# Patient Record
Sex: FEMALE | Race: WHITE | NOT HISPANIC OR LATINO | Employment: OTHER | URBAN - METROPOLITAN AREA
[De-identification: names, ages, dates, MRNs, and addresses within clinical notes are randomized per-mention and may not be internally consistent; named-entity substitution may affect disease eponyms.]

---

## 2017-01-24 ENCOUNTER — OFFICE VISIT (OUTPATIENT)
Dept: NEUROSURGERY | Facility: CLINIC | Age: 37
End: 2017-01-24
Payer: MEDICARE

## 2017-01-24 VITALS
DIASTOLIC BLOOD PRESSURE: 78 MMHG | WEIGHT: 293 LBS | SYSTOLIC BLOOD PRESSURE: 138 MMHG | HEART RATE: 82 BPM | HEIGHT: 57 IN | BODY MASS INDEX: 63.21 KG/M2 | TEMPERATURE: 99 F

## 2017-01-24 DIAGNOSIS — G93.5 ARNOLD-CHIARI MALFORMATION, TYPE I: ICD-10-CM

## 2017-01-24 DIAGNOSIS — E66.01 MORBID OBESITY, UNSPECIFIED OBESITY TYPE: ICD-10-CM

## 2017-01-24 DIAGNOSIS — M79.601 RIGHT ARM PAIN: ICD-10-CM

## 2017-01-24 DIAGNOSIS — R42 DIZZINESS: ICD-10-CM

## 2017-01-24 DIAGNOSIS — G93.2 PSEUDOTUMOR CEREBRI: ICD-10-CM

## 2017-01-24 DIAGNOSIS — Z98.2 S/P VENTRICULOPERITONEAL SHUNT: Primary | ICD-10-CM

## 2017-01-24 PROCEDURE — 99214 OFFICE O/P EST MOD 30 MIN: CPT | Mod: S$GLB,,, | Performed by: NURSE PRACTITIONER

## 2017-01-24 PROCEDURE — 62252 CSF SHUNT REPROGRAM: CPT | Mod: S$GLB,,, | Performed by: NURSE PRACTITIONER

## 2017-01-24 RX ORDER — HYDROCODONE BITARTRATE AND ACETAMINOPHEN 10; 325 MG/1; MG/1
1 TABLET ORAL EVERY 4 HOURS PRN
Status: ON HOLD | COMMUNITY
End: 2017-03-08 | Stop reason: HOSPADM

## 2017-01-24 RX ORDER — NAPROXEN 375 MG/1
375 TABLET ORAL 2 TIMES DAILY
COMMUNITY
End: 2017-02-27 | Stop reason: CLARIF

## 2017-01-24 RX ORDER — FLUTICASONE PROPIONATE 50 MCG
1 SPRAY, SUSPENSION (ML) NASAL DAILY
COMMUNITY
End: 2017-11-09

## 2017-01-24 RX ORDER — BUTALBITAL, ASPIRIN, CAFFEINE AND CODEINE PHOSPHATE 50; 325; 40; 30 MG/1; MG/1; MG/1; MG/1
1 CAPSULE ORAL EVERY 4 HOURS PRN
COMMUNITY
End: 2018-09-12

## 2017-01-24 RX ORDER — CYCLOBENZAPRINE HCL 10 MG
10 TABLET ORAL 3 TIMES DAILY PRN
COMMUNITY
End: 2017-12-11 | Stop reason: SDUPTHER

## 2017-01-24 NOTE — MR AVS SNAPSHOT
[place] - Neurosurgery  [address]  [facility] [address]  Phone: [phone]  Fax: [phone]                  Shanna Douglass   2017 1:30 PM   Office Visit    Description:  Female : 1980   Provider:  Michelle Finnegan NP   Department:  [place] - Neurosurgery           Reason for Visit     Cervical Spine Pain (C-spine)     Shunt Problem                To Do List           Goals (5 Years of Data)     None      [facility]     [facility] On Call Nurse Care Line -  Assistance  Registered nurses in the [facility] provide clinical advisement, health education, appointment booking, and other advisory services.  Call for this free service at [phone].             Medications           Message regarding Medications     Verify the changes and/or additions to your medication regime listed below are the same as discussed with your clinician today.  If any of these changes or additions are incorrect, please notify your healthcare provider.             Verify that the below list of medications is an accurate representation of the medications you are currently taking.  If none reported, the list may be blank. If incorrect, please contact your healthcare provider. Carry this list with you in case of emergency.           Current Medications     amoxicillin (AMOXIL) 875 MG tablet     celecoxib (CELEBREX) 200 MG capsule Take 400 mg by mouth 2 (two) times daily.     cetirizine (ZYRTEC) 10 MG tablet Take 10 mg by mouth once daily.    codeine-butalbital-ASA-caffeine (BUTALBITAL COMPOUND-CODEINE) 18--40 mg Cap Take 1 capsule by mouth every 4 (four) hours as needed.    cyclobenzaprine (FLEXERIL) 10 MG tablet Take 10 mg by mouth 3 (three) times daily as needed for Muscle spasms.    fluticasone (FLONASE) 50 mcg/actuation nasal spray 1 spray by Each Nare route once daily.    furosemide (LASIX) 80 MG tablet Take 80 mg by mouth once daily.     gabapentin (NEURONTIN) 300 MG  "capsule Take 300 mg by mouth once daily.     hydrocodone-acetaminophen 10-325mg (NORCO)  mg Tab Take by mouth.    levothyroxine (SYNTHROID) 137 MCG Tab tablet Take 137 mcg by mouth before breakfast.     losartan (COZAAR) 50 MG tablet Take 50 mg by mouth once daily.     medroxyPROGESTERone (PROVERA) 10 MG tablet Take 10 mg by mouth once daily.     naproxen (NAPROSYN) 375 MG tablet Take 375 mg by mouth 2 (two) times daily.    oxycodone-acetaminophen (PERCOCET) 7.5-325 mg per tablet Take 1 tablet by mouth every 4 (four) hours as needed for Pain.    pantoprazole (PROTONIX) 40 MG tablet Take 40 mg by mouth once daily.     paroxetine (PAXIL) 20 MG tablet Take 20 mg by mouth once daily.     polyethylene glycol (GLYCOLAX) 17 gram PwPk Take 17 g by mouth once daily.    potassium chloride (MICRO-K) 10 MEQ CpSR Take 10 mEq by mouth once daily.    sulfamethoxazole-trimethoprim 800-160mg (BACTRIM DS) 800-160 mg Tab     topiramate (TOPAMAX) 100 MG tablet Take 100 mg by mouth 2 (two) times daily.            Clinical Reference Information           Vital Signs - Last Recorded  Most recent update: 1/24/2017  1:19 PM by Afia Apple LPN    BP Pulse Temp Ht Wt BMI    138/78 82 98.5 °F (36.9 °C) (Oral) 4' 9" (1.448 m) 134 kg (295 lb 6.7 oz) 63.93 kg/m2      Blood Pressure          Most Recent Value    BP  138/78      Allergies as of 1/24/2017     Diamox [Acetazolamide]      Immunizations Administered on Date of Encounter - 1/24/2017     None      [facility] Sign-Up     Activating your [facility] account is as easy as 1-2-3!     1) Visit [email or web address], select Sign Up Now, enter this activation code and your date of birth, then select Next.  [email or web address]  Expires: 3/10/2017  1:04 PM      2) Create a username and password to use when you visit [facility] in the future and select a security question in case you lose your password and select Next.    3) Enter your e-mail address and click Sign Up!    Additional Information  If " you have questions, please e-mail [email or web address] or call [phone] to talk to our [facility] staff. Remember, [facility] is NOT to be used for urgent needs. For medical emergencies, dial 911.

## 2017-01-24 NOTE — PROGRESS NOTES
Neurosurgery Outpatient Follow Up    Patient ID: Shanna Douglass is a 36 y.o. female.    Chief Complaint   Patient presents with    Cervical Spine Pain (C-spine)     5 month history of worsening, moderate-severe neck pain nothing pain, numbness and weakness in her entire RUE to hand. Reports intermittent bladder retention. Pain is increased by walking and slightly alleviated by sitting. She is doing PT with minimal improvement. She has not had injections.     Shunt Problem     Patient states she started having frequent headaches and intermittent dizziness that started about a week ago after having an MRI       Review of Systems   Constitutional: Positive for activity change. Negative for appetite change, chills, fever and unexpected weight change.   HENT: Negative for tinnitus, trouble swallowing and voice change.    Respiratory: Negative for apnea, cough, chest tightness and shortness of breath.    Cardiovascular: Negative for chest pain and palpitations.   Gastrointestinal: Negative for constipation, diarrhea, nausea and vomiting.   Genitourinary: Positive for vaginal pain. Negative for difficulty urinating, dysuria, frequency and urgency.   Musculoskeletal: Positive for arthralgias, gait problem, neck pain and neck stiffness. Negative for back pain.   Skin: Negative for wound.   Neurological: Positive for dizziness, tremors, weakness, numbness and headaches. Negative for seizures, facial asymmetry, speech difficulty and light-headedness.   Psychiatric/Behavioral: Negative for confusion and decreased concentration.       Past Medical History   Diagnosis Date    Asthma     Bronchitis     Chiari malformation type I     Chronic headache     Hyperlipidemia     Hypertension     Murmur, heart     NPH (normal pressure hydrocephalus)     Obesity     Pseudotumor cerebri     Thyroid disease      Social History     Social History    Marital status: Single     Spouse name: N/A    Number of children: N/A     Years of education: N/A     Occupational History    Not on file.     Social History Main Topics    Smoking status: Never Smoker    Smokeless tobacco: Never Used    Alcohol use No    Drug use: No    Sexual activity: Not on file     Other Topics Concern    Not on file     Social History Narrative     Family History   Problem Relation Age of Onset    Diabetes Mother     Hypertension Father     Heart disease Father     Heart disease Maternal Grandfather      Review of patient's allergies indicates:   Allergen Reactions    Diamox [acetazolamide] Hives       Current Outpatient Prescriptions:     amoxicillin (AMOXIL) 875 MG tablet, , Disp: , Rfl:     celecoxib (CELEBREX) 200 MG capsule, Take 400 mg by mouth 2 (two) times daily. , Disp: , Rfl:     cetirizine (ZYRTEC) 10 MG tablet, Take 10 mg by mouth once daily., Disp: , Rfl:     codeine-butalbital-ASA-caffeine (BUTALBITAL COMPOUND-CODEINE) 74--40 mg Cap, Take 1 capsule by mouth every 4 (four) hours as needed., Disp: , Rfl:     cyclobenzaprine (FLEXERIL) 10 MG tablet, Take 10 mg by mouth 3 (three) times daily as needed for Muscle spasms., Disp: , Rfl:     fluticasone (FLONASE) 50 mcg/actuation nasal spray, 1 spray by Each Nare route once daily., Disp: , Rfl:     furosemide (LASIX) 80 MG tablet, Take 80 mg by mouth once daily. , Disp: , Rfl:     gabapentin (NEURONTIN) 300 MG capsule, Take 300 mg by mouth once daily. , Disp: , Rfl:     hydrocodone-acetaminophen 10-325mg (NORCO)  mg Tab, Take by mouth., Disp: , Rfl:     levothyroxine (SYNTHROID) 137 MCG Tab tablet, Take 137 mcg by mouth before breakfast. , Disp: , Rfl:     losartan (COZAAR) 50 MG tablet, Take 50 mg by mouth once daily. , Disp: , Rfl:     medroxyPROGESTERone (PROVERA) 10 MG tablet, Take 10 mg by mouth once daily. , Disp: , Rfl:     naproxen (NAPROSYN) 375 MG tablet, Take 375 mg by mouth 2 (two) times daily., Disp: , Rfl:     oxycodone-acetaminophen (PERCOCET) 7.5-325 mg per  "tablet, Take 1 tablet by mouth every 4 (four) hours as needed for Pain., Disp: 45 tablet, Rfl: 0    pantoprazole (PROTONIX) 40 MG tablet, Take 40 mg by mouth once daily. , Disp: , Rfl:     paroxetine (PAXIL) 20 MG tablet, Take 20 mg by mouth once daily. , Disp: , Rfl:     polyethylene glycol (GLYCOLAX) 17 gram PwPk, Take 17 g by mouth once daily., Disp: 20 packet, Rfl: 1    potassium chloride (MICRO-K) 10 MEQ CpSR, Take 10 mEq by mouth once daily., Disp: , Rfl:     sulfamethoxazole-trimethoprim 800-160mg (BACTRIM DS) 800-160 mg Tab, , Disp: , Rfl:     topiramate (TOPAMAX) 100 MG tablet, Take 100 mg by mouth 2 (two) times daily. , Disp: , Rfl:   Blood pressure 138/78, pulse 82, temperature 98.5 °F (36.9 °C), temperature source Oral, height 4' 9" (1.448 m), weight 134 kg (295 lb 6.7 oz).      Neurologic Exam     Mental Status   Oriented to person, place, and time.   Follows 3 step commands.   Attention: normal. Concentration: normal.   Speech: speech is normal   Level of consciousness: alert  Knowledge: consistent with education.   Able to name object. Able to read. Able to repeat. Able to write. Normal comprehension.     Cranial Nerves     CN II   Visual acuity: normal  Right visual field deficit: none  Left visual field deficit: none     CN III, IV, VI   Pupils are equal, round, and reactive to light.  Extraocular motions are normal.   Right pupil: Size: 3 mm. Shape: regular. Reactivity: brisk. Consensual response: intact. Accommodation: intact.   Left pupil: Size: 3 mm. Shape: regular. Reactivity: brisk. Consensual response: intact. Accommodation: intact.   CN III: no CN III palsy  Nystagmus: none   Diplopia: none  Ophthalmoparesis: none  Conjugate gaze: present    CN V   Right facial sensation deficit: none  Left facial sensation deficit: none    CN VII   Right facial weakness: none  Left facial weakness: none    CN VIII   Hearing: intact    CN IX, X   CN IX normal.   CN X normal.     CN XI   Right " sternocleidomastoid strength: normal  Left sternocleidomastoid strength: normal  Right trapezius strength: normal  Left trapezius strength: normal    CN XII   Fasciculations: absent  Tongue deviation: none    Motor Exam   Muscle bulk: normal  Overall muscle tone: normal  Right arm pronator drift: absent  Left arm pronator drift: absent    Strength   Strength 5/5 except as noted.        Mild weakness left hand intrinsics     Sensory Exam   Light touch normal.   Vibration normal.   Pinprick normal.        Except for hypoesthesia left forearm, hand, digits 1-5     Gait, Coordination, and Reflexes     Coordination   Romberg: negative  Finger to nose coordination: normal  Heel to shin coordination: normal  Tandem walking coordination: abnormal    Tremor   Resting tremor: absent  Intention tremor: absent  Action tremor: left arm    Reflexes   Right brachioradialis: 2+  Left brachioradialis: 2+  Right biceps: 2+  Left biceps: 2+  Right triceps: 2+  Left triceps: 2+  Right Cardenas: present  Left Cardenas: absent  Right ankle clonus: absent  Left ankle clonus: absent       Mild ataxia       Physical Exam   Constitutional: She is oriented to person, place, and time. She appears well-developed and well-nourished.   obese   HENT:   Head: Normocephalic and atraumatic.   Eyes: EOM are normal. Pupils are equal, round, and reactive to light.   Neck: Neck supple. Decreased range of motion present.   Cardiovascular: Normal rate and intact distal pulses.    Pulmonary/Chest: Effort normal. No respiratory distress.   Abdominal: Soft. She exhibits no distension.   Musculoskeletal: She exhibits no edema or deformity.   Neurological: She is oriented to person, place, and time. She has an abnormal Tandem Gait Test. She has a normal Finger-Nose-Finger Test, a normal Heel to Ortiz Test and a normal Romberg Test.   Reflex Scores:       Tricep reflexes are 2+ on the right side and 2+ on the left side.       Bicep reflexes are 2+ on the right side  and 2+ on the left side.       Brachioradialis reflexes are 2+ on the right side and 2+ on the left side.  Skin: Skin is warm and dry.   Psychiatric: She has a normal mood and affect. Her speech is normal and behavior is normal. Judgment and thought content normal.   Developmentally delayed   Nursing note and vitals reviewed.  right frontal shunt valve palpable, compresses without refilling  PFD incision well healed    Provider dictation:  The patient is a 36 year-old morbidly obese  female with pseudotumor cerebri and chiari malformation who is s/p posterior fossa decompression in June 2016. She also has undergone  shunt in the past by another neurosurgeon. She is following up today due to complaints of neck and right arm pain, headache and dizziness. The arm and neck pain has been ongoing for ~5 months. She has reportedly been evaluated by an orthopedic surgeon and neurology. She underwent right shoulder MRI on Friday and needs her shunt valve evaluated due to setting change following MRI. The headache and dizziness started after the MRI. She has also recently undergone EMG/NCS of the bilateral upper extremities but this report is not available.     Most reccent CT head was in 9/2016 at [facility]. Shunt valve was set at 150 prior to MRI and was at 170 post-imaging.     Using the [employer] , I have reset the valve to 150 cm H20. However, I am not certain that the shunt is even functioning due to the fact that the valve compresses without refilling.     Given the above, I would to obtain a new CT head to evaluate the ventricular size. We will also obtain shunt series x-rays to evaluate for any discontinuity of the tubing. Due to her neck and arm pain, weakness, and ataxia, I would like to obtain a cervical MRI. We will see her back in this clinic to determine further plan of care after the above images are completed.     Visit Diagnosis:  S/P ventriculoperitoneal shunt  -     CT Head Without Contrast;  Future; Expected date: 1/24/17  -     MRI Cervical Spine Without Contrast; Future; Expected date: 1/24/17  -     X-Ray Shunt Series; Future; Expected date: 1/24/17  -     X-Ray Skull Ltd Less Than 4 Views; Future; Expected date: 1/24/17    Morbid obesity, unspecified obesity type  -     CT Head Without Contrast; Future; Expected date: 1/24/17  -     MRI Cervical Spine Without Contrast; Future; Expected date: 1/24/17  -     X-Ray Shunt Series; Future; Expected date: 1/24/17  -     X-Ray Skull Ltd Less Than 4 Views; Future; Expected date: 1/24/17    Pseudotumor cerebri  -     CT Head Without Contrast; Future; Expected date: 1/24/17  -     MRI Cervical Spine Without Contrast; Future; Expected date: 1/24/17  -     X-Ray Shunt Series; Future; Expected date: 1/24/17  -     X-Ray Skull Ltd Less Than 4 Views; Future; Expected date: 1/24/17    Arnold-Chiari malformation, type I  -     CT Head Without Contrast; Future; Expected date: 1/24/17  -     MRI Cervical Spine Without Contrast; Future; Expected date: 1/24/17  -     X-Ray Shunt Series; Future; Expected date: 1/24/17  -     X-Ray Skull Ltd Less Than 4 Views; Future; Expected date: 1/24/17    Right arm pain  -     CT Head Without Contrast; Future; Expected date: 1/24/17  -     MRI Cervical Spine Without Contrast; Future; Expected date: 1/24/17  -     X-Ray Shunt Series; Future; Expected date: 1/24/17  -     X-Ray Skull Ltd Less Than 4 Views; Future; Expected date: 1/24/17    Dizziness  -     CT Head Without Contrast; Future; Expected date: 1/24/17  -     MRI Cervical Spine Without Contrast; Future; Expected date: 1/24/17  -     X-Ray Shunt Series; Future; Expected date: 1/24/17  -     X-Ray Skull Ltd Less Than 4 Views; Future; Expected date: 1/24/17

## 2017-02-09 ENCOUNTER — OFFICE VISIT (OUTPATIENT)
Dept: NEUROSURGERY | Facility: CLINIC | Age: 37
End: 2017-02-09
Payer: MEDICARE

## 2017-02-09 VITALS
DIASTOLIC BLOOD PRESSURE: 76 MMHG | SYSTOLIC BLOOD PRESSURE: 127 MMHG | HEIGHT: 57 IN | HEART RATE: 90 BPM | BODY MASS INDEX: 61.5 KG/M2 | WEIGHT: 285.06 LBS | TEMPERATURE: 98 F

## 2017-02-09 DIAGNOSIS — M50.30 DEGENERATION OF CERVICAL DISC WITHOUT MYELOPATHY: ICD-10-CM

## 2017-02-09 DIAGNOSIS — R51.9 CHRONIC NONINTRACTABLE HEADACHE, UNSPECIFIED HEADACHE TYPE: ICD-10-CM

## 2017-02-09 DIAGNOSIS — Z98.2 S/P VENTRICULOPERITONEAL SHUNT: Primary | ICD-10-CM

## 2017-02-09 DIAGNOSIS — G89.29 CHRONIC NONINTRACTABLE HEADACHE, UNSPECIFIED HEADACHE TYPE: ICD-10-CM

## 2017-02-09 DIAGNOSIS — E66.01 MORBID OBESITY, UNSPECIFIED OBESITY TYPE: ICD-10-CM

## 2017-02-09 DIAGNOSIS — G93.2 PSEUDOTUMOR CEREBRI: ICD-10-CM

## 2017-02-09 PROCEDURE — 99214 OFFICE O/P EST MOD 30 MIN: CPT | Mod: S$GLB,,, | Performed by: NURSE PRACTITIONER

## 2017-02-09 NOTE — MR AVS SNAPSHOT
[facility] - Neurosurgery  [address]  [facility] [address]  Phone: [phone]  Fax: [phone]                  Shanna Douglass   2017 3:00 PM   Office Visit    Description:  Female : 1980   Provider:  Michelle Finnegan NP   Department:  [facility] - Neurosurgery           Reason for Visit     Follow-up                To Do List           Goals (5 Years of Data)     None      [facility]     [facility] On Call Nurse Care Line -  Assistance  Registered nurses in the [facility] provide clinical advisement, health education, appointment booking, and other advisory services.  Call for this free service at [phone].             Medications           Message regarding Medications     Verify the changes and/or additions to your medication regime listed below are the same as discussed with your clinician today.  If any of these changes or additions are incorrect, please notify your healthcare provider.             Verify that the below list of medications is an accurate representation of the medications you are currently taking.  If none reported, the list may be blank. If incorrect, please contact your healthcare provider. Carry this list with you in case of emergency.           Current Medications     amoxicillin (AMOXIL) 875 MG tablet     celecoxib (CELEBREX) 200 MG capsule Take 400 mg by mouth 2 (two) times daily.     cetirizine (ZYRTEC) 10 MG tablet Take 10 mg by mouth once daily.    codeine-butalbital-ASA-caffeine (BUTALBITAL COMPOUND-CODEINE) 53--40 mg Cap Take 1 capsule by mouth every 4 (four) hours as needed.    cyclobenzaprine (FLEXERIL) 10 MG tablet Take 10 mg by mouth 3 (three) times daily as needed for Muscle spasms.    fluticasone (FLONASE) 50 mcg/actuation nasal spray 1 spray by Each Nare route once daily.    furosemide (LASIX) 80 MG tablet Take 80 mg by mouth once daily.     gabapentin (NEURONTIN) 300 MG capsule Take 300 mg by mouth once daily.      "hydrocodone-acetaminophen 10-325mg (NORCO)  mg Tab Take by mouth.    levothyroxine (SYNTHROID) 137 MCG Tab tablet Take 137 mcg by mouth before breakfast.     losartan (COZAAR) 50 MG tablet Take 50 mg by mouth once daily.     medroxyPROGESTERone (PROVERA) 10 MG tablet Take 10 mg by mouth once daily.     naproxen (NAPROSYN) 375 MG tablet Take 375 mg by mouth 2 (two) times daily.    oxycodone-acetaminophen (PERCOCET) 7.5-325 mg per tablet Take 1 tablet by mouth every 4 (four) hours as needed for Pain.    pantoprazole (PROTONIX) 40 MG tablet Take 40 mg by mouth once daily.     paroxetine (PAXIL) 20 MG tablet Take 20 mg by mouth once daily.     polyethylene glycol (GLYCOLAX) 17 gram PwPk Take 17 g by mouth once daily.    potassium chloride (MICRO-K) 10 MEQ CpSR Take 10 mEq by mouth once daily.    sulfamethoxazole-trimethoprim 800-160mg (BACTRIM DS) 800-160 mg Tab     topiramate (TOPAMAX) 100 MG tablet Take 100 mg by mouth 2 (two) times daily.            Clinical Reference Information           Your Vitals Were     BP Pulse Temp Height Weight BMI    127/76 90 98.2 °F (36.8 °C) (Oral) 4' 9" (1.448 m) 129.3 kg (285 lb 0.9 oz) 61.69 kg/m2      Blood Pressure          Most Recent Value    BP  127/76      Allergies as of 2/9/2017     Diamox [Acetazolamide]      Immunizations Administered on Date of Encounter - 2/9/2017     None      Language Assistance Services     ATTENTION: Language assistance services are available, free of charge. Please call [phone].      ATENCIÓN: Si habla lyric, tiene a garcia disposición servicios gratuitos de asistencia lingüística. Llame al [phone].     [facility] Ý: N?u b?n nói Ti?ng Vi?t, có các d?ch v? h? tr? ngôn ng? mi?n phí dành cho b?n. G?i s? [phone].         [facility] complies with applicable Federal civil rights laws and does not discriminate on the basis of race, color, national origin, age, disability, or sex.        "

## 2017-02-09 NOTE — PROGRESS NOTES
Neurosurgery Outpatient Follow Up    Patient ID: Shanna Douglass is a 36 y.o. female.    Chief Complaint   Patient presents with    Follow-up     States headaches have increased in severity since last visit.        Review of Systems   Constitutional: Positive for activity change. Negative for appetite change, chills, fever and unexpected weight change.   HENT: Negative for tinnitus, trouble swallowing and voice change.    Respiratory: Negative for apnea, cough, chest tightness and shortness of breath.    Cardiovascular: Negative for chest pain and palpitations.   Gastrointestinal: Negative for constipation, diarrhea, nausea and vomiting.   Genitourinary: Negative for difficulty urinating, dysuria, frequency and urgency.   Musculoskeletal: Positive for arthralgias, gait problem, neck pain and neck stiffness. Negative for back pain.   Skin: Negative for wound.   Neurological: Positive for dizziness, tremors, weakness, numbness and headaches. Negative for seizures, facial asymmetry, speech difficulty and light-headedness.   Psychiatric/Behavioral: Negative for confusion and decreased concentration.       Past Medical History   Diagnosis Date    Asthma     Bronchitis     Chiari malformation type I     Chronic headache     Hyperlipidemia     Hypertension     Murmur, heart     NPH (normal pressure hydrocephalus)     Obesity     Pseudotumor cerebri     Thyroid disease      Social History     Social History    Marital status: Single     Spouse name: N/A    Number of children: N/A    Years of education: N/A     Occupational History    Not on file.     Social History Main Topics    Smoking status: Never Smoker    Smokeless tobacco: Never Used    Alcohol use No    Drug use: No    Sexual activity: Not on file     Other Topics Concern    Not on file     Social History Narrative     Family History   Problem Relation Age of Onset    Diabetes Mother     Hypertension Father     Heart disease Father      Heart disease Maternal Grandfather      Review of patient's allergies indicates:   Allergen Reactions    Diamox [acetazolamide] Hives       Current Outpatient Prescriptions:     amoxicillin (AMOXIL) 875 MG tablet, , Disp: , Rfl:     celecoxib (CELEBREX) 200 MG capsule, Take 400 mg by mouth 2 (two) times daily. , Disp: , Rfl:     cetirizine (ZYRTEC) 10 MG tablet, Take 10 mg by mouth once daily., Disp: , Rfl:     codeine-butalbital-ASA-caffeine (BUTALBITAL COMPOUND-CODEINE) 11--40 mg Cap, Take 1 capsule by mouth every 4 (four) hours as needed., Disp: , Rfl:     cyclobenzaprine (FLEXERIL) 10 MG tablet, Take 10 mg by mouth 3 (three) times daily as needed for Muscle spasms., Disp: , Rfl:     fluticasone (FLONASE) 50 mcg/actuation nasal spray, 1 spray by Each Nare route once daily., Disp: , Rfl:     furosemide (LASIX) 80 MG tablet, Take 80 mg by mouth once daily. , Disp: , Rfl:     gabapentin (NEURONTIN) 300 MG capsule, Take 300 mg by mouth once daily. , Disp: , Rfl:     hydrocodone-acetaminophen 10-325mg (NORCO)  mg Tab, Take by mouth., Disp: , Rfl:     levothyroxine (SYNTHROID) 137 MCG Tab tablet, Take 137 mcg by mouth before breakfast. , Disp: , Rfl:     losartan (COZAAR) 50 MG tablet, Take 50 mg by mouth once daily. , Disp: , Rfl:     medroxyPROGESTERone (PROVERA) 10 MG tablet, Take 10 mg by mouth once daily. , Disp: , Rfl:     naproxen (NAPROSYN) 375 MG tablet, Take 375 mg by mouth 2 (two) times daily., Disp: , Rfl:     oxycodone-acetaminophen (PERCOCET) 7.5-325 mg per tablet, Take 1 tablet by mouth every 4 (four) hours as needed for Pain., Disp: 45 tablet, Rfl: 0    pantoprazole (PROTONIX) 40 MG tablet, Take 40 mg by mouth once daily. , Disp: , Rfl:     paroxetine (PAXIL) 20 MG tablet, Take 20 mg by mouth once daily. , Disp: , Rfl:     polyethylene glycol (GLYCOLAX) 17 gram PwPk, Take 17 g by mouth once daily., Disp: 20 packet, Rfl: 1    potassium chloride (MICRO-K) 10 MEQ CpSR, Take  "10 mEq by mouth once daily., Disp: , Rfl:     sulfamethoxazole-trimethoprim 800-160mg (BACTRIM DS) 800-160 mg Tab, , Disp: , Rfl:     topiramate (TOPAMAX) 100 MG tablet, Take 100 mg by mouth 2 (two) times daily. , Disp: , Rfl:   Blood pressure 127/76, pulse 90, temperature 98.2 °F (36.8 °C), temperature source Oral, height 4' 9" (1.448 m), weight 129.3 kg (285 lb 0.9 oz).      Neurologic Exam     Mental Status   Oriented to person, place, and time.   Follows 3 step commands.   Attention: normal. Concentration: normal.   Speech: speech is normal   Level of consciousness: alert  Knowledge: consistent with education.   Able to name object. Able to read. Able to repeat. Able to write. Normal comprehension.     Cranial Nerves     CN II   Visual acuity: normal  Right visual field deficit: none  Left visual field deficit: none     CN III, IV, VI   Pupils are equal, round, and reactive to light.  Extraocular motions are normal.   Right pupil: Size: 3 mm. Shape: regular. Reactivity: brisk. Consensual response: intact. Accommodation: intact.   Left pupil: Size: 3 mm. Shape: regular. Reactivity: brisk. Consensual response: intact. Accommodation: intact.   CN III: no CN III palsy  Nystagmus: none   Diplopia: none  Ophthalmoparesis: none  Conjugate gaze: present    CN V   Right facial sensation deficit: none  Left facial sensation deficit: none    CN VII   Right facial weakness: none  Left facial weakness: none    CN VIII   Hearing: intact    CN IX, X   CN IX normal.   CN X normal.     CN XI   Right sternocleidomastoid strength: normal  Left sternocleidomastoid strength: normal  Right trapezius strength: normal  Left trapezius strength: normal    CN XII   Fasciculations: absent  Tongue deviation: none    Motor Exam   Muscle bulk: normal  Overall muscle tone: normal  Right arm pronator drift: absent  Left arm pronator drift: absent    Strength   Strength 5/5 except as noted.        Mild weakness left hand intrinsics     Sensory " Exam   Light touch normal.   Vibration normal.   Pinprick normal.        Except for hypoesthesia left forearm, hand, digits 1-5     Gait, Coordination, and Reflexes     Coordination   Romberg: negative  Finger to nose coordination: normal  Heel to shin coordination: normal  Tandem walking coordination: abnormal    Tremor   Resting tremor: absent  Intention tremor: absent  Action tremor: left arm    Reflexes   Right brachioradialis: 2+  Left brachioradialis: 2+  Right biceps: 2+  Left biceps: 2+  Right triceps: 2+  Left triceps: 2+  Right Cardenas: present  Left Cardenas: absent  Right ankle clonus: absent  Left ankle clonus: absent       Mild ataxia       Physical Exam   Constitutional: She is oriented to person, place, and time. She appears well-developed and well-nourished.   obese   HENT:   Head: Normocephalic and atraumatic.   Eyes: EOM are normal. Pupils are equal, round, and reactive to light.   Neck: Neck supple. Decreased range of motion present.   Cardiovascular: Normal rate and intact distal pulses.    Pulmonary/Chest: Effort normal. No respiratory distress.   Abdominal: Soft. She exhibits no distension.   Musculoskeletal: She exhibits no edema or deformity.   Neurological: She is oriented to person, place, and time. She has an abnormal Tandem Gait Test. She has a normal Finger-Nose-Finger Test, a normal Heel to Ortiz Test and a normal Romberg Test.   Reflex Scores:       Tricep reflexes are 2+ on the right side and 2+ on the left side.       Bicep reflexes are 2+ on the right side and 2+ on the left side.       Brachioradialis reflexes are 2+ on the right side and 2+ on the left side.  Skin: Skin is warm and dry.   Psychiatric: She has a normal mood and affect. Her speech is normal and behavior is normal. Judgment and thought content normal.   Developmentally delayed   Nursing note and vitals reviewed.  right frontal shunt valve palpable, compresses without refilling  PFD incision well healed    Provider  "dictation:  The patient is a 36 year-old morbidly obese  female with pseudotumor cerebri and chiari malformation who is s/p posterior fossa decompression in June 2016. She also has undergone  shunt in the past by another neurosurgeon.     She is following up today with a new CT head and cervical MRI.     Per her last visit: "She is following up due to complaints of neck and right arm pain, headache and dizziness. The arm and neck pain has been ongoing for ~5 months. She has reportedly been evaluated by an orthopedic surgeon and neurology. She underwent right shoulder MRI. The headache and dizziness started after the MRI. She has also recently undergone EMG/NCS of the bilateral upper extremities."     EMG report reviewed and reveals recurrent, mild bilateral carpal tunnel syndrome effecting both motor and sensory fibers.     CT head from 2/9/2017 at [facility] reviewed and reveals sulcal effacement with slit ventricles, consistent with severe pseudotumor cerebri.     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 There are degenerative findings that are most pronounced at C4/5 where there is a right paracentral disc protrusion that distorts the right hemicord and effaces the thecal sac.     Given the above, the patient's symptoms could be related to persistent increased ICP despite  shunt and posterior fossa decompression. She may be a candidate for cranial expansion procedure and we would like to purse admission in the near future for ICP bolt monitor insertion to better evaluate her ICP trend over 24-48 hours. If consistently high, we will proceed with cranial expansion. Shunt revision would not provide any benefit, given there is very little CSF available to drain.    The degree of cervical stenosis at C4/5 is likely contributing to her right arm/shoulder pain and some of her neck and head pain. She may require decompression in the " future.     We would like to address the pseudotumor cerebri first. IF she remains symptomatic, then we will address the cervical stenosis.     I have reviewed the patient's history, physical exam and neuroimaging with Dr. Velasco and he is in agreement with this plan. The patient would like to proceed with surgery as soon as possible.    Visit Diagnosis:  S/P ventriculoperitoneal shunt    Chronic nonintractable headache, unspecified headache type    Morbid obesity, unspecified obesity type    Pseudotumor cerebri    Degeneration of cervical disc without myelopathy

## 2017-02-14 DIAGNOSIS — G93.2 PSEUDOTUMOR CEREBRI: Primary | ICD-10-CM

## 2017-03-01 ENCOUNTER — TELEPHONE (OUTPATIENT)
Dept: NEUROSURGERY | Facility: CLINIC | Age: 37
End: 2017-03-01

## 2017-03-01 NOTE — TELEPHONE ENCOUNTER
Received a call from Mouna in Pre-access at [facility], that pt had an abnormal urine that needs to be treated prior to surgery. While discussing this we found that the order for urine culture was not released by Pre-access. Informed Mouna we will not be able to treat this until a urine culture is completed. Mouna states she will contact the pt to come in for another UA and Culture.

## 2017-03-06 PROBLEM — Z01.818 PRE-OP EVALUATION: Status: ACTIVE | Noted: 2017-03-06

## 2017-03-13 ENCOUNTER — CLINICAL SUPPORT (OUTPATIENT)
Dept: NEUROSURGERY | Facility: CLINIC | Age: 37
End: 2017-03-13
Payer: MEDICARE

## 2017-03-13 NOTE — PROGRESS NOTES
Pt is 7 days s/p right frontal ICP bolt monitor with Dr. Velasco. No s/s of infection. Staples removed with no issue. Pt reports post-operative pain level = pre-operative state. Pt is requesting medication refill today. Pt re-educated on narcotics policy. Per JR, NP, no refills to be given. Pt aware that no f/u is needed for this matter. No further questions.

## 2017-03-13 NOTE — MR AVS SNAPSHOT
[facility]  [address]  [place] [address]  Phone: [phone]  Fax: [phone]                  Shanna Douglass   3/13/2017 1:30 PM   Clinical Support    Description:  Female : 1980   Provider:  [facility]   Department:  [facility]                To Do List           Future Appointments        Provider Department Dept Phone    2017 10:30 AM Stanley Velasco MD [facility] Neurosurgery [phone]      Goals (5 Years of Data)     None      [facility]     [facility] On Call Nurse Care Line -  Assistance  Registered nurses in the [facility] On Call Center provide clinical advisement, health education, appointment booking, and other advisory services.  Call for this free service at [phone].             Medications           Message regarding Medications     Verify the changes and/or additions to your medication regime listed below are the same as discussed with your clinician today.  If any of these changes or additions are incorrect, please notify your healthcare provider.             Verify that the below list of medications is an accurate representation of the medications you are currently taking.  If none reported, the list may be blank. If incorrect, please contact your healthcare provider. Carry this list with you in case of emergency.           Current Medications     aspirin (ECOTRIN) 81 MG EC tablet Take 81 mg by mouth once daily.    cetirizine (ZYRTEC) 10 MG tablet Take 10 mg by mouth once daily.    codeine-butalbital-ASA-caffeine (BUTALBITAL COMPOUND-CODEINE) 46--40 mg Cap Take 1 capsule by mouth every 4 (four) hours as needed.    cyclobenzaprine (FLEXERIL) 10 MG tablet Take 10 mg by mouth 3 (three) times daily as needed for Muscle spasms.    fluticasone (FLONASE) 50 mcg/actuation nasal spray 1 spray by Each Nare route once daily.    furosemide (LASIX) 80 MG tablet Take 80 mg by mouth once daily.      hydrocodone-acetaminophen 5-325mg (NORCO) 5-325 mg per tablet Take 1 tablet by mouth every 6 (six) hours as needed for Pain.    levothyroxine (SYNTHROID) 137 MCG Tab tablet Take 125 mcg by mouth before breakfast.     losartan (COZAAR) 50 MG tablet Take 50 mg by mouth once daily.     pantoprazole (PROTONIX) 40 MG tablet Take 40 mg by mouth once daily.     paroxetine (PAXIL) 20 MG tablet Take 20 mg by mouth once daily.     potassium chloride (MICRO-K) 10 MEQ CpSR Take 10 mEq by mouth once daily.    topiramate (TOPAMAX) 100 MG tablet Take 100 mg by mouth 2 (two) times daily.            Clinical Reference Information           Your Vitals Were     Last Period                   01/27/2017           Allergies as of 3/13/2017     Diamox [Acetazolamide]    Dye      Immunizations Administered on Date of Encounter - 3/13/2017     None      Language Assistance Services     ATTENTION: Language assistance services are available, free of charge. Please call [phone].      ATENCIÓN: Si marianola lyric, tiene a garcia disposición servicios gratuitos de asistencia lingüística. Llame al [phone].     GURMEET Ý: N?u b?n nói Ti?ng Vi?t, có các d?ch v? h? tr? ngôn ng? mi?n phí dành cho b?n. G?i s? [phone].         [facility] complies with applicable Federal civil rights laws and does not discriminate on the basis of race, color, national origin, age, disability, or sex.

## 2017-03-16 ENCOUNTER — PATIENT MESSAGE (OUTPATIENT)
Dept: ORTHOPEDIC SURGERY | Facility: CLINIC | Age: 37
End: 2017-03-16

## 2017-04-04 ENCOUNTER — OFFICE VISIT (OUTPATIENT)
Dept: CARDIOLOGY | Facility: CLINIC | Age: 37
End: 2017-04-04
Payer: MEDICARE

## 2017-04-04 VITALS
DIASTOLIC BLOOD PRESSURE: 60 MMHG | BODY MASS INDEX: 58.91 KG/M2 | HEART RATE: 114 BPM | HEIGHT: 57 IN | WEIGHT: 273.06 LBS | SYSTOLIC BLOOD PRESSURE: 114 MMHG

## 2017-04-04 DIAGNOSIS — Z98.2 S/P VENTRICULOPERITONEAL SHUNT: Primary | ICD-10-CM

## 2017-04-04 DIAGNOSIS — I10 ESSENTIAL HYPERTENSION: ICD-10-CM

## 2017-04-04 PROCEDURE — 99999 PR PBB SHADOW E&M-EST. PATIENT-LVL II: CPT | Mod: PBBFAC,,, | Performed by: INTERNAL MEDICINE

## 2017-04-04 PROCEDURE — 99204 OFFICE O/P NEW MOD 45 MIN: CPT | Mod: S$PBB,,, | Performed by: INTERNAL MEDICINE

## 2017-04-04 PROCEDURE — 99212 OFFICE O/P EST SF 10 MIN: CPT | Mod: PBBFAC,PO | Performed by: INTERNAL MEDICINE

## 2017-04-04 RX ORDER — CIPROFLOXACIN 500 MG/1
500 TABLET ORAL
COMMUNITY
End: 2017-10-03

## 2017-04-04 RX ORDER — LEVOTHYROXINE SODIUM 125 UG/1
125 TABLET ORAL DAILY
COMMUNITY
End: 2018-05-03

## 2017-04-04 RX ORDER — ONDANSETRON 4 MG/1
8 TABLET, ORALLY DISINTEGRATING ORAL EVERY 4 HOURS PRN
COMMUNITY
End: 2017-12-11

## 2017-04-04 RX ORDER — BENZONATATE 100 MG/1
100 CAPSULE ORAL 3 TIMES DAILY PRN
COMMUNITY
End: 2017-11-09

## 2017-04-04 RX ORDER — ASPIRIN 81 MG
100 TABLET, DELAYED RELEASE (ENTERIC COATED) ORAL
COMMUNITY
End: 2018-09-12

## 2017-04-04 RX ORDER — GABAPENTIN 800 MG/1
800 TABLET ORAL 3 TIMES DAILY
COMMUNITY
End: 2017-09-19

## 2017-04-04 NOTE — PROGRESS NOTES
Subjective:   Patient ID:  Shanna Douglass is a 36 y.o. female who presents for follow-up to establish care. Pt with hx of murmur.  Pt with  shunt last month.Patient denies CP, angina or anginal equivalent.    Hypertension   This is a chronic problem. The current episode started more than 1 year ago. The problem has been gradually improving since onset. The problem is controlled. Pertinent negatives include no chest pain, palpitations or shortness of breath. Past treatments include angiotensin blockers and diuretics. The current treatment provides moderate improvement. Compliance problems include exercise.        Review of Systems   Constitution: Negative. Negative for weight gain.   HENT: Negative.    Eyes: Negative.    Cardiovascular: Negative.  Negative for chest pain, leg swelling and palpitations.   Respiratory: Negative.  Negative for shortness of breath.    Endocrine: Negative.    Hematologic/Lymphatic: Negative.    Skin: Negative.    Musculoskeletal: Negative for muscle weakness.   Gastrointestinal: Negative.    Genitourinary: Negative.    Neurological: Negative.  Negative for dizziness.   Psychiatric/Behavioral: Negative.    Allergic/Immunologic: Negative.      Family History   Problem Relation Age of Onset    Diabetes Mother     Hypertension Father     Heart disease Father     Heart disease Maternal Grandfather      Past Medical History:   Diagnosis Date    Asthma     Bronchitis     Chiari malformation type I     Chronic headache     Hyperlipidemia     Hypertension     Murmur, heart     NPH (normal pressure hydrocephalus)     Obesity     MANUEL on CPAP     Pseudotumor cerebri     Thyroid disease     Hadley syndrome      Current Outpatient Prescriptions on File Prior to Visit   Medication Sig Dispense Refill    aspirin (ECOTRIN) 81 MG EC tablet Take 81 mg by mouth once daily.      cetirizine (ZYRTEC) 10 MG tablet Take 10 mg by mouth once daily.      codeine-butalbital-ASA-caffeine  (BUTALBITAL COMPOUND-CODEINE) 23--40 mg Cap Take 1 capsule by mouth every 4 (four) hours as needed.      cyclobenzaprine (FLEXERIL) 10 MG tablet Take 10 mg by mouth 3 (three) times daily as needed for Muscle spasms.      fluticasone (FLONASE) 50 mcg/actuation nasal spray 1 spray by Each Nare route once daily.      furosemide (LASIX) 80 MG tablet Take 80 mg by mouth once daily.       hydrocodone-acetaminophen 5-325mg (NORCO) 5-325 mg per tablet Take 1 tablet by mouth every 6 (six) hours as needed for Pain. 30 tablet 0    losartan (COZAAR) 50 MG tablet Take 50 mg by mouth once daily.       pantoprazole (PROTONIX) 40 MG tablet Take 40 mg by mouth once daily.       paroxetine (PAXIL) 20 MG tablet Take 20 mg by mouth once daily.       potassium chloride (MICRO-K) 10 MEQ CpSR Take 10 mEq by mouth once daily.      topiramate (TOPAMAX) 100 MG tablet Take 100 mg by mouth 2 (two) times daily.       [DISCONTINUED] levothyroxine (SYNTHROID) 137 MCG Tab tablet Take 125 mcg by mouth before breakfast.        No current facility-administered medications on file prior to visit.      Review of patient's allergies indicates:   Allergen Reactions    Diamox [acetazolamide] Hives    Dye Swelling and Rash       Objective:     Physical Exam   Constitutional: She is oriented to person, place, and time. She appears well-developed and well-nourished.   HENT:   Head: Normocephalic and atraumatic.   Eyes: Conjunctivae and EOM are normal. Pupils are equal, round, and reactive to light.   Neck: Normal range of motion. Neck supple.   Cardiovascular: Normal rate, regular rhythm, normal heart sounds and intact distal pulses.    Pulmonary/Chest: Effort normal and breath sounds normal.   Abdominal: Soft. Bowel sounds are normal.   Musculoskeletal: Normal range of motion.   Neurological: She is alert and oriented to person, place, and time.   Skin: Skin is warm and dry.   Psychiatric: She has a normal mood and affect.   Nursing note  and vitals reviewed.      Assessment:     1. S/P ventriculoperitoneal shunt    2. Essential hypertension    3. BMI 50.0-59.9, adult    4. Sleep Apnea    Plan:     S/P ventriculoperitoneal shunt    Essential hypertension    BMI 50.0-59.9, adult    echo  Continue losartan, diuretic- htn  Weight loss

## 2017-04-04 NOTE — MR AVS SNAPSHOT
[facility]  [address]  [place] [address]  Phone: [phone]  Fax: [phone]                  Shanna Douglass   2017 1:20 PM   Office Visit    Description:  Female : 1980   Provider:  Kit Bucio MD   Department:  [facility]           Reason for Visit     Consult           Diagnoses this Visit        Comments    S/P ventriculoperitoneal shunt    -  Primary     Essential hypertension         BMI 50.0-59.9, adult                To Do List           Future Appointments        Provider Department Dept Phone    2017 10:30 AM Stanley Velasco MD [facility] Neurosurgery [phone]      Goals (5 Years of Data)     None      Follow-Up and Disposition     Return in about 4 months (around 2017).      [facility] On Call     [facility] On Call Nurse Care Line -  Assistance  Unless otherwise directed by your provider, please contact [facility], our nurse care line that is available for  assistance.     Registered nurses in the [facility] On Call Center provide: appointment scheduling, clinical advisement, health education, and other advisory services.  Call: [phone] (toll free)               Medications           Message regarding Medications     Verify the changes and/or additions to your medication regime listed below are the same as discussed with your clinician today.  If any of these changes or additions are incorrect, please notify your healthcare provider.             Verify that the below list of medications is an accurate representation of the medications you are currently taking.  If none reported, the list may be blank. If incorrect, please contact your healthcare provider. Carry this list with you in case of emergency.           Current Medications     aspirin (ECOTRIN) 81 MG EC tablet Take 81 mg by mouth once daily.    benzonatate (TESSALON) 100 MG capsule Take 100 mg by mouth 3 (three) times daily as needed for Cough.     "butalbital-aspirin-caffeine (BUTALBITAL COMPOUND) -40 mg Tab Take by mouth.    cetirizine (ZYRTEC) 10 MG tablet Take 10 mg by mouth once daily.    ciprofloxacin HCl (CIPRO) 500 MG tablet Take 500 mg by mouth every 12 (twelve) hours.    codeine-butalbital-ASA-caffeine (BUTALBITAL COMPOUND-CODEINE) 62--40 mg Cap Take 1 capsule by mouth every 4 (four) hours as needed.    cyclobenzaprine (FLEXERIL) 10 MG tablet Take 10 mg by mouth 3 (three) times daily as needed for Muscle spasms.    docusate sodium (STOOL SOFTENER) 100 mg capsule Take 100 mg by mouth as needed for Constipation.    fluticasone (FLONASE) 50 mcg/actuation nasal spray 1 spray by Each Nare route once daily.    furosemide (LASIX) 80 MG tablet Take 80 mg by mouth once daily.     gabapentin (NEURONTIN) 800 MG tablet Take 800 mg by mouth 3 (three) times daily.    hydrocodone-acetaminophen 5-325mg (NORCO) 5-325 mg per tablet Take 1 tablet by mouth every 6 (six) hours as needed for Pain.    levothyroxine (SYNTHROID) 125 MCG tablet Take 125 mcg by mouth once daily.    losartan (COZAAR) 50 MG tablet Take 50 mg by mouth once daily.     ondansetron (ZOFRAN-ODT) 4 MG TbDL Take 8 mg by mouth every 4 (four) hours as needed.    pantoprazole (PROTONIX) 40 MG tablet Take 40 mg by mouth once daily.     paroxetine (PAXIL) 20 MG tablet Take 20 mg by mouth once daily.     potassium chloride (MICRO-K) 10 MEQ CpSR Take 10 mEq by mouth once daily.    topiramate (TOPAMAX) 100 MG tablet Take 100 mg by mouth 2 (two) times daily.            Clinical Reference Information           Your Vitals Were     BP Pulse Height Weight BMI    114/60 (BP Location: Left arm, Patient Position: Sitting, BP Method: Automatic) 114 4' 9" (1.448 m) 123.9 kg (273 lb 0.6 oz) 59.09 kg/m2      Blood Pressure          Most Recent Value    BP  114/60      Allergies as of 4/4/2017     Diamox [Acetazolamide]    Dye      Immunizations Administered on Date of Encounter - 4/4/2017     None      Orders " Placed During Today's Visit      Normal Orders This Visit    2D echo with color flow doppler       Language Assistance Services     ATTENTION: Language assistance services are available, free of charge. Please call [phone].      ATENCIÓN: Si habla lyric, tiene a garcia disposición servicios gratuitos de asistencia lingüística. Llame al [phone].     CHÚ Ý: N?u b?n nói Ti?ng Vi?t, có các d?ch v? h? tr? ngôn ng? mi?n phí dành cho b?n. G?i s? [phone].         [facility] complies with applicable Federal civil rights laws and does not discriminate on the basis of race, color, national origin, age, disability, or sex.

## 2017-04-06 ENCOUNTER — OFFICE VISIT (OUTPATIENT)
Dept: NEUROSURGERY | Facility: CLINIC | Age: 37
End: 2017-04-06
Payer: MEDICARE

## 2017-04-06 VITALS
DIASTOLIC BLOOD PRESSURE: 85 MMHG | HEART RATE: 100 BPM | SYSTOLIC BLOOD PRESSURE: 129 MMHG | WEIGHT: 274.25 LBS | BODY MASS INDEX: 59.17 KG/M2 | HEIGHT: 57 IN

## 2017-04-06 DIAGNOSIS — G89.29 CHRONIC BILATERAL LOW BACK PAIN WITHOUT SCIATICA: ICD-10-CM

## 2017-04-06 DIAGNOSIS — M50.20 HERNIATED CERVICAL INTERVERTEBRAL DISC: ICD-10-CM

## 2017-04-06 DIAGNOSIS — G93.5 ARNOLD-CHIARI MALFORMATION, TYPE I: ICD-10-CM

## 2017-04-06 DIAGNOSIS — Z98.2 S/P VENTRICULOPERITONEAL SHUNT: Primary | ICD-10-CM

## 2017-04-06 DIAGNOSIS — E66.01 MORBID OBESITY, UNSPECIFIED OBESITY TYPE: ICD-10-CM

## 2017-04-06 DIAGNOSIS — M54.2 NECK PAIN: ICD-10-CM

## 2017-04-06 DIAGNOSIS — M54.50 CHRONIC BILATERAL LOW BACK PAIN WITHOUT SCIATICA: ICD-10-CM

## 2017-04-06 DIAGNOSIS — G93.2 PSEUDOTUMOR CEREBRI SYNDROME: ICD-10-CM

## 2017-04-06 DIAGNOSIS — M48.02 CERVICAL STENOSIS OF SPINAL CANAL: ICD-10-CM

## 2017-04-06 PROCEDURE — 99214 OFFICE O/P EST MOD 30 MIN: CPT | Mod: S$GLB,,, | Performed by: NEUROLOGICAL SURGERY

## 2017-04-06 NOTE — PROGRESS NOTES
Neurosurgery Outpatient Follow Up    Patient ID: Shanna Douglass is a 36 y.o. female.    Chief Complaint   Patient presents with    Follow-up     Neck pain follow up. States pain has increased since last visit.        Review of Systems   Constitutional: Positive for activity change. Negative for appetite change, chills, fever and unexpected weight change.   HENT: Negative for tinnitus, trouble swallowing and voice change.    Respiratory: Negative for apnea, cough, chest tightness and shortness of breath.    Cardiovascular: Negative for chest pain and palpitations.   Gastrointestinal: Negative for constipation, diarrhea, nausea and vomiting.   Genitourinary: Positive for vaginal pain. Negative for difficulty urinating, dysuria, frequency and urgency.   Musculoskeletal: Positive for arthralgias, gait problem, neck pain and neck stiffness. Negative for back pain.   Skin: Negative for wound.   Neurological: Positive for dizziness, tremors, weakness, numbness and headaches. Negative for seizures, facial asymmetry, speech difficulty and light-headedness.   Psychiatric/Behavioral: Negative for confusion and decreased concentration.       Past Medical History:   Diagnosis Date    Asthma     Bronchitis     Chiari malformation type I     Chronic headache     Hyperlipidemia     Hypertension     Murmur, heart     NPH (normal pressure hydrocephalus)     Obesity     MANUEL on CPAP     Pseudotumor cerebri     Thyroid disease     Hadley syndrome      Social History     Social History    Marital status: Single     Spouse name: N/A    Number of children: N/A    Years of education: N/A     Occupational History    Not on file.     Social History Main Topics    Smoking status: Never Smoker    Smokeless tobacco: Never Used    Alcohol use No    Drug use: No    Sexual activity: Not on file     Other Topics Concern    Not on file     Social History Narrative     Family History   Problem Relation Age of Onset     Diabetes Mother     Hypertension Father     Heart disease Father     Heart disease Maternal Grandfather      Review of patient's allergies indicates:   Allergen Reactions    Diamox [acetazolamide] Hives       Current Outpatient Prescriptions:     aspirin (ECOTRIN) 81 MG EC tablet, Take 81 mg by mouth once daily., Disp: , Rfl:     benzonatate (TESSALON) 100 MG capsule, Take 100 mg by mouth 3 (three) times daily as needed for Cough., Disp: , Rfl:     butalbital-aspirin-caffeine (BUTALBITAL COMPOUND) -40 mg Tab, Take by mouth., Disp: , Rfl:     cetirizine (ZYRTEC) 10 MG tablet, Take 10 mg by mouth once daily., Disp: , Rfl:     ciprofloxacin HCl (CIPRO) 500 MG tablet, Take 500 mg by mouth every 12 (twelve) hours., Disp: , Rfl:     codeine-butalbital-ASA-caffeine (BUTALBITAL COMPOUND-CODEINE) 01--40 mg Cap, Take 1 capsule by mouth every 4 (four) hours as needed., Disp: , Rfl:     cyclobenzaprine (FLEXERIL) 10 MG tablet, Take 10 mg by mouth 3 (three) times daily as needed for Muscle spasms., Disp: , Rfl:     docusate sodium (STOOL SOFTENER) 100 mg capsule, Take 100 mg by mouth as needed for Constipation., Disp: , Rfl:     fluticasone (FLONASE) 50 mcg/actuation nasal spray, 1 spray by Each Nare route once daily., Disp: , Rfl:     furosemide (LASIX) 80 MG tablet, Take 80 mg by mouth once daily. , Disp: , Rfl:     gabapentin (NEURONTIN) 800 MG tablet, Take 800 mg by mouth 3 (three) times daily., Disp: , Rfl:     hydrocodone-acetaminophen 5-325mg (NORCO) 5-325 mg per tablet, Take 1 tablet by mouth every 6 (six) hours as needed for Pain., Disp: 30 tablet, Rfl: 0    levothyroxine (SYNTHROID) 125 MCG tablet, Take 125 mcg by mouth once daily., Disp: , Rfl:     losartan (COZAAR) 50 MG tablet, Take 50 mg by mouth once daily. , Disp: , Rfl:     ondansetron (ZOFRAN-ODT) 4 MG TbDL, Take 8 mg by mouth every 4 (four) hours as needed., Disp: , Rfl:     pantoprazole (PROTONIX) 40 MG tablet, Take 40 mg by  "mouth once daily. , Disp: , Rfl:     paroxetine (PAXIL) 20 MG tablet, Take 20 mg by mouth once daily. , Disp: , Rfl:     potassium chloride (MICRO-K) 10 MEQ CpSR, Take 10 mEq by mouth once daily., Disp: , Rfl:     topiramate (TOPAMAX) 100 MG tablet, Take 100 mg by mouth 2 (two) times daily. , Disp: , Rfl:   Blood pressure 129/85, pulse 100, height 4' 9" (1.448 m), weight 124.4 kg (274 lb 4 oz).      Neurologic Exam     Mental Status   Oriented to person, place, and time.   Follows 3 step commands.   Attention: normal. Concentration: normal.   Speech: speech is normal   Level of consciousness: alert  Knowledge: consistent with education.   Able to name object. Able to read. Able to repeat. Able to write. Normal comprehension.     Cranial Nerves     CN II   Visual acuity: normal  Right visual field deficit: none  Left visual field deficit: none     CN III, IV, VI   Pupils are equal, round, and reactive to light.  Extraocular motions are normal.   Right pupil: Size: 3 mm. Shape: regular. Reactivity: brisk. Consensual response: intact. Accommodation: intact.   Left pupil: Size: 3 mm. Shape: regular. Reactivity: brisk. Consensual response: intact. Accommodation: intact.   CN III: no CN III palsy  Nystagmus: none   Diplopia: none  Ophthalmoparesis: none  Conjugate gaze: present    CN V   Right facial sensation deficit: none  Left facial sensation deficit: none    CN VII   Right facial weakness: none  Left facial weakness: none    CN VIII   Hearing: intact    CN IX, X   CN IX normal.   CN X normal.     CN XI   Right sternocleidomastoid strength: normal  Left sternocleidomastoid strength: normal  Right trapezius strength: normal  Left trapezius strength: normal    CN XII   Fasciculations: absent  Tongue deviation: none    Motor Exam   Muscle bulk: normal  Overall muscle tone: normal  Right arm pronator drift: absent  Left arm pronator drift: absent    Strength   Strength 5/5 except as noted.        Mild weakness left " hand intrinsics     Sensory Exam   Light touch normal.   Vibration normal.   Pinprick normal.        Except for hypoesthesia left forearm, hand, digits 1-5     Gait, Coordination, and Reflexes     Coordination   Romberg: negative  Finger to nose coordination: normal  Heel to shin coordination: normal  Tandem walking coordination: abnormal    Tremor   Resting tremor: absent  Intention tremor: absent  Action tremor: left arm    Reflexes   Right brachioradialis: 2+  Left brachioradialis: 2+  Right biceps: 2+  Left biceps: 2+  Right triceps: 2+  Left triceps: 2+  Right Cardenas: present  Left Cardenas: absent  Right ankle clonus: absent  Left ankle clonus: absent       Mild ataxia       Physical Exam   Constitutional: She is oriented to person, place, and time. She appears well-developed and well-nourished.   obese   HENT:   Head: Normocephalic and atraumatic.   Eyes: EOM are normal. Pupils are equal, round, and reactive to light.   Neck: Neck supple. Decreased range of motion present.   Cardiovascular: Normal rate and intact distal pulses.    Pulmonary/Chest: Effort normal. No respiratory distress.   Abdominal: Soft. She exhibits no distension.   Musculoskeletal: She exhibits no edema or deformity.   Neurological: She is oriented to person, place, and time. She has an abnormal Tandem Gait Test. She has a normal Finger-Nose-Finger Test, a normal Heel to Ortiz Test and a normal Romberg Test.   Reflex Scores:       Tricep reflexes are 2+ on the right side and 2+ on the left side.       Bicep reflexes are 2+ on the right side and 2+ on the left side.       Brachioradialis reflexes are 2+ on the right side and 2+ on the left side.  Skin: Skin is warm and dry.   Psychiatric: She has a normal mood and affect. Her speech is normal and behavior is normal. Judgment and thought content normal.   Developmentally delayed   Nursing note and vitals reviewed.  right frontal shunt valve palpable, compresses without refilling  PFD incision  well healed    Provider dictation:  The patient is a 36 year-old  female with extreme morbid obesity with pseudotumor cerebri and chiari malformation who is s/p posterior fossa decompression in June 2016.She is following up today due to complaints of neck and right arm pain and numbness as well as low back pain and gait difficulty. The arm and neck pain has been ongoing for over 1 year and she reports numbness in the entire right arm. Her neck and back pain are worse with movement and with walking.    She has a recent imaging of the cervical spine.    The Codman shunt shunt is set at 150.     The cervical MRI shows congenital narrowing of the cervical spine with moderate stenosis throughout and a C4-5 right paracentral disc herniation compressing the ventral hemicord.    Given the above she is a candidate for a C4-5 ADCF but she is a high risk surgical candidate due to her extreme BMI and spherical body habitus. I would like her to see a bariatric surgeon before considering spinal interventions. We will refer her to Dr. Bowden.    Visit Diagnosis:  S/P ventriculoperitoneal shunt    Pseudotumor cerebri syndrome    Morbid obesity, unspecified obesity type    BMI 50.0-59.9, adult    Arnold-Chiari malformation, type I    Neck pain    Cervical stenosis of spinal canal    Herniated cervical intervertebral disc    Chronic bilateral low back pain without sciatica

## 2017-04-06 NOTE — MR AVS SNAPSHOT
[facility] Neurosurgery  [address] [facility] [address] [place] [address]  Phone: [phone]  Fax: [phone]                  Shanna Douglass   2017 10:30 AM   Office Visit    Description:  Female : 1980   Provider:  Stanley Velasco MD   Department:  [facility] - Neurosurgery           Reason for Visit     Follow-up           Diagnoses this Visit        Comments    S/P ventriculoperitoneal shunt    -  Primary     Pseudotumor cerebri syndrome         Morbid obesity, unspecified obesity type         BMI 50.0-59.9, adult         Arnold-Chiari malformation, type I         Neck pain         Cervical stenosis of spinal canal         Herniated cervical intervertebral disc         Chronic bilateral low back pain without sciatica                To Do List           Future Appointments        Provider Department Dept Phone    4/10/2017 1:45 PM NEELAM BOLIVAR SCHEDULE [facility] [phone]    2017 1:00 PM Kit Bucio MD [facility] [phone]      Goals (5 Years of Data)     None      [facility]     [facility] Care [facility] -  Assistance  Unless otherwise directed by your provider, please contact [facility], our nurse care line that is available for  assistance.     Registered nurses in the [facility] provide: appointment scheduling, clinical advisement, health education, and other advisory services.  Call: [phone] (toll free)               Medications           Message regarding Medications     Verify the changes and/or additions to your medication regime listed below are the same as discussed with your clinician today.  If any of these changes or additions are incorrect, please notify your healthcare provider.             Verify that the below list of medications is an accurate representation of the medications you are currently taking.  If none reported, the list may be blank. If incorrect, please contact your healthcare provider.  "Carry this list with you in case of emergency.           Current Medications     aspirin (ECOTRIN) 81 MG EC tablet Take 81 mg by mouth once daily.    benzonatate (TESSALON) 100 MG capsule Take 100 mg by mouth 3 (three) times daily as needed for Cough.    butalbital-aspirin-caffeine (BUTALBITAL COMPOUND) -40 mg Tab Take by mouth.    cetirizine (ZYRTEC) 10 MG tablet Take 10 mg by mouth once daily.    ciprofloxacin HCl (CIPRO) 500 MG tablet Take 500 mg by mouth every 12 (twelve) hours.    codeine-butalbital-ASA-caffeine (BUTALBITAL COMPOUND-CODEINE) 21--40 mg Cap Take 1 capsule by mouth every 4 (four) hours as needed.    cyclobenzaprine (FLEXERIL) 10 MG tablet Take 10 mg by mouth 3 (three) times daily as needed for Muscle spasms.    docusate sodium (STOOL SOFTENER) 100 mg capsule Take 100 mg by mouth as needed for Constipation.    fluticasone (FLONASE) 50 mcg/actuation nasal spray 1 spray by Each Nare route once daily.    furosemide (LASIX) 80 MG tablet Take 80 mg by mouth once daily.     gabapentin (NEURONTIN) 800 MG tablet Take 800 mg by mouth 3 (three) times daily.    hydrocodone-acetaminophen 5-325mg (NORCO) 5-325 mg per tablet Take 1 tablet by mouth every 6 (six) hours as needed for Pain.    levothyroxine (SYNTHROID) 125 MCG tablet Take 125 mcg by mouth once daily.    losartan (COZAAR) 50 MG tablet Take 50 mg by mouth once daily.     ondansetron (ZOFRAN-ODT) 4 MG TbDL Take 8 mg by mouth every 4 (four) hours as needed.    pantoprazole (PROTONIX) 40 MG tablet Take 40 mg by mouth once daily.     paroxetine (PAXIL) 20 MG tablet Take 20 mg by mouth once daily.     potassium chloride (MICRO-K) 10 MEQ CpSR Take 10 mEq by mouth once daily.    topiramate (TOPAMAX) 100 MG tablet Take 100 mg by mouth 2 (two) times daily.            Clinical Reference Information           Your Vitals Were     BP Pulse Height Weight BMI    129/85 100 4' 9" (1.448 m) 124.4 kg (274 lb 4 oz) 59.35 kg/m2      Blood Pressure          " Most Recent Value    BP  129/85      Allergies as of 4/6/2017     Diamox [Acetazolamide]    Dye      Immunizations Administered on Date of Encounter - 4/6/2017     None      Language Assistance Services     ATTENTION: Language assistance services are available, free of charge. Please call [phone].      ATENCIÓN: Si habla lyric, tiene a garcia disposición servicios gratuitos de asistencia lingüística. Llame al [phone].     CHÚ Ý: N?u b?n nói Ti?ng Vi?t, có các d?ch v? h? tr? ngôn ng? mi?n phí dành cho b?n. G?i s? [phone].         [facility] complies with applicable Federal civil rights laws and does not discriminate on the basis of race, color, national origin, age, disability, or sex.

## 2017-04-18 ENCOUNTER — APPOINTMENT (OUTPATIENT)
Dept: CARDIOLOGY | Facility: CLINIC | Age: 37
End: 2017-04-18
Payer: MEDICARE

## 2017-04-18 LAB
DIASTOLIC DYSFUNCTION: NO
ESTIMATED PA SYSTOLIC PRESSURE: 30.04
RETIRED EF AND QEF - SEE NOTES: 60 (ref 55–65)

## 2017-04-18 PROCEDURE — 93306 TTE W/DOPPLER COMPLETE: CPT | Mod: PBBFAC,PO | Performed by: INTERNAL MEDICINE

## 2017-05-09 ENCOUNTER — TELEPHONE (OUTPATIENT)
Dept: BARIATRICS | Facility: CLINIC | Age: 37
End: 2017-05-09

## 2017-05-11 ENCOUNTER — OFFICE VISIT (OUTPATIENT)
Dept: NEUROSURGERY | Facility: CLINIC | Age: 37
End: 2017-05-11
Payer: MEDICARE

## 2017-05-11 VITALS
HEART RATE: 88 BPM | SYSTOLIC BLOOD PRESSURE: 144 MMHG | DIASTOLIC BLOOD PRESSURE: 77 MMHG | BODY MASS INDEX: 57.51 KG/M2 | HEIGHT: 57 IN | WEIGHT: 266.56 LBS

## 2017-05-11 DIAGNOSIS — G93.2 PSEUDOTUMOR CEREBRI SYNDROME: ICD-10-CM

## 2017-05-11 DIAGNOSIS — Z98.2 S/P VENTRICULOPERITONEAL SHUNT: Primary | ICD-10-CM

## 2017-05-11 DIAGNOSIS — E66.01 MORBID OBESITY, UNSPECIFIED OBESITY TYPE: ICD-10-CM

## 2017-05-11 DIAGNOSIS — G93.5 ARNOLD-CHIARI MALFORMATION, TYPE I: ICD-10-CM

## 2017-05-11 PROCEDURE — 99213 OFFICE O/P EST LOW 20 MIN: CPT | Mod: S$GLB,,, | Performed by: PHYSICIAN ASSISTANT

## 2017-05-11 NOTE — PROGRESS NOTES
Neurosurgery History & Physical    Patient ID: Shanna Douglass is a 36 y.o. female.    Chief Complaint   Patient presents with    Shunt Problem       Review of Systems   Constitutional: Negative for activity change, appetite change, chills, fever and unexpected weight change.   HENT: Negative for tinnitus, trouble swallowing and voice change.    Respiratory: Negative for apnea, cough, chest tightness and shortness of breath.    Cardiovascular: Negative for chest pain and palpitations.   Gastrointestinal: Negative for constipation, diarrhea, nausea and vomiting.   Genitourinary: Positive for enuresis. Negative for difficulty urinating, dysuria, frequency and urgency.   Musculoskeletal: Positive for back pain and neck pain. Negative for gait problem and neck stiffness.   Skin: Negative for wound.   Neurological: Positive for headaches. Negative for dizziness, tremors, seizures, facial asymmetry, speech difficulty, weakness, light-headedness and numbness.   Psychiatric/Behavioral: Negative for confusion and decreased concentration.       Past Medical History:   Diagnosis Date    Asthma     Bronchitis     Chiari malformation type I     Chronic headache     Hyperlipidemia     Hypertension     Murmur, heart     NPH (normal pressure hydrocephalus)     Obesity     MANUEL on CPAP     Pseudotumor cerebri     Thyroid disease     Hadley syndrome      Social History     Social History    Marital status: Single     Spouse name: N/A    Number of children: N/A    Years of education: N/A     Occupational History    Not on file.     Social History Main Topics    Smoking status: Never Smoker    Smokeless tobacco: Never Used    Alcohol use No    Drug use: No    Sexual activity: Not on file     Other Topics Concern    Not on file     Social History Narrative     Family History   Problem Relation Age of Onset    Diabetes Mother     Hypertension Father     Heart disease Father     Heart disease Maternal  Grandfather      Review of patient's allergies indicates:   Allergen Reactions    Diamox [acetazolamide] Hives    Dye Swelling and Rash       Current Outpatient Prescriptions:     aspirin (ECOTRIN) 81 MG EC tablet, Take 81 mg by mouth once daily., Disp: , Rfl:     benzonatate (TESSALON) 100 MG capsule, Take 100 mg by mouth 3 (three) times daily as needed for Cough., Disp: , Rfl:     butalbital-aspirin-caffeine (BUTALBITAL COMPOUND) -40 mg Tab, Take by mouth., Disp: , Rfl:     cetirizine (ZYRTEC) 10 MG tablet, Take 10 mg by mouth once daily., Disp: , Rfl:     ciprofloxacin HCl (CIPRO) 500 MG tablet, Take 500 mg by mouth every 12 (twelve) hours., Disp: , Rfl:     codeine-butalbital-ASA-caffeine (BUTALBITAL COMPOUND-CODEINE) 96--40 mg Cap, Take 1 capsule by mouth every 4 (four) hours as needed., Disp: , Rfl:     cyclobenzaprine (FLEXERIL) 10 MG tablet, Take 10 mg by mouth 3 (three) times daily as needed for Muscle spasms., Disp: , Rfl:     docusate sodium (STOOL SOFTENER) 100 mg capsule, Take 100 mg by mouth as needed for Constipation., Disp: , Rfl:     fluticasone (FLONASE) 50 mcg/actuation nasal spray, 1 spray by Each Nare route once daily., Disp: , Rfl:     furosemide (LASIX) 80 MG tablet, Take 80 mg by mouth once daily. , Disp: , Rfl:     gabapentin (NEURONTIN) 800 MG tablet, Take 800 mg by mouth 3 (three) times daily., Disp: , Rfl:     hydrocodone-acetaminophen 5-325mg (NORCO) 5-325 mg per tablet, Take 1 tablet by mouth every 6 (six) hours as needed for Pain., Disp: 30 tablet, Rfl: 0    levothyroxine (SYNTHROID) 125 MCG tablet, Take 125 mcg by mouth once daily., Disp: , Rfl:     losartan (COZAAR) 50 MG tablet, Take 50 mg by mouth once daily. , Disp: , Rfl:     ondansetron (ZOFRAN-ODT) 4 MG TbDL, Take 8 mg by mouth every 4 (four) hours as needed., Disp: , Rfl:     pantoprazole (PROTONIX) 40 MG tablet, Take 40 mg by mouth once daily. , Disp: , Rfl:     paroxetine (PAXIL) 20 MG tablet,  "Take 20 mg by mouth once daily. , Disp: , Rfl:     potassium chloride (MICRO-K) 10 MEQ CpSR, Take 10 mEq by mouth once daily., Disp: , Rfl:     topiramate (TOPAMAX) 100 MG tablet, Take 100 mg by mouth 2 (two) times daily. , Disp: , Rfl:     Vitals:    05/11/17 1251   BP: (!) 144/77   Pulse: 88   Weight: 120.9 kg (266 lb 8.6 oz)   Height: 4' 9" (1.448 m)       Physical Exam   Constitutional: She is oriented to person, place, and time. She appears well-developed and well-nourished.   HENT:   Head: Normocephalic and atraumatic.   Eyes: Pupils are equal, round, and reactive to light.   Neck: Normal range of motion. Neck supple.   Cardiovascular: Normal rate.    Pulmonary/Chest: Effort normal.   Musculoskeletal: Normal range of motion. She exhibits no edema.   Neurological: She is alert and oriented to person, place, and time. She has a normal Finger-Nose-Finger Test, a normal Heel to Shin Test, a normal Romberg Test and a normal Tandem Gait Test. Gait normal.   Reflex Scores:       Tricep reflexes are 2+ on the right side and 2+ on the left side.       Bicep reflexes are 2+ on the right side and 2+ on the left side.       Brachioradialis reflexes are 2+ on the right side and 2+ on the left side.       Patellar reflexes are 2+ on the right side and 2+ on the left side.       Achilles reflexes are 2+ on the right side and 2+ on the left side.  Skin: Skin is warm, dry and intact.   Psychiatric: She has a normal mood and affect. Her speech is normal and behavior is normal. Judgment and thought content normal.   Nursing note and vitals reviewed.      Neurologic Exam     Mental Status   Oriented to person, place, and time.   Oriented to person.   Oriented to place.   Oriented to time.   Follows 3 step commands.   Attention: normal. Concentration: normal.   Speech: speech is normal   Level of consciousness: alert  Knowledge: consistent with education.   Able to name object. Able to read. Able to repeat. Able to write. Normal " comprehension.     Cranial Nerves     CN II   Visual acuity: normal  Right visual field deficit: none  Left visual field deficit: none     CN III, IV, VI   Pupils are equal, round, and reactive to light.  Right pupil: Size: 3 mm. Shape: regular. Reactivity: brisk. Consensual response: intact.   Left pupil: Size: 3 mm. Shape: regular. Reactivity: brisk. Consensual response: intact.   CN III: no CN III palsy  CN VI: no CN VI palsy  Nystagmus: none   Diplopia: none  Ophthalmoparesis: none  Conjugate gaze: present    CN V   Right facial sensation deficit: none  Left facial sensation deficit: none    CN VII   Right facial weakness: none  Left facial weakness: none    CN VIII   Hearing: intact    CN IX, X   CN IX normal.   CN X normal.     CN XI   Right sternocleidomastoid strength: normal  Left sternocleidomastoid strength: normal  Right trapezius strength: normal  Left trapezius strength: normal    CN XII   Fasciculations: absent  Tongue deviation: none    Motor Exam   Muscle bulk: normal  Overall muscle tone: normal  Right arm pronator drift: absent  Left arm pronator drift: absent    Strength   Right neck flexion: 5/5  Left neck flexion: 5/5  Right neck extension: 5/5  Left neck extension: 5/5  Right deltoid: 5/5  Left deltoid: 5/5  Right biceps: 5/5  Left biceps: 5/5  Right triceps: 5/5  Left triceps: 5/5  Right wrist flexion: 5/5  Left wrist flexion: 5/5  Right wrist extension: 5/5  Left wrist extension: 5/5  Right interossei: 5/5  Left interossei: 5/5  Right abdominals: 5/5  Left abdominals: 5/5  Right iliopsoas: 5/5  Left iliopsoas: 5/5  Right quadriceps: 5/5  Left quadriceps: 5/5  Right hamstrin/5  Left hamstrin/5  Right glutei: 5/5  Left glutei: 5/5  Right anterior tibial: 5/5  Left anterior tibial: 5/5  Right posterior tibial: 5/5  Left posterior tibial: 5/5  Right peroneal: 5/5  Left peroneal: 5/5  Right gastroc: 5/5  Left gastroc: 5/5    Sensory Exam   Right arm light touch: normal  Left arm light  touch: normal  Right leg light touch: normal  Left leg light touch: normal  Right arm vibration: normal  Left arm vibration: normal  Right arm pinprick: normal  Left arm pinprick: normal    Gait, Coordination, and Reflexes     Gait  Gait: normal    Coordination   Romberg: negative  Finger to nose coordination: normal  Heel to shin coordination: normal  Tandem walking coordination: normal    Tremor   Resting tremor: absent  Intention tremor: absent  Action tremor: absent    Reflexes   Right brachioradialis: 2+  Left brachioradialis: 2+  Right biceps: 2+  Left biceps: 2+  Right triceps: 2+  Left triceps: 2+  Right patellar: 2+  Left patellar: 2+  Right achilles: 2+  Left achilles: 2+  Right Cardenas: absent  Left Cardenas: absent  Right ankle clonus: absent  Left ankle clonus: absent      Provider dictation:  36 year-old morbidly obese  female with pseudotumor cerebri and chiari malformation who is s/p posterior fossa decompression in June 2016 presents today for episodes of urinary incontinence.  These episodes have occurred 3 times in the last 3 weeks.  During each event, she did not feel the urge to go to the restroom and was incontinent.  She continues to have headaches and neck pain.     The Codman shunt shunt is set at 150.      I have reviewed a cervical MRI which reveals congenital narrowing of the cervical spine with moderate stenosis throughout and a C4-5 right paracentral disc herniation mildly compressing the ventral hemicord.  There is no cord signal change seen.    I do not belive her 3 episodes of cord compression are due to cervical spine pathology as there is no focal cord compression with myelomalacia change.  She has no myelopathic findings on exam.  These episodes can not be explained from her  Shunt.  She may have had some stress incontinence and should follow up with her pcp, gynecologist if it persists.  No further neurosurgical treatment needed at this time.    Her case has been  discussed with Dr. Velasco and he agrees with the recommendations.     Visit Diagnosis:  S/P ventriculoperitoneal shunt    Pseudotumor cerebri syndrome    Morbid obesity, unspecified obesity type    Arnold-Chiari malformation, type I        Total time spent counseling greater than fifty percent of total visit time.  Counseling included discussion regarding imaging findings, diagnosis possibilities, treatment options, risks and benefits.   The patient had many questions regarding the options and long-term effects.

## 2017-05-11 NOTE — MR AVS SNAPSHOT
[facility]  [address] [facility] [address]  [facility] [address]  Phone: [phone]  Fax: [phone]                  Shanna Douglass   2017 12:30 PM   Office Visit    Description:  Female : 1980   Provider:  Michelle Walsh PA-C   Department:  [facility]           Reason for Visit     Shunt Problem                To Do List           Future Appointments        Provider Department Dept Phone    2017 1:00 PM Kit Bucio MD [facility] [phone]      Goals (5 Years of Data)     None      [facility]     [facility] On Call Nurse Care Line -  Assistance  Unless otherwise directed by your provider, please contact [facility], our nurse care line that is available for  assistance.     Registered nurses in the [facility] provide: appointment scheduling, clinical advisement, health education, and other advisory services.  Call: [phone] (toll free)               Medications           Message regarding Medications     Verify the changes and/or additions to your medication regime listed below are the same as discussed with your clinician today.  If any of these changes or additions are incorrect, please notify your healthcare provider.             Verify that the below list of medications is an accurate representation of the medications you are currently taking.  If none reported, the list may be blank. If incorrect, please contact your healthcare provider. Carry this list with you in case of emergency.           Current Medications     aspirin (ECOTRIN) 81 MG EC tablet Take 81 mg by mouth once daily.    benzonatate (TESSALON) 100 MG capsule Take 100 mg by mouth 3 (three) times daily as needed for Cough.    butalbital-aspirin-caffeine (BUTALBITAL COMPOUND) -40 mg Tab Take by mouth.    cetirizine (ZYRTEC) 10 MG tablet Take 10 mg by mouth once daily.    ciprofloxacin HCl (CIPRO) 500 MG tablet Take 500 mg by mouth every 12  "(twelve) hours.    codeine-butalbital-ASA-caffeine (BUTALBITAL COMPOUND-CODEINE) 71--40 mg Cap Take 1 capsule by mouth every 4 (four) hours as needed.    cyclobenzaprine (FLEXERIL) 10 MG tablet Take 10 mg by mouth 3 (three) times daily as needed for Muscle spasms.    docusate sodium (STOOL SOFTENER) 100 mg capsule Take 100 mg by mouth as needed for Constipation.    fluticasone (FLONASE) 50 mcg/actuation nasal spray 1 spray by Each Nare route once daily.    furosemide (LASIX) 80 MG tablet Take 80 mg by mouth once daily.     gabapentin (NEURONTIN) 800 MG tablet Take 800 mg by mouth 3 (three) times daily.    hydrocodone-acetaminophen 5-325mg (NORCO) 5-325 mg per tablet Take 1 tablet by mouth every 6 (six) hours as needed for Pain.    levothyroxine (SYNTHROID) 125 MCG tablet Take 125 mcg by mouth once daily.    losartan (COZAAR) 50 MG tablet Take 50 mg by mouth once daily.     ondansetron (ZOFRAN-ODT) 4 MG TbDL Take 8 mg by mouth every 4 (four) hours as needed.    pantoprazole (PROTONIX) 40 MG tablet Take 40 mg by mouth once daily.     paroxetine (PAXIL) 20 MG tablet Take 20 mg by mouth once daily.     potassium chloride (MICRO-K) 10 MEQ CpSR Take 10 mEq by mouth once daily.    topiramate (TOPAMAX) 100 MG tablet Take 100 mg by mouth 2 (two) times daily.            Clinical Reference Information           Your Vitals Were     BP Pulse Height Weight BMI    144/77 88 4' 9" (1.448 m) 120.9 kg (266 lb 8.6 oz) 57.68 kg/m2      Blood Pressure          Most Recent Value    BP  (!)  144/77      Allergies as of 5/11/2017     Diamox [Acetazolamide]    Dye      Immunizations Administered on Date of Encounter - 5/11/2017     None      Language Assistance Services     ATTENTION: Language assistance services are available, free of charge. Please call [phone].      ATENCIÓN: Si habla lyric, tiene a garcia disposición servicios gratuitos de asistencia lingüística. Llame al [phone].     [facility] Ý: N?u b?n nói Ti?ng Vi?t, có " các d?ch v? h? tr? ngôn ng? mi?n phí dành cho b?n. G?i s? [phone].         [facility] complies with applicable Federal civil rights laws and does not discriminate on the basis of race, color, national origin, age, disability, or sex.

## 2017-05-17 ENCOUNTER — TELEPHONE (OUTPATIENT)
Dept: NEUROSURGERY | Facility: CLINIC | Age: 37
End: 2017-05-17

## 2017-05-17 DIAGNOSIS — M48.02 CERVICAL STENOSIS OF SPINAL CANAL: Primary | ICD-10-CM

## 2017-05-17 NOTE — TELEPHONE ENCOUNTER
Returned Pts call regarding left shoulder to elbow pain. Informed pt per providers last note, we will not proceed with surgery until pt has weight loss surgery. Informed pt i will relay information regarding pain to [facility]. Pt verbalized understanding.     Discussed above with Michelle Wlash, who advised referral to pain management.

## 2017-07-06 NOTE — TELEPHONE ENCOUNTER
Pt called requesting pain management referral be sent to Dr. Pak. Advised pt I will sed Dr. Pak the referral and call her back with an appt.

## 2017-07-06 NOTE — ADDENDUM NOTE
Encounter addended by: Jessa Gonzalez LPN on: 7/6/2017 10:59 AM<BR>    Actions taken: Sign clinical note

## 2017-07-06 NOTE — TELEPHONE ENCOUNTER
Called pt to give pain management appt. Unable to leave voicemail. Appt letter printed and mailed to pt.

## 2017-08-17 ENCOUNTER — OFFICE VISIT (OUTPATIENT)
Dept: PAIN MEDICINE | Facility: CLINIC | Age: 37
End: 2017-08-17
Payer: MEDICARE

## 2017-08-17 VITALS
WEIGHT: 256.63 LBS | HEIGHT: 57 IN | SYSTOLIC BLOOD PRESSURE: 128 MMHG | BODY MASS INDEX: 55.37 KG/M2 | HEART RATE: 76 BPM | TEMPERATURE: 98 F | RESPIRATION RATE: 20 BRPM | DIASTOLIC BLOOD PRESSURE: 72 MMHG

## 2017-08-17 DIAGNOSIS — M48.02 CERVICAL STENOSIS OF SPINAL CANAL: Primary | ICD-10-CM

## 2017-08-17 DIAGNOSIS — E66.01 MORBID OBESITY WITH BMI OF 50.0-59.9, ADULT: ICD-10-CM

## 2017-08-17 DIAGNOSIS — G93.2 PSEUDOTUMOR CEREBRI: ICD-10-CM

## 2017-08-17 DIAGNOSIS — M54.12 CERVICAL RADICULOPATHY: ICD-10-CM

## 2017-08-17 PROCEDURE — 99205 OFFICE O/P NEW HI 60 MIN: CPT | Mod: S$PBB,,, | Performed by: ANESTHESIOLOGY

## 2017-08-17 PROCEDURE — 99214 OFFICE O/P EST MOD 30 MIN: CPT | Mod: PBBFAC,PO | Performed by: ANESTHESIOLOGY

## 2017-08-17 PROCEDURE — 3078F DIAST BP <80 MM HG: CPT | Mod: ,,, | Performed by: ANESTHESIOLOGY

## 2017-08-17 PROCEDURE — 3074F SYST BP LT 130 MM HG: CPT | Mod: ,,, | Performed by: ANESTHESIOLOGY

## 2017-08-17 PROCEDURE — 99999 PR PBB SHADOW E&M-EST. PATIENT-LVL IV: CPT | Mod: PBBFAC,,, | Performed by: ANESTHESIOLOGY

## 2017-08-17 RX ORDER — HYDROCODONE BITARTRATE AND ACETAMINOPHEN 5; 325 MG/1; MG/1
1 TABLET ORAL EVERY 12 HOURS PRN
Qty: 60 TABLET | Refills: 0 | Status: SHIPPED | OUTPATIENT
Start: 2017-08-17 | End: 2017-09-16

## 2017-08-17 NOTE — LETTER
August 17, 2017      Stanley Velasco MD  [address]  [address]  [facility] [address]           [facility]  [address] [facility] [place] [address]  Phone: [phone]          Patient: Shanna Douglass   MR Number: [id number]   YOB: 1980   Date of Visit: 8/17/2017       Dear Dr. Stanley Velasco:    Thank you for referring Shanna Douglass to me for evaluation. Attached you will find relevant portions of my assessment and plan of care.    If you have questions, please do not hesitate to call me. I look forward to following Shanna Douglass along with you.    Sincerely,    Santana Pak MD    Enclosure  CC:  No Recipients    If you would like to receive this communication electronically, please contact [email or web address] or [phone] to request more information on [employer] access.    For providers and/or their staff who would like to refer a patient to [facility], please contact us through our one-stop-shop provider referral line, [facility] , at [phone].    If you feel you have received this communication in error or would no longer like to receive these types of communications, please e-mail [email or web address]

## 2017-08-17 NOTE — PROGRESS NOTES
This note was completed with dictation software and grammatical errors may exist.    CC:Neck pain    HPI: The patient is a 37-year-old woman with a history of Chiari malformation, Hadley syndrome, pseudotumor cerebri with shunt, morbid obesity who presents in referral from Dr. Velasco for neck pain. The patient has a long history of neck pain, headaches, states that this began in about 1997.  She had been diagnosed with Arnold-Chiari malformation type I and underwent posterior fossa decompression in June, 2016 by Dr. Velasco.She also has a history of pseudotumor cerebri, headaches for which she underwent  shunting but continued to have headaches, underwent inpatient ICP bolt placement to monitor in 3/2017 and was found to have continued low ICPs so no further surgery was done.  She continues to have headaches but explains that she began having right arm pain about one year ago in addition to her neck pain, MRI has shown some congenital canal narrowing in addition to disc bulging with moderate canal stenosis especially at C4/5 with a paracentral disc bulge out to the right side.  She has not undergone any interventions for her neck pain.  She describes her pain as aching, burning, tingling and numb throughout the right arm.  It is worse with bending or flexing her neck, improved with heat and medications.    Pain intervention history: She was seeing Dr. Gerson Renteria, pain management and until recently had been taking hydrocodone 5/325 once a day in addition to Flexeril 3 times a day and gabapentin 600 mg 3 times a day.  She apparently tested positive for Valium and so was dismissed from his practice.    ROS: She reports weight loss, headaches, easy bruising and back pain.  Balance of review of systems is negative.    Past Medical History:   Diagnosis Date    Asthma     Bronchitis     Chiari malformation type I     Chronic headache     Hyperlipidemia     Hypertension     Murmur, heart     NPH (normal pressure  "hydrocephalus)     Obesity     MANUEL on CPAP     Pseudotumor cerebri     Thyroid disease     Hadley syndrome        Past Surgical History:   Procedure Laterality Date    CARPAL TUNNEL RELEASE      CERVICAL SPINE SURGERY      CHOLECYSTECTOMY      EAR TUBE REMOVAL Bilateral     EYE SURGERY      strabismus    MYRINGOTOMY W/ TUBES      TONSILLECTOMY      VENTRICULOPERITONEAL SHUNT         Social History     Social History    Marital status: Single     Spouse name: N/A    Number of children: N/A    Years of education: N/A     Social History Main Topics    Smoking status: Never Smoker    Smokeless tobacco: Never Used    Alcohol use No    Drug use: No    Sexual activity: Not Asked     Other Topics Concern    None     Social History Narrative    None         Medications/Allergies: See med card    Vitals:    08/17/17 0809   BP: 128/72   Pulse: 76   Resp: 20   Temp: 97.8 °F (36.6 °C)   TempSrc: Oral   Weight: 116.4 kg (256 lb 9.9 oz)   Height: 4' 9" (1.448 m)   PainSc:   6   PainLoc: Arm     Body mass index is 55.53 kg/m².      Physical exam:  Gen: A and O x3, pleasant, obese  Skin: No rashes or obvious lesions  HEENT: PERRLA, no obvious deformities on ears or in canals.Trachea midline.  CVS: Regular rate and rhythm, normal palpable pulses.  Resp: Clear to auscultation bilaterally, no wheezes or rales.  Abdomen: Soft, NT/ND.  Musculoskeletal:  Wide-based gait.    Neuro:  Upper extremities: 5/5 strength bilaterally   Reflexes: Brachioradialis 2+, Bicep 2+, Tricep 2+.   Sensory: Intact and symmetrical to light touch and pinprick in C2-T1 dermatomes bilaterally.    Cervical Spine:  Cervical spine: Range of motion is mildly reduced with forward flexion with no increased pain, moderately reduced with extension with increased pain and neck in the midline, lateral rotation mildly reduced with increased pain especially with right oblique extension.  Spurling's maneuver is lateral neck pain.  Myofascial exam: " Tenderness to palpation across cervical paraspinous region bilaterally.  She has tenderness over the right lateral epicondyle and posterior forearm musculature.      Imaging:  MRI from 2/9/17 cervical spine demonstrates congenitally narrowed canal with disc bulging most prominently at C4/5 to the right side causing anterior cord compression but no signal changes.  There is narrowing of the canal at C3/4 to a lesser degree.    Assessment:   The patient is a 37-year-old woman with a history of Chiari malformation, pseudotumor cerebri with shunt, morbid obesity who presents in referral from Dr. Velasco for neck pain.  1. Cervical stenosis of spinal canal     2. Cervical radiculopathy     3. Pseudotumor cerebri     4. Morbid obesity with BMI of 50.0-59.9, adult         Plan:  1.  The patient is somewhat complicated in that she has cervical canal stenosis at fairly young age of 37 but also has morbid obesity which makes for a poor surgical candidate for right now.  She states that she is working on weight loss by exercising, walking and working around the house and has cut back on meals as well.  She reports previously weighing 335 pounds she is now down to 256 pounds today.  I explained that I could help manage her symptoms for now, likely will need surgery at some point.  2.  In terms of medication, she has been taking hydrocodone but feels that she would benefit from more frequent dosing and at this point I think that's reasonable.  I have given her prescription for hydrocodone 5/325 to take twice daily.  I have asked her to stop taking the Flexeril 2-3 times a day and only take it at night because I think this is probably only causing more sedation.  Likewise, she reports having some shakiness with taking gabapentin at 600 mg so I will drop her down to 300 mg 3 times a day and continue this.  3.  She may be a candidate for an epidural steroid injection, however she states that due to her history she cannot have  steroids.  I'm not sure if she is referring to her pseudotumor cerebri because steroids actually were a treatment for this at one time.  I'm not sure if there are any other contraindications at this point, I will check with Dr. Velasco.  Otherwise we will set her up for a follow-up in 1 month or sooner as needed.  I've had her sign an opioid agreement and during the next visit we will have her submit a urine drug screen.    Thank you for referring this interesting patient, and I look forward to continuing to collaborate in her care.

## 2017-08-24 ENCOUNTER — TELEPHONE (OUTPATIENT)
Dept: NEUROSURGERY | Facility: CLINIC | Age: 37
End: 2017-08-24

## 2017-08-24 NOTE — TELEPHONE ENCOUNTER
Spoke with patient and she said she has had a headache for four days. I let her know she needs to see the neurologist and she indicated understanding.

## 2017-08-24 NOTE — TELEPHONE ENCOUNTER
----- Message from Rani Duffy sent at 8/24/2017 11:10 AM CDT -----  Patient would like to make appointment for 9/9 if possible only want to see Rod. [phone]

## 2017-09-11 ENCOUNTER — TELEPHONE (OUTPATIENT)
Dept: NEUROSURGERY | Facility: CLINIC | Age: 37
End: 2017-09-11

## 2017-09-11 ENCOUNTER — TELEPHONE (OUTPATIENT)
Dept: PAIN MEDICINE | Facility: CLINIC | Age: 37
End: 2017-09-11

## 2017-09-11 NOTE — TELEPHONE ENCOUNTER
Spoke with the patient regarding her pain medication. Patient is asking to increase her pain medication to 3 per day due to increased headaches. She was advised not to increase her pain mediation unless instructed by Dr. Pak. She was advised that increasing pain medication can cause rebound headaches. Please advise.

## 2017-09-11 NOTE — TELEPHONE ENCOUNTER
Called pt to discuss below message. Left voicemail with return number.       ----- Message from Winsome Ma sent at 9/11/2017 12:40 PM CDT -----  Contact: Mrs. Shanna Otero called and stated that she would like to see Dr Velasco instead of Michelle Walsh. She asked that you call her back at [phone].

## 2017-09-11 NOTE — TELEPHONE ENCOUNTER
No, I do not recommend increasing the frequency of the pain medication. She needs to see neurology for her headache management. Please have her follow up with us in the next few weeks.

## 2017-09-11 NOTE — TELEPHONE ENCOUNTER
----- Message from Naima Astorga sent at 9/11/2017 12:49 PM CDT -----  Contact: pt  Pt states that she needs to speak to the nurse regarding need to ask question about pain medicine...[phone] (home)

## 2017-09-12 ENCOUNTER — TELEPHONE (OUTPATIENT)
Dept: PAIN MEDICINE | Facility: CLINIC | Age: 37
End: 2017-09-12

## 2017-09-12 NOTE — TELEPHONE ENCOUNTER
Spoke with the patient with the recommendations. Stated that she has an appointment already scheduled.

## 2017-09-12 NOTE — TELEPHONE ENCOUNTER
----- Message from Brissa Stevenson sent at 9/12/2017  3:39 PM CDT -----  Patient is returning office call. Please call back with details at [phone].

## 2017-09-19 ENCOUNTER — OFFICE VISIT (OUTPATIENT)
Dept: PAIN MEDICINE | Facility: CLINIC | Age: 37
End: 2017-09-19
Payer: MEDICARE

## 2017-09-19 ENCOUNTER — TELEPHONE (OUTPATIENT)
Dept: PAIN MEDICINE | Facility: CLINIC | Age: 37
End: 2017-09-19

## 2017-09-19 VITALS
RESPIRATION RATE: 20 BRPM | HEART RATE: 78 BPM | DIASTOLIC BLOOD PRESSURE: 70 MMHG | TEMPERATURE: 98 F | WEIGHT: 247.38 LBS | BODY MASS INDEX: 53.53 KG/M2 | SYSTOLIC BLOOD PRESSURE: 130 MMHG

## 2017-09-19 DIAGNOSIS — M50.30 DDD (DEGENERATIVE DISC DISEASE), CERVICAL: Primary | ICD-10-CM

## 2017-09-19 DIAGNOSIS — R51.9 CHRONIC NONINTRACTABLE HEADACHE, UNSPECIFIED HEADACHE TYPE: ICD-10-CM

## 2017-09-19 DIAGNOSIS — E66.01 MORBID OBESITY, UNSPECIFIED OBESITY TYPE: ICD-10-CM

## 2017-09-19 DIAGNOSIS — G89.29 CHRONIC NONINTRACTABLE HEADACHE, UNSPECIFIED HEADACHE TYPE: ICD-10-CM

## 2017-09-19 DIAGNOSIS — M48.02 CERVICAL STENOSIS OF SPINAL CANAL: ICD-10-CM

## 2017-09-19 PROCEDURE — 99214 OFFICE O/P EST MOD 30 MIN: CPT | Mod: PBBFAC,PO | Performed by: ANESTHESIOLOGY

## 2017-09-19 PROCEDURE — 3075F SYST BP GE 130 - 139MM HG: CPT | Mod: ,,, | Performed by: ANESTHESIOLOGY

## 2017-09-19 PROCEDURE — 3078F DIAST BP <80 MM HG: CPT | Mod: ,,, | Performed by: ANESTHESIOLOGY

## 2017-09-19 PROCEDURE — 80307 DRUG TEST PRSMV CHEM ANLYZR: CPT

## 2017-09-19 PROCEDURE — 99999 PR PBB SHADOW E&M-EST. PATIENT-LVL IV: CPT | Mod: PBBFAC,,, | Performed by: ANESTHESIOLOGY

## 2017-09-19 PROCEDURE — 99213 OFFICE O/P EST LOW 20 MIN: CPT | Mod: S$PBB,,, | Performed by: ANESTHESIOLOGY

## 2017-09-19 RX ORDER — HYDROCODONE BITARTRATE AND ACETAMINOPHEN 5; 325 MG/1; MG/1
1 TABLET ORAL 2 TIMES DAILY PRN
Qty: 60 TABLET | Refills: 0 | Status: SHIPPED | OUTPATIENT
Start: 2017-09-19 | End: 2017-11-01 | Stop reason: SDUPTHER

## 2017-09-19 NOTE — PROGRESS NOTES
This note was completed with dictation software and grammatical errors may exist.    CC:Neck pain, headaches    HPI: The patient is a 37-year-old woman with a history of Chiari malformation, Hadley syndrome, pseudotumor cerebri with shunt, morbid obesity who presents in referral from Dr. Velasco for neck pain. She returns in follow-up today complaining of more frequent headaches, states that it starts in the frontal region and radiates back to the occipital region and causes neck pain as well.  Not sure if this is actually vice versa.  She continues to have neck pain radiating into her right greater than left arm.  She also reports having some issues with dropping things lately.  She was helping her father wash lawnmowers and she was dropping the towels.  She also reports that her arms have been shaking, hands shaking constantly.  I had noticed this during the last visit as well, she cannot tell how long this has been going on.  We had considered that she had recently increased gabapentin to 600 mg 3 times a day and so we had her decrease down to 300mg 3 times a day but this has not improved her shakes, her pain has not worsened.    Pain intervention history: She was seeing Dr. Gerson Renteria, pain management and until recently had been taking hydrocodone 5/325 once a day in addition to Flexeril 3 times a day and gabapentin 600 mg 3 times a day.  She apparently tested positive for Valium and so was dismissed from his practice.    ROS: She reports weight loss, headaches, easy bruising and back pain.  Balance of review of systems is negative.    Past Medical History:   Diagnosis Date    Asthma     Bronchitis     Chiari malformation type I     Chronic headache     Hyperlipidemia     Hypertension     Murmur, heart     NPH (normal pressure hydrocephalus)     Obesity     MANUEL on CPAP     Pseudotumor cerebri     Thyroid disease     Hadley syndrome        Past Surgical History:   Procedure Laterality Date     CARPAL TUNNEL RELEASE      CERVICAL SPINE SURGERY      CHOLECYSTECTOMY      EAR TUBE REMOVAL Bilateral     EYE SURGERY      strabismus    MYRINGOTOMY W/ TUBES      TONSILLECTOMY      VENTRICULOPERITONEAL SHUNT         Social History     Social History    Marital status: Single     Spouse name: N/A    Number of children: N/A    Years of education: N/A     Social History Main Topics    Smoking status: Never Smoker    Smokeless tobacco: Never Used    Alcohol use No    Drug use: No    Sexual activity: Not Asked     Other Topics Concern    None     Social History Narrative    None         Medications/Allergies: See med card    Vitals:    09/19/17 1005   BP: 130/70   Pulse: 78   Resp: 20   Temp: 97.6 °F (36.4 °C)   TempSrc: Oral   Weight: 112.2 kg (247 lb 5.7 oz)   PainSc:   4   PainLoc: Back     Body mass index is 53.53 kg/m².      Physical exam:  Gen: A and O x3, pleasant, obese  Skin: No rashes or obvious lesions  HEENT: PERRLA, no obvious deformities on ears or in canals.Trachea midline.  CVS: Regular rate and rhythm, normal palpable pulses.  Resp: Clear to auscultation bilaterally, no wheezes or rales.  Abdomen: Soft, NT/ND.  Musculoskeletal:  Wide-based gait.    Neuro:  Upper extremities: 5/5 strength bilaterally   Reflexes: Brachioradialis 2+, Bicep 2+, Tricep 2+.   Sensory: Intact and symmetrical to light touch and pinprick in C2-T1 dermatomes bilaterally.  Noticeable tremor bilateral hands when held out anteriorly.    Cervical Spine:  Cervical spine: Range of motion is mildly reduced with forward flexion with no increased pain, moderately reduced with extension with increased pain and neck in the midline, lateral rotation mildly reduced with increased pain especially with right oblique extension.  Spurling's maneuver is lateral neck pain.  Myofascial exam: Tenderness to palpation across cervical paraspinous region bilaterally.  She has tenderness over the right lateral epicondyle and posterior  forearm musculature.      Imaging:  MRI from 2/9/17 cervical spine demonstrates congenitally narrowed canal with disc bulging most prominently at C4/5 to the right side causing anterior cord compression but no signal changes.  There is narrowing of the canal at C3/4 to a lesser degree.    Assessment:   The patient is a 37-year-old woman with a history of Chiari malformation, pseudotumor cerebri with shunt, morbid obesity who presents in referral from Dr. Velasco for neck pain.  1. DDD (degenerative disc disease), cervical  Pain Clinic Drug Screen   2. Chronic nonintractable headache, unspecified headache type     3. Cervical stenosis of spinal canal     4. Morbid obesity, unspecified obesity type         Plan:  1.  I'm going to have her discontinue the gabapentin since she is already taking Topamax 100 mg twice a day and we want to find out if this could possibly be causing some of her tremors.  If her pain worsens after stopping the gabapentin we can consider going back on it.  2.  For her neck pain, arm pain and headaches, she does have cervical stenosis and we discussed the role of epidural steroid injections and she would like to proceed.  I have discussed this with her neurosurgeon, Dr. Velasco and he has stated that there are no contraindications for her receiving steroids.  Hopefully this will help with the neck pain, arm pain and even the headaches perhaps.  In terms of her headaches, she has been taking Fioricet without any relief, hydrocodone 5 mg up to twice a day with out much relief.  She does have an appointment with her neurologist, Dr. Rincon in October or November.  I'll have her follow-up in several weeks after the injection or sooner as needed.

## 2017-09-19 NOTE — TELEPHONE ENCOUNTER
Left patient voicemail stating we received her cardio clearance for procedure and to call back to schedule.

## 2017-09-20 ENCOUNTER — TELEPHONE (OUTPATIENT)
Dept: PAIN MEDICINE | Facility: CLINIC | Age: 37
End: 2017-09-20

## 2017-09-20 DIAGNOSIS — M54.12 CERVICAL RADICULOPATHY: Primary | ICD-10-CM

## 2017-09-20 RX ORDER — SODIUM CHLORIDE, SODIUM LACTATE, POTASSIUM CHLORIDE, CALCIUM CHLORIDE 600; 310; 30; 20 MG/100ML; MG/100ML; MG/100ML; MG/100ML
INJECTION, SOLUTION INTRAVENOUS CONTINUOUS
Status: CANCELLED | OUTPATIENT
Start: 2017-10-04

## 2017-09-20 RX ORDER — MIDAZOLAM HYDROCHLORIDE 5 MG/ML
4 INJECTION INTRAMUSCULAR; INTRAVENOUS ONCE
Status: CANCELLED | OUTPATIENT
Start: 2017-10-04

## 2017-09-20 NOTE — TELEPHONE ENCOUNTER
----- Message from Ginny Valadez sent at 9/19/2017  4:41 PM CDT -----  Contact: call  [phone]    Pt    Stated   She  Is  Returning the  Call // please call

## 2017-09-21 ENCOUNTER — OFFICE VISIT (OUTPATIENT)
Dept: SPINE | Facility: CLINIC | Age: 37
End: 2017-09-21
Payer: MEDICARE

## 2017-09-21 VITALS
SYSTOLIC BLOOD PRESSURE: 112 MMHG | DIASTOLIC BLOOD PRESSURE: 62 MMHG | HEART RATE: 103 BPM | BODY MASS INDEX: 53.53 KG/M2 | HEIGHT: 57 IN | WEIGHT: 248.13 LBS

## 2017-09-21 DIAGNOSIS — R51.9 CHRONIC NONINTRACTABLE HEADACHE, UNSPECIFIED HEADACHE TYPE: ICD-10-CM

## 2017-09-21 DIAGNOSIS — E66.01 MORBID OBESITY, UNSPECIFIED OBESITY TYPE: ICD-10-CM

## 2017-09-21 DIAGNOSIS — G89.29 CHRONIC NONINTRACTABLE HEADACHE, UNSPECIFIED HEADACHE TYPE: ICD-10-CM

## 2017-09-21 DIAGNOSIS — M48.02 CERVICAL STENOSIS OF SPINAL CANAL: ICD-10-CM

## 2017-09-21 DIAGNOSIS — M54.2 NECK PAIN: ICD-10-CM

## 2017-09-21 DIAGNOSIS — G93.5 ARNOLD-CHIARI MALFORMATION, TYPE I: Primary | ICD-10-CM

## 2017-09-21 DIAGNOSIS — M50.20 HERNIATED CERVICAL INTERVERTEBRAL DISC: ICD-10-CM

## 2017-09-21 DIAGNOSIS — G93.2 PSEUDOTUMOR CEREBRI SYNDROME: ICD-10-CM

## 2017-09-21 PROCEDURE — 99999 PR PBB SHADOW E&M-EST. PATIENT-LVL IV: CPT | Mod: PBBFAC,,, | Performed by: PHYSICIAN ASSISTANT

## 2017-09-21 PROCEDURE — 3078F DIAST BP <80 MM HG: CPT | Mod: ,,, | Performed by: PHYSICIAN ASSISTANT

## 2017-09-21 PROCEDURE — 99213 OFFICE O/P EST LOW 20 MIN: CPT | Mod: S$PBB,,, | Performed by: PHYSICIAN ASSISTANT

## 2017-09-21 PROCEDURE — 99214 OFFICE O/P EST MOD 30 MIN: CPT | Mod: PBBFAC,PN | Performed by: PHYSICIAN ASSISTANT

## 2017-09-21 PROCEDURE — 3074F SYST BP LT 130 MM HG: CPT | Mod: ,,, | Performed by: PHYSICIAN ASSISTANT

## 2017-09-21 RX ORDER — NAPROXEN 500 MG/1
500 TABLET ORAL 2 TIMES DAILY
COMMUNITY
End: 2018-09-12

## 2017-09-21 RX ORDER — MONTELUKAST SODIUM 10 MG/1
10 TABLET ORAL DAILY
COMMUNITY
End: 2018-09-12

## 2017-09-21 RX ORDER — PROMETHAZINE HYDROCHLORIDE 25 MG/1
25 TABLET ORAL EVERY 4 HOURS PRN
COMMUNITY
End: 2017-12-11 | Stop reason: SDUPTHER

## 2017-09-21 NOTE — PROGRESS NOTES
Neurosurgery History & Physical    Patient ID: Shanna Douglass is a 37 y.o. female.    Chief Complaint   Patient presents with    Headache    Neck Pain       Review of Systems   Constitutional: Negative for activity change, appetite change, chills, fever and unexpected weight change.   HENT: Negative for tinnitus, trouble swallowing and voice change.    Respiratory: Negative for apnea, cough, chest tightness and shortness of breath.    Cardiovascular: Negative for chest pain and palpitations.   Gastrointestinal: Negative for constipation, diarrhea, nausea and vomiting.   Genitourinary: Negative for difficulty urinating, dysuria, frequency and urgency.   Musculoskeletal: Positive for back pain and neck pain. Negative for gait problem and neck stiffness.   Skin: Negative for wound.   Neurological: Positive for headaches. Negative for dizziness, tremors, seizures, facial asymmetry, speech difficulty, weakness, light-headedness and numbness.   Psychiatric/Behavioral: Negative for confusion and decreased concentration.       Past Medical History:   Diagnosis Date    Asthma     Bronchitis     Chiari malformation type I     Chronic headache     Hyperlipidemia     Hypertension     Murmur, heart     NPH (normal pressure hydrocephalus)     Obesity     MANUEL on CPAP     Pseudotumor cerebri     Thyroid disease     Hadley syndrome      Social History     Social History    Marital status: Single     Spouse name: N/A    Number of children: N/A    Years of education: N/A     Occupational History    Not on file.     Social History Main Topics    Smoking status: Never Smoker    Smokeless tobacco: Never Used    Alcohol use No    Drug use: No    Sexual activity: Not on file     Other Topics Concern    Not on file     Social History Narrative    No narrative on file     Family History   Problem Relation Age of Onset    Diabetes Mother     Hypertension Father     Heart disease Father     Heart disease  Maternal Grandfather      Review of patient's allergies indicates:   Allergen Reactions    Diamox [acetazolamide] Hives    Dye Swelling and Rash       Current Outpatient Prescriptions:     aspirin (ECOTRIN) 81 MG EC tablet, Take 81 mg by mouth once daily., Disp: , Rfl:     benzonatate (TESSALON) 100 MG capsule, Take 100 mg by mouth 3 (three) times daily as needed for Cough., Disp: , Rfl:     butalbital-aspirin-caffeine (BUTALBITAL COMPOUND) -40 mg Tab, Take by mouth., Disp: , Rfl:     cetirizine (ZYRTEC) 10 MG tablet, Take 10 mg by mouth once daily., Disp: , Rfl:     ciprofloxacin HCl (CIPRO) 500 MG tablet, Take 500 mg by mouth every 12 (twelve) hours., Disp: , Rfl:     codeine-butalbital-ASA-caffeine (BUTALBITAL COMPOUND-CODEINE) 56--40 mg Cap, Take 1 capsule by mouth every 4 (four) hours as needed., Disp: , Rfl:     cyclobenzaprine (FLEXERIL) 10 MG tablet, Take 10 mg by mouth 3 (three) times daily as needed for Muscle spasms., Disp: , Rfl:     docusate sodium (STOOL SOFTENER) 100 mg capsule, Take 100 mg by mouth as needed for Constipation., Disp: , Rfl:     fluticasone (FLONASE) 50 mcg/actuation nasal spray, 1 spray by Each Nare route once daily., Disp: , Rfl:     furosemide (LASIX) 80 MG tablet, Take 80 mg by mouth once daily. , Disp: , Rfl:     hydrocodone-acetaminophen 5-325mg (NORCO) 5-325 mg per tablet, Take 1 tablet by mouth 2 (two) times daily as needed for Pain., Disp: 60 tablet, Rfl: 0    levothyroxine (SYNTHROID) 125 MCG tablet, Take 125 mcg by mouth once daily., Disp: , Rfl:     losartan (COZAAR) 50 MG tablet, Take 50 mg by mouth once daily. , Disp: , Rfl:     montelukast (SINGULAIR) 10 mg tablet, Take 10 mg by mouth once daily., Disp: , Rfl:     naproxen (NAPROSYN) 500 MG tablet, Take 500 mg by mouth 2 (two) times daily., Disp: , Rfl:     ondansetron (ZOFRAN-ODT) 4 MG TbDL, Take 8 mg by mouth every 4 (four) hours as needed., Disp: , Rfl:     pantoprazole (PROTONIX) 40 MG  "tablet, Take 40 mg by mouth once daily. , Disp: , Rfl:     paroxetine (PAXIL) 20 MG tablet, Take 20 mg by mouth once daily. , Disp: , Rfl:     potassium chloride (MICRO-K) 10 MEQ CpSR, Take 10 mEq by mouth once daily., Disp: , Rfl:     promethazine (PHENERGAN) 25 MG tablet, Take 25 mg by mouth every 4 (four) hours as needed for Nausea., Disp: , Rfl:     topiramate (TOPAMAX) 100 MG tablet, Take 100 mg by mouth 2 (two) times daily. , Disp: , Rfl:     Vitals:    09/21/17 1020   BP: 112/62   BP Location: Left arm   Patient Position: Sitting   BP Method: Large (Automatic)   Pulse: 103   Weight: 112.5 kg (248 lb 2 oz)   Height: 4' 9" (1.448 m)       Physical Exam   Constitutional: She is oriented to person, place, and time. She appears well-developed and well-nourished.   HENT:   Head: Normocephalic and atraumatic.   Eyes: Pupils are equal, round, and reactive to light.   Neck: Normal range of motion. Neck supple.   Cardiovascular: Normal rate.    Pulmonary/Chest: Effort normal.   Musculoskeletal: Normal range of motion. She exhibits no edema.   Neurological: She is alert and oriented to person, place, and time. She has a normal Finger-Nose-Finger Test, a normal Heel to Shin Test, a normal Romberg Test and a normal Tandem Gait Test. Gait normal.   Reflex Scores:       Tricep reflexes are 2+ on the right side and 2+ on the left side.       Bicep reflexes are 2+ on the right side and 2+ on the left side.       Brachioradialis reflexes are 2+ on the right side and 2+ on the left side.       Patellar reflexes are 2+ on the right side and 2+ on the left side.       Achilles reflexes are 2+ on the right side and 2+ on the left side.  Skin: Skin is warm, dry and intact.   Psychiatric: She has a normal mood and affect. Her speech is normal and behavior is normal. Judgment and thought content normal.   Nursing note and vitals reviewed.      Neurologic Exam     Mental Status   Oriented to person, place, and time.   Oriented to " person.   Oriented to place.   Oriented to time.   Follows 3 step commands.   Attention: normal. Concentration: normal.   Speech: speech is normal   Level of consciousness: alert  Knowledge: consistent with education.   Able to name object. Able to read. Able to repeat. Able to write. Normal comprehension.     Cranial Nerves     CN II   Visual acuity: normal  Right visual field deficit: none  Left visual field deficit: none     CN III, IV, VI   Pupils are equal, round, and reactive to light.  Right pupil: Size: 3 mm. Shape: regular. Reactivity: brisk. Consensual response: intact.   Left pupil: Size: 3 mm. Shape: regular. Reactivity: brisk. Consensual response: intact.   CN III: no CN III palsy  CN VI: no CN VI palsy  Nystagmus: none   Diplopia: none  Ophthalmoparesis: none  Conjugate gaze: present    CN V   Right facial sensation deficit: none  Left facial sensation deficit: none    CN VII   Right facial weakness: none  Left facial weakness: none    CN VIII   Hearing: intact    CN IX, X   CN IX normal.   CN X normal.     CN XI   Right sternocleidomastoid strength: normal  Left sternocleidomastoid strength: normal  Right trapezius strength: normal  Left trapezius strength: normal    CN XII   Fasciculations: absent  Tongue deviation: none    Motor Exam   Muscle bulk: normal  Overall muscle tone: normal  Right arm pronator drift: absent  Left arm pronator drift: absent    Strength   Right neck flexion: 5/5  Left neck flexion: 5/5  Right neck extension: 5/5  Left neck extension: 5/5  Right deltoid: 5/5  Left deltoid: 5/5  Right biceps: 5/5  Left biceps: 5/5  Right triceps: 5/5  Left triceps: 5/5  Right wrist flexion: 5/5  Left wrist flexion: 5/5  Right wrist extension: 5/5  Left wrist extension: 5/5  Right interossei: 5/5  Left interossei: 5/5  Right abdominals: 5/5  Left abdominals: 5/5  Right iliopsoas: 5/5  Left iliopsoas: 5/5  Right quadriceps: 5/5  Left quadriceps: 5/5  Right hamstrin/5  Left hamstring:  5/5  Right glutei: 5/5  Left glutei: 5/5  Right anterior tibial: 5/5  Left anterior tibial: 5/5  Right posterior tibial: 5/5  Left posterior tibial: 5/5  Right peroneal: 5/5  Left peroneal: 5/5  Right gastroc: 5/5  Left gastroc: 5/5    Sensory Exam   Right arm light touch: normal  Left arm light touch: normal  Right leg light touch: normal  Left leg light touch: normal  Right arm vibration: normal  Left arm vibration: normal  Right arm pinprick: normal  Left arm pinprick: normal    Gait, Coordination, and Reflexes     Gait  Gait: normal    Coordination   Romberg: negative  Finger to nose coordination: normal  Heel to shin coordination: normal  Tandem walking coordination: normal    Tremor   Resting tremor: absent  Intention tremor: absent  Action tremor: absent    Reflexes   Right brachioradialis: 2+  Left brachioradialis: 2+  Right biceps: 2+  Left biceps: 2+  Right triceps: 2+  Left triceps: 2+  Right patellar: 2+  Left patellar: 2+  Right achilles: 2+  Left achilles: 2+  Right Cardenas: absent  Left Cardenas: absent  Right ankle clonus: absent  Left ankle clonus: absent      Provider dictation:  36 year-old morbidly obese  female presents for follow up of headaches and neck pain.  She is known to have chiari malformation and pseudotumor cerebri.  She is s/p June 2016 posterior fossa decompression.  She continued to have headaches and on 3/9/17 an ICP bolt monitor was placed.  Her ICP did not elevate and remained below 10mmHg despite positioning and degree of headache; therefore, she was not a candidated for cranial vault expansion.  She is known to have congenital cervical stenosis and cervical spondylosis greatest at C4/5 with right disc hernia.  She has had chronic headaches, neck pain and right arm pain that has increased over the last 1 month.  Headaches affect the entire head and do not change with position.  She is scheduled for TABITHA with Dr. Pak 10/4/17.  She does see Dr. Rincon (neurologist)  for headaches.     The Codman shunt is set at 150.      I have reviewed a cervical MRI which reveals congenital narrowing of the cervical spine with moderate stenosis throughout and a C4-5 right paracentral disc herniation mildly compressing the ventral hemicord.  There is no cord signal change seen.    Her headaches, neck pain and right arm pain are due to cervical pathology and likely not related to her prior shunt or chiari/ pseudotumor because there was no pressure change with ICP bolt monitoring.  She should proceed with TABITHA as offered by Dr. Pak and continue to follow with her neurologist for headaches. If no improvement, Dr. Velasco has discussed cervical spine surgery with her in the past and we could re-consider surgery.  Dr. Velasco has also recommended she lose weight prior to considering any surgery, which she has been working on.  Follow up if no improvement with injections.     Visit Diagnosis:  Arnold-Chiari malformation, type I    Chronic nonintractable headache, unspecified headache type    Pseudotumor cerebri syndrome    Cervical stenosis of spinal canal    Herniated cervical intervertebral disc    Morbid obesity, unspecified obesity type    Neck pain        Total time spent counseling greater than fifty percent of total visit time.  Counseling included discussion regarding imaging findings, diagnosis possibilities, treatment options, risks and benefits.   The patient had many questions regarding the options and long-term effects.

## 2017-09-25 LAB
6MAM UR QL: NOT DETECTED
7AMINOCLONAZEPAM UR QL: NOT DETECTED
A-OH ALPRAZ UR QL: NOT DETECTED
ALPRAZ UR QL: NOT DETECTED
AMPHET UR QL SCN: NOT DETECTED
ANNOTATION COMMENT IMP: NORMAL
ANNOTATION COMMENT IMP: NORMAL
BARBITURATES UR QL: PRESENT
BUPRENORPHINE UR QL: NOT DETECTED
BZE UR QL: NOT DETECTED
CARBOXYTHC UR QL: NOT DETECTED
CARISOPRODOL UR QL: NOT DETECTED
CLONAZEPAM UR QL: NOT DETECTED
CODEINE UR QL: NOT DETECTED
CREAT UR-MCNC: 108.6 MG/DL (ref 20–400)
DIAZEPAM UR QL: NOT DETECTED
ETHYL GLUCURONIDE UR QL: NOT DETECTED
FENTANYL UR QL: NOT DETECTED
HYDROCODONE UR QL: PRESENT
HYDROMORPHONE UR QL: NOT DETECTED
LORAZEPAM UR QL: NOT DETECTED
MDA UR QL: NOT DETECTED
MDEA UR QL: NOT DETECTED
MDMA UR QL: NOT DETECTED
ME-PHENIDATE UR QL: NOT DETECTED
MEPERIDINE UR QL: NOT DETECTED
METHADONE UR QL: NOT DETECTED
METHAMPHET UR QL: NOT DETECTED
MIDAZOLAM UR QL SCN: NOT DETECTED
MORPHINE UR QL: NOT DETECTED
NORBUPRENORPHINE UR QL CFM: NOT DETECTED
NORDIAZEPAM UR QL: NOT DETECTED
NORFENTANYL UR QL: NOT DETECTED
NORHYDROCODONE UR QL CFM: PRESENT
NOROXYCODONE UR QL CFM: NOT DETECTED
NOROXYMORPHONE: NOT DETECTED
OXAZEPAM UR QL: NOT DETECTED
OXYCODONE UR QL: NOT DETECTED
OXYMORPHONE UR QL: NOT DETECTED
PATHOLOGY STUDY: NORMAL
PCP UR QL: NOT DETECTED
PHENTERMINE UR QL: NOT DETECTED
PROPOXYPH UR QL: NOT DETECTED
SERVICE CMNT-IMP: NORMAL
TAPENTADOL UR QL SCN: NOT DETECTED
TAPENTADOL-O-SULF: NOT DETECTED
TEMAZEPAM UR QL: NOT DETECTED
TRAMADOL UR QL: NOT DETECTED
ZOLPIDEM UR QL: NOT DETECTED

## 2017-10-03 ENCOUNTER — TELEPHONE (OUTPATIENT)
Dept: PAIN MEDICINE | Facility: CLINIC | Age: 37
End: 2017-10-03

## 2017-10-03 DIAGNOSIS — M50.30 DDD (DEGENERATIVE DISC DISEASE), CERVICAL: Primary | ICD-10-CM

## 2017-10-03 NOTE — TELEPHONE ENCOUNTER
----- Message from Tequila Cheng RN sent at 10/3/2017 12:24 PM CDT -----  Pt. Scheduled for TABITHA 10/4. Reports she took Naproxyn 500 mg yesterday(10/3). Is it still ok to proceed with procedure?

## 2017-10-04 ENCOUNTER — SURGERY (OUTPATIENT)
Age: 37
End: 2017-10-04

## 2017-10-04 ENCOUNTER — HOSPITAL ENCOUNTER (OUTPATIENT)
Facility: HOSPITAL | Age: 37
Discharge: HOME | End: 2017-10-04
Attending: ANESTHESIOLOGY | Admitting: ANESTHESIOLOGY
Payer: MEDICARE

## 2017-10-04 ENCOUNTER — HOSPITAL ENCOUNTER (OUTPATIENT)
Dept: RADIOLOGY | Facility: HOSPITAL | Age: 37
Discharge: HOME | End: 2017-10-04
Attending: ANESTHESIOLOGY
Payer: MEDICARE

## 2017-10-04 VITALS
WEIGHT: 240 LBS | TEMPERATURE: 98 F | SYSTOLIC BLOOD PRESSURE: 128 MMHG | DIASTOLIC BLOOD PRESSURE: 68 MMHG | RESPIRATION RATE: 18 BRPM | BODY MASS INDEX: 51.78 KG/M2 | HEIGHT: 57 IN | OXYGEN SATURATION: 98 % | HEART RATE: 95 BPM

## 2017-10-04 DIAGNOSIS — M50.30 DDD (DEGENERATIVE DISC DISEASE), CERVICAL: ICD-10-CM

## 2017-10-04 DIAGNOSIS — M54.12 CERVICAL RADICULOPATHY: Primary | ICD-10-CM

## 2017-10-04 LAB
B-HCG UR QL: NEGATIVE
CTP QC/QA: YES

## 2017-10-04 PROCEDURE — 99152 MOD SED SAME PHYS/QHP 5/>YRS: CPT | Mod: ,,, | Performed by: ANESTHESIOLOGY

## 2017-10-04 PROCEDURE — 25000003 PHARM REV CODE 250: Mod: PO | Performed by: ANESTHESIOLOGY

## 2017-10-04 PROCEDURE — 76000 FLUOROSCOPY <1 HR PHYS/QHP: CPT | Mod: TC,PO

## 2017-10-04 PROCEDURE — 81025 URINE PREGNANCY TEST: CPT | Mod: PO | Performed by: ANESTHESIOLOGY

## 2017-10-04 PROCEDURE — A9579 GAD-BASE MR CONTRAST NOS,1ML: HCPCS | Mod: PO | Performed by: ANESTHESIOLOGY

## 2017-10-04 PROCEDURE — 63600175 PHARM REV CODE 636 W HCPCS: Mod: PO | Performed by: ANESTHESIOLOGY

## 2017-10-04 PROCEDURE — 62321 NJX INTERLAMINAR CRV/THRC: CPT | Mod: ,,, | Performed by: ANESTHESIOLOGY

## 2017-10-04 PROCEDURE — 25500020 PHARM REV CODE 255: Mod: PO | Performed by: ANESTHESIOLOGY

## 2017-10-04 PROCEDURE — 62321 NJX INTERLAMINAR CRV/THRC: CPT | Mod: PO | Performed by: ANESTHESIOLOGY

## 2017-10-04 RX ORDER — SODIUM CHLORIDE, SODIUM LACTATE, POTASSIUM CHLORIDE, CALCIUM CHLORIDE 600; 310; 30; 20 MG/100ML; MG/100ML; MG/100ML; MG/100ML
INJECTION, SOLUTION INTRAVENOUS CONTINUOUS
Status: DISCONTINUED | OUTPATIENT
Start: 2017-10-04 | End: 2017-10-04 | Stop reason: HOSPADM

## 2017-10-04 RX ORDER — MIDAZOLAM HYDROCHLORIDE 2 MG/2ML
INJECTION, SOLUTION INTRAMUSCULAR; INTRAVENOUS
Status: DISCONTINUED | OUTPATIENT
Start: 2017-10-04 | End: 2017-10-04 | Stop reason: HOSPADM

## 2017-10-04 RX ORDER — DIPHENHYDRAMINE HCL 25 MG
25 CAPSULE ORAL EVERY 6 HOURS PRN
Status: DISCONTINUED | OUTPATIENT
Start: 2017-10-04 | End: 2017-10-04 | Stop reason: HOSPADM

## 2017-10-04 RX ORDER — LIDOCAINE HYDROCHLORIDE 10 MG/ML
INJECTION, SOLUTION EPIDURAL; INFILTRATION; INTRACAUDAL; PERINEURAL
Status: DISCONTINUED | OUTPATIENT
Start: 2017-10-04 | End: 2017-10-04 | Stop reason: HOSPADM

## 2017-10-04 RX ORDER — MIDAZOLAM HYDROCHLORIDE 5 MG/ML
4 INJECTION INTRAMUSCULAR; INTRAVENOUS ONCE
Status: DISCONTINUED | OUTPATIENT
Start: 2017-10-04 | End: 2017-10-04 | Stop reason: HOSPADM

## 2017-10-04 RX ORDER — METHYLPREDNISOLONE ACETATE 80 MG/ML
INJECTION, SUSPENSION INTRA-ARTICULAR; INTRALESIONAL; INTRAMUSCULAR; SOFT TISSUE
Status: DISCONTINUED | OUTPATIENT
Start: 2017-10-04 | End: 2017-10-04 | Stop reason: HOSPADM

## 2017-10-04 RX ADMIN — MIDAZOLAM HYDROCHLORIDE 1 MG: 1 INJECTION, SOLUTION INTRAMUSCULAR; INTRAVENOUS at 03:10

## 2017-10-04 RX ADMIN — GADODIAMIDE 3 ML: 287 INJECTION INTRAVENOUS at 03:10

## 2017-10-04 RX ADMIN — LIDOCAINE HYDROCHLORIDE 10 ML: 10 INJECTION, SOLUTION EPIDURAL; INFILTRATION; INTRACAUDAL; PERINEURAL at 03:10

## 2017-10-04 RX ADMIN — METHYLPREDNISOLONE ACETATE 80 MG: 80 INJECTION, SUSPENSION INTRA-ARTICULAR; INTRALESIONAL; INTRAMUSCULAR; SOFT TISSUE at 03:10

## 2017-10-04 RX ADMIN — SODIUM CHLORIDE, SODIUM LACTATE, POTASSIUM CHLORIDE, AND CALCIUM CHLORIDE: 600; 310; 30; 20 INJECTION, SOLUTION INTRAVENOUS at 02:10

## 2017-10-04 RX ADMIN — DIPHENHYDRAMINE HYDROCHLORIDE 25 MG: 50 CAPSULE ORAL at 04:10

## 2017-10-04 NOTE — DISCHARGE INSTRUCTIONS
Recovery After Procedural Sedation (Adult)  You have been given medicine by vein to make you sleep during your surgery. This may have included both a pain medicine and sleeping medicine. Most of the effects have worn off. But you may still have some drowsiness for the next 6 to 8 hours.  Home care  Follow these guidelines when you get home:  · For the next 8 hours, you should be watched by a responsible adult. This person should make sure your condition is not getting worse.  · Don't drink any alcohol for the next 24 hours.  · Don't drive, operate dangerous machinery, or make important business or personal decisions during the next 24 hours.  Note: Your healthcare provider may tell you not to take any medicine by mouth for pain or sleep in the next 4 hours. These medicines may react with the medicines you were given in the hospital. This could cause a much stronger response than usual.  Follow-up care  Follow up with your healthcare provider if you are not alert and back to your usual level of activity within 12 hours.  When to seek medical advice  Call your healthcare provider right away if any of these occur:  · Drowsiness gets worse  · Weakness or dizziness gets worse  · Repeated vomiting  · You can't be awakened   Date Last Reviewed: 10/18/2016  © 2862-9649 [employer]. [address] [place] [address], [place] [address]. All rights reserved. This information is not intended as a substitute for professional medical care. Always follow your healthcare professional's instructions.    Home care instructions  Apply ice pack to the injection site for 20 minutes periods for the first 24 hrs for soreness/discomfort at injection site DO NOT USE HEAT FOR 24 HOURS  Keep site clean and dry for 24 hours, remove bandaid when desired  Do not drive until tomorrow  Take care when walking after a cervical injection  Avoid strenuous activities for 2 days  Make take 2 weeks to feel the full effects   Resume home medication  as prescribed today  Resume Aspirin, Plavix, or Coumadin the day after the procedure unless otherwise instructed.    SEE IMMEDIATE MEDICAL HELP FOR:  Severe increase in your usual pain or appearance of new pain  Prolonged or increasing weakness or numbness in the legs or arms  Drainage, redness, active bleeding, or increased swelling at the injection site  Temperature over 100.0 degrees F.  Headache that increases when your head is upright and decreases when you lie flat    CALL 911 OR GO DIRECTLY TO EMERGENCY DEPARTMENT FOR:  Shortness of breath, chest pain, or problems breathing

## 2017-10-04 NOTE — OP NOTE
PROCEDURE DATE: 10/4/2017    Procedure: C7-T1 cervical interlaminar epidural steroid injection under utilizing fluoroscopy.    Diagnosis: Cervical Radiculopathy    POSTOP DIAGNOSIS: SAME    Physician: Santana Pak MD    Medications injected:  Methylprednisone 80mg followed by a slow injection of 4 mL sterile, preservative-free normal saline.    Local anesthetic used: Lidocaine 1%, 4 ml.    Sedation Medications: 2mg versed    Complications:  none    Estimated blood loss: none    Technique:  A time-out was taken to identify patient and procedure prior to starting the procedure.  With the patient laying in a prone position with the neck in a mid-flexed forward position, the area was prepped and draped in the usual sterile fashion using ChloraPrep and a fenestrated drape.  The area was determined under AP fluoroscopic guidance.  Local anesthetic was given using a 25-gauge 1.5 inch needle by raising a wheal and then infiltrating ventrally.  A 3.5 inch 20-gauge Touhy needle was introduced under fluoroscopic guidance to meet the lamina of C7.  The needle was then hinged under the lamina then advanced using loss of resistance technique.  Once the tip of the needle was in the desired position, the contrast dye Omnipaque was injected to determine placement and no uptake.  The steroid was then injected slowly followed by a slow injection of 4 mL of the sterile preservative-free normal saline.  The patient tolerated the procedure well.    The patient was monitored after the procedure and was given post-procedure and discharge instructions to follow at home. The patient was discharged in a stable condition.

## 2017-10-04 NOTE — DISCHARGE SUMMARY
[facility]  Discharge Note  Short Stay    Admit Date: 10/4/2017    Discharge Date: 10/4/2017    Attending Physician: Santana Pak MD     Discharge Provider: Santana Pak    Diagnoses:  Active Hospital Problems    Diagnosis  POA    *Cervical radiculopathy [M54.12]  Yes      Resolved Hospital Problems    Diagnosis Date Resolved POA   No resolved problems to display.       Discharged Condition: good    Final Diagnoses: Cervical radiculopathy [M54.12]    Disposition: Home or Self Care    Hospital Course: no complications, uneventful    Outcome of Hospitalization, Treatment, Procedure, or Surgery:  Patient was admitted for outpatient procedure. The patient underwent procedure without complications and are discharged home    Follow up/Patient Instructions:  Follow up as scheduled/Patient has received instructions and follow up date    Medications:  Continue previous medications      Discharge Procedure Orders  Diet general     Activity as tolerated     Call MD for:  temperature >100.4     Call MD for:  severe uncontrolled pain     Call MD for:  redness, tenderness, or signs of infection (pain, swelling, redness, odor or green/yellow discharge around incision site)     Call MD for:  severe persistent headache     No dressing needed           Discharge Procedure Orders (must include Diet, Follow-up, Activity):    Discharge Procedure Orders (must include Diet, Follow-up, Activity)  Diet general     Activity as tolerated     Call MD for:  temperature >100.4     Call MD for:  severe uncontrolled pain     Call MD for:  redness, tenderness, or signs of infection (pain, swelling, redness, odor or green/yellow discharge around incision site)     Call MD for:  severe persistent headache     No dressing needed

## 2017-10-23 ENCOUNTER — TELEPHONE (OUTPATIENT)
Dept: PAIN MEDICINE | Facility: CLINIC | Age: 37
End: 2017-10-23

## 2017-11-01 ENCOUNTER — OFFICE VISIT (OUTPATIENT)
Dept: PAIN MEDICINE | Facility: CLINIC | Age: 37
End: 2017-11-01
Payer: MEDICARE

## 2017-11-01 VITALS
SYSTOLIC BLOOD PRESSURE: 118 MMHG | BODY MASS INDEX: 50.66 KG/M2 | DIASTOLIC BLOOD PRESSURE: 70 MMHG | HEIGHT: 57 IN | HEART RATE: 80 BPM | WEIGHT: 234.81 LBS

## 2017-11-01 DIAGNOSIS — M50.30 DDD (DEGENERATIVE DISC DISEASE), CERVICAL: ICD-10-CM

## 2017-11-01 DIAGNOSIS — M54.12 CERVICAL RADICULOPATHY: Primary | ICD-10-CM

## 2017-11-01 PROCEDURE — 99215 OFFICE O/P EST HI 40 MIN: CPT | Mod: PBBFAC,PO | Performed by: PHYSICIAN ASSISTANT

## 2017-11-01 PROCEDURE — 99214 OFFICE O/P EST MOD 30 MIN: CPT | Mod: S$PBB,,, | Performed by: PHYSICIAN ASSISTANT

## 2017-11-01 PROCEDURE — 99999 PR PBB SHADOW E&M-EST. PATIENT-LVL V: CPT | Mod: PBBFAC,,, | Performed by: PHYSICIAN ASSISTANT

## 2017-11-01 RX ORDER — METOPROLOL SUCCINATE 25 MG/1
1 TABLET, EXTENDED RELEASE ORAL
COMMUNITY
Start: 2017-10-12 | End: 2018-09-12

## 2017-11-01 RX ORDER — HYDROCODONE BITARTRATE AND ACETAMINOPHEN 5; 325 MG/1; MG/1
1 TABLET ORAL 2 TIMES DAILY PRN
Qty: 60 TABLET | Refills: 0 | Status: SHIPPED | OUTPATIENT
Start: 2017-11-01 | End: 2017-12-11 | Stop reason: SDUPTHER

## 2017-11-01 RX ORDER — SODIUM CHLORIDE, SODIUM LACTATE, POTASSIUM CHLORIDE, CALCIUM CHLORIDE 600; 310; 30; 20 MG/100ML; MG/100ML; MG/100ML; MG/100ML
INJECTION, SOLUTION INTRAVENOUS CONTINUOUS
Status: CANCELLED | OUTPATIENT
Start: 2017-11-14

## 2017-11-01 RX ORDER — DIPHENOXYLATE HYDROCHLORIDE AND ATROPINE SULFATE 2.5; .025 MG/1; MG/1
TABLET ORAL
COMMUNITY
Start: 2017-10-31 | End: 2018-09-12

## 2017-11-05 NOTE — PROGRESS NOTES
This note was completed with dictation software and grammatical errors may exist.    CC:Neck pain, headaches    HPI: The patient is a 37-year-old woman with a history of Chiari malformation, Hadley syndrome, pseudotumor cerebri with shunt, morbid obesity who presents in referral from Dr. Velasco for neck pain. She is status post C7-T1 cervical interlaminar epidural steroid injection on 10/4/17 with 100% relief lasting 2 weeks.  Patient is new to me.  She complains of bilateral neck pain radiating throughout the arms and also causing headaches.  She reports intermittent weakness in her arms and constant numbness in her hands.  She feels that her tremors improved some since discontinuing gabapentin.  She also complains of low back pain radiating throughout her legs.  She believes she had a lumbar spine MRI but is not sure where.    Pain intervention history: She was seeing Dr. Gerson Renteria, pain management and until recently had been taking hydrocodone 5/325 once a day in addition to Flexeril 3 times a day and gabapentin 600 mg 3 times a day.  She apparently tested positive for Valium and so was dismissed from his practice. She is status post C7-T1 cervical interlaminar epidural steroid injection on 10/4/17 with 100% relief lasting 2 weeks.      ROS: She reports weight loss, headaches, easy bruising and back pain.  Balance of review of systems is negative.    Past Medical History:   Diagnosis Date    Asthma     Bronchitis     Chiari malformation type I     Chronic headache     Hyperlipidemia     Hypertension     Murmur, heart     NPH (normal pressure hydrocephalus)     Obesity     MANUEL on CPAP     Pseudotumor cerebri     Thyroid disease     Hadley syndrome        Past Surgical History:   Procedure Laterality Date    CARPAL TUNNEL RELEASE      CERVICAL SPINE SURGERY      CHOLECYSTECTOMY      EAR TUBE REMOVAL Bilateral     EYE SURGERY      strabismus    MYRINGOTOMY W/ TUBES      TONSILLECTOMY    "   VENTRICULOPERITONEAL SHUNT         Social History     Social History    Marital status: Single     Spouse name: N/A    Number of children: N/A    Years of education: N/A     Social History Main Topics    Smoking status: Never Smoker    Smokeless tobacco: Never Used    Alcohol use No    Drug use: No    Sexual activity: Not Asked     Other Topics Concern    None     Social History Narrative    None         Medications/Allergies: See med card    Vitals:    11/01/17 1316   BP: 118/70   Pulse: 80   Weight: 106.5 kg (234 lb 12.6 oz)   Height: 4' 9" (1.448 m)   PainSc:   7   PainLoc: Back     Body mass index is 50.81 kg/m².      Physical exam:  Gen: A and O x3, pleasant, obese  Skin: No rashes or obvious lesions  HEENT: PERRLA, no obvious deformities on ears or in canals.Trachea midline.  CVS: Regular rate and rhythm, normal palpable pulses.  Resp: Clear to auscultation bilaterally, no wheezes or rales.  Abdomen: Soft, NT/ND.  Musculoskeletal:  Wide-based gait.    Neuro:  Upper extremities: 5/5 strength bilaterally   Lower extremities: 5/5 strength bilaterally  Reflexes: Brachioradialis 2+, Bicep 2+, Tricep 2+. Patellar 2+, Achilles 2+ bilaterally.  Sensory: Intact and symmetrical to light touch and pinprick in C2-T1 dermatomes bilaterally.  Intact and symmetrical to light touch and pinprick in L2-S1 dermatomes bilaterally.    Cervical Spine:  Cervical spine: Range of motion is mildly reduced with forward flexion with no increased pain, moderately reduced with extension with increased pain and neck in the midline, lateral rotation mildly reduced with increased pain especially with right oblique extension.  Spurling's maneuver is lateral neck pain.  Myofascial exam: Tenderness to palpation across cervical paraspinous region bilaterally.    Lumbar spine:  Lumbar spine: ROM is mildly limited with flexion and extension without major increased low back pain.  Derrick's test causes no increased pain on either side. "    Supine straight leg raise is negative bilaterally.    Internal and external rotation of the hip causes no increased pain on either side.  Myofascial exam: No tenderness to palpation across lumbar paraspinous muscles.        Imaging:  MRI from 2/9/17 cervical spine demonstrates congenitally narrowed canal with disc bulging most prominently at C4/5 to the right side causing anterior cord compression but no signal changes.  There is narrowing of the canal at C3/4 to a lesser degree.    Assessment:   The patient is a 37-year-old woman with a history of Chiari malformation, pseudotumor cerebri with shunt, morbid obesity who presents in referral from Dr. Velasco for neck pain.  1. Cervical radiculopathy  Vital signs    Activity as tolerated    Place 18-22 gauage peripheral IV     Verify informed consent    Notify physician     Notify physician     Notify physician (specify)    Diet NPO    Case Request Operating Room: INJECTION-STEROID-EPIDURAL-CERVICAL    Place in Outpatient    lactated ringers infusion   2. DDD (degenerative disc disease), cervical         Plan:  1.  The patient did well following the cervical TABITHA with significant relief but not long-lasting.  I'll set her up to repeat this.  It can be done third time if necessary.  2.  She reports having a lumbar spine MRI possibly a year ago but is not sure where.  If we are unable to locate these records and she continues to have low back pain, we may need to obtain a new lumbar spine MRI.  3.  Dr. Pak provided a prescription for hydrocodone-acetaminophen 5/325 mg #60.  I have reviewed the [place] Board of [employer] website and there are no abberancies.    4.  Follow-up in 4 weeks post procedure sooner as needed.    Greater than 50% of this 30 minute visit was spent on counseling the patient.

## 2017-11-10 DIAGNOSIS — M50.30 DDD (DEGENERATIVE DISC DISEASE), CERVICAL: Primary | ICD-10-CM

## 2017-11-14 ENCOUNTER — HOSPITAL ENCOUNTER (OUTPATIENT)
Dept: RADIOLOGY | Facility: HOSPITAL | Age: 37
Discharge: HOME | End: 2017-11-14
Attending: ANESTHESIOLOGY | Admitting: ANESTHESIOLOGY
Payer: MEDICARE

## 2017-11-14 ENCOUNTER — SURGERY (OUTPATIENT)
Age: 37
End: 2017-11-14

## 2017-11-14 ENCOUNTER — HOSPITAL ENCOUNTER (OUTPATIENT)
Facility: HOSPITAL | Age: 37
Discharge: HOME | End: 2017-11-14
Attending: ANESTHESIOLOGY | Admitting: ANESTHESIOLOGY
Payer: MEDICARE

## 2017-11-14 DIAGNOSIS — M50.30 DDD (DEGENERATIVE DISC DISEASE), CERVICAL: ICD-10-CM

## 2017-11-14 DIAGNOSIS — M54.12 CERVICAL RADICULOPATHY: Primary | ICD-10-CM

## 2017-11-14 LAB
B-HCG UR QL: NEGATIVE
CTP QC/QA: YES

## 2017-11-14 PROCEDURE — 25000003 PHARM REV CODE 250: Mod: PO | Performed by: ANESTHESIOLOGY

## 2017-11-14 PROCEDURE — 62321 NJX INTERLAMINAR CRV/THRC: CPT | Mod: PO | Performed by: ANESTHESIOLOGY

## 2017-11-14 PROCEDURE — 25500020 PHARM REV CODE 255: Mod: PO | Performed by: ANESTHESIOLOGY

## 2017-11-14 PROCEDURE — A9579 GAD-BASE MR CONTRAST NOS,1ML: HCPCS | Mod: PO | Performed by: ANESTHESIOLOGY

## 2017-11-14 PROCEDURE — 81025 URINE PREGNANCY TEST: CPT | Mod: PO | Performed by: ANESTHESIOLOGY

## 2017-11-14 PROCEDURE — 62321 NJX INTERLAMINAR CRV/THRC: CPT | Mod: ,,, | Performed by: ANESTHESIOLOGY

## 2017-11-14 PROCEDURE — 76000 FLUOROSCOPY <1 HR PHYS/QHP: CPT | Mod: TC,PO

## 2017-11-14 PROCEDURE — A4216 STERILE WATER/SALINE, 10 ML: HCPCS | Mod: PO | Performed by: ANESTHESIOLOGY

## 2017-11-14 PROCEDURE — 99152 MOD SED SAME PHYS/QHP 5/>YRS: CPT | Mod: ,,, | Performed by: ANESTHESIOLOGY

## 2017-11-14 PROCEDURE — 63600175 PHARM REV CODE 636 W HCPCS: Mod: PO | Performed by: ANESTHESIOLOGY

## 2017-11-14 RX ORDER — MIDAZOLAM HYDROCHLORIDE 2 MG/2ML
INJECTION, SOLUTION INTRAMUSCULAR; INTRAVENOUS
Status: DISCONTINUED | OUTPATIENT
Start: 2017-11-14 | End: 2017-11-14 | Stop reason: HOSPADM

## 2017-11-14 RX ORDER — SODIUM CHLORIDE, SODIUM LACTATE, POTASSIUM CHLORIDE, CALCIUM CHLORIDE 600; 310; 30; 20 MG/100ML; MG/100ML; MG/100ML; MG/100ML
INJECTION, SOLUTION INTRAVENOUS CONTINUOUS
Status: DISCONTINUED | OUTPATIENT
Start: 2017-11-14 | End: 2017-11-14 | Stop reason: HOSPADM

## 2017-11-14 RX ORDER — LIDOCAINE HYDROCHLORIDE 10 MG/ML
1 INJECTION INFILTRATION; PERINEURAL ONCE
Status: COMPLETED | OUTPATIENT
Start: 2017-11-14 | End: 2017-11-14

## 2017-11-14 RX ORDER — METHYLPREDNISOLONE ACETATE 80 MG/ML
INJECTION, SUSPENSION INTRA-ARTICULAR; INTRALESIONAL; INTRAMUSCULAR; SOFT TISSUE
Status: DISCONTINUED | OUTPATIENT
Start: 2017-11-14 | End: 2017-11-14 | Stop reason: HOSPADM

## 2017-11-14 RX ORDER — SODIUM CHLORIDE 9 MG/ML
INJECTION, SOLUTION INTRAMUSCULAR; INTRAVENOUS; SUBCUTANEOUS
Status: DISCONTINUED | OUTPATIENT
Start: 2017-11-14 | End: 2017-11-14 | Stop reason: HOSPADM

## 2017-11-14 RX ADMIN — SODIUM CHLORIDE, SODIUM LACTATE, POTASSIUM CHLORIDE, AND CALCIUM CHLORIDE: .6; .31; .03; .02 INJECTION, SOLUTION INTRAVENOUS at 01:11

## 2017-11-14 RX ADMIN — GADODIAMIDE 1 ML: 287 INJECTION INTRAVENOUS at 01:11

## 2017-11-14 RX ADMIN — METHYLPREDNISOLONE ACETATE 80 MG: 80 INJECTION, SUSPENSION INTRA-ARTICULAR; INTRALESIONAL; INTRAMUSCULAR; SOFT TISSUE at 01:11

## 2017-11-14 RX ADMIN — LIDOCAINE HYDROCHLORIDE 1 ML: 10 INJECTION, SOLUTION EPIDURAL; INFILTRATION; INTRACAUDAL; PERINEURAL at 01:11

## 2017-11-14 RX ADMIN — SODIUM CHLORIDE 4 ML: 9 INJECTION INTRAMUSCULAR; INTRAVENOUS; SUBCUTANEOUS at 01:11

## 2017-11-14 RX ADMIN — MIDAZOLAM HYDROCHLORIDE 2 MG: 1 INJECTION, SOLUTION INTRAMUSCULAR; INTRAVENOUS at 01:11

## 2017-11-14 NOTE — OP NOTE
PROCEDURE DATE: 11/14/2017    Procedure: C7-T1 cervical interlaminar epidural steroid injection under utilizing fluoroscopy.    Diagnosis: Cervical Radiculopathy    POSTOP DIAGNOSIS: SAME    Physician: Santana Pak MD    Medications injected:  Methylprednisone 80mg followed by a slow injection of 4 mL sterile, preservative-free normal saline.    Local anesthetic used: Lidocaine 1%, 4 ml.    Sedation Medications: 2mg versed    Complications:  none    Estimated blood loss: none    Technique:  A time-out was taken to identify patient and procedure prior to starting the procedure.  With the patient laying in a prone position with the neck in a mid-flexed forward position, the area was prepped and draped in the usual sterile fashion using ChloraPrep and a fenestrated drape.  The area was determined under AP fluoroscopic guidance.  Local anesthetic was given using a 25-gauge 1.5 inch needle by raising a wheal and then infiltrating ventrally.  A 3.5 inch 20-gauge Touhy needle was introduced under fluoroscopic guidance to meet the lamina of C7.  The needle was then hinged under the lamina then advanced using loss of resistance technique.  Once the tip of the needle was in the desired position, the contrast dye Omnipaque was injected to determine placement and no uptake.  The steroid was then injected slowly followed by a slow injection of 4 mL of the sterile preservative-free normal saline.  The patient tolerated the procedure well.    The patient was monitored after the procedure and was given post-procedure and discharge instructions to follow at home. The patient was discharged in a stable condition.

## 2017-11-14 NOTE — DISCHARGE SUMMARY
[facility]  Discharge Note  Short Stay    Admit Date: 11/14/2017    Discharge Date: 11/14/2017    Attending Physician: Santana Pak MD     Discharge Provider: Santana Pak    Diagnoses:  Active Hospital Problems    Diagnosis  POA    *Cervical radiculopathy [M54.12]  Yes      Resolved Hospital Problems    Diagnosis Date Resolved POA   No resolved problems to display.       Discharged Condition: good    Final Diagnoses: Cervical radiculopathy [M54.12]    Disposition: Home or Self Care    Hospital Course: no complications, uneventful    Outcome of Hospitalization, Treatment, Procedure, or Surgery:  Patient was admitted for outpatient procedure. The patient underwent procedure without complications and are discharged home    Follow up/Patient Instructions:  Follow up as scheduled/Patient has received instructions and follow up date    Medications:  Continue previous medications      Discharge Procedure Orders  Diet general     Activity as tolerated     Call MD for:  temperature >100.4     Call MD for:  severe uncontrolled pain     Call MD for:  redness, tenderness, or signs of infection (pain, swelling, redness, odor or green/yellow discharge around incision site)     Call MD for:  severe persistent headache     No dressing needed           Discharge Procedure Orders (must include Diet, Follow-up, Activity):    Discharge Procedure Orders (must include Diet, Follow-up, Activity)  Diet general     Activity as tolerated     Call MD for:  temperature >100.4     Call MD for:  severe uncontrolled pain     Call MD for:  redness, tenderness, or signs of infection (pain, swelling, redness, odor or green/yellow discharge around incision site)     Call MD for:  severe persistent headache     No dressing needed

## 2017-11-14 NOTE — DISCHARGE INSTRUCTIONS
Home care instructions  Apply ice pack to the injection site for 20 minutes periods for the first 24 hrs for soreness/discomfort at injection site DO NOT USE HEAT FOR 24 HOURS  Keep site clean and dry for 24 hours, remove bandaid when desired  Do not drive until tomorrow  Take care when walking after a lumbar injection  Avoid strenuous activities for 2 days  Make take 2 weeks to feel the full effects   Resume home medication as prescribed today  Resume Aspirin, Plavix, or Coumadin the day after the procedure unless otherwise instructed.    SEE IMMEDIATE MEDICAL HELP FOR:  Severe increase in your usual pain or appearance of new pain  Prolonged or increasing weakness or numbness in the legs or arms  Drainage, redness, active bleeding, or increased swelling at the injection site  Temperature over 100.0 degrees F.  Headache that increases when your head is upright and decreases when you lie flat    CALL 911 OR GO DIRECTLY TO EMERGENCY DEPARTMENT FOR:  Shortness of breath, chest pain, or problems breathing

## 2017-11-15 VITALS
TEMPERATURE: 98 F | SYSTOLIC BLOOD PRESSURE: 121 MMHG | DIASTOLIC BLOOD PRESSURE: 61 MMHG | RESPIRATION RATE: 18 BRPM | HEART RATE: 85 BPM | WEIGHT: 234 LBS | HEIGHT: 57 IN | OXYGEN SATURATION: 100 % | BODY MASS INDEX: 50.48 KG/M2

## 2017-12-11 ENCOUNTER — TELEPHONE (OUTPATIENT)
Dept: NEUROSURGERY | Facility: CLINIC | Age: 37
End: 2017-12-11

## 2017-12-11 ENCOUNTER — HOSPITAL ENCOUNTER (OUTPATIENT)
Dept: RADIOLOGY | Facility: HOSPITAL | Age: 37
Discharge: HOME | End: 2017-12-11
Attending: PHYSICIAN ASSISTANT
Payer: MEDICARE

## 2017-12-11 ENCOUNTER — OFFICE VISIT (OUTPATIENT)
Dept: PAIN MEDICINE | Facility: CLINIC | Age: 37
End: 2017-12-11
Payer: MEDICARE

## 2017-12-11 VITALS
HEART RATE: 76 BPM | BODY MASS INDEX: 51.76 KG/M2 | RESPIRATION RATE: 18 BRPM | TEMPERATURE: 98 F | WEIGHT: 239.19 LBS | SYSTOLIC BLOOD PRESSURE: 120 MMHG | DIASTOLIC BLOOD PRESSURE: 60 MMHG

## 2017-12-11 DIAGNOSIS — M48.02 CERVICAL STENOSIS OF SPINAL CANAL: ICD-10-CM

## 2017-12-11 DIAGNOSIS — M54.12 CERVICAL RADICULOPATHY: ICD-10-CM

## 2017-12-11 DIAGNOSIS — M51.36 DDD (DEGENERATIVE DISC DISEASE), LUMBAR: ICD-10-CM

## 2017-12-11 DIAGNOSIS — R29.898 MUSCULAR DECONDITIONING: ICD-10-CM

## 2017-12-11 DIAGNOSIS — M79.18 MYOFASCIAL MUSCLE PAIN: Primary | ICD-10-CM

## 2017-12-11 DIAGNOSIS — E66.01 MORBID OBESITY: ICD-10-CM

## 2017-12-11 DIAGNOSIS — M50.30 DDD (DEGENERATIVE DISC DISEASE), CERVICAL: ICD-10-CM

## 2017-12-11 PROCEDURE — 99999 PR PBB SHADOW E&M-EST. PATIENT-LVL III: CPT | Mod: PBBFAC,,, | Performed by: ANESTHESIOLOGY

## 2017-12-11 PROCEDURE — 99213 OFFICE O/P EST LOW 20 MIN: CPT | Mod: PBBFAC,25,PO | Performed by: ANESTHESIOLOGY

## 2017-12-11 PROCEDURE — 74000 XR SHUNT SERIES: CPT | Mod: 26,,, | Performed by: RADIOLOGY

## 2017-12-11 PROCEDURE — 74000 XR SHUNT SERIES: CPT | Mod: TC,PO

## 2017-12-11 PROCEDURE — 70250 X-RAY EXAM OF SKULL: CPT | Mod: 26,,, | Performed by: RADIOLOGY

## 2017-12-11 PROCEDURE — 71010 XR SHUNT SERIES: CPT | Mod: 26,,, | Performed by: RADIOLOGY

## 2017-12-11 PROCEDURE — 99213 OFFICE O/P EST LOW 20 MIN: CPT | Mod: S$PBB,,, | Performed by: ANESTHESIOLOGY

## 2017-12-11 PROCEDURE — 72020 X-RAY EXAM OF SPINE 1 VIEW: CPT | Mod: 26,,, | Performed by: RADIOLOGY

## 2017-12-11 RX ORDER — HYDROCODONE BITARTRATE AND ACETAMINOPHEN 5; 325 MG/1; MG/1
1 TABLET ORAL 2 TIMES DAILY PRN
Qty: 60 TABLET | Refills: 0 | Status: SHIPPED | OUTPATIENT
Start: 2017-12-11 | End: 2018-02-07 | Stop reason: SDUPTHER

## 2017-12-11 RX ORDER — PROMETHAZINE HYDROCHLORIDE 25 MG/1
25 TABLET ORAL EVERY 4 HOURS PRN
Qty: 60 TABLET | Refills: 0 | Status: SHIPPED | OUTPATIENT
Start: 2017-12-11 | End: 2018-09-12

## 2017-12-11 RX ORDER — CYCLOBENZAPRINE HCL 10 MG
10 TABLET ORAL NIGHTLY
Qty: 30 TABLET | Refills: 0 | Status: SHIPPED | OUTPATIENT
Start: 2017-12-11 | End: 2018-02-07 | Stop reason: SDUPTHER

## 2017-12-11 RX ORDER — HYDROCODONE BITARTRATE AND ACETAMINOPHEN 5; 325 MG/1; MG/1
1 TABLET ORAL 2 TIMES DAILY PRN
Qty: 60 TABLET | Refills: 0 | Status: SHIPPED | OUTPATIENT
Start: 2017-01-10 | End: 2017-02-09

## 2017-12-11 NOTE — TELEPHONE ENCOUNTER
Pt called to schedule appt with Dr. Velasco. MRI, xray, and appt with Dr. Velasco dates, times, and locations confirmed. Pt verbalized understanding.

## 2017-12-11 NOTE — TELEPHONE ENCOUNTER
----- Message from Nicolette Garcia PA-C sent at 12/5/2017  3:38 PM CST -----  Contact: self  Surgery was discussed last office visit, please make an appointment with Dr. Velasco at his next available. She does not have a CINE sequence MRI for her Chiari. She will need that prior to a possible posterior fossa decompression surgery if needed. I ordered both an Xray shunt series and a CINE MRI. Her MRI and appointment will need to be the same day so we can reset the shunt. The Xray should be done prior to MRI.     ----- Message -----  From: Thu Mclean  Sent: 12/5/2017   8:51 AM  To: Nicolette Garcia PA-C    Patient called reporting having worsening headaches and pain beneath her shunt. She was last seen by her neurologist in 10/2017. Office note in media. Patient would like to make a follow up with Dr. Velasco. Please advise how patient should be scheduled or not. Patient call back [phone].

## 2017-12-11 NOTE — PROGRESS NOTES
This note was completed with dictation software and grammatical errors may exist.    CC:Neck pain, headaches    HPI: The patient is a 37-year-old woman with a history of Chiari malformation, Hadley syndrome, pseudotumor cerebri with shunt, morbid obesity who presents in referral from Dr. Velasco for neck pain. She is status post a second cervical TABITHA on 11/14/17 with almost complete relief again but only lasting 2-3 weeks.  She reported complete relief of her bilateral radicular arm symptoms, tingling and numbness in her hands, posterior and parietal headaches.  However, now the symptoms have returned.  She complains of left greater than right pain into the third and fourth fingers, states that she has been dropping things.  She reports headaches in the parietal region, sensitivity to touch over the parietal region as well.  She has been having some increased pain especially on the right side at the site of her previous shunt.  She has an appointment to see Dr. Velasco to discuss possible shunt revision.    Her other complaint today is bilateral low back pain, radiates into the right lateral thigh at times.  It is worse with standing and walking, sometimes worse with sitting too long.  She has been doing some stretching exercises, otherwise has not done anything specifically for her back.  She denies any weakness in the legs but does report pain and cramping in the thighs at times.    Pain intervention history: She was seeing Dr. Gerson Renteria, pain management and until recently had been taking hydrocodone 5/325 once a day in addition to Flexeril 3 times a day and gabapentin 600 mg 3 times a day.  She apparently tested positive for Valium and so was dismissed from his practice. She is status post C7-T1 cervical interlaminar epidural steroid injection on 10/4/17 with 100% relief lasting 2 weeks.      ROS: She reports weight loss, headaches, easy bruising and back pain.  Balance of review of systems is negative.    Past  Medical History:   Diagnosis Date    Asthma     Bronchitis     Chiari malformation type I     Chronic headache     Hyperlipidemia     Hypertension     Murmur, heart     NPH (normal pressure hydrocephalus)     Obesity     MANUEL on CPAP     Pseudotumor cerebri     Thyroid disease     Hadley syndrome        Past Surgical History:   Procedure Laterality Date    CARPAL TUNNEL RELEASE      CERVICAL SPINE SURGERY      CHOLECYSTECTOMY      EAR TUBE REMOVAL Bilateral     EYE SURGERY      strabismus    MYRINGOTOMY W/ TUBES      TONSILLECTOMY      VENTRICULOPERITONEAL SHUNT         Social History     Social History    Marital status: Single     Spouse name: N/A    Number of children: N/A    Years of education: N/A     Social History Main Topics    Smoking status: Never Smoker    Smokeless tobacco: Never Used    Alcohol use No    Drug use: No    Sexual activity: Not on file     Other Topics Concern    Not on file     Social History Narrative    No narrative on file         Medications/Allergies: See med card    Vitals:    12/11/17 0913   BP: 120/60   Pulse: 76   Resp: 18   Temp: 97.6 °F (36.4 °C)   TempSrc: Oral   Weight: 108.5 kg (239 lb 3.2 oz)   PainSc:   8   PainLoc: Neck     Body mass index is 51.76 kg/m².      Physical exam:  Gen: A and O x3, pleasant, obese  Skin: No rashes or obvious lesions  HEENT: PERRLA, no obvious deformities on ears or in canals.Trachea midline.  CVS: Regular rate and rhythm, normal palpable pulses.  Resp: Clear to auscultation bilaterally, no wheezes or rales.  Abdomen: Soft, NT/ND.  Musculoskeletal:  Wide-based gait.    Neuro:  Upper extremities: 5/5 strength bilaterally   Lower extremities: 5/5 strength bilaterally  Reflexes: Brachioradialis 2+, Bicep 2+, Tricep 2+. Patellar 2+, Achilles 2+ bilaterally.  Sensory: Intact and symmetrical to light touch and pinprick in C2-T1 dermatomes bilaterally.  Intact and symmetrical to light touch and pinprick in L2-S1  dermatomes bilaterally.    Cervical Spine:  Cervical spine: Range of motion is mildly reduced with forward flexion with no increased pain, moderately reduced with extension with increased pain and neck in the midline, lateral rotation mildly reduced with increased pain especially with right oblique extension.  Spurling's maneuver is lateral neck pain.  Myofascial exam: Tenderness to palpation across cervical paraspinous region bilaterally.    Lumbar spine:  Lumbar spine: Range of motion is moderately reduced with extension with increased pain in the bilateral low back, right greater than left, mildly reduced with flexion with no increased pain.    Derrick's test causes no increased pain on either side.    Supine straight leg raise is negative bilaterally.    Internal and external rotation of the hip causes no increased pain on either side.  Myofascial exam: No tenderness to palpation across lumbar paraspinous muscles.        Imaging:  MRI from 2/9/17 cervical spine demonstrates congenitally narrowed canal with disc bulging most prominently at C4/5 to the right side causing anterior cord compression but no signal changes.  There is narrowing of the canal at C3/4 to a lesser degree.    Assessment:   The patient is a 37-year-old woman with a history of Chiari malformation, pseudotumor cerebri with shunt, morbid obesity who presents in referral from Dr. Velasco for neck pain.  1. Myofascial muscle pain  Ambulatory Referral to Physical/Occupational Therapy   2. DDD (degenerative disc disease), lumbar  Ambulatory Referral to Physical/Occupational Therapy   3. Muscular deconditioning  Ambulatory Referral to Physical/Occupational Therapy   4. Cervical stenosis of spinal canal     5. Morbid obesity     6. Cervical radiculopathy         Plan:  1.  I explained that for her neck pain, radicular symptoms and headaches, the epidural steroid injections seem to provide very good relief which would in turn seem to provide diagnostic  information as well.  Unfortunately they are not providing lasting relief so I would not repeat these now.  She will continue to use gabapentin, I have provided a prescription for hydrocodone 5/325 to use twice a day, she also requested Flexeril at night.  We discussed that if she is developing further weakness, surgery may need to be an option for her.  2.  She has been having a great deal of nausea, occurring after eating and her primary care physician thinks that this may be secondary to her reflux.  She was apparently given a new antireflux medication but she has not started it.  She has also had Zofran in the past and states that this does not help.  I gave her a prescription for Phenergan to use as needed but told her to start the reflux medication as soon as possible.  3.  For her back pain, I think she would benefit from physical therapy, I've sent orders to [facility] to work on core strengthening, stretching exercises.  I think if she lost any weight it would go a long way to improving some of her back pain as well.  4.  She'll follow-up in 2 months or sooner as needed.

## 2018-01-04 ENCOUNTER — TELEPHONE (OUTPATIENT)
Dept: NEUROSURGERY | Facility: CLINIC | Age: 38
End: 2018-01-04

## 2018-01-04 ENCOUNTER — HOSPITAL ENCOUNTER (OUTPATIENT)
Dept: RADIOLOGY | Facility: HOSPITAL | Age: 38
Discharge: HOME | End: 2018-01-04
Attending: PHYSICIAN ASSISTANT
Payer: MEDICARE

## 2018-01-04 ENCOUNTER — HOSPITAL ENCOUNTER (OUTPATIENT)
Dept: RADIOLOGY | Facility: HOSPITAL | Age: 38
Discharge: HOME | End: 2018-01-04
Attending: NEUROLOGICAL SURGERY
Payer: MEDICARE

## 2018-01-04 ENCOUNTER — OFFICE VISIT (OUTPATIENT)
Dept: NEUROSURGERY | Facility: CLINIC | Age: 38
End: 2018-01-04
Payer: MEDICARE

## 2018-01-04 VITALS
HEIGHT: 57 IN | WEIGHT: 231.69 LBS | DIASTOLIC BLOOD PRESSURE: 72 MMHG | SYSTOLIC BLOOD PRESSURE: 134 MMHG | HEART RATE: 81 BPM | BODY MASS INDEX: 49.98 KG/M2

## 2018-01-04 DIAGNOSIS — R10.31 RIGHT LOWER QUADRANT ABDOMINAL PAIN: ICD-10-CM

## 2018-01-04 DIAGNOSIS — R10.31 RIGHT LOWER QUADRANT ABDOMINAL PAIN: Primary | ICD-10-CM

## 2018-01-04 DIAGNOSIS — R51.9 HEADACHE DISORDER: Primary | ICD-10-CM

## 2018-01-04 DIAGNOSIS — M50.30 DDD (DEGENERATIVE DISC DISEASE), CERVICAL: ICD-10-CM

## 2018-01-04 DIAGNOSIS — Z98.2 S/P VENTRICULOPERITONEAL SHUNT: ICD-10-CM

## 2018-01-04 DIAGNOSIS — R51.9 WORSENING HEADACHES: ICD-10-CM

## 2018-01-04 DIAGNOSIS — G93.5 ARNOLD-CHIARI MALFORMATION, TYPE I: ICD-10-CM

## 2018-01-04 DIAGNOSIS — G93.2 PSEUDOTUMOR CEREBRI: ICD-10-CM

## 2018-01-04 PROCEDURE — 74178 CT ABD&PLV WO CNTR FLWD CNTR: CPT | Mod: 26,,, | Performed by: RADIOLOGY

## 2018-01-04 PROCEDURE — 70551 MRI BRAIN STEM W/O DYE: CPT | Mod: TC,PO

## 2018-01-04 PROCEDURE — 99214 OFFICE O/P EST MOD 30 MIN: CPT | Mod: S$GLB,,, | Performed by: NEUROLOGICAL SURGERY

## 2018-01-04 PROCEDURE — 99999 PR PBB SHADOW E&M-EST. PATIENT-LVL IV: CPT | Mod: PBBFAC,,, | Performed by: NEUROLOGICAL SURGERY

## 2018-01-04 PROCEDURE — 99214 OFFICE O/P EST MOD 30 MIN: CPT | Mod: PBBFAC,25,PN | Performed by: NEUROLOGICAL SURGERY

## 2018-01-04 PROCEDURE — 25500020 PHARM REV CODE 255: Mod: PO | Performed by: NEUROLOGICAL SURGERY

## 2018-01-04 PROCEDURE — 70551 MRI BRAIN STEM W/O DYE: CPT | Mod: 26,,, | Performed by: RADIOLOGY

## 2018-01-04 PROCEDURE — 74178 CT ABD&PLV WO CNTR FLWD CNTR: CPT | Mod: TC,PO

## 2018-01-04 RX ADMIN — IOHEXOL 100 ML: 350 INJECTION, SOLUTION INTRAVENOUS at 02:01

## 2018-01-04 RX ADMIN — IOHEXOL 30 ML: 350 INJECTION, SOLUTION INTRAVENOUS at 02:01

## 2018-01-04 NOTE — TELEPHONE ENCOUNTER
Please make appt for pt when Dr. Gonzalez comes back from maternity leave. Referral placed with Dr. Gonzalez for headaches. Thank you!

## 2018-01-04 NOTE — PROGRESS NOTES
Neurosurgery History & Physical    Patient ID: Shanna Douglass is a 37 y.o. female.    Chief Complaint   Patient presents with    Follow-up     Pt reports with worsening of headaches and pain beneath shunt. Pt also reports nausea.       Review of Systems   Constitutional: Negative for activity change, appetite change, chills, fever and unexpected weight change.   HENT: Negative for tinnitus, trouble swallowing and voice change.    Respiratory: Negative for apnea, cough, chest tightness and shortness of breath.    Cardiovascular: Negative for chest pain and palpitations.   Gastrointestinal: Negative for constipation, diarrhea, nausea and vomiting.   Genitourinary: Positive for enuresis. Negative for difficulty urinating, dysuria, frequency and urgency.   Musculoskeletal: Positive for back pain and neck pain. Negative for gait problem and neck stiffness.   Skin: Negative for wound.   Neurological: Positive for headaches. Negative for dizziness, tremors, seizures, facial asymmetry, speech difficulty, weakness, light-headedness and numbness.   Psychiatric/Behavioral: Negative for confusion and decreased concentration.       Past Medical History:   Diagnosis Date    Asthma     Bronchitis     Chiari malformation type I     Chronic headache     Hyperlipidemia     Hypertension     Murmur, heart     NPH (normal pressure hydrocephalus)     Obesity     MANUEL on CPAP     Pseudotumor cerebri     Thyroid disease     Hadley syndrome      Social History     Social History    Marital status: Single     Spouse name: N/A    Number of children: N/A    Years of education: N/A     Occupational History    Not on file.     Social History Main Topics    Smoking status: Never Smoker    Smokeless tobacco: Never Used    Alcohol use No    Drug use: No    Sexual activity: Not on file     Other Topics Concern    Not on file     Social History Narrative    No narrative on file     Family History   Problem Relation Age of  Onset    Diabetes Mother     Hypertension Father     Heart disease Father     Heart disease Maternal Grandfather      Review of patient's allergies indicates:   Allergen Reactions    Diamox [acetazolamide] Hives    Dye Swelling and Rash       Current Outpatient Prescriptions:     aspirin (ECOTRIN) 81 MG EC tablet, Take 81 mg by mouth once daily., Disp: , Rfl:     butalbital-aspirin-caffeine (BUTALBITAL COMPOUND) -40 mg Tab, Take by mouth., Disp: , Rfl:     cetirizine (ZYRTEC) 10 MG tablet, Take 10 mg by mouth once daily., Disp: , Rfl:     codeine-butalbital-ASA-caffeine (BUTALBITAL COMPOUND-CODEINE) 93--40 mg Cap, Take 1 capsule by mouth every 4 (four) hours as needed., Disp: , Rfl:     cyclobenzaprine (FLEXERIL) 10 MG tablet, Take 1 tablet (10 mg total) by mouth every evening., Disp: 30 tablet, Rfl: 0    diphenoxylate-atropine 2.5-0.025 mg (LOMOTIL) 2.5-0.025 mg per tablet, , Disp: , Rfl:     docusate sodium (STOOL SOFTENER) 100 mg capsule, Take 100 mg by mouth as needed for Constipation., Disp: , Rfl:     furosemide (LASIX) 80 MG tablet, Take 80 mg by mouth once daily. , Disp: , Rfl:     hydrocodone-acetaminophen 5-325mg (NORCO) 5-325 mg per tablet, Take 1 tablet by mouth 2 (two) times daily as needed for Pain., Disp: 60 tablet, Rfl: 0    levothyroxine (SYNTHROID) 125 MCG tablet, Take 125 mcg by mouth once daily., Disp: , Rfl:     metoprolol succinate (TOPROL-XL) 25 MG 24 hr tablet, 1 tablet., Disp: , Rfl:     montelukast (SINGULAIR) 10 mg tablet, Take 10 mg by mouth once daily., Disp: , Rfl:     naproxen (NAPROSYN) 500 MG tablet, Take 500 mg by mouth 2 (two) times daily., Disp: , Rfl:     omega-3s-dha-epa-fish oil (OMEGA-3 FISH OIL) 300-1,000 mg Cap, Take 1 capsule by mouth., Disp: , Rfl:     pantoprazole (PROTONIX) 40 MG tablet, Take 40 mg by mouth once daily. , Disp: , Rfl:     paroxetine (PAXIL) 20 MG tablet, Take 20 mg by mouth once daily. , Disp: , Rfl:     potassium  "chloride (MICRO-K) 10 MEQ CpSR, Take 10 mEq by mouth once daily., Disp: , Rfl:     promethazine (PHENERGAN) 25 MG tablet, Take 1 tablet (25 mg total) by mouth every 4 (four) hours as needed for Nausea., Disp: 60 tablet, Rfl: 0    topiramate (TOPAMAX) 100 MG tablet, Take 100 mg by mouth 2 (two) times daily. , Disp: , Rfl:     Vitals:    01/04/18 1210   BP: 134/72   Pulse: 81   Weight: 105.1 kg (231 lb 11.3 oz)   Height: 4' 9" (1.448 m)       Physical Exam   Constitutional: She is oriented to person, place, and time. She appears well-developed and well-nourished.   HENT:   Head: Normocephalic and atraumatic.   Eyes: Pupils are equal, round, and reactive to light.   Neck: Normal range of motion. Neck supple.   Cardiovascular: Normal rate.    Pulmonary/Chest: Effort normal.   Musculoskeletal: Normal range of motion. She exhibits no edema.   Neurological: She is alert and oriented to person, place, and time. She has a normal Finger-Nose-Finger Test, a normal Heel to Shin Test, a normal Romberg Test and a normal Tandem Gait Test. Gait normal.   Reflex Scores:       Tricep reflexes are 2+ on the right side and 2+ on the left side.       Bicep reflexes are 2+ on the right side and 2+ on the left side.       Brachioradialis reflexes are 2+ on the right side and 2+ on the left side.       Patellar reflexes are 2+ on the right side and 2+ on the left side.       Achilles reflexes are 2+ on the right side and 2+ on the left side.  Skin: Skin is warm, dry and intact.   Psychiatric: She has a normal mood and affect. Her speech is normal and behavior is normal. Judgment and thought content normal.   Nursing note and vitals reviewed.      Neurologic Exam     Mental Status   Oriented to person, place, and time.   Oriented to person.   Oriented to place.   Oriented to time.   Follows 3 step commands.   Attention: normal. Concentration: normal.   Speech: speech is normal   Level of consciousness: alert  Knowledge: consistent with " education.   Able to name object. Able to read. Able to repeat. Able to write. Normal comprehension.     Cranial Nerves     CN II   Visual acuity: normal  Right visual field deficit: none  Left visual field deficit: none     CN III, IV, VI   Pupils are equal, round, and reactive to light.  Right pupil: Size: 3 mm. Shape: regular. Reactivity: brisk. Consensual response: intact.   Left pupil: Size: 3 mm. Shape: regular. Reactivity: brisk. Consensual response: intact.   CN III: no CN III palsy  CN VI: no CN VI palsy  Nystagmus: none   Diplopia: none  Ophthalmoparesis: none  Conjugate gaze: present    CN V   Right facial sensation deficit: none  Left facial sensation deficit: none    CN VII   Right facial weakness: none  Left facial weakness: none    CN VIII   Hearing: intact    CN IX, X   CN IX normal.   CN X normal.     CN XI   Right sternocleidomastoid strength: normal  Left sternocleidomastoid strength: normal  Right trapezius strength: normal  Left trapezius strength: normal    CN XII   Fasciculations: absent  Tongue deviation: none    Motor Exam   Muscle bulk: normal  Overall muscle tone: normal  Right arm pronator drift: absent  Left arm pronator drift: absent    Strength   Right neck flexion: 5/5  Left neck flexion: 5/5  Right neck extension: 5/5  Left neck extension: 5/5  Right deltoid: 5/5  Left deltoid: 5/5  Right biceps: 5/5  Left biceps: 5/5  Right triceps: 5/5  Left triceps: 5/5  Right wrist flexion: 5/5  Left wrist flexion: 5/5  Right wrist extension: 5/5  Left wrist extension: 5/5  Right interossei: 5/5  Left interossei: 5/5  Right abdominals: 5/5  Left abdominals: 5/5  Right iliopsoas: 5/5  Left iliopsoas: 5/5  Right quadriceps: 5/5  Left quadriceps: 5/5  Right hamstrin/5  Left hamstrin/5  Right glutei: 5/5  Left glutei: 5/5  Right anterior tibial: 5/5  Left anterior tibial: 5/5  Right posterior tibial: 5/5  Left posterior tibial: 5/5  Right peroneal: 5/5  Left peroneal: 5/5  Right gastroc:  "5/5  Left gastroc: 5/5    Sensory Exam   Right arm light touch: normal  Left arm light touch: normal  Right leg light touch: normal  Left leg light touch: normal  Right arm vibration: normal  Left arm vibration: normal  Right arm pinprick: normal  Left arm pinprick: normal    Gait, Coordination, and Reflexes     Gait  Gait: normal    Coordination   Romberg: negative  Finger to nose coordination: normal  Heel to shin coordination: normal  Tandem walking coordination: normal    Tremor   Resting tremor: absent  Intention tremor: absent  Action tremor: absent    Reflexes   Right brachioradialis: 2+  Left brachioradialis: 2+  Right biceps: 2+  Left biceps: 2+  Right triceps: 2+  Left triceps: 2+  Right patellar: 2+  Left patellar: 2+  Right achilles: 2+  Left achilles: 2+  Right Cardenas: absent  Left Cardenas: absent  Right ankle clonus: absent  Left ankle clonus: absent      Provider dictation:  The patient is 37 year-old morbidly obese  female with pseudotumor cerebri and Chiari Type 1 malformation s/p posterior fossa decompression in June 2016 following up for persistent headache and new right lower abdominal pain.  She has a labile mood and is upset because her neurologist in [place] allegedly told her "she has only 6 months to live."  She scored 23 on the PHQ-9 indicating severe depression.     The Codman shunt shunt is set at 150 and her ventricles are completely decompressed.     I have reviewed a brain MRI which reveals persistent crowding at the foramen magnum. Her psoter fossa decompression was performed in the seated position because of her body habitus and we do cannot obtain a wider decompression in her case.    Her exam is grossly intact and she has managed to lose some weight. She will require an Abdominal and Pelvic CT to assess her focal tenderness and sharp pain the right lower quadrant. I have explained this is not related to the peritoneal end of the  shunt.     She will see Dr. Gonzalez or Dr" Ashtyn for her headaches and I have advised her to seek psychiatric help for depression.     Visit Diagnosis:  Headache disorder    Pseudotumor cerebri    S/P ventriculoperitoneal shunt    BMI 50.0-59.9, adult    Arnold-Chiari malformation, type I    Right lower quadrant abdominal pain        Total time spent counseling greater than fifty percent of total visit time.  Counseling included discussion regarding imaging findings, diagnosis possibilities, treatment options, risks and benefits.   The patient had many questions regarding the options and long-term effects.

## 2018-01-05 ENCOUNTER — TELEPHONE (OUTPATIENT)
Dept: NEUROSURGERY | Facility: CLINIC | Age: 38
End: 2018-01-05

## 2018-01-05 DIAGNOSIS — K86.9 PANCREATIC LESION: Primary | ICD-10-CM

## 2018-01-05 NOTE — TELEPHONE ENCOUNTER
----- Message from Stanley Velasco MD sent at 1/4/2018  4:10 PM CST -----  Regarding: referral to GI  Please arrange referral to GI for pancreatic lesion on CT scan

## 2018-01-05 NOTE — TELEPHONE ENCOUNTER
Called pt regarding GI referral. Informed pt I sent a message to Dr. Roberson's staff and they will be calling her to make an appointment. Pt verbalized understanding.

## 2018-01-05 NOTE — TELEPHONE ENCOUNTER
Please schedule an appt for the pt with Dr. Roberson. Pt is being referred for pancreatic lesion. Referral in system. Thank you.

## 2018-01-09 ENCOUNTER — TELEPHONE (OUTPATIENT)
Dept: NEUROSURGERY | Facility: CLINIC | Age: 38
End: 2018-01-09

## 2018-01-09 DIAGNOSIS — K86.9 PANCREATIC LESION: Primary | ICD-10-CM

## 2018-01-09 NOTE — TELEPHONE ENCOUNTER
----- Message from Thu Mclean sent at 1/9/2018 10:05 AM CST -----  Contact: self  Patient request to have GI referral sent to Dr. Stef Boyle in [place]. Clinic phone number is [phone].

## 2018-01-09 NOTE — TELEPHONE ENCOUNTER
Called pt to notify of referral entered for Dr. Boyle in [place] with GI as requested. Pt to call to schedule an appt. Instructed pt to call clinic for any further concerns.

## 2018-02-07 ENCOUNTER — OFFICE VISIT (OUTPATIENT)
Dept: PAIN MEDICINE | Facility: CLINIC | Age: 38
End: 2018-02-07
Payer: MEDICARE

## 2018-02-07 VITALS
DIASTOLIC BLOOD PRESSURE: 67 MMHG | HEART RATE: 78 BPM | HEIGHT: 57 IN | SYSTOLIC BLOOD PRESSURE: 121 MMHG | BODY MASS INDEX: 51.27 KG/M2 | WEIGHT: 237.63 LBS

## 2018-02-07 DIAGNOSIS — M54.16 LUMBAR RADICULOPATHY: ICD-10-CM

## 2018-02-07 DIAGNOSIS — M50.30 DDD (DEGENERATIVE DISC DISEASE), CERVICAL: ICD-10-CM

## 2018-02-07 DIAGNOSIS — R29.898 MUSCULAR DECONDITIONING: ICD-10-CM

## 2018-02-07 DIAGNOSIS — M54.12 CERVICAL RADICULOPATHY: ICD-10-CM

## 2018-02-07 DIAGNOSIS — M48.02 CERVICAL STENOSIS OF SPINAL CANAL: ICD-10-CM

## 2018-02-07 DIAGNOSIS — M51.36 DDD (DEGENERATIVE DISC DISEASE), LUMBAR: Primary | ICD-10-CM

## 2018-02-07 PROCEDURE — 99214 OFFICE O/P EST MOD 30 MIN: CPT | Mod: S$GLB,,, | Performed by: PHYSICIAN ASSISTANT

## 2018-02-07 PROCEDURE — 99999 PR PBB SHADOW E&M-EST. PATIENT-LVL V: CPT | Mod: PBBFAC,,, | Performed by: PHYSICIAN ASSISTANT

## 2018-02-07 PROCEDURE — 3008F BODY MASS INDEX DOCD: CPT | Mod: S$GLB,,, | Performed by: PHYSICIAN ASSISTANT

## 2018-02-07 RX ORDER — HYDROCODONE BITARTRATE AND ACETAMINOPHEN 5; 325 MG/1; MG/1
1 TABLET ORAL 2 TIMES DAILY PRN
Qty: 60 TABLET | Refills: 0 | Status: SHIPPED | OUTPATIENT
Start: 2018-02-07 | End: 2018-03-09

## 2018-02-07 RX ORDER — OMEPRAZOLE 20 MG/1
CAPSULE, DELAYED RELEASE ORAL
COMMUNITY
Start: 2018-01-31 | End: 2018-09-12

## 2018-02-07 RX ORDER — CYCLOBENZAPRINE HCL 10 MG
10 TABLET ORAL NIGHTLY
Qty: 30 TABLET | Refills: 2 | Status: SHIPPED | OUTPATIENT
Start: 2018-02-07 | End: 2018-09-12

## 2018-02-07 RX ORDER — HYDROCODONE BITARTRATE AND ACETAMINOPHEN 5; 325 MG/1; MG/1
1 TABLET ORAL 2 TIMES DAILY PRN
Qty: 60 TABLET | Refills: 0 | Status: SHIPPED | OUTPATIENT
Start: 2018-03-09 | End: 2018-04-09 | Stop reason: SDUPTHER

## 2018-02-09 NOTE — PROGRESS NOTES
This note was completed with dictation software and grammatical errors may exist.    CC: Back pain, Neck pain, headaches    HPI: The patient is a 37-year-old woman with a history of Chiari malformation, Hadley syndrome, pseudotumor cerebri with shunt, morbid obesity who presents in referral from Dr. Velasco for neck pain. She returns in follow-up today with neck pain and back pain.  Currently her low back is bothering her more than her neck.  She complains of bilateral low back pain radiating to the posterior and lateral thighs and bilateral shins.  Pain is worse with getting up from a seated position as well as standing and walking.  She continues to have neck pain radiating throughout her left arm.  She has been going to physical therapy and aquatic therapy at [facility] once a week with some relief.  She complains of numbness in her feet and her left arm.  She denies weakness, bladder or bowel incontinence.    Pain intervention history: She was seeing Dr. Gerson Renteria, pain management and until recently had been taking hydrocodone 5/325 once a day in addition to Flexeril 3 times a day and gabapentin 600 mg 3 times a day.  She apparently tested positive for Valium and so was dismissed from his practice. She is status post C7-T1 cervical interlaminar epidural steroid injection on 10/4/17 with 100% relief lasting 2 weeks.  She is status post a second cervical TABITHA on 11/14/17 with almost complete relief again but only lasting 2-3 weeks.    ROS: She reports weight loss, headaches, easy bruising and back pain.  Balance of review of systems is negative.    Past Medical History:   Diagnosis Date    Asthma     Bronchitis     Chiari malformation type I     Chronic headache     Hyperlipidemia     Hypertension     Murmur, heart     NPH (normal pressure hydrocephalus)     Obesity     MANUEL on CPAP     Pseudotumor cerebri     Thyroid disease     Hadley syndrome        Past Surgical History:   Procedure  "Laterality Date    CARPAL TUNNEL RELEASE      CERVICAL SPINE SURGERY      CHOLECYSTECTOMY      EAR TUBE REMOVAL Bilateral     EYE SURGERY      strabismus    MYRINGOTOMY W/ TUBES      TONSILLECTOMY      VENTRICULOPERITONEAL SHUNT         Social History     Social History    Marital status: Single     Spouse name: N/A    Number of children: N/A    Years of education: N/A     Social History Main Topics    Smoking status: Never Smoker    Smokeless tobacco: Never Used    Alcohol use No    Drug use: No    Sexual activity: Not Asked     Other Topics Concern    None     Social History Narrative    None         Medications/Allergies: See med card    Vitals:    02/07/18 1323   BP: 121/67   Pulse: 78   Weight: 107.8 kg (237 lb 10.5 oz)   Height: 4' 9" (1.448 m)   PainSc:   9   PainLoc: Neck     Body mass index is 51.43 kg/m².      Physical exam:  Gen: A and O x3, pleasant, obese  Skin: No rashes or obvious lesions  HEENT: PERRLA, no obvious deformities on ears or in canals.Trachea midline.  CVS: Regular rate and rhythm, normal palpable pulses.  Resp: Clear to auscultation bilaterally, no wheezes or rales.  Abdomen: Soft, NT/ND.  Musculoskeletal:  Wide-based gait.    Neuro:  Upper extremities: 5/5 strength bilaterally   Lower extremities: 5/5 strength bilaterally  Reflexes: Brachioradialis 2+, Bicep 2+, Tricep 2+. Patellar 2+, Achilles 2+ bilaterally.  Sensory: Intact and symmetrical to light touch and pinprick in C2-T1 dermatomes bilaterally.  Intact and symmetrical to light touch and pinprick in L2-S1 dermatomes bilaterally.    Cervical Spine:  Cervical spine: Range of motion is mildly reduced with flexion with no increased pain, moderately reduced with extension with increased neck pain, lateral rotation mildly reduced with increased pain, worse on the left.  Spurling's maneuver is lateral neck pain.  Myofascial exam: Tenderness to palpation across cervical paraspinous region bilaterally.    Lumbar " spine:  Lumbar spine: Range of motion is moderately reduced with extension with increased pain in the bilateral low back, mildly reduced with flexion with no increased pain.    Derrick's test causes no increased pain on either side.    Supine straight leg raise is negative bilaterally.    Internal and external rotation of the hip causes no increased pain on either side.  Myofascial exam: No tenderness to palpation across lumbar paraspinous muscles.      Imaging:  MRI from 2/9/17 cervical spine demonstrates congenitally narrowed canal with disc bulging most prominently at C4/5 to the right side causing anterior cord compression but no signal changes.  There is narrowing of the canal at C3/4 to a lesser degree.      Assessment:   The patient is a 37-year-old woman with a history of Chiari malformation, pseudotumor cerebri with shunt, morbid obesity who presents in referral from Dr. Velasco for neck pain.  1. DDD (degenerative disc disease), lumbar  MRI Lumbar Spine Without Contrast   2. Lumbar radiculopathy     3. Cervical stenosis of spinal canal     4. Cervical radiculopathy     5. DDD (degenerative disc disease), cervical     6. Muscular deconditioning         Plan:  1.  For her low back and leg pain, I will obtain a lumbar spine MRI for further evaluation.  She would likely benefit from a lumbar TABITHA.  We will call her with results and recommendations.  2.  Dr. Pak provided prescriptions for hydrocodone-acetaminophen 5/325 mg up to 2 times a day as needed for pain.  I have reviewed the [place] Board of Pharmacy website and there are no abberancies.      Greater than 50% of this 25 minute visit was spent on counseling patient.

## 2018-02-12 ENCOUNTER — TELEPHONE (OUTPATIENT)
Dept: PAIN MEDICINE | Facility: CLINIC | Age: 38
End: 2018-02-12

## 2018-02-12 NOTE — TELEPHONE ENCOUNTER
----- Message from Ginny Valadez sent at 2/10/2018  8:05 AM CST -----    Please call  Pt [phone]  To reschedule  MRI // please call

## 2018-02-26 ENCOUNTER — TELEPHONE (OUTPATIENT)
Dept: PAIN MEDICINE | Facility: CLINIC | Age: 38
End: 2018-02-26

## 2018-02-26 NOTE — TELEPHONE ENCOUNTER
----- Message from Sami Houston sent at 2/26/2018 10:04 AM CST -----  Contact: self   Patient want to speak with a nurse regarding some test that were done on her stomach. Please call back at [phone] (home)

## 2018-02-26 NOTE — TELEPHONE ENCOUNTER
Patient stated she wanted to hold off on PT right now until she gets her stomach issues taken care of.

## 2018-03-20 ENCOUNTER — TELEPHONE (OUTPATIENT)
Dept: PAIN MEDICINE | Facility: CLINIC | Age: 38
End: 2018-03-20

## 2018-03-20 NOTE — TELEPHONE ENCOUNTER
----- Message from Brissa Stevenson sent at 3/20/2018  8:21 AM CDT -----  Patient is calling concerning needing another prescription of hydrocodone-acetaminophen 5-325mg (NORCO) 5-325 mg per tablet but her appointment is not until 5/8/18. Please call back to advise at [phone] or [phone]      [address] [address]  Phone: [phone] Fax: [phone]

## 2018-03-21 ENCOUNTER — TELEPHONE (OUTPATIENT)
Dept: PAIN MEDICINE | Facility: CLINIC | Age: 38
End: 2018-03-21

## 2018-03-21 RX ORDER — HYDROCODONE BITARTRATE AND ACETAMINOPHEN 5; 325 MG/1; MG/1
1 TABLET ORAL 2 TIMES DAILY PRN
Qty: 60 TABLET | Refills: 0 | Status: CANCELLED | OUTPATIENT
Start: 2018-03-21 | End: 2018-04-20

## 2018-03-21 NOTE — TELEPHONE ENCOUNTER
----- Message from Lucia Butcher sent at 3/21/2018  8:59 AM CDT -----  Contact: self [phone] cell/home [phone]  Patient called asking for a written for rx norco. Asking to  the rx Monday 03/26/2018. Thanks!

## 2018-03-23 ENCOUNTER — TELEPHONE (OUTPATIENT)
Dept: PAIN MEDICINE | Facility: CLINIC | Age: 38
End: 2018-03-23

## 2018-03-23 NOTE — TELEPHONE ENCOUNTER
----- Message from Pardeep Lozano sent at 3/22/2018  9:53 AM CDT -----  Contact: pt  Pt is calling on regards to speaking with doctor. Pt can be reached at [phone] (qiyk)

## 2018-03-23 NOTE — TELEPHONE ENCOUNTER
----- Message from Sami Houston sent at 3/23/2018 11:09 AM CDT -----  Contact: self   Patient want to speak with a nurse regarding rx for norco, please call back at [phone] (home) Father Thierry will answer the phone, patient on her way to therapy

## 2018-03-26 ENCOUNTER — HOSPITAL ENCOUNTER (OUTPATIENT)
Dept: RADIOLOGY | Facility: HOSPITAL | Age: 38
Discharge: HOME | End: 2018-03-26
Attending: PHYSICIAN ASSISTANT
Payer: MEDICARE

## 2018-03-26 ENCOUNTER — OFFICE VISIT (OUTPATIENT)
Dept: NEUROLOGY | Facility: CLINIC | Age: 38
End: 2018-03-26
Payer: MEDICARE

## 2018-03-26 ENCOUNTER — OFFICE VISIT (OUTPATIENT)
Dept: NEUROSURGERY | Facility: CLINIC | Age: 38
End: 2018-03-26
Payer: MEDICARE

## 2018-03-26 ENCOUNTER — TELEPHONE (OUTPATIENT)
Dept: PAIN MEDICINE | Facility: CLINIC | Age: 38
End: 2018-03-26

## 2018-03-26 VITALS
WEIGHT: 226.19 LBS | DIASTOLIC BLOOD PRESSURE: 68 MMHG | HEART RATE: 81 BPM | HEIGHT: 57 IN | SYSTOLIC BLOOD PRESSURE: 111 MMHG | BODY MASS INDEX: 48.8 KG/M2

## 2018-03-26 VITALS
RESPIRATION RATE: 18 BRPM | HEART RATE: 99 BPM | HEIGHT: 57 IN | BODY MASS INDEX: 48.76 KG/M2 | SYSTOLIC BLOOD PRESSURE: 114 MMHG | WEIGHT: 226 LBS | DIASTOLIC BLOOD PRESSURE: 73 MMHG

## 2018-03-26 DIAGNOSIS — Z98.2 S/P VENTRICULOPERITONEAL SHUNT: ICD-10-CM

## 2018-03-26 DIAGNOSIS — E66.01 MORBID OBESITY: ICD-10-CM

## 2018-03-26 DIAGNOSIS — G43.719 INTRACTABLE CHRONIC MIGRAINE WITHOUT AURA AND WITHOUT STATUS MIGRAINOSUS: Primary | ICD-10-CM

## 2018-03-26 DIAGNOSIS — M51.36 DDD (DEGENERATIVE DISC DISEASE), LUMBAR: ICD-10-CM

## 2018-03-26 DIAGNOSIS — G93.2 PSEUDOTUMOR CEREBRI SYNDROME: ICD-10-CM

## 2018-03-26 DIAGNOSIS — Z98.2 VP (VENTRICULOPERITONEAL) SHUNT STATUS: Primary | ICD-10-CM

## 2018-03-26 DIAGNOSIS — Z98.2 S/P VENTRICULOPERITONEAL SHUNT: Primary | ICD-10-CM

## 2018-03-26 DIAGNOSIS — G93.5 ARNOLD-CHIARI MALFORMATION, TYPE I: ICD-10-CM

## 2018-03-26 PROCEDURE — 72148 MRI LUMBAR SPINE W/O DYE: CPT | Mod: TC,PO

## 2018-03-26 PROCEDURE — 99999 PR PBB SHADOW E&M-EST. PATIENT-LVL V: CPT | Mod: PBBFAC,,, | Performed by: PSYCHIATRY & NEUROLOGY

## 2018-03-26 PROCEDURE — 99999 PR PBB SHADOW E&M-EST. PATIENT-LVL IV: CPT | Mod: PBBFAC,,, | Performed by: PHYSICIAN ASSISTANT

## 2018-03-26 PROCEDURE — 99204 OFFICE O/P NEW MOD 45 MIN: CPT | Mod: S$PBB,,, | Performed by: PSYCHIATRY & NEUROLOGY

## 2018-03-26 PROCEDURE — 72148 MRI LUMBAR SPINE W/O DYE: CPT | Mod: 26,,, | Performed by: RADIOLOGY

## 2018-03-26 PROCEDURE — 62252 CSF SHUNT REPROGRAM: CPT | Mod: PBBFAC,PN | Performed by: PHYSICIAN ASSISTANT

## 2018-03-26 PROCEDURE — 99215 OFFICE O/P EST HI 40 MIN: CPT | Mod: PBBFAC,PO,25 | Performed by: PSYCHIATRY & NEUROLOGY

## 2018-03-26 PROCEDURE — 99214 OFFICE O/P EST MOD 30 MIN: CPT | Mod: PBBFAC,25,27,PN | Performed by: PHYSICIAN ASSISTANT

## 2018-03-26 PROCEDURE — 99499 UNLISTED E&M SERVICE: CPT | Mod: S$PBB,,, | Performed by: PHYSICIAN ASSISTANT

## 2018-03-26 PROCEDURE — 62252 CSF SHUNT REPROGRAM: CPT | Mod: 26,S$PBB,, | Performed by: PHYSICIAN ASSISTANT

## 2018-03-26 RX ORDER — SUMATRIPTAN SUCCINATE 100 MG/1
TABLET ORAL
Qty: 10 TABLET | Refills: 3 | Status: SHIPPED | OUTPATIENT
Start: 2018-03-26 | End: 2018-06-13 | Stop reason: SDUPTHER

## 2018-03-26 RX ORDER — SULFAMETHOXAZOLE AND TRIMETHOPRIM 800; 160 MG/1; MG/1
TABLET ORAL
COMMUNITY
Start: 2018-03-07 | End: 2018-05-22

## 2018-03-26 RX ORDER — HYDROCORTISONE 25 MG/G
CREAM TOPICAL
COMMUNITY
Start: 2018-03-09 | End: 2018-09-12

## 2018-03-26 NOTE — LETTER
March 26, 2018      Stanley Velasco MD  [address] [facility] [address]  [address]  [place] [address]           [facility] [place]  [address] [facility] [address] [place] [address]  Phone: [phone]  Fax: [phone]          Patient: Shanna Douglass   MR Number: [id number]   YOB: 1980   Date of Visit: 3/26/2018       Dear Dr. Stanley Velasco:    Thank you for referring Shanna Douglass to me for evaluation. Attached you will find relevant portions of my assessment and plan of care.    If you have questions, please do not hesitate to call me. I look forward to following Shanna Douglass along with you.    Sincerely,    Flory Chamorro MD    Enclosure  CC:  No Recipients    If you would like to receive this communication electronically, please contact [email or web address] or [phone] to request more information on [facility] Link access.    For providers and/or their staff who would like to refer a patient to [facility], please contact us through our one-stop-shop provider referral line, [facility] [place], at [phone].    If you feel you have received this communication in error or would no longer like to receive these types of communications, please e-mail [email or web address]

## 2018-03-26 NOTE — TELEPHONE ENCOUNTER
Spoke with the patient and she would like to  a written RX for Norco. She will be here on 4/13 for another appointment.

## 2018-03-26 NOTE — PATIENT INSTRUCTIONS
TESTING:  -- none     PREVENTION (use daily regardless of headache):  -- seek authorization to begin Botox treatments for chronic migraine     ACUTE TREATMENT (use total no more than 10 days per month unless otherwise stated):  --  At onset of moderate/severe migraine (within one hour) take sumatriptan, may repeat once in 2 hours

## 2018-03-26 NOTE — TELEPHONE ENCOUNTER
----- Message from Emma Mejia sent at 3/26/2018 10:41 AM CDT -----  Contact: SELF  The patient came to the clinic today and said she does not have an appointment until May but she has run out of her pain medication. Please contact the patient to speak with her about possibly getting a refill.

## 2018-03-26 NOTE — PROGRESS NOTES
"Date of service: 3/26/2018  Referring provider: Dr. Stanley Velasco    Subjective:      Chief complaint: Headache       Patient ID: Shanna Douglass is a 37 y.o. lady with Hadley syndrome, Chiari type 1 malformation, s/p posterior fossa decompression June 2016, morbid obesity, pseudotumor cerebri s/p shunt, obstructive sleep apnea on CPAP, presenting for headache management, new patient to me     History of Present Illness    ORIGINAL HEADACHE HISTORY - 3/26/18   Age at onset and course over time: headaches began in 1999. During that time had an eye exam and was found to have papilledema, had an Lp, and a brain MRI initial workup done at [facility]. In 2007 was treated at [facility] with a  shunt. Has not had any revisions since then. She reports she has never lost vision. Dr. Blackwood is her eye doctor in [place], [place]. 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     Codman shunt set to 150 - she reports last reprogram had a positive effect on her headaches     Location: frontotemporal, sometimes occipial  Quality: stabbing, pressure, throbbing  Severity: current 5, range 3 to 10   Duration: hours  Frequency: daily x 1 year, previous to that every other day. Wakes up without headache and it escalates as day goes on. Rarely, will wake up with headache   Alleviated by: darkness, heat, menses  Exacerbated by: fatigue, light, noise, smells, cough, sneezing, menses, change in weather   Associated with: Photophobia, phonophobia, osmophobia, blurred vision, double vision, vomiting, dizziness, vertigo, tinnitus, congestion, problems concentrating, neck tightness   Sleep habits:  Caffeine " intake: 2 per day     Current acute treatment:  -- norco 5mg BID - for headaches and back pain   -- tylenol     Current prevention:  -- metoprolol XL 25mg   -- topiramate 100mg twice a day - makes her mouth dry, sleepy, has helped somewhat   (paxil)  (lasix 80mg)     Previously tried/failed acute treatment:  -- fioricet  -- excedrin   -- BC powder     Previously tried/failed preventative treatment:  -- gabapentin   -- diamox - hives       Review of patient's allergies indicates:   Allergen Reactions    Diamox [acetazolamide] Hives    Dye Swelling and Rash     Current Outpatient Prescriptions   Medication Sig Dispense Refill    aspirin (ECOTRIN) 81 MG EC tablet Take 81 mg by mouth once daily.      butalbital-aspirin-caffeine (BUTALBITAL COMPOUND) -40 mg Tab Take by mouth.      cetirizine (ZYRTEC) 10 MG tablet Take 10 mg by mouth once daily.      codeine-butalbital-ASA-caffeine (BUTALBITAL COMPOUND-CODEINE) 36--40 mg Cap Take 1 capsule by mouth every 4 (four) hours as needed.      cyclobenzaprine (FLEXERIL) 10 MG tablet Take 1 tablet (10 mg total) by mouth every evening. 30 tablet 2    diphenoxylate-atropine 2.5-0.025 mg (LOMOTIL) 2.5-0.025 mg per tablet       docusate sodium (STOOL SOFTENER) 100 mg capsule Take 100 mg by mouth as needed for Constipation.      furosemide (LASIX) 80 MG tablet Take 80 mg by mouth once daily.       hydrocodone-acetaminophen 5-325mg (NORCO) 5-325 mg per tablet Take 1 tablet by mouth 2 (two) times daily as needed for Pain. 60 tablet 0    levothyroxine (SYNTHROID) 125 MCG tablet Take 125 mcg by mouth once daily.      metoprolol succinate (TOPROL-XL) 25 MG 24 hr tablet 1 tablet.      montelukast (SINGULAIR) 10 mg tablet Take 10 mg by mouth once daily.      naproxen (NAPROSYN) 500 MG tablet Take 500 mg by mouth 2 (two) times daily.      omega-3s-dha-epa-fish oil (OMEGA-3 FISH OIL) 300-1,000 mg Cap Take 1 capsule by mouth.      omeprazole (PRILOSEC) 20 MG capsule        pantoprazole (PROTONIX) 40 MG tablet Take 40 mg by mouth once daily.       paroxetine (PAXIL) 20 MG tablet Take 20 mg by mouth once daily.       potassium chloride (MICRO-K) 10 MEQ CpSR Take 10 mEq by mouth once daily.      PROCTO-MED HC 2.5 % rectal cream       promethazine (PHENERGAN) 25 MG tablet Take 1 tablet (25 mg total) by mouth every 4 (four) hours as needed for Nausea. 60 tablet 0    ranitidine (ZANTAC) 300 MG tablet       sulfamethoxazole-trimethoprim 800-160mg (BACTRIM DS) 800-160 mg Tab       topiramate (TOPAMAX) 100 MG tablet Take 100 mg by mouth 2 (two) times daily.       sumatriptan (IMITREX) 100 MG tablet 1 tab PO PRN migraine. May repeat once in 2 hours, no more than 2 tab in 24 hours. No more than 10 days per month. 10 tablet 3     No current facility-administered medications for this visit.        Past Medical History  Past Medical History:   Diagnosis Date    Asthma     Bronchitis     Chiari malformation type I     Chronic headache     Hyperlipidemia     Hypertension     Murmur, heart     NPH (normal pressure hydrocephalus)     Obesity     MANUEL on CPAP     Pseudotumor cerebri     Thyroid disease     Hadley syndrome        Past Surgical History  Past Surgical History:   Procedure Laterality Date    CARPAL TUNNEL RELEASE      CERVICAL SPINE SURGERY      CHOLECYSTECTOMY      EAR TUBE REMOVAL Bilateral     EYE SURGERY      strabismus    MYRINGOTOMY W/ TUBES      TONSILLECTOMY      VENTRICULOPERITONEAL SHUNT         Family History  Family History   Problem Relation Age of Onset    Diabetes Mother     Hypertension Father     Heart disease Father     Heart disease Maternal Grandfather        Social History  Social History     Social History    Marital status: Single     Spouse name: N/A    Number of children: N/A    Years of education: N/A     Occupational History    Not on file.     Social History Main Topics    Smoking status: Never Smoker    Smokeless  tobacco: Never Used    Alcohol use No    Drug use: No    Sexual activity: Not on file     Other Topics Concern    Not on file     Social History Narrative    No narrative on file        Review of Systems  Constitutional: + weight loss, no change to appetite   Eyes: + change to vision, +double vision, no redness, no tearing  Ears, nose, mouth, throat: no hearing loss, + tinnitus, no rhinorrhea, no difficulty swallowing   Cardiovascular: no palpitations  Respiratory: no shortness of breath, + cough   Gastrointestinal: no diarrhea, no constipation +nausea, vomiting  Musculoskeletal: no joint pain  Skin: no rashes  Neurologic: no numbness, weakness, change to speech, loss of coordination +tingling in feet  Endocrine: + heat or cold intolerance   Heme: no night sweats, no easy bruising   Psych: no depression     Objective:        Vitals:    03/26/18 1055   BP: 114/73   Pulse: 99   Resp: 18     Body mass index is 48.9 kg/m².    Constitutional: appears in no acute distress, well-developed, well-nourished. Father provides a significant amount of history due to patient's intellectual status.     Eyes: normal conjunctiva, PERRLA, optic discs not visualized well - inability to hold gaze.   Confrontational fields - full in all quadrants bilaterally  Color vision (HRR plates 5-10): 4.5/6 bilaterally (1/2 point lost on plate 6, and full point lost on plate 7 bilaterally)    Ears, nose, mouth, throat: face appears syndromic. Right eye appears esotropic at rest, but ductions full. Hearing grossly intact     Cardiovascular: regular rate and rhythm, no murmurs appreciated    Respiratory: unlabored respirations, breath sounds normal bilaterally    Gastrointestinal: no visible abdominal masses, no guarding, no visible hernia    Musculoskeletal: normal tone in all four extremities. No atrophy. No abnormal movements. Strength in all muscles groups of the upper and lower extremities is 5/5. Normal gait and station. Digits and nails  normal.      Spine:   CERVICAL SPINE:  Inspection: midline healed surgical scar in upper cervical spine   ROM: normal   MUSCLE SPASM: mild throughout   FACET LOADING: + bilateral   SPURLING: no  JERSON / ARIANA tender: + bilateral     Psychiatric: normal judgment and insight. Oriented to situation.     Neurologic:   Cortical functions: recent and remote memory intact, normal attention span and concentration, speech fluent, adequate fund of knowledge   Cranial nerves: visual fields full, PERRLA, EOMI, facial sensation intact in V1-V3, symmetric facial strength, hearing intact to finger rub, palate elevates symmetrically, shoulder shrug 5/5, tongue protrudes midline   Reflexes: 2+ in the upper and lower extremities, no Cardenas  Sensation: intact to temperature   Coordination: normal finger to nose    Data Review:     I have personally reviewed the referring provider's notes, labs, & imaging made available to me today.      RADIOLOGY STUDIES:  I have personally reviewed the pertinent images performed.       Results for orders placed or performed during the hospital encounter of 01/04/18   MRI Brain Without Contrast    Narrative    EXAM: Brain MRI without contrast.    INDICATION: Preoperative planning for posterior fossa decompression for Chiari malformation    TECHNIQUE: Multiplanar, multisequence brain MRI was performed. DWI was performed. ADC map was generated. CSF flow/CINE sequencing was performed.    COMPARISON: The cervical spine MRI dated 02/09/2017, head CT dated 06/08/2016, brain MRI dated 05/23/2016      FINDINGS:     Intracranial contents: There are postsurgical changes of posterior fossa decompression since the prior MRI dated 05/23/2016.  These postoperative changes are clearly demonstrated on head CT dated 06/08/2016.  There is, however, is still crowding at the foramen magnum with diminished CSF flow demonstrated on this any sequences.  There is flattening of the ventral surface at the cervical medullary  junction in significant decrease in CSF at the foramen magnum surrounding the lower brainstem and cerebellar tonsils.  There are no regions of restricted diffusion to suggest acute infarction.  Demonstrated is a right frontal approach  shunt catheter with the tip near the 3rd ventricle.  The ventricles remain completely decompressed, nearly slitlike.  Correlate clinically.  This is unchanged.  There is minimal flair and T2 hyperintense signal traversing the right frontal lobe along the catheter course likely representing minimal stable gliosis.  There is no hemorrhage or mass effect.  There is no acute infarction.  The orbits are grossly normal.  There is no definite flattening of the posterior optic disc.  There is no abnormal extra-axial fluid collection.  Flow voids indicating patency are present in the major vessels at the base of the brain but there is crowding of the basilar cistern.  Small caliber of the basilar artery may represent developmental variation with fetal origin of the posterior cerebral arteries.    Extracranial contents: Marrow signal intensity is grossly normal paracolic posterior nasopharyngeal soft tissue is nonspecific but likely reflects reactive lymphoid tissue.  The paranasal sinuses and mastoid air cells are clear.    Impression         1. There are postsurgical changes of prior posterior fossa decompression but there is still crowding at the foramen magnum with diminished CSF flow seen on this any sequences.    2.  No change in position of the ventriculoperitoneal shunt catheter from right frontal approach with complete decompression of the lateral and 3rd ventricles.  The 4th ventricle is normal in position.  There is no hydrocephalus.  These findings are unchanged.    3.  There is no acute hemorrhage or acute infarction.      Electronically signed by: Dr. Jn Calvin  Date:     01/04/18  Time:    12:19    Results for orders placed or performed during the hospital encounter of  06/07/16   CT Head Without Contrast    Narrative    CT HEAD WITHOUT CONTRAST:    CPT 72674    Total DLP: 803 mGy-cm  AEC (Automated Exposure Control) was utilized to reduce the radiation dose to the patient.    HISTORY:  35-year-old status post suboccipital craniotomy for Chiari I malformation and history of prior ventriculostomy shunt catheter placement for intracranial hypertension.  Comparison outside of the brain from 05/23/2016 and CT scan of the head from 12/10/2014.    TECHNIQUE: Multiple contiguous axial images were acquired from the base of the skull and the vertex without contrast administration.    FINDINGS:  There is minimal right greater than left suboccipital craniotomy.  The cerebellar tonsils still project caudally below the level of what would be an intact foramen magnum.  There is some pneumocephalus inferiorly and extending cephalad above the cerebellum in the region of the tectal plate and view of interpositum area.  There is also some and both inferior middle cranial fossa and overlying the right frontal lobe superiorly.  There is unchanged appearance of a right frontal ventriculostomy shunt catheter with the catheter tip unchanged in position as it extends through the right lateral meniscal frontal horn and into versus adjacent the right foramen of Monro and into the third ventricle.  The right lateral ventricle is slitlike with the left unchanged in position and nearly slitlike.  The third ventricle and fourth ventricles also remain slitlike.  No cerebral or cerebellar parenchymal abnormality is identified. There is no hemorrhage, midline shift,   significant mass-effect, or extra-axial fluid collection. The partially visualized paranasal sinuses and mastoid air cells are clear. The calvarium is intact.    Impression    1. Interval suboccipital craniotomy for decompression of a Chiari I malformation.  There is a small amount of pneumocephalus present.    2.  Unchanged appearance of right  frontal ventriculostomy shunt catheter, as above, with tip in the third ventricle and would be ventricles unchanged in their slitlike appearance.  ______________________________________     Electronically signed by: KATERINE GARCIA MD  Date:     06/08/16  Time:    08:43    Results for orders placed or performed during the hospital encounter of 05/23/16   MRI Brain W WO Contrast    Narrative    Exam: MRI of the brain without and with contrast    Comparison: None.    Technique: Multiplanar MR imaging of the brain was performed both before and following the intravenous administration of 10 cc of Gadavist contrast material.    Findings:     There is a ventriculostomy shunt catheter entering the brain via a right frontal approach which terminates in the vicinity of the 3rd ventricle.  There is gliosis along the course of the catheter.  The lateral ventricles and 3rd ventricle have a slitlike appearance, and over shunting cannot be excluded based on the provided images.  The 4th ventricle is normal in size.    The brain appears normally formed with preserved gray-white matter junction differentiation.  No evidence of acute/recent major vascular territory cerebral infarction, enhancing intra-axial mass, or parenchymal hemorrhage.  No vascular malformations appreciated.    There is a Chiari type I malformation present at the craniocervical junction with approximately 6-7 mm of inferior extension of the cerebellar tonsils through the foramen magnum.  The tip of the cerebellar tonsils have a somewhat pointed appearance.  There is foreshortening/rounding of the clivus.  There is diminished CSF pulsation visible at the craniocervical junction on the cine images.  No associated cervical cord syrinx within the imaged portion of the cervical spinal cord.    No hydrocephalus.  No extra-axial fluid collections or blood products.  No abnormal leptomeningeal enhancement or enhancing extra-axial mass.  The skull base flow voids are  unremarkable.  There is mucoperiosteal thickening observed at the floor of the left maxillary sinus.    The visualized orbits are unremarkable.  There is fatty replacement of the parotid glands.  There is an 11 mm left submandibular region probable intraparotid lymph node (series 9 image 25).    Impression    1.  Chiari type I malformation with approximately 6-7 mm of inferior extension of the cerebellar tonsils through the foramen magnum.  Additional details are provided above.  No associated cervical cord syrinx.    2.  Ventriculostomy shunt catheter entering the brain via a right frontal approach with probable gliosis along the shunt catheter tract.  The 3rd ventricle and lateral ventricles have a somewhat slit like appearance, and over-shunting cannot be excluded.    3.  Mild maxillary sinus disease.  ______________________________________     Electronically signed by: Michael Montiel MD  Date:     05/23/16  Time:    11:19        Lab Results   Component Value Date     03/07/2017    K 3.8 03/07/2017     03/07/2017    CO2 28 03/07/2017    BUN 12 03/07/2017    CREATININE 0.74 03/07/2017     (H) 03/07/2017    HGBA1C 5.8 11/30/2012    AST 16 11/30/2012    ALT 37 11/30/2012    ALBUMIN 3.6 11/30/2012    PROT 8.1 11/30/2012    BILITOT 0.4 11/30/2012       Lab Results   Component Value Date    WBC 4.46 03/07/2017    HGB 12.4 03/07/2017    HCT 38.8 03/07/2017    MCV 82 03/07/2017     03/07/2017       Lab Results   Component Value Date    TSH 3.25 11/30/2012           Assessment & Plan:       Problem List Items Addressed This Visit        Neuro    Intractable chronic migraine without aura and without status migrainosus - Primary    Overview     As is common in this population, patient with pseudotumor in remission as evidenced by stable fundoscopic exam / stable visual fields but with ongoing chronic headaches of the migrainous type. Explained to patient and family that once pressure is  "controlled, I treat the remaining headaches according to phenotype in this case chronic migraine. Patient has failed several "good' preventatives, must be on a daily preventative due to daily frequency of headaches, and must have a weight neutral option due to morbid obesity / PTC / sleep apnea. Discussed the most effective weight neutral option going forward is Botox. Patient agreed to proceed.    The patient has chronic migraines (G43.719) and suffers from headaches more than 15 days a month lasting more than 4 hours a day with no relief of symptoms despite trying multiple medications including but not limited to anti-epileptics (topiramate, gabapentin, diamox), beta blockers (metoprolol),  and antidepressants (paxil - cannot trial additional ones due to being on Paxil). Botox treatment was approved for chronic migraines in October 2010. The patient will be an ideal candidate for Botox. We are planning for 3 treatments 3 months apart and aiming for at least 50% improvement in the symptoms. If we see no improvement after 3 treatments, we will discontinue the injections.    In addition, I discussed that the negative role that chronic opiate therapy plays in migraine chronification and worsening of migraines as dose was reduced being supportive of this.            Relevant Medications    sumatriptan (IMITREX) 100 MG tablet    Other Relevant Orders    Prior Authorization Order    Pseudotumor cerebri syndrome    Overview     Diagnosed in 1999 with headaches and papilledema.  shunt placed in [facility] in 2007, her only shunt thus far. Ventricles slit like. Patient also on full dose topiramate and lasix. Allergic (hives) to diamox. Visual fields and color vision impaired, but fields at least have shown to be stable and discs show chronic atrophy without new edema. Does have MANUEL and uses CPAP.    Appears to be well controlled at this time.          S/P ventriculoperitoneal shunt    Overview     2007         Arnold-Chiari " malformation, type I    Overview     S/p decompression 2016            Endocrine    Morbid obesity    Overview     Resulting in PTC, sleep apnea. All migraine medications must be weight neutral.                    TESTING:  -- none     PREVENTION (use daily regardless of headache):  -- seek authorization to begin Botox treatments for chronic migraine     ACUTE TREATMENT (use total no more than 10 days per month unless otherwise stated):  --  At onset of moderate/severe migraine (within one hour) take sumatriptan, may repeat once in 2 hours    Follow-up in about 2 weeks (around 4/9/2018) for Botox #1.    Flory Chamorro MD

## 2018-03-28 ENCOUNTER — TELEPHONE (OUTPATIENT)
Dept: PAIN MEDICINE | Facility: CLINIC | Age: 38
End: 2018-03-28

## 2018-03-28 NOTE — TELEPHONE ENCOUNTER
Please let the patient know I reviewed her lumbar spine MRI results.  I believe her pain is likely due to arthritis and I recommend bilateral L3/4 and L4/5 facet joint injections with four-week follow-up. (On consent form, please add that she has sacralization of L5 and a rudimentary disc space.)

## 2018-04-02 NOTE — PROGRESS NOTES
Procedure:  Shunt reprogramming  Indication: S/p MRI       Ms. Douglass presents to clinic today to have her shunt reprogrammed following an MRI. The Nginxm  was placed over the shunt valve, and the setting was reset to 150 mm of H2O. The patient tolerated this well, with no complications. We will obtain a skull x-ray to verify the shunt setting. She was informed to call the clinic with any further questions or concerns in the meantime.      Brina Taylor PA-C   Neurosurgery

## 2018-04-09 RX ORDER — HYDROCODONE BITARTRATE AND ACETAMINOPHEN 5; 325 MG/1; MG/1
1 TABLET ORAL 2 TIMES DAILY PRN
Qty: 60 TABLET | Refills: 0 | Status: SHIPPED | OUTPATIENT
Start: 2018-04-09 | End: 2018-05-08 | Stop reason: SDUPTHER

## 2018-04-10 ENCOUNTER — TELEPHONE (OUTPATIENT)
Dept: PAIN MEDICINE | Facility: CLINIC | Age: 38
End: 2018-04-10

## 2018-04-10 NOTE — TELEPHONE ENCOUNTER
----- Message from Angel Kemp sent at 4/10/2018 10:01 AM CDT -----  Contact: same  Type:  Test Results    Who Called:  patient  Name of Test (Lab/Mammo/Etc):  MRI of spine  Date of Test:  3/26/18  Ordering Provider:  Slade  Where the test was performed:  [facility]  Best Call Back Number:  [phone] cell or [phone]  Additional Information:  n/a

## 2018-04-10 NOTE — TELEPHONE ENCOUNTER
Spoke with patient regarding results. Patient verbalized understanding and stated she will tall to her dad and call us back.

## 2018-04-11 ENCOUNTER — TELEPHONE (OUTPATIENT)
Dept: PAIN MEDICINE | Facility: CLINIC | Age: 38
End: 2018-04-11

## 2018-04-11 NOTE — TELEPHONE ENCOUNTER
----- Message from Lauren Erickson sent at 4/11/2018  9:51 AM CDT -----  Contact: self  Patient called asking for advice about injections.  Please call patient at [phone] or [phone]. Thanks!

## 2018-04-11 NOTE — TELEPHONE ENCOUNTER
Spoke with patient and procedure has been scheduled for may 4th with 4 week follow up. Pre-op instructions reviewed and sent to patient.

## 2018-04-11 NOTE — TELEPHONE ENCOUNTER
----- Message from Angel Kemp sent at 4/11/2018  1:15 PM CDT -----  Contact: same  Patient called in and stated someone from office returned her call about getting injections in her back.  Patient stated that the she has gotten them in the past in her neck.  Patient call back number is [phone])

## 2018-04-12 DIAGNOSIS — M47.816 LUMBAR SPONDYLOSIS: Primary | ICD-10-CM

## 2018-04-12 RX ORDER — SODIUM CHLORIDE, SODIUM LACTATE, POTASSIUM CHLORIDE, CALCIUM CHLORIDE 600; 310; 30; 20 MG/100ML; MG/100ML; MG/100ML; MG/100ML
INJECTION, SOLUTION INTRAVENOUS CONTINUOUS
Status: CANCELLED | OUTPATIENT
Start: 2018-05-04

## 2018-04-13 ENCOUNTER — HOSPITAL ENCOUNTER (OUTPATIENT)
Dept: RADIOLOGY | Facility: HOSPITAL | Age: 38
Discharge: HOME | End: 2018-04-13
Attending: PHYSICIAN ASSISTANT
Payer: MEDICARE

## 2018-04-13 ENCOUNTER — PROCEDURE VISIT (OUTPATIENT)
Dept: NEUROLOGY | Facility: CLINIC | Age: 38
End: 2018-04-13
Payer: MEDICARE

## 2018-04-13 VITALS
BODY MASS INDEX: 49.61 KG/M2 | RESPIRATION RATE: 18 BRPM | WEIGHT: 229.94 LBS | HEART RATE: 85 BPM | SYSTOLIC BLOOD PRESSURE: 106 MMHG | DIASTOLIC BLOOD PRESSURE: 72 MMHG | HEIGHT: 57 IN

## 2018-04-13 DIAGNOSIS — G43.719 INTRACTABLE CHRONIC MIGRAINE WITHOUT AURA AND WITHOUT STATUS MIGRAINOSUS: Primary | ICD-10-CM

## 2018-04-13 DIAGNOSIS — Z98.2 VP (VENTRICULOPERITONEAL) SHUNT STATUS: ICD-10-CM

## 2018-04-13 DIAGNOSIS — G93.2 PSEUDOTUMOR CEREBRI: ICD-10-CM

## 2018-04-13 DIAGNOSIS — G93.2 PSEUDOTUMOR CEREBRI: Primary | ICD-10-CM

## 2018-04-13 PROCEDURE — 71045 X-RAY EXAM CHEST 1 VIEW: CPT | Mod: TC,FY,PO

## 2018-04-13 PROCEDURE — 70250 X-RAY EXAM OF SKULL: CPT | Mod: 26,,, | Performed by: RADIOLOGY

## 2018-04-13 PROCEDURE — 71045 X-RAY EXAM CHEST 1 VIEW: CPT | Mod: 26,,, | Performed by: RADIOLOGY

## 2018-04-13 PROCEDURE — 64615 CHEMODENERV MUSC MIGRAINE: CPT | Mod: PBBFAC,PO | Performed by: PSYCHIATRY & NEUROLOGY

## 2018-04-13 PROCEDURE — 76000 FLUOROSCOPY <1 HR PHYS/QHP: CPT | Mod: TC,PO

## 2018-04-13 PROCEDURE — 64615 CHEMODENERV MUSC MIGRAINE: CPT | Mod: S$PBB,,, | Performed by: PSYCHIATRY & NEUROLOGY

## 2018-04-13 PROCEDURE — 74018 RADEX ABDOMEN 1 VIEW: CPT | Mod: 26,,, | Performed by: RADIOLOGY

## 2018-04-13 PROCEDURE — 74018 RADEX ABDOMEN 1 VIEW: CPT | Mod: TC,FY,PO

## 2018-04-13 PROCEDURE — 76000 FLUOROSCOPY <1 HR PHYS/QHP: CPT | Mod: 26,,, | Performed by: RADIOLOGY

## 2018-04-13 PROCEDURE — 72020 X-RAY EXAM OF SPINE 1 VIEW: CPT | Mod: 26,,, | Performed by: RADIOLOGY

## 2018-04-13 RX ADMIN — ONABOTULINUMTOXINA 200 UNITS: 100 INJECTION, POWDER, LYOPHILIZED, FOR SOLUTION INTRADERMAL; INTRAMUSCULAR at 02:04

## 2018-04-13 NOTE — PROCEDURES
Procedures     PROCEDURE PERFORMED: Botulinum toxin injection (87307)    CLINICAL INDICATION: G43.719    A time out was conducted just before the start of the procedure to verify the correct patient and procedure, procedure location, and all relevant critical information.     Conventional methods of treatment such as multiple medications , both on and   off label have been tried including: anti-epileptics (topiramate, gabapentin, diamox), beta blockers (metoprolol),  and antidepressants (paxil - cannot trial additional ones due to being on Paxil). The patient has been unresponsive and refractory.The patient meets criteria for chronic headaches according to the ICHD-II, the patient has more than 15 headaches a month which last for more than 4 hours a day.    This is the first Botox injections and I am aiming for at least 50%  improvement in the patient's symptoms. Frequency of treatment is every 3 months unless no response to the treatments, at which time we will discontinue the injections.     DESCRIPTION OF PROCEDURE: After obtaining informed consent and under   aseptic technique, a total of 145 units of botulinum toxin type A were   injected in the following muscles: Procerus 5 units,  5 units   bilaterally, frontalis 20 units, temporalis 20 units bilaterally,   occipitalis 15 units,  and trapezius 15 units bilaterally. The patient was given a total of 155 units in 29 sites.The patient tolerated the procedure well. There were no complications. The patient was given a prescription for repeat treatment in 3 months.     upper cervical paraspinals 10 units bilaterally spared due to prior surgery in this area     Unavoidable waste 55 units    Flory Chamorro MD

## 2018-04-28 DIAGNOSIS — M51.36 DDD (DEGENERATIVE DISC DISEASE), LUMBAR: Primary | ICD-10-CM

## 2018-05-03 ENCOUNTER — TELEPHONE (OUTPATIENT)
Dept: SURGERY | Facility: HOSPITAL | Age: 38
End: 2018-05-03

## 2018-05-03 ENCOUNTER — TELEPHONE (OUTPATIENT)
Dept: PAIN MEDICINE | Facility: CLINIC | Age: 38
End: 2018-05-03

## 2018-05-03 NOTE — TELEPHONE ENCOUNTER
----- Message from Shahrzad Armando sent at 5/3/2018  4:19 PM CDT -----  Contact: Self  Call Placed to POD     Patient needs to talk to the nurse about rescheduling her shots in her back procedure     Please call back to reschedule [phone]

## 2018-05-03 NOTE — TELEPHONE ENCOUNTER
Patient will need to be rescheduled for her procedure 7 days after the completion of the antibiotics. Unable to reach the patient via phone call.

## 2018-05-08 ENCOUNTER — TELEPHONE (OUTPATIENT)
Dept: PAIN MEDICINE | Facility: CLINIC | Age: 38
End: 2018-05-08

## 2018-05-08 RX ORDER — HYDROCODONE BITARTRATE AND ACETAMINOPHEN 5; 325 MG/1; MG/1
1 TABLET ORAL 2 TIMES DAILY PRN
Qty: 60 TABLET | Refills: 0 | Status: SHIPPED | OUTPATIENT
Start: 2018-05-08 | End: 2018-06-07

## 2018-05-08 NOTE — TELEPHONE ENCOUNTER
Spoke with patient. She is going to be in the area around 11 and would like to  a paper refill for hydrocodone. Please advise.

## 2018-05-08 NOTE — TELEPHONE ENCOUNTER
----- Message from Angel Kemp sent at 5/8/2018  8:01 AM CDT -----  Contact: same  Patient called in and wanted to see if she could  her Rx for hydrocodone-acetaminophen 5-325mg (NORCO) 5-325 mg per tablet.  Patient stated that it is due for refill on 5/10/18 but she won't be in [place] to  on that day & has to come to [place] today.      Patient call back number is [phone]

## 2018-05-22 RX ORDER — METHIMAZOLE 10 MG/1
5 TABLET ORAL 3 TIMES DAILY
COMMUNITY
End: 2018-09-12

## 2018-05-22 RX ORDER — BUDESONIDE AND FORMOTEROL FUMARATE DIHYDRATE 160; 4.5 UG/1; UG/1
2 AEROSOL RESPIRATORY (INHALATION) EVERY 12 HOURS
COMMUNITY
End: 2018-09-12

## 2018-05-23 ENCOUNTER — SURGERY (OUTPATIENT)
Age: 38
End: 2018-05-23

## 2018-05-23 ENCOUNTER — HOSPITAL ENCOUNTER (OUTPATIENT)
Dept: RADIOLOGY | Facility: HOSPITAL | Age: 38
Discharge: HOME | End: 2018-05-23
Attending: ANESTHESIOLOGY | Admitting: ANESTHESIOLOGY
Payer: MEDICARE

## 2018-05-23 ENCOUNTER — HOSPITAL ENCOUNTER (OUTPATIENT)
Facility: HOSPITAL | Age: 38
Discharge: HOME | End: 2018-05-23
Attending: ANESTHESIOLOGY | Admitting: ANESTHESIOLOGY
Payer: MEDICARE

## 2018-05-23 DIAGNOSIS — M51.36 DDD (DEGENERATIVE DISC DISEASE), LUMBAR: ICD-10-CM

## 2018-05-23 DIAGNOSIS — M47.816 LUMBAR SPONDYLOSIS: Primary | ICD-10-CM

## 2018-05-23 LAB
B-HCG UR QL: NEGATIVE
CTP QC/QA: YES

## 2018-05-23 PROCEDURE — 64494 INJ PARAVERT F JNT L/S 2 LEV: CPT | Mod: 50,,, | Performed by: ANESTHESIOLOGY

## 2018-05-23 PROCEDURE — 25500020 PHARM REV CODE 255: Mod: PO | Performed by: ANESTHESIOLOGY

## 2018-05-23 PROCEDURE — 63600175 PHARM REV CODE 636 W HCPCS: Mod: PO | Performed by: ANESTHESIOLOGY

## 2018-05-23 PROCEDURE — 64493 INJ PARAVERT F JNT L/S 1 LEV: CPT | Mod: 50,,, | Performed by: ANESTHESIOLOGY

## 2018-05-23 PROCEDURE — 81025 URINE PREGNANCY TEST: CPT | Mod: PO | Performed by: ANESTHESIOLOGY

## 2018-05-23 PROCEDURE — 76000 FLUOROSCOPY <1 HR PHYS/QHP: CPT | Mod: TC,PO

## 2018-05-23 PROCEDURE — 25000003 PHARM REV CODE 250: Mod: PO | Performed by: ANESTHESIOLOGY

## 2018-05-23 PROCEDURE — A9579 GAD-BASE MR CONTRAST NOS,1ML: HCPCS | Mod: PO | Performed by: ANESTHESIOLOGY

## 2018-05-23 PROCEDURE — 64493 INJ PARAVERT F JNT L/S 1 LEV: CPT | Mod: 50,PO | Performed by: ANESTHESIOLOGY

## 2018-05-23 PROCEDURE — 64494 INJ PARAVERT F JNT L/S 2 LEV: CPT | Mod: 50,PO | Performed by: ANESTHESIOLOGY

## 2018-05-23 PROCEDURE — 99152 MOD SED SAME PHYS/QHP 5/>YRS: CPT | Mod: ,,, | Performed by: ANESTHESIOLOGY

## 2018-05-23 RX ORDER — NORGESTIMATE AND ETHINYL ESTRADIOL 0.25-0.035
1 KIT ORAL DAILY
COMMUNITY
End: 2018-09-12

## 2018-05-23 RX ORDER — MIDAZOLAM HYDROCHLORIDE 1 MG/ML
INJECTION INTRAMUSCULAR; INTRAVENOUS
Status: DISCONTINUED | OUTPATIENT
Start: 2018-05-23 | End: 2018-05-23 | Stop reason: HOSPADM

## 2018-05-23 RX ORDER — METHYLPREDNISOLONE ACETATE 80 MG/ML
INJECTION, SUSPENSION INTRA-ARTICULAR; INTRALESIONAL; INTRAMUSCULAR; SOFT TISSUE
Status: DISCONTINUED | OUTPATIENT
Start: 2018-05-23 | End: 2018-05-23 | Stop reason: HOSPADM

## 2018-05-23 RX ORDER — LIDOCAINE HYDROCHLORIDE 10 MG/ML
INJECTION, SOLUTION EPIDURAL; INFILTRATION; INTRACAUDAL; PERINEURAL
Status: DISCONTINUED | OUTPATIENT
Start: 2018-05-23 | End: 2018-05-23 | Stop reason: HOSPADM

## 2018-05-23 RX ORDER — SODIUM CHLORIDE, SODIUM LACTATE, POTASSIUM CHLORIDE, CALCIUM CHLORIDE 600; 310; 30; 20 MG/100ML; MG/100ML; MG/100ML; MG/100ML
INJECTION, SOLUTION INTRAVENOUS CONTINUOUS
Status: DISCONTINUED | OUTPATIENT
Start: 2018-05-23 | End: 2018-05-23 | Stop reason: HOSPADM

## 2018-05-23 RX ORDER — BUPIVACAINE HYDROCHLORIDE 2.5 MG/ML
INJECTION, SOLUTION EPIDURAL; INFILTRATION; INTRACAUDAL
Status: DISCONTINUED | OUTPATIENT
Start: 2018-05-23 | End: 2018-05-23 | Stop reason: HOSPADM

## 2018-05-23 RX ADMIN — BUPIVACAINE HYDROCHLORIDE 3 ML: 2.5 INJECTION, SOLUTION EPIDURAL; INFILTRATION; INTRACAUDAL; PERINEURAL at 01:05

## 2018-05-23 RX ADMIN — METHYLPREDNISOLONE ACETATE 80 MG: 80 INJECTION, SUSPENSION INTRA-ARTICULAR; INTRALESIONAL; INTRAMUSCULAR; SOFT TISSUE at 01:05

## 2018-05-23 RX ADMIN — MIDAZOLAM HYDROCHLORIDE 1 MG: 1 INJECTION, SOLUTION INTRAMUSCULAR; INTRAVENOUS at 01:05

## 2018-05-23 RX ADMIN — GADODIAMIDE 2 ML: 287 INJECTION INTRAVENOUS at 01:05

## 2018-05-23 RX ADMIN — LIDOCAINE HYDROCHLORIDE 8 ML: 10 INJECTION, SOLUTION EPIDURAL; INFILTRATION; INTRACAUDAL; PERINEURAL at 01:05

## 2018-05-23 NOTE — OR NURSING
Patient awake, alert, and oriented.  No complaints of pain or discomfort at present time. VSS, tolerating fluids without C/O N/V. Stable for discharge home.

## 2018-05-23 NOTE — DISCHARGE SUMMARY
[facility]  Discharge Note  Short Stay    Admit Date: 5/23/2018    Discharge Date: 5/23/2018    Attending Physician: Santana Pak MD     Discharge Provider: Santana Pak    Diagnoses:  Active Hospital Problems    Diagnosis  POA    *Lumbar spondylosis [M47.816]  Yes      Resolved Hospital Problems    Diagnosis Date Resolved POA   No resolved problems to display.       Discharged Condition: good    Final Diagnoses: Lumbar spondylosis [M47.816]    Disposition: Home or Self Care    Hospital Course: no complications, uneventful    Outcome of Hospitalization, Treatment, Procedure, or Surgery:  Patient was admitted for outpatient procedure. The patient underwent procedure without complications and are discharged home    Follow up/Patient Instructions:  Follow up as scheduled in Pain Management clinic in 3-4 weeks/Patient has received instructions and follow up date and time    Medications:  Continue previous medications      Discharge Procedure Orders  Call MD for:  temperature >100.4     Call MD for:  severe uncontrolled pain     Call MD for:  redness, tenderness, or signs of infection (pain, swelling, redness, odor or green/yellow discharge around incision site)     Call MD for:  severe persistent headache     No dressing needed           Discharge Procedure Orders (must include Diet, Follow-up, Activity):    Discharge Procedure Orders (must include Diet, Follow-up, Activity)  Call MD for:  temperature >100.4     Call MD for:  severe uncontrolled pain     Call MD for:  redness, tenderness, or signs of infection (pain, swelling, redness, odor or green/yellow discharge around incision site)     Call MD for:  severe persistent headache     No dressing needed

## 2018-05-23 NOTE — H&P
"CC: Back pain    HPI: The patient is a 36yo woman with a history of lumbar spondylosis here for bilateral L3/4 and L4/5 facet joint injections. There are no major changes in history and physical from 2/7/18.    Past Medical History:   Diagnosis Date    Asthma     Bronchitis     Chiari malformation type I     Chronic headache     Hyperlipidemia     Hypertension     Murmur, heart     NPH (normal pressure hydrocephalus)     Obesity     MANUEL on CPAP     Pseudotumor cerebri     Thyroid disease     Hadley syndrome        Past Surgical History:   Procedure Laterality Date    CARPAL TUNNEL RELEASE      CERVICAL SPINE SURGERY      CHOLECYSTECTOMY      EAR TUBE REMOVAL Bilateral     EYE SURGERY      strabismus    MYRINGOTOMY W/ TUBES      TONSILLECTOMY      VENTRICULOPERITONEAL SHUNT         Family History   Problem Relation Age of Onset    Diabetes Mother     Hypertension Father     Heart disease Father     Heart disease Maternal Grandfather        Social History     Social History    Marital status: Single     Spouse name: N/A    Number of children: N/A    Years of education: N/A     Social History Main Topics    Smoking status: Never Smoker    Smokeless tobacco: Never Used    Alcohol use No    Drug use: No    Sexual activity: Not Asked     Other Topics Concern    None     Social History Narrative    None       Current Facility-Administered Medications   Medication Dose Route Frequency Provider Last Rate Last Dose    lactated ringers infusion   Intravenous Continuous Santana Pak MD           Review of patient's allergies indicates:   Allergen Reactions    Diamox [acetazolamide] Hives    Dye Swelling and Rash       Vitals:    05/03/18 1223 05/23/18 1240 05/23/18 1245   BP:   115/69   Pulse:   (!) 56   Resp:   18   Temp:   97.9 °F (36.6 °C)   TempSrc:   Skin   SpO2:   95%   Weight: 104.3 kg (230 lb) 104.3 kg (230 lb)    Height: 4' 9" (1.448 m) 4' 9" (1.448 m)        REVIEW OF " SYSTEMS:     GENERAL: No weight loss, malaise or fevers.  HEENT:  No recent changes in vision or hearing  NECK: Negative for lumps, no difficulty with swallowing.  RESPIRATORY: Negative for cough, wheezing or shortness of breath, patient denies any recent URI.  CARDIOVASCULAR: Negative for chest pain, leg swelling or palpitations.  GI: Negative for abdominal discomfort, blood in stools or black stools or change in bowel habits.  MUSCULOSKELETAL: See HPI.  SKIN: Negative for lesions, rash, and itching.  PSYCH: No suicidal or homicidal ideations, no current mood disturbances.  HEMATOLOGY/LYMPHOLOGY: Negative for prolonged bleeding, bruising easily or swollen nodes. Patient is not currently taking any anti-coagulants  ENDO: No history of diabetes or thyroid dysfunction  NEURO: No history of syncope, paralysis, seizures or tremors.All other reviewed and negative other than HPI.    Physical exam:  Gen: A and O x3, pleasant, well-groomed  Skin: No rashes or obvious lesions  HEENT: PERRLA, no obvious deformities on ears or in canals. No thyroid masses, trachea midline, no palpable lymph nodes in neck, axilla.  CVS: Regular rate and rhythm, normal S1 and S2, no murmurs.  Resp: Clear to auscultation bilaterally.  Abdomen: Soft, NT/ND, normal bowel sounds present.  Musculoskeletal/Neuro: Moving all extremities    Assessment:  Lumbar spondylosis  -     Case Request Operating Room: INJECTION-FACET L3/4 and L4/5  -     Activity as tolerated; Standing  -     Place in Outpatient; Standing  -     Diet NPO; Standing  -     lactated ringers infusion; Inject into the vein continuous.  -     Notify physician ; Standing  -     Notify physician ; Standing  -     Notify physician (specify); Standing  -     Place 18-22 gaua peripheral IV ; Standing  -     Verify informed consent; Standing  -     Vital signs; Standing    Other orders  -     POCT urine pregnancy; Standing

## 2018-05-24 VITALS
HEART RATE: 66 BPM | HEIGHT: 57 IN | SYSTOLIC BLOOD PRESSURE: 146 MMHG | OXYGEN SATURATION: 98 % | WEIGHT: 230 LBS | BODY MASS INDEX: 49.62 KG/M2 | DIASTOLIC BLOOD PRESSURE: 66 MMHG | RESPIRATION RATE: 19 BRPM | TEMPERATURE: 98 F

## 2018-06-11 ENCOUNTER — TELEPHONE (OUTPATIENT)
Dept: PAIN MEDICINE | Facility: CLINIC | Age: 38
End: 2018-06-11

## 2018-06-11 NOTE — TELEPHONE ENCOUNTER
Attempted to contact the patient on the phone numbers listed. 1st number the voice mail is not set up and the 2nd number is not a working number. The patient will need to make an appointment for follow up.

## 2018-06-11 NOTE — TELEPHONE ENCOUNTER
----- Message from Naima Astorga sent at 6/11/2018  8:10 AM CDT -----  Contact: pt  Pt calling state needs a refill on Norco 5 mg pt thinks....[phone] or [phone]        .  [facility] - [place], [place] - [address] [place] [id number]  Phone: [phone] Fax: [phone]

## 2018-06-12 ENCOUNTER — TELEPHONE (OUTPATIENT)
Dept: PAIN MEDICINE | Facility: CLINIC | Age: 38
End: 2018-06-12

## 2018-06-12 NOTE — TELEPHONE ENCOUNTER
Attempted to contact the patient. Phone numbers given we are unable to leave a message and the other number is not in service. The patient needs a follow up appointment as she falls outside the guidelines for opioids.

## 2018-06-12 NOTE — TELEPHONE ENCOUNTER
----- Message from Pardeep Lozano sent at 6/12/2018 11:49 AM CDT -----  Contact: Pt  Can the clinic reply in [facility]: no      Please refill the medication(s) listed below. Please call the patient when the prescription(s) is ready for  at this phone number  [phone] .[phone] (home)        Medication #1 [place]      Preferred Pharmacy:   [address] [address]  Phone: [phone] Fax: [phone]

## 2018-06-13 ENCOUNTER — TELEPHONE (OUTPATIENT)
Dept: PAIN MEDICINE | Facility: CLINIC | Age: 38
End: 2018-06-13

## 2018-06-13 DIAGNOSIS — G43.719 INTRACTABLE CHRONIC MIGRAINE WITHOUT AURA AND WITHOUT STATUS MIGRAINOSUS: ICD-10-CM

## 2018-06-13 RX ORDER — HYDROCODONE BITARTRATE AND ACETAMINOPHEN 5; 325 MG/1; MG/1
1 TABLET ORAL EVERY 12 HOURS PRN
Qty: 60 TABLET | Refills: 0 | Status: SHIPPED | OUTPATIENT
Start: 2018-06-13 | End: 2018-07-06 | Stop reason: SDUPTHER

## 2018-06-13 RX ORDER — SUMATRIPTAN SUCCINATE 100 MG/1
TABLET ORAL
Qty: 10 TABLET | Refills: 11 | Status: SHIPPED | OUTPATIENT
Start: 2018-06-13 | End: 2018-09-12

## 2018-06-13 NOTE — TELEPHONE ENCOUNTER
As a cross-coverage courtesy, I will refill the medication for 30 days per Dr. Pak's previous refills of Norco 5-325 BID PRN #60/month.    Lauren Castillo Jr, MD  Interventional Pain Medicine / Anesthesiology

## 2018-06-13 NOTE — TELEPHONE ENCOUNTER
----- Message from Melanie Gonzalez sent at 6/13/2018 10:13 AM CDT -----  Type:  RX Refill Request    Who Called:  self  Refill or New Rx:  refill  RX Name and Strength: sumatriptan (IMITREX) 100 MG tablet  How is the patient currently taking it? (ex. 1XDay):     Is this a 30 day or 90 day RX:     Preferred Pharmacy with phone number:    [address] [address]  Phone: [phone] Fax: [phone]    Local or Mail Order:     Ordering Provider:     Best Call Back Number:   [phone]   Additional Information:  States she has had a headache   
rapid heart beat

## 2018-06-13 NOTE — TELEPHONE ENCOUNTER
----- Message from Melanie Gonzalez sent at 6/13/2018 10:09 AM CDT -----  Type:  RX Refill Request    Who Called:  self  Refill or New Rx:    RX Name and Strength:  narco  How is the patient currently taking it? (ex. 1XDay):     Is this a 30 day or 90 day RX:     Preferred Pharmacy with phone number:    [address] [address]  Phone: [phone] Fax: [phone]    Local or Mail Order:      Ordering Provider:     Best Call Back Number:  [phone]  Additional Information:  Not sure it is time for refill

## 2018-06-29 ENCOUNTER — TELEPHONE (OUTPATIENT)
Dept: NEUROSURGERY | Facility: CLINIC | Age: 38
End: 2018-06-29

## 2018-06-29 ENCOUNTER — PATIENT MESSAGE (OUTPATIENT)
Dept: NEUROSURGERY | Facility: CLINIC | Age: 38
End: 2018-06-29

## 2018-06-29 NOTE — TELEPHONE ENCOUNTER
Pt called to make appt to check shunt d/t increased headaches. Do you want just shunt series xrays or the CT also? Please advise.

## 2018-07-02 ENCOUNTER — TELEPHONE (OUTPATIENT)
Dept: NEUROLOGY | Facility: CLINIC | Age: 38
End: 2018-07-02

## 2018-07-02 DIAGNOSIS — G43.719 INTRACTABLE CHRONIC MIGRAINE WITHOUT AURA AND WITHOUT STATUS MIGRAINOSUS: Primary | ICD-10-CM

## 2018-07-06 ENCOUNTER — PROCEDURE VISIT (OUTPATIENT)
Dept: NEUROLOGY | Facility: CLINIC | Age: 38
End: 2018-07-06
Payer: MEDICARE

## 2018-07-06 ENCOUNTER — OFFICE VISIT (OUTPATIENT)
Dept: PAIN MEDICINE | Facility: CLINIC | Age: 38
End: 2018-07-06
Payer: MEDICARE

## 2018-07-06 VITALS
BODY MASS INDEX: 54.29 KG/M2 | RESPIRATION RATE: 20 BRPM | SYSTOLIC BLOOD PRESSURE: 105 MMHG | OXYGEN SATURATION: 97 % | DIASTOLIC BLOOD PRESSURE: 57 MMHG | HEART RATE: 71 BPM | TEMPERATURE: 98 F | WEIGHT: 250.88 LBS

## 2018-07-06 VITALS
RESPIRATION RATE: 18 BRPM | SYSTOLIC BLOOD PRESSURE: 117 MMHG | HEART RATE: 67 BPM | BODY MASS INDEX: 54.13 KG/M2 | HEIGHT: 57 IN | DIASTOLIC BLOOD PRESSURE: 77 MMHG | WEIGHT: 250.88 LBS

## 2018-07-06 DIAGNOSIS — M48.02 CERVICAL STENOSIS OF SPINAL CANAL: ICD-10-CM

## 2018-07-06 DIAGNOSIS — G43.719 INTRACTABLE CHRONIC MIGRAINE WITHOUT AURA AND WITHOUT STATUS MIGRAINOSUS: Primary | ICD-10-CM

## 2018-07-06 DIAGNOSIS — R51.9 CHRONIC NONINTRACTABLE HEADACHE, UNSPECIFIED HEADACHE TYPE: ICD-10-CM

## 2018-07-06 DIAGNOSIS — M51.36 DDD (DEGENERATIVE DISC DISEASE), LUMBAR: ICD-10-CM

## 2018-07-06 DIAGNOSIS — M47.816 LUMBAR SPONDYLOSIS: Primary | ICD-10-CM

## 2018-07-06 DIAGNOSIS — Z79.891 OPIOID CONTRACT EXISTS: ICD-10-CM

## 2018-07-06 DIAGNOSIS — G89.29 CHRONIC NONINTRACTABLE HEADACHE, UNSPECIFIED HEADACHE TYPE: ICD-10-CM

## 2018-07-06 PROCEDURE — 64615 CHEMODENERV MUSC MIGRAINE: CPT | Mod: PBBFAC,PO | Performed by: PSYCHIATRY & NEUROLOGY

## 2018-07-06 PROCEDURE — 99213 OFFICE O/P EST LOW 20 MIN: CPT | Mod: S$PBB,,, | Performed by: PHYSICIAN ASSISTANT

## 2018-07-06 PROCEDURE — 64615 CHEMODENERV MUSC MIGRAINE: CPT | Mod: S$PBB,,, | Performed by: PSYCHIATRY & NEUROLOGY

## 2018-07-06 PROCEDURE — 99215 OFFICE O/P EST HI 40 MIN: CPT | Mod: PBBFAC,PN,25 | Performed by: PHYSICIAN ASSISTANT

## 2018-07-06 PROCEDURE — 99999 PR PBB SHADOW E&M-EST. PATIENT-LVL V: CPT | Mod: PBBFAC,,, | Performed by: PHYSICIAN ASSISTANT

## 2018-07-06 PROCEDURE — 80307 DRUG TEST PRSMV CHEM ANLYZR: CPT

## 2018-07-06 RX ORDER — CEFDINIR 300 MG/1
1 CAPSULE ORAL 2 TIMES DAILY
COMMUNITY
Start: 2018-07-02 | End: 2018-09-12

## 2018-07-06 RX ORDER — HYDROCODONE BITARTRATE AND ACETAMINOPHEN 5; 325 MG/1; MG/1
1 TABLET ORAL EVERY 12 HOURS PRN
Qty: 60 TABLET | Refills: 0 | Status: SHIPPED | OUTPATIENT
Start: 2018-08-12 | End: 2018-09-10 | Stop reason: SDUPTHER

## 2018-07-06 RX ORDER — SODIUM CHLORIDE, SODIUM LACTATE, POTASSIUM CHLORIDE, CALCIUM CHLORIDE 600; 310; 30; 20 MG/100ML; MG/100ML; MG/100ML; MG/100ML
INJECTION, SOLUTION INTRAVENOUS CONTINUOUS
Status: CANCELLED | OUTPATIENT
Start: 2018-08-06

## 2018-07-06 RX ORDER — HYDROCODONE BITARTRATE AND ACETAMINOPHEN 5; 325 MG/1; MG/1
1 TABLET ORAL EVERY 12 HOURS PRN
Qty: 60 TABLET | Refills: 0 | Status: SHIPPED | OUTPATIENT
Start: 2018-07-13 | End: 2018-08-12

## 2018-07-06 RX ADMIN — ONABOTULINUMTOXINA 200 UNITS: 100 INJECTION, POWDER, LYOPHILIZED, FOR SOLUTION INTRADERMAL; INTRAMUSCULAR at 03:07

## 2018-07-06 NOTE — PROGRESS NOTES
This note was completed with dictation software and grammatical errors may exist.    CC: Back pain, Neck pain, headaches    HPI: The patient is a 38-year-old woman with a history of Chiari malformation, Hadley syndrome, pseudotumor cerebri with shunt, morbid obesity who presents in referral from Dr. Velasco for neck pain. She is status post bilateral L3/4 and L4/5 facet joint injections on 5/23/18 with 100% relief lasting about 3 weeks.  Her low back pain returned.  This radiates to her posterior and lateral thighs while she is walking.  She has relief with pain medication and sitting.  She reports completing physical therapy at [facility] with some relief.  She continues to have neck pain radiating into her left arm.  She feels that the left arm pain is getting worse and she is going to discuss this with her neurosurgeon next week.  She complains of intermittent left upper extremity weakness and numbness.  She denies bladder or bowel incontinence.  She also complains of a headache and is seeing neurology today for Botox injections.    Pain intervention history: She was seeing Dr. Gerson Renteria, pain management and until recently had been taking hydrocodone 5/325 once a day in addition to Flexeril 3 times a day and gabapentin 600 mg 3 times a day.  She apparently tested positive for Valium and so was dismissed from his practice. She is status post C7-T1 cervical interlaminar epidural steroid injection on 10/4/17 with 100% relief lasting 2 weeks.  She is status post a second cervical TABITHA on 11/14/17 with almost complete relief again but only lasting 2-3 weeks.    ROS: She reports weight loss, headaches, easy bruising and back pain.  Balance of review of systems is negative.    Past Medical History:   Diagnosis Date    Asthma     Bronchitis     Chiari malformation type I     Chronic headache     Hyperlipidemia     Hypertension     Murmur, heart     NPH (normal pressure hydrocephalus)     Obesity     MANUEL on  CPAP     Pseudotumor cerebri     Thyroid disease     Hadley syndrome        Past Surgical History:   Procedure Laterality Date    CARPAL TUNNEL RELEASE      CERVICAL SPINE SURGERY      CHOLECYSTECTOMY      EAR TUBE REMOVAL Bilateral     EYE SURGERY      strabismus    INJECTION OF FACET JOINT Bilateral 5/23/2018    Procedure: INJECTION-FACET L3/4 and L4/5;  Surgeon: Santana Pak MD;  Location: [facility] OR;  Service: Pain Management;  Laterality: Bilateral;  sacralization of L5 and a rudimentary disc space    MYRINGOTOMY W/ TUBES      TONSILLECTOMY      VENTRICULOPERITONEAL SHUNT         Social History     Social History    Marital status: Single     Spouse name: N/A    Number of children: N/A    Years of education: N/A     Social History Main Topics    Smoking status: Never Smoker    Smokeless tobacco: Never Used    Alcohol use No    Drug use: No    Sexual activity: Not Asked     Other Topics Concern    None     Social History Narrative    None         Medications/Allergies: See med card    Vitals:    07/06/18 1432   BP: (!) 105/57   Pulse: 71   Resp: 20   Temp: 98.4 °F (36.9 °C)   TempSrc: Oral   SpO2: 97%   Weight: 113.8 kg (250 lb 14.1 oz)   PainSc:   6   PainLoc: Back     Body mass index is 54.29 kg/m².      Physical exam:  Gen: A and O x3, pleasant, obese  Skin: No rashes or obvious lesions  HEENT: PERRLA, no obvious deformities on ears or in canals.Trachea midline.  CVS: Regular rate and rhythm, normal palpable pulses.  Resp: Clear to auscultation bilaterally, no wheezes or rales.  Abdomen: Soft, NT/ND.  Musculoskeletal:  Wide-based gait.    Neuro:  Upper extremities: 5/5 strength bilaterally   Lower extremities: 5/5 strength bilaterally  Reflexes: Brachioradialis 2+, Bicep 2+, Tricep 2+. Patellar 2+, Achilles 2+ bilaterally.  Sensory: Intact and symmetrical to light touch and pinprick in C2-T1 dermatomes bilaterally.  Intact and symmetrical to light touch and pinprick in L2-S1  dermatomes bilaterally.    Cervical Spine:  Cervical spine: Range of motion is mildly reduced with flexion with no increased pain, moderately reduced with extension with increased neck pain, lateral rotation mildly reduced with increased pain, worse on the left.  Spurling's maneuver causes lateral neck pain.  Myofascial exam: Mild tenderness to palpation across cervical paraspinous region bilaterally.    Lumbar spine:  Lumbar spine: Range of motion is moderately reduced with extension with increased pain in the bilateral low back, mildly reduced with flexion with no increased pain.    Derrick's test causes no increased pain on either side.    Supine straight leg raise is negative bilaterally.    Internal and external rotation of the hip causes no increased pain on either side.  Myofascial exam: Mild tenderness to palpation across the lumbar paraspinous muscles.      Imaging:  MRI from 2/9/17 cervical spine demonstrates congenitally narrowed canal with disc bulging most prominently at C4/5 to the right side causing anterior cord compression but no signal changes.  There is narrowing of the canal at C3/4 to a lesser degree.      Assessment:   The patient is a 38-year-old woman with a history of Chiari malformation, pseudotumor cerebri with shunt, morbid obesity who presents in referral from Dr. Velasco for neck pain.  1. Lumbar spondylosis     2. DDD (degenerative disc disease), lumbar     3. Cervical stenosis of spinal canal     4. Opioid contract exists  Pain Clinic Drug Screen    Vital signs    Place 18-22 gauage peripheral IV     Verify informed consent    Notify physician     Notify physician     Notify physician (specify)    Diet NPO    Case Request Operating Room: RADIOFREQUENCY THERMAL COAGULATION, NERVE, SYMPATHETIC, LUMBAR L2, L3, L4    Place in Outpatient    lactated ringers infusion   5. Chronic nonintractable headache, unspecified headache type         Plan:  1.  Dr. Pak provided prescriptions for  hydrocodone-acetaminophen 5/325 mg up to 2 times a day as needed for pain.  I have reviewed the [place] Board of Pharmacy website and there are no abberancies.    2.  We discussed the results of her facet joint injections which gave her complete relief lasting about 3 weeks.  I believe this is diagnostic and she will likely have lasting benefit from radio frequency ablation of the corresponding nerves.  She would like to schedule this and will get her set up for bilateral L2, 3 and 4 medial branch RFA.  The patient has sacralization of L5.  3.  She is going to follow-up with neurosurgery next week and will discuss her worsening left arm pain.  We have tried to cervical TABITHA's with short-term relief.  4.  She is going to have Botox injections with neurology today for her headaches.  5.  Follow-up in 4 weeks post procedure or sooner as needed.

## 2018-07-06 NOTE — PROCEDURES
Procedures       PROCEDURE PERFORMED: Botulinum toxin injection (28763)    CLINICAL INDICATION: G43.719    A time out was conducted just before the start of the procedure to verify the correct patient and procedure, procedure location, and all relevant critical information.     Conventional methods of treatment such as multiple medications , both on and   off label have been tried including: anti-epileptics (topiramate, gabapentin, diamox), beta blockers (metoprolol),  and antidepressants (paxil - cannot trial additional ones due to being on Paxil). The patient has been unresponsive and refractory.The patient meets criteria for chronic headaches according to the ICHD-II, the patient has more than 15 headaches a month which last for more than 4 hours a day.    This is the second Botox injections and I am aiming for at least 50%  improvement in the patient's symptoms.     Previous injection was 4/13/18 and resulted in 100% improvement (headache-free) until the 2 week wear off period.     Frequency of treatment is every 3 months unless no response to the treatments, at which time we will discontinue the injections.     DESCRIPTION OF PROCEDURE: After obtaining informed consent and under   aseptic technique, a total of 145 units of botulinum toxin type A were   injected in the following muscles: Procerus 5 units,  5 units   bilaterally, frontalis 20 units, temporalis 20 units bilaterally,   occipitalis 15 units,  and trapezius 15 units bilaterally. The patient was given a total of 145 units in 29 sites.The patient tolerated the procedure well. There were no complications. The patient was given a prescription for repeat treatment in 3 months.     upper cervical paraspinals 10 units bilaterally spared due to prior surgery in this area     Unavoidable waste 55 units    Flory Chamorro MD

## 2018-07-09 ENCOUNTER — TELEPHONE (OUTPATIENT)
Dept: PAIN MEDICINE | Facility: CLINIC | Age: 38
End: 2018-07-09

## 2018-07-09 NOTE — TELEPHONE ENCOUNTER
----- Message from Chemo Johnson sent at 7/9/2018 10:05 AM CDT -----  Contact: pt  Pt is calling with questions and concerns about her procedure that she has scheduled for 8-6-18,pls call pt back advise    Call Back#[phone] or    Thanks

## 2018-07-10 ENCOUNTER — TELEPHONE (OUTPATIENT)
Dept: PAIN MEDICINE | Facility: CLINIC | Age: 38
End: 2018-07-10

## 2018-07-10 LAB
6MAM UR QL: NOT DETECTED
7AMINOCLONAZEPAM UR QL: NOT DETECTED
A-OH ALPRAZ UR QL: NOT DETECTED
ALPRAZ UR QL: NOT DETECTED
AMPHET UR QL SCN: NOT DETECTED
ANNOTATION COMMENT IMP: NORMAL
ANNOTATION COMMENT IMP: NORMAL
BARBITURATES UR QL: NOT DETECTED
BUPRENORPHINE UR QL: NOT DETECTED
BZE UR QL: NOT DETECTED
CARBOXYTHC UR QL: NOT DETECTED
CARISOPRODOL UR QL: NOT DETECTED
CLONAZEPAM UR QL: NOT DETECTED
CODEINE UR QL: NOT DETECTED
CREAT UR-MCNC: 162.8 MG/DL (ref 20–400)
DIAZEPAM UR QL: NOT DETECTED
ETHYL GLUCURONIDE UR QL: NOT DETECTED
FENTANYL UR QL: NOT DETECTED
HYDROCODONE UR QL: PRESENT
HYDROMORPHONE UR QL: NOT DETECTED
LORAZEPAM UR QL: NOT DETECTED
MDA UR QL: NOT DETECTED
MDEA UR QL: NOT DETECTED
MDMA UR QL: NOT DETECTED
ME-PHENIDATE UR QL: NOT DETECTED
MEPERIDINE UR QL: NOT DETECTED
METHADONE UR QL: NOT DETECTED
METHAMPHET UR QL: NOT DETECTED
MIDAZOLAM UR QL SCN: NOT DETECTED
MORPHINE UR QL: NOT DETECTED
NORBUPRENORPHINE UR QL CFM: NOT DETECTED
NORDIAZEPAM UR QL: NOT DETECTED
NORFENTANYL UR QL: NOT DETECTED
NORHYDROCODONE UR QL CFM: PRESENT
NOROXYCODONE UR QL CFM: NOT DETECTED
NOROXYMORPHONE: NOT DETECTED
OXAZEPAM UR QL: NOT DETECTED
OXYCODONE UR QL: NOT DETECTED
OXYMORPHONE UR QL: NOT DETECTED
PATHOLOGY STUDY: NORMAL
PCP UR QL: NOT DETECTED
PHENTERMINE UR QL: NOT DETECTED
PROPOXYPH UR QL: NOT DETECTED
SERVICE CMNT-IMP: NORMAL
TAPENTADOL UR QL SCN: NOT DETECTED
TAPENTADOL-O-SULF: NOT DETECTED
TEMAZEPAM UR QL: NOT DETECTED
TRAMADOL UR QL: NOT DETECTED
ZOLPIDEM UR QL: NOT DETECTED

## 2018-07-10 NOTE — TELEPHONE ENCOUNTER
----- Message from Qian Castro sent at 7/10/2018 10:07 AM CDT -----  Contact: pt  Pt is requesting call back to speak with nurse regarding nerve test, please call pt to advise   Call Back  [phone]  Thanks

## 2018-07-17 DIAGNOSIS — G93.2 PSEUDOTUMOR CEREBRI: Primary | ICD-10-CM

## 2018-07-17 DIAGNOSIS — Q07.00 ARNOLD-CHIARI MALFORMATION: ICD-10-CM

## 2018-07-17 DIAGNOSIS — Z98.2 S/P VP SHUNT: ICD-10-CM

## 2018-07-18 ENCOUNTER — HOSPITAL ENCOUNTER (OUTPATIENT)
Dept: RADIOLOGY | Facility: HOSPITAL | Age: 38
Discharge: HOME | End: 2018-07-18
Attending: PHYSICIAN ASSISTANT
Payer: MEDICARE

## 2018-07-18 ENCOUNTER — OFFICE VISIT (OUTPATIENT)
Dept: NEUROSURGERY | Facility: CLINIC | Age: 38
End: 2018-07-18
Payer: MEDICARE

## 2018-07-18 VITALS
WEIGHT: 254.75 LBS | DIASTOLIC BLOOD PRESSURE: 67 MMHG | SYSTOLIC BLOOD PRESSURE: 104 MMHG | BODY MASS INDEX: 54.96 KG/M2 | HEIGHT: 57 IN | HEART RATE: 62 BPM

## 2018-07-18 DIAGNOSIS — G93.2 PSEUDOTUMOR CEREBRI: ICD-10-CM

## 2018-07-18 DIAGNOSIS — Z98.2 S/P VP SHUNT: ICD-10-CM

## 2018-07-18 DIAGNOSIS — T85.9XXA DISORDER OF CEREBRAL VENTRICULAR SHUNT, INITIAL ENCOUNTER: Primary | ICD-10-CM

## 2018-07-18 DIAGNOSIS — Z98.2 DISORDER OF CEREBRAL VENTRICULAR SHUNT, INITIAL ENCOUNTER: Primary | ICD-10-CM

## 2018-07-18 DIAGNOSIS — Q07.00 ARNOLD-CHIARI MALFORMATION: ICD-10-CM

## 2018-07-18 DIAGNOSIS — M48.02 SPINAL STENOSIS OF CERVICAL REGION: ICD-10-CM

## 2018-07-18 PROCEDURE — 72020 X-RAY EXAM OF SPINE 1 VIEW: CPT | Mod: TC,FY,PO

## 2018-07-18 PROCEDURE — 76000 FLUOROSCOPY <1 HR PHYS/QHP: CPT | Mod: 26,,, | Performed by: RADIOLOGY

## 2018-07-18 PROCEDURE — 70250 X-RAY EXAM OF SKULL: CPT | Mod: 26,,, | Performed by: RADIOLOGY

## 2018-07-18 PROCEDURE — 99213 OFFICE O/P EST LOW 20 MIN: CPT | Mod: PBBFAC,25,PN | Performed by: PHYSICIAN ASSISTANT

## 2018-07-18 PROCEDURE — 74018 RADEX ABDOMEN 1 VIEW: CPT | Mod: 26,,, | Performed by: RADIOLOGY

## 2018-07-18 PROCEDURE — 70450 CT HEAD/BRAIN W/O DYE: CPT | Mod: TC,PO

## 2018-07-18 PROCEDURE — 76000 FLUOROSCOPY <1 HR PHYS/QHP: CPT | Mod: TC,PO

## 2018-07-18 PROCEDURE — 99214 OFFICE O/P EST MOD 30 MIN: CPT | Mod: S$PBB,,, | Performed by: PHYSICIAN ASSISTANT

## 2018-07-18 PROCEDURE — 72020 X-RAY EXAM OF SPINE 1 VIEW: CPT | Mod: 26,,, | Performed by: RADIOLOGY

## 2018-07-18 PROCEDURE — 70450 CT HEAD/BRAIN W/O DYE: CPT | Mod: 26,,, | Performed by: RADIOLOGY

## 2018-07-18 PROCEDURE — 71045 X-RAY EXAM CHEST 1 VIEW: CPT | Mod: 26,,, | Performed by: RADIOLOGY

## 2018-07-18 PROCEDURE — 99999 PR PBB SHADOW E&M-EST. PATIENT-LVL III: CPT | Mod: PBBFAC,,, | Performed by: PHYSICIAN ASSISTANT

## 2018-07-18 RX ORDER — SULFAMETHOXAZOLE AND TRIMETHOPRIM 400; 80 MG/1; MG/1
1 TABLET ORAL 2 TIMES DAILY
COMMUNITY
Start: 2018-07-16 | End: 2018-07-27

## 2018-07-23 ENCOUNTER — TELEPHONE (OUTPATIENT)
Dept: PAIN MEDICINE | Facility: CLINIC | Age: 38
End: 2018-07-23

## 2018-07-23 NOTE — TELEPHONE ENCOUNTER
----- Message from Santino Klein sent at 7/23/2018  9:52 AM CDT -----  Contact: Shanna  Shanna is requesting a nurse call back regarding procedure on August 6. Call her back on [phone] (home)     thanks

## 2018-07-24 NOTE — TELEPHONE ENCOUNTER
Left voice message to contact the office to set up date for procedure in Sept. Left some available dates on message machine.

## 2018-07-25 ENCOUNTER — TELEPHONE (OUTPATIENT)
Dept: PAIN MEDICINE | Facility: CLINIC | Age: 38
End: 2018-07-25

## 2018-07-25 NOTE — TELEPHONE ENCOUNTER
----- Message from Elicia West sent at 7/25/2018 11:27 AM CDT -----  Contact: SELF  Patient need to speak to nurse regarding rescheduling getting nerves burned in patient back     Please call to advice [phone]

## 2018-07-30 NOTE — PROGRESS NOTES
Neurosurgery History & Physical    Patient ID: Shanna Douglass is a 38 y.o. female.    Chief Complaint   Patient presents with    Shunt Problem     pt states feels like her head is swimming; touching her head hurts; she is dizzy; blurry vision occassionally; falling daily       Review of Systems   Constitutional: Positive for chills and fatigue. Negative for activity change, appetite change, fever and unexpected weight change.   HENT: Negative for tinnitus, trouble swallowing and voice change.    Respiratory: Positive for cough. Negative for apnea, chest tightness and shortness of breath.    Cardiovascular: Negative for chest pain and palpitations.   Gastrointestinal: Positive for abdominal pain and diarrhea. Negative for constipation, nausea and vomiting.   Genitourinary: Positive for enuresis. Negative for difficulty urinating, dysuria, frequency and urgency.   Musculoskeletal: Positive for back pain and neck pain. Negative for gait problem and neck stiffness.   Skin: Negative for wound.   Neurological: Positive for weakness and headaches. Negative for dizziness, tremors, seizures, facial asymmetry, speech difficulty, light-headedness and numbness.   Psychiatric/Behavioral: Negative for confusion and decreased concentration.       Past Medical History:   Diagnosis Date    Asthma     Bronchitis     Chiari malformation type I     Chronic headache     Hyperlipidemia     Hypertension     Murmur, heart     NPH (normal pressure hydrocephalus)     Obesity     MANUEL on CPAP     Pseudotumor cerebri     Thyroid disease     Hadley syndrome      Social History     Social History    Marital status: Single     Spouse name: N/A    Number of children: N/A    Years of education: N/A     Occupational History    Not on file.     Social History Main Topics    Smoking status: Never Smoker    Smokeless tobacco: Never Used    Alcohol use No    Drug use: No    Sexual activity: Not on file     Other Topics Concern     Not on file     Social History Narrative    No narrative on file     Family History   Problem Relation Age of Onset    Diabetes Mother     Hypertension Father     Heart disease Father     Heart disease Maternal Grandfather      Review of patient's allergies indicates:   Allergen Reactions    Diamox [acetazolamide] Hives    Dye Swelling and Rash       Current Outpatient Prescriptions:     aspirin (ECOTRIN) 81 MG EC tablet, Take 81 mg by mouth once daily., Disp: , Rfl:     budesonide-formoterol 160-4.5 mcg (SYMBICORT) 160-4.5 mcg/actuation HFAA, Inhale 2 puffs into the lungs every 12 (twelve) hours. Controller, Disp: , Rfl:     cefdinir (OMNICEF) 300 MG capsule, Take 1 capsule by mouth 2 (two) times daily., Disp: , Rfl:     cetirizine (ZYRTEC) 10 MG tablet, Take 10 mg by mouth once daily., Disp: , Rfl:     codeine-butalbital-ASA-caffeine (BUTALBITAL COMPOUND-CODEINE) 27--40 mg Cap, Take 1 capsule by mouth every 4 (four) hours as needed., Disp: , Rfl:     cyclobenzaprine (FLEXERIL) 10 MG tablet, Take 1 tablet (10 mg total) by mouth every evening., Disp: 30 tablet, Rfl: 2    diphenoxylate-atropine 2.5-0.025 mg (LOMOTIL) 2.5-0.025 mg per tablet, , Disp: , Rfl:     docusate sodium (STOOL SOFTENER) 100 mg capsule, Take 100 mg by mouth as needed for Constipation., Disp: , Rfl:     furosemide (LASIX) 80 MG tablet, Take 80 mg by mouth once daily. , Disp: , Rfl:     HYDROcodone-acetaminophen (NORCO) 5-325 mg per tablet, Take 1 tablet by mouth every 12 (twelve) hours as needed for Pain., Disp: 60 tablet, Rfl: 0    [START ON 8/12/2018] HYDROcodone-acetaminophen (NORCO) 5-325 mg per tablet, Take 1 tablet by mouth every 12 (twelve) hours as needed for Pain., Disp: 60 tablet, Rfl: 0    methIMAzole (TAPAZOLE) 10 MG Tab, Take 5 mg by mouth 3 (three) times daily., Disp: , Rfl:     metoprolol succinate (TOPROL-XL) 25 MG 24 hr tablet, 1 tablet., Disp: , Rfl:     mometasone 220 mcg (30 doses) inhaler, Inhale 2  "puffs into the lungs once daily. Controller, Disp: , Rfl:     montelukast (SINGULAIR) 10 mg tablet, Take 10 mg by mouth once daily., Disp: , Rfl:     naproxen (NAPROSYN) 500 MG tablet, Take 500 mg by mouth 2 (two) times daily., Disp: , Rfl:     norgestimate-ethinyl estradiol (ORTHO-CYCLEN) 0.25-35 mg-mcg per tablet, Take 1 tablet by mouth once daily., Disp: , Rfl:     omeprazole (PRILOSEC) 20 MG capsule, , Disp: , Rfl:     pantoprazole (PROTONIX) 40 MG tablet, Take 40 mg by mouth once daily. , Disp: , Rfl:     paroxetine (PAXIL) 20 MG tablet, Take 20 mg by mouth once daily. , Disp: , Rfl:     potassium chloride (MICRO-K) 10 MEQ CpSR, Take 10 mEq by mouth once daily., Disp: , Rfl:     PROCTO-MED HC 2.5 % rectal cream, , Disp: , Rfl:     promethazine (PHENERGAN) 25 MG tablet, Take 1 tablet (25 mg total) by mouth every 4 (four) hours as needed for Nausea., Disp: 60 tablet, Rfl: 0    ranitidine (ZANTAC) 300 MG tablet, , Disp: , Rfl:     sumatriptan (IMITREX) 100 MG tablet, 1 tab PO PRN migraine. May repeat once in 2 hours, no more than 2 tab in 24 hours. No more than 10 days per month., Disp: 10 tablet, Rfl: 11    topiramate (TOPAMAX) 100 MG tablet, Take 100 mg by mouth 2 (two) times daily. , Disp: , Rfl:     Current Facility-Administered Medications:     onabotulinumtoxina injection 200 Units, 200 Units, Intramuscular, Q90 Days, Flory Chamorro MD, 200 Units at 07/06/18 1548    Vitals:    07/18/18 1411   BP: 104/67   Pulse: 62   Weight: 115.5 kg (254 lb 11.9 oz)   Height: 4' 9" (1.448 m)       Physical Exam   Constitutional: She is oriented to person, place, and time. She appears well-developed and well-nourished.   HENT:   Head: Normocephalic and atraumatic.   Eyes: Pupils are equal, round, and reactive to light.   Neck: Normal range of motion. Neck supple.   Cardiovascular: Normal rate.    Pulmonary/Chest: Effort normal.   Musculoskeletal: Normal range of motion. She exhibits no edema.   Neurological: She " is alert and oriented to person, place, and time. She has a normal Finger-Nose-Finger Test, a normal Heel to Shin Test, a normal Romberg Test and a normal Tandem Gait Test. Gait normal.   Reflex Scores:       Tricep reflexes are 2+ on the right side and 2+ on the left side.       Bicep reflexes are 2+ on the right side and 2+ on the left side.       Brachioradialis reflexes are 2+ on the right side and 2+ on the left side.       Patellar reflexes are 2+ on the right side and 2+ on the left side.       Achilles reflexes are 2+ on the right side and 2+ on the left side.  Skin: Skin is warm, dry and intact.   Psychiatric: She has a normal mood and affect. Her speech is normal and behavior is normal. Judgment and thought content normal.   Nursing note and vitals reviewed.      Neurologic Exam     Mental Status   Oriented to person, place, and time.   Oriented to person.   Oriented to place.   Oriented to time.   Follows 3 step commands.   Attention: normal. Concentration: normal.   Speech: speech is normal   Level of consciousness: alert  Knowledge: consistent with education.   Able to name object. Able to read. Able to repeat. Able to write. Normal comprehension.     Cranial Nerves     CN II   Visual acuity: normal  Right visual field deficit: none  Left visual field deficit: none     CN III, IV, VI   Pupils are equal, round, and reactive to light.  Right pupil: Size: 3 mm. Shape: regular. Reactivity: brisk. Consensual response: intact.   Left pupil: Size: 3 mm. Shape: regular. Reactivity: brisk. Consensual response: intact.   CN III: no CN III palsy  CN VI: no CN VI palsy  Nystagmus: none   Diplopia: none  Ophthalmoparesis: none  Conjugate gaze: present    CN V   Right facial sensation deficit: none  Left facial sensation deficit: none    CN VII   Right facial weakness: none  Left facial weakness: none    CN VIII   Hearing: intact    CN IX, X   CN IX normal.   CN X normal.     CN XI   Right sternocleidomastoid  strength: normal  Left sternocleidomastoid strength: normal  Right trapezius strength: normal  Left trapezius strength: normal    CN XII   Fasciculations: absent  Tongue deviation: none    Motor Exam   Muscle bulk: normal  Overall muscle tone: normal  Right arm pronator drift: absent  Left arm pronator drift: absent    Strength   Right neck flexion: 5/5  Left neck flexion: 5/5  Right neck extension: 5/5  Left neck extension: 5/5  Right deltoid: 5/5  Left deltoid: 5/5  Right biceps: 5/5  Left biceps: 5/5  Right triceps: 5/5  Left triceps: 5/5  Right wrist flexion: 5/5  Left wrist flexion: 5/5  Right wrist extension: 5/5  Left wrist extension: 5/5  Right interossei: 55  Left interossei: 5/  Right abdominals: 5/  Left abdominals: 5/  Right iliopsoas: 5/  Left iliopsoas: 5/  Right quadriceps: 5/5  Left quadriceps: 5/5  Right hamstrin/5  Left hamstrin/5  Right glutei: 5  Left glutei: 5/  Right anterior tibial: 5/5  Left anterior tibial: 5/5  Right posterior tibial: 5/5  Left posterior tibial: 5/5  Right peroneal: 5/5  Left peroneal: 5/5  Right gastroc: 5/5  Left gastroc:     Sensory Exam   Right arm light touch: normal  Left arm light touch: normal  Right leg light touch: normal  Left leg light touch: normal  Right arm vibration: normal  Left arm vibration: normal  Right arm pinprick: normal  Left arm pinprick: normal    Gait, Coordination, and Reflexes     Gait  Gait: normal    Coordination   Romberg: negative  Finger to nose coordination: normal  Heel to shin coordination: normal  Tandem walking coordination: normal    Tremor   Resting tremor: absent  Intention tremor: absent  Action tremor: absent    Reflexes   Right brachioradialis: 2+  Left brachioradialis: 2+  Right biceps: 2+  Left biceps: 2+  Right triceps: 2+  Left triceps: 2+  Right patellar: 2+  Left patellar: 2+  Right achilles: 2+  Left achilles: 2+  Right Cardenas: absent  Left Cardenas: absent  Right ankle clonus: absent  Left ankle  clonus: absent      Provider dictation:    The patient is 38 year-old morbidly obese  female with pseudotumor cerebri and Chiari Type 1 malformation s/p posterior fossa decompression in June 2016 following up for persistent headache and right lower abdominal pain.  She also feels that her head is swimming and touching her head hurts.  She reports some dizziness and intermittent blurry vision.  She reports frequent falls and difficulty with her balance.  She also reports a history of diarrhea.  She has had this problem before and Botox helped her when she had it.    Her PHQ was 16 and her mini-mental status is 28    She has had multiple office visits for potential shunt malfunction however she has remained neurologically stable.  She does report feeling feverish and that she has been feeling sick.  She has been diagnosed with pleurisy and has been given a 3 week course of Bactrim.  This has been ongoing for 3 weeks.  She has an ophthalmology appointment on August 2, 2018 to assess her vision changes.    On physical examination, she is an obese  female with an abnormal tandem walk but otherwise stable neurologic exam.  She has no tenderness to palpation to the shunt.  The shunt refills appropriately.  She has subjective diminished sensation in the upper extremities in a nondermatomal distribution.  I do not appreciate weakness on exam.    X-ray shunt series demonstrates no complication and continuity of the tubing of the  shunt and continued setting at 150 mm of water.  CT of the head demonstrates stable positioning of the  shunt without evidence of hydrocephalus.    Given her complaints of abdominal pain and diarrhea, I would like to order evaluation of the shunt with laboratory work assessing for infection though I do not feel that this is the case.  She complains of neck pain and bilateral arm numbness and I will evaluate her cervical spine with an MRI and EMG of the upper extremities.  She  will follow-up with me after the studies.  I will see her after her ophthalmology exam.  Based on previous notes the patient has many office visits with concerns for shunt malfunction though she has remained neurologically stable.  I would air on the side of not adjusting the shunt given this history.     Visit Diagnosis:  Disorder of cerebral ventricular shunt, initial encounter  -     CBC auto differential; Future; Expected date: 07/19/2018  -     Sedimentation rate; Future; Expected date: 07/19/2018  -     C-reactive protein; Future; Expected date: 07/19/2018  -     Comprehensive metabolic panel; Future; Expected date: 07/19/2018    Spinal stenosis of cervical region  -     EMG W/ ULTRASOUND AND NERVE CONDUCTION TEST 2 Extremities; Future  -     MRI Cervical Spine Without Contrast; Future; Expected date: 07/19/2018        Total time spent counseling greater than fifty percent of total visit time.  Counseling included discussion regarding imaging findings, diagnosis possibilities, treatment options, risks and benefits.   The patient had many questions regarding the options and long-term effects.

## 2018-08-09 ENCOUNTER — TELEPHONE (OUTPATIENT)
Dept: PAIN MEDICINE | Facility: CLINIC | Age: 38
End: 2018-08-09

## 2018-08-09 NOTE — TELEPHONE ENCOUNTER
Spoke with patient. Bilateral lumbar RFA L2, L3, L4 has been rescheduled for 8/31. Pre-op instructions sent to patient.

## 2018-08-09 NOTE — TELEPHONE ENCOUNTER
----- Message from Lauren Erickson sent at 8/9/2018  9:27 AM CDT -----  Contact: self  patient would like a call back from Jessa to reschedule procedure on 08/20/18. Please call patient at [phone]. Thanks!

## 2018-08-27 ENCOUNTER — TELEPHONE (OUTPATIENT)
Dept: PAIN MEDICINE | Facility: CLINIC | Age: 38
End: 2018-08-27

## 2018-08-29 ENCOUNTER — TELEPHONE (OUTPATIENT)
Dept: PAIN MEDICINE | Facility: CLINIC | Age: 38
End: 2018-08-29

## 2018-08-31 ENCOUNTER — TELEPHONE (OUTPATIENT)
Dept: PAIN MEDICINE | Facility: CLINIC | Age: 38
End: 2018-08-31

## 2018-08-31 NOTE — TELEPHONE ENCOUNTER
Spoke with the patient and procedure rescheduled and confirmation follow up appointment mailed to home address. Phone number verified as we have not been able to reach the patient. Number listed is the correct phone number.

## 2018-08-31 NOTE — TELEPHONE ENCOUNTER
----- Message from Melanie Gonzalez sent at 8/31/2018  2:10 PM CDT -----  Please call pt at [phone]  About a surgery that was going to be scheduled

## 2018-09-10 RX ORDER — HYDROCODONE BITARTRATE AND ACETAMINOPHEN 5; 325 MG/1; MG/1
1 TABLET ORAL EVERY 12 HOURS PRN
Qty: 60 TABLET | Refills: 0 | Status: SHIPPED | OUTPATIENT
Start: 2018-09-11 | End: 2018-09-12

## 2018-09-12 ENCOUNTER — OFFICE VISIT (OUTPATIENT)
Dept: NEUROLOGY | Facility: CLINIC | Age: 38
End: 2018-09-12
Payer: MEDICARE

## 2018-09-12 ENCOUNTER — LAB VISIT (OUTPATIENT)
Dept: LAB | Facility: HOSPITAL | Age: 38
End: 2018-09-12
Attending: PHYSICIAN ASSISTANT
Payer: MEDICARE

## 2018-09-12 VITALS
DIASTOLIC BLOOD PRESSURE: 84 MMHG | HEIGHT: 57 IN | SYSTOLIC BLOOD PRESSURE: 144 MMHG | HEART RATE: 78 BPM | WEIGHT: 248.88 LBS | BODY MASS INDEX: 53.69 KG/M2 | RESPIRATION RATE: 16 BRPM

## 2018-09-12 DIAGNOSIS — G93.2 PSEUDOTUMOR CEREBRI SYNDROME: ICD-10-CM

## 2018-09-12 DIAGNOSIS — E66.01 MORBID OBESITY: ICD-10-CM

## 2018-09-12 DIAGNOSIS — T85.9XXA DISORDER OF CEREBRAL VENTRICULAR SHUNT, INITIAL ENCOUNTER: ICD-10-CM

## 2018-09-12 DIAGNOSIS — Z98.2 S/P VENTRICULOPERITONEAL SHUNT: ICD-10-CM

## 2018-09-12 DIAGNOSIS — G93.5 ARNOLD-CHIARI MALFORMATION, TYPE I: ICD-10-CM

## 2018-09-12 DIAGNOSIS — G43.719 INTRACTABLE CHRONIC MIGRAINE WITHOUT AURA AND WITHOUT STATUS MIGRAINOSUS: Primary | ICD-10-CM

## 2018-09-12 DIAGNOSIS — Z98.2 DISORDER OF CEREBRAL VENTRICULAR SHUNT, INITIAL ENCOUNTER: ICD-10-CM

## 2018-09-12 LAB
ALBUMIN SERPL BCP-MCNC: 3.4 G/DL
ALP SERPL-CCNC: 134 U/L
ALT SERPL W/O P-5'-P-CCNC: 16 U/L
ANION GAP SERPL CALC-SCNC: 8 MMOL/L
AST SERPL-CCNC: 14 U/L
BASOPHILS # BLD AUTO: 0.02 K/UL
BASOPHILS NFR BLD: 0.3 %
BILIRUB SERPL-MCNC: 0.3 MG/DL
BUN SERPL-MCNC: 14 MG/DL
CALCIUM SERPL-MCNC: 9.5 MG/DL
CHLORIDE SERPL-SCNC: 107 MMOL/L
CO2 SERPL-SCNC: 23 MMOL/L
CREAT SERPL-MCNC: 0.7 MG/DL
CRP SERPL-MCNC: 3.1 MG/L
DIFFERENTIAL METHOD: ABNORMAL
EOSINOPHIL # BLD AUTO: 0.3 K/UL
EOSINOPHIL NFR BLD: 3.8 %
ERYTHROCYTE [DISTWIDTH] IN BLOOD BY AUTOMATED COUNT: 12.8 %
ERYTHROCYTE [SEDIMENTATION RATE] IN BLOOD BY WESTERGREN METHOD: 20 MM/HR
EST. GFR  (AFRICAN AMERICAN): >60 ML/MIN/1.73 M^2
EST. GFR  (NON AFRICAN AMERICAN): >60 ML/MIN/1.73 M^2
GLUCOSE SERPL-MCNC: 94 MG/DL
HCT VFR BLD AUTO: 44.7 %
HGB BLD-MCNC: 13.8 G/DL
IMM GRANULOCYTES # BLD AUTO: 0.02 K/UL
IMM GRANULOCYTES NFR BLD AUTO: 0.3 %
LYMPHOCYTES # BLD AUTO: 2.6 K/UL
LYMPHOCYTES NFR BLD: 34.5 %
MCH RBC QN AUTO: 26.2 PG
MCHC RBC AUTO-ENTMCNC: 30.9 G/DL
MCV RBC AUTO: 85 FL
MONOCYTES # BLD AUTO: 0.6 K/UL
MONOCYTES NFR BLD: 7.8 %
NEUTROPHILS # BLD AUTO: 4 K/UL
NEUTROPHILS NFR BLD: 53.3 %
NRBC BLD-RTO: 0 /100 WBC
PLATELET # BLD AUTO: 321 K/UL
PMV BLD AUTO: 9.6 FL
POTASSIUM SERPL-SCNC: 4 MMOL/L
PROT SERPL-MCNC: 7.7 G/DL
RBC # BLD AUTO: 5.27 M/UL
SODIUM SERPL-SCNC: 138 MMOL/L
WBC # BLD AUTO: 7.46 K/UL

## 2018-09-12 PROCEDURE — 85651 RBC SED RATE NONAUTOMATED: CPT | Mod: PO

## 2018-09-12 PROCEDURE — 99214 OFFICE O/P EST MOD 30 MIN: CPT | Mod: S$PBB,,, | Performed by: PSYCHIATRY & NEUROLOGY

## 2018-09-12 PROCEDURE — 80053 COMPREHEN METABOLIC PANEL: CPT

## 2018-09-12 PROCEDURE — 85025 COMPLETE CBC W/AUTO DIFF WBC: CPT

## 2018-09-12 PROCEDURE — 36415 COLL VENOUS BLD VENIPUNCTURE: CPT | Mod: PO

## 2018-09-12 PROCEDURE — 86140 C-REACTIVE PROTEIN: CPT

## 2018-09-12 PROCEDURE — 99999 PR PBB SHADOW E&M-EST. PATIENT-LVL III: CPT | Mod: PBBFAC,,, | Performed by: PSYCHIATRY & NEUROLOGY

## 2018-09-12 PROCEDURE — 99213 OFFICE O/P EST LOW 20 MIN: CPT | Mod: PBBFAC,PO | Performed by: PSYCHIATRY & NEUROLOGY

## 2018-09-12 RX ORDER — CELECOXIB 100 MG/1
200 CAPSULE ORAL 2 TIMES DAILY PRN
Qty: 30 CAPSULE | Refills: 11 | Status: SHIPPED | OUTPATIENT
Start: 2018-09-12 | End: 2020-04-21 | Stop reason: SDUPTHER

## 2018-09-12 RX ORDER — TOPIRAMATE 100 MG/1
100 TABLET, FILM COATED ORAL 2 TIMES DAILY
Qty: 60 TABLET | Refills: 11 | Status: SHIPPED | OUTPATIENT
Start: 2018-09-12 | End: 2019-01-17

## 2018-09-12 NOTE — PROGRESS NOTES
"Date of service: 9/12/2018  Referring provider: No ref. provider found    Subjective:      Chief complaint: Migraine       Patient ID: Shanna Douglass is a 38 y.o. lady with Hadley syndrome, Chiari type 1 malformation, s/p posterior fossa decompression June 2016, morbid obesity, pseudotumor cerebri s/p shunt, obstructive sleep apnea on CPAP, presenting for headache follow up after initiating botox     History of Present Illness    INTERVAL HISTORY - 9/12/18    She started Botox for 13 18 and reported 100% improvement with that session.  Her 2nd session was 07/06/2018, she reports this did help initially but about 3 weeks ago which was close to this 6-7 week daniel after Botox her headache started to return.  She reports she is much worse.  She has sharp pains in the neck and all over her head.  Her current pain score is 8 with range of 5-8.  The sumatriptan was making her heart race so she asked for an alternative.  Neurosurgery is working her up for potential shunt infection she will have labs done today.  She also reports that in the interim she was diagnosed with Graves disease and she is undergoing blood work for this.    ORIGINAL HEADACHE HISTORY - 3/26/18   Age at onset and course over time: headaches began in 1999. During that time had an eye exam and was found to have papilledema, had an Lp, and a brain MRI initial workup done at [facility]. In 2007 was treated at [facility] with a  shunt. Has not had any revisions since then. She reports she has never lost vision. Dr. Blackwood is her eye doctor in [place], [place]. 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     Codman shunt set " to 150 - she reports last reprogram had a positive effect on her headaches     Location: frontotemporal, sometimes occipial  Quality: stabbing, pressure, throbbing  Severity: current 5, range 3 to 10   Duration: hours  Frequency: daily x 1 year, previous to that every other day. Wakes up without headache and it escalates as day goes on. Rarely, will wake up with headache   Alleviated by: darkness, heat, menses  Exacerbated by: fatigue, light, noise, smells, cough, sneezing, menses, change in weather   Associated with: Photophobia, phonophobia, osmophobia, blurred vision, double vision, vomiting, dizziness, vertigo, tinnitus, congestion, problems concentrating, neck tightness   Sleep habits:  Caffeine intake: 2 per day     Current acute treatment:  -- norco 5mg BID - for headaches and back pain   -- tylenol     Current prevention:  -- botox - initiated 04/13/2018  -- metoprolol XL 25mg   -- topiramate 100mg twice a day - makes her mouth dry, sleepy, has helped somewhat   (paxil)  (lasix 80mg)     Previously tried/failed acute treatment:  -- fioricet  -- excedrin   -- BC powder   -- a sumatriptan - palpitations    Previously tried/failed preventative treatment:  -- gabapentin   -- diamox - hives       Review of patient's allergies indicates:   Allergen Reactions    Diamox [acetazolamide] Hives    Dye Swelling and Rash     Current Outpatient Medications   Medication Sig Dispense Refill    celecoxib (CELEBREX) 100 MG capsule Take 2 capsules (200 mg total) by mouth 2 (two) times daily as needed (headache and sharp pain). No more than 3 days per week. 30 capsule 11    topiramate (TOPAMAX) 100 MG tablet Take 1 tablet (100 mg total) by mouth 2 (two) times daily. 60 tablet 11     Current Facility-Administered Medications   Medication Dose Route Frequency Provider Last Rate Last Dose    onabotulinumtoxina injection 200 Units  200 Units Intramuscular Q90 Days Flory Chamorro MD   200 Units at 07/06/18 1548       Past  Medical History  Past Medical History:   Diagnosis Date    Asthma     Bronchitis     Chiari malformation type I     Chronic headache     Hyperlipidemia     Hypertension     Murmur, heart     NPH (normal pressure hydrocephalus)     Obesity     MANUEL on CPAP     Pseudotumor cerebri     Thyroid disease     Hadley syndrome        Past Surgical History  Past Surgical History:   Procedure Laterality Date    SHAYNA HOLES-ICP MONITOR--  Left frontal ICP bolt Left 3/6/2017    Performed by [facility]co [facility] at [facility] OR    CARPAL TUNNEL RELEASE      CERVICAL SPINE SURGERY      CHOLECYSTECTOMY      CRANIOTOMY-POSTERIOR FOSSA-- posterior fossa decompression N/A 6/7/2016    Performed by [facility]co [facility] at [facility] OR    EAR TUBE REMOVAL Bilateral     EYE SURGERY      strabismus    INJECTION OF FACET JOINT Bilateral 5/23/2018    Procedure: INJECTION-FACET L3/4 and L4/5;  Surgeon: Santana Pak MD;  Location: [facility] OR;  Service: Pain Management;  Laterality: Bilateral;  sacralization of L5 and a rudimentary disc space    INJECTION-FACET L3/4 and L4/5 Bilateral 5/23/2018    Performed by Santana Pak MD at [facility] OR    INJECTION-STEROID-EPIDURAL-CERVICAL N/A 11/14/2017    Performed by Santana Pak MD at [facility] OR    INJECTION-STEROID-EPIDURAL-CERVICAL C7/T1 N/A 10/4/2017    Performed by Santana Pak MD at [facility] OR    MYRINGOTOMY W/ TUBES      TONSILLECTOMY      VENTRICULOPERITONEAL SHUNT         Family History  Family History   Problem Relation Age of Onset    Diabetes Mother     Hypertension Father     Heart disease Father     Heart disease Maternal Grandfather        Social History  Social History     Socioeconomic History    Marital status: Single     Spouse name: Not on file    Number of children: Not on file    Years of education: Not on file    Highest education level: Not on file   Social Needs    Financial resource strain: Not on file    Food insecurity - worry: Not  on file    Food insecurity - inability: Not on file    Transportation needs - medical: Not on file    Transportation needs - non-medical: Not on file   Occupational History    Not on file   Tobacco Use    Smoking status: Never Smoker    Smokeless tobacco: Never Used   Substance and Sexual Activity    Alcohol use: No    Drug use: No    Sexual activity: Not on file   Other Topics Concern    Not on file   Social History Narrative    Not on file        Review of Systems  14-point review of systems as follows:   No check daniel indicates NEGATIVE response   Constitutional: [] weight loss, [] change to appetite   Eyes: [] change in vision, [x] double vision   Ears, nose, mouth, throat: [] frequent nose bleeds, [] ringing in the ears   Respiratory: [x] cough, [] wheezing   Cardiovascular: [x] chest pain, [] palpitations   Gastrointestinal: [] jaundice, [] nausea/vomiting   Genitourinary: [] incontinence, [] burning with urination   Hematologic/lymphatic: [x] easy bruising/bleeding, [] night sweats   Neurological: [x] numbness, [] weakness   Endocrine: [] fatigue, [] heat/cold intolerance   Allergy/Immunologic: [] fevers, [] chills   Musculoskeletal: [x] muscle pain, [x] joint pain   Psychiatric: [] thoughts of harming self/others, [] depression   Integumentary: [] rashes, [] sores that do not heal       Objective:        Vitals:    09/12/18 1033   BP: (!) 144/84   Pulse: 78   Resp: 16     Body mass index is 53.86 kg/m².    PREVIOUS:  Constitutional: appears in no acute distress, well-developed, well-nourished. Father provides a significant amount of history due to patient's intellectual status.     Eyes: normal conjunctiva, PERRLA, optic discs not visualized well - inability to hold gaze.   Confrontational fields - full in all quadrants bilaterally  Color vision (HRR plates 5-10): 4.5/6 bilaterally (1/2 point lost on plate 6, and full point lost on plate 7 bilaterally)    Ears, nose, mouth, throat: face appears  syndromic. Right eye appears esotropic at rest, but ductions full. Hearing grossly intact     Cardiovascular: regular rate and rhythm, no murmurs appreciated    Respiratory: unlabored respirations, breath sounds normal bilaterally    Gastrointestinal: no visible abdominal masses, no guarding, no visible hernia    Musculoskeletal: normal tone in all four extremities. No atrophy. No abnormal movements. Strength in all muscles groups of the upper and lower extremities is 5/5. Normal gait and station. Digits and nails normal.      Spine:   CERVICAL SPINE:  Inspection: midline healed surgical scar in upper cervical spine   ROM: normal   MUSCLE SPASM: mild throughout   FACET LOADING: + bilateral   SPURLING: no  JERSON / ARIANA tender: + bilateral     Psychiatric: normal judgment and insight. Oriented to situation.     Neurologic:   Cortical functions: recent and remote memory intact, normal attention span and concentration, speech fluent, adequate fund of knowledge   Cranial nerves: visual fields full, PERRLA, EOMI, facial sensation intact in V1-V3, symmetric facial strength, hearing intact to finger rub, palate elevates symmetrically, shoulder shrug 5/5, tongue protrudes midline   Reflexes: 2+ in the upper and lower extremities, no Cardenas  Sensation: intact to temperature   Coordination: normal finger to nose    Data Review:     I have personally reviewed the referring provider's notes, labs, & imaging made available to me today.      RADIOLOGY STUDIES:  I have personally reviewed the pertinent images performed.       Results for orders placed or performed during the hospital encounter of 01/04/18   MRI Brain Without Contrast    Narrative    EXAM: Brain MRI without contrast.    INDICATION: Preoperative planning for posterior fossa decompression for Chiari malformation    TECHNIQUE: Multiplanar, multisequence brain MRI was performed. DWI was performed. ADC map was generated. CSF flow/CINE sequencing was  performed.    COMPARISON: The cervical spine MRI dated 02/09/2017, head CT dated 06/08/2016, brain MRI dated 05/23/2016      FINDINGS:     Intracranial contents: There are postsurgical changes of posterior fossa decompression since the prior MRI dated 05/23/2016.  These postoperative changes are clearly demonstrated on head CT dated 06/08/2016.  There is, however, is still crowding at the foramen magnum with diminished CSF flow demonstrated on this any sequences.  There is flattening of the ventral surface at the cervical medullary junction in significant decrease in CSF at the foramen magnum surrounding the lower brainstem and cerebellar tonsils.  There are no regions of restricted diffusion to suggest acute infarction.  Demonstrated is a right frontal approach  shunt catheter with the tip near the 3rd ventricle.  The ventricles remain completely decompressed, nearly slitlike.  Correlate clinically.  This is unchanged.  There is minimal flair and T2 hyperintense signal traversing the right frontal lobe along the catheter course likely representing minimal stable gliosis.  There is no hemorrhage or mass effect.  There is no acute infarction.  The orbits are grossly normal.  There is no definite flattening of the posterior optic disc.  There is no abnormal extra-axial fluid collection.  Flow voids indicating patency are present in the major vessels at the base of the brain but there is crowding of the basilar cistern.  Small caliber of the basilar artery may represent developmental variation with fetal origin of the posterior cerebral arteries.    Extracranial contents: Marrow signal intensity is grossly normal paracolic posterior nasopharyngeal soft tissue is nonspecific but likely reflects reactive lymphoid tissue.  The paranasal sinuses and mastoid air cells are clear.    Impression         1. There are postsurgical changes of prior posterior fossa decompression but there is still crowding at the foramen magnum  with diminished CSF flow seen on this any sequences.    2.  No change in position of the ventriculoperitoneal shunt catheter from right frontal approach with complete decompression of the lateral and 3rd ventricles.  The 4th ventricle is normal in position.  There is no hydrocephalus.  These findings are unchanged.    3.  There is no acute hemorrhage or acute infarction.      Electronically signed by: Dr. Jn Calvin  Date:     01/04/18  Time:    12:19    Results for orders placed or performed during the hospital encounter of 06/07/16   CT Head Without Contrast    Narrative    CT HEAD WITHOUT CONTRAST:    CPT 68804    Total DLP: 803 mGy-cm  AEC (Automated Exposure Control) was utilized to reduce the radiation dose to the patient.    HISTORY:  35-year-old status post suboccipital craniotomy for Chiari I malformation and history of prior ventriculostomy shunt catheter placement for intracranial hypertension.  Comparison outside of the brain from 05/23/2016 and CT scan of the head from 12/10/2014.    TECHNIQUE: Multiple contiguous axial images were acquired from the base of the skull and the vertex without contrast administration.    FINDINGS:  There is minimal right greater than left suboccipital craniotomy.  The cerebellar tonsils still project caudally below the level of what would be an intact foramen magnum.  There is some pneumocephalus inferiorly and extending cephalad above the cerebellum in the region of the tectal plate and view of interpositum area.  There is also some and both inferior middle cranial fossa and overlying the right frontal lobe superiorly.  There is unchanged appearance of a right frontal ventriculostomy shunt catheter with the catheter tip unchanged in position as it extends through the right lateral meniscal frontal horn and into versus adjacent the right foramen of Monro and into the third ventricle.  The right lateral ventricle is slitlike with the left unchanged in position and  nearly slitlike.  The third ventricle and fourth ventricles also remain slitlike.  No cerebral or cerebellar parenchymal abnormality is identified. There is no hemorrhage, midline shift,   significant mass-effect, or extra-axial fluid collection. The partially visualized paranasal sinuses and mastoid air cells are clear. The calvarium is intact.    Impression    1. Interval suboccipital craniotomy for decompression of a Chiari I malformation.  There is a small amount of pneumocephalus present.    2.  Unchanged appearance of right frontal ventriculostomy shunt catheter, as above, with tip in the third ventricle and would be ventricles unchanged in their slitlike appearance.  ______________________________________     Electronically signed by: KATERINE GARCIA MD  Date:     06/08/16  Time:    08:43    Results for orders placed or performed during the hospital encounter of 05/23/16   MRI Brain W WO Contrast    Narrative    Exam: MRI of the brain without and with contrast    Comparison: None.    Technique: Multiplanar MR imaging of the brain was performed both before and following the intravenous administration of 10 cc of Gadavist contrast material.    Findings:     There is a ventriculostomy shunt catheter entering the brain via a right frontal approach which terminates in the vicinity of the 3rd ventricle.  There is gliosis along the course of the catheter.  The lateral ventricles and 3rd ventricle have a slitlike appearance, and over shunting cannot be excluded based on the provided images.  The 4th ventricle is normal in size.    The brain appears normally formed with preserved gray-white matter junction differentiation.  No evidence of acute/recent major vascular territory cerebral infarction, enhancing intra-axial mass, or parenchymal hemorrhage.  No vascular malformations appreciated.    There is a Chiari type I malformation present at the craniocervical junction with approximately 6-7 mm of inferior extension of  the cerebellar tonsils through the foramen magnum.  The tip of the cerebellar tonsils have a somewhat pointed appearance.  There is foreshortening/rounding of the clivus.  There is diminished CSF pulsation visible at the craniocervical junction on the cine images.  No associated cervical cord syrinx within the imaged portion of the cervical spinal cord.    No hydrocephalus.  No extra-axial fluid collections or blood products.  No abnormal leptomeningeal enhancement or enhancing extra-axial mass.  The skull base flow voids are unremarkable.  There is mucoperiosteal thickening observed at the floor of the left maxillary sinus.    The visualized orbits are unremarkable.  There is fatty replacement of the parotid glands.  There is an 11 mm left submandibular region probable intraparotid lymph node (series 9 image 25).    Impression    1.  Chiari type I malformation with approximately 6-7 mm of inferior extension of the cerebellar tonsils through the foramen magnum.  Additional details are provided above.  No associated cervical cord syrinx.    2.  Ventriculostomy shunt catheter entering the brain via a right frontal approach with probable gliosis along the shunt catheter tract.  The 3rd ventricle and lateral ventricles have a somewhat slit like appearance, and over-shunting cannot be excluded.    3.  Mild maxillary sinus disease.  ______________________________________     Electronically signed by: Michael Montiel MD  Date:     05/23/16  Time:    11:19        Lab Results   Component Value Date     03/07/2017    K 3.8 03/07/2017     03/07/2017    CO2 28 03/07/2017    BUN 12 03/07/2017    CREATININE 0.74 03/07/2017     (H) 03/07/2017    HGBA1C 5.8 11/30/2012    AST 16 11/30/2012    ALT 37 11/30/2012    ALBUMIN 3.6 11/30/2012    PROT 8.1 11/30/2012    BILITOT 0.4 11/30/2012       Lab Results   Component Value Date    WBC 4.46 03/07/2017    HGB 12.4 03/07/2017    HCT 38.8 03/07/2017    MCV 82 03/07/2017  "    03/07/2017       Lab Results   Component Value Date    TSH 3.25 11/30/2012           Assessment & Plan:       Problem List Items Addressed This Visit        Neuro    Intractable chronic migraine without aura and without status migrainosus - Primary    Overview     As is common in this population, patient with pseudotumor in remission as evidenced by stable fundoscopic exam / stable visual fields but with ongoing chronic headaches of the migrainous type. Explained to patient and family that once pressure is controlled, I treat the remaining headaches according to phenotype in this case chronic migraine. Patient has failed several "good' preventatives, must be on a daily preventative due to daily frequency of headaches, and must have a weight neutral option due to morbid obesity / PTC / sleep apnea. Discussed the most effective weight neutral option going forward is Botox. Patient agreed to proceed.    The patient has chronic migraines (G43.719) and suffers from headaches more than 15 days a month lasting more than 4 hours a day with no relief of symptoms despite trying multiple medications including but not limited to anti-epileptics (topiramate, gabapentin, diamox), beta blockers (metoprolol),  and antidepressants (paxil - cannot trial additional ones due to being on Paxil). Botox treatment was approved for chronic migraines in October 2010. The patient will be an ideal candidate for Botox. We are planning for 3 treatments 3 months apart and aiming for at least 50% improvement in the symptoms. If we see no improvement after 3 treatments, we will discontinue the injections.    In addition, I discussed that the negative role that chronic opiate therapy plays in migraine chronification and worsening of migraines as dose was reduced being supportive of this.     ------------    For session of Botox resulted in 100% prove minute, 2nd did have an improvement but had an earlier were affected.  I explained this " is expected and the 3rd session will solidify her improvement.  No changed plan.    Sumatriptan was not tolerated due to palpitations this changes Celebrex especially given sharp shooting pains.  Indomethacin appeared not to be covered on her plan even the short-acting form.           Relevant Medications    celecoxib (CELEBREX) 100 MG capsule    topiramate (TOPAMAX) 100 MG tablet    Pseudotumor cerebri syndrome    Overview     Diagnosed in 1999 with headaches and papilledema.  shunt placed in [facility] in 2007, her only shunt thus far. Ventricles slit like. Patient also on full dose topiramate and lasix. Allergic (hives) to diamox. Visual fields and color vision impaired, but fields at least have shown to be stable and discs show chronic atrophy without new edema. Does have MANUEL and uses CPAP.    Appears to be well controlled at this time.          S/P ventriculoperitoneal shunt    Overview     2007         Arnold-Chiari malformation, type I    Overview     S/p decompression 2016            Endocrine    Morbid obesity    Overview     Resulting in PTC, sleep apnea. All migraine medications must be weight neutral.                    Patient asked about driving from a headache standpoint.  I recommended no driving at this time.     TESTING:  -- none     PREVENTION (use daily regardless of headache):  -- continue Botox treatments for chronic migraine - #3 on 10/2/18 - I do believe current headaches will improve after this is done   -- continue topiramate 100mg twice a day     ACUTE TREATMENT (use total no more than 12 days per month unless otherwise stated):  -- stop sumatriptan   -- At onset of moderate/severe migraine (within one hour) take Celebrex one tablet as needed - no more than 3 days per week     Follow-up in about 4 months (around 1/12/2019).    Flory Chamorro MD

## 2018-09-12 NOTE — PATIENT INSTRUCTIONS
TESTING:  -- none     PREVENTION (use daily regardless of headache):  -- continue Botox treatments for chronic migraine - #3 on 10/2/18 - I do believe current headaches will improve after this is done   -- continue topiramate 100mg twice a day     ACUTE TREATMENT (use total no more than 12 days per month unless otherwise stated):  -- At onset of moderate/severe migraine (within one hour) take Celebrex one tablet as needed - no more than 3 days per week    I do not recommend driving school

## 2018-09-13 ENCOUNTER — TELEPHONE (OUTPATIENT)
Dept: NEUROSURGERY | Facility: CLINIC | Age: 38
End: 2018-09-13

## 2018-09-13 NOTE — TELEPHONE ENCOUNTER
You also ordered her a MRI c-spine and EMG which she hasn't done yet. She has it set up and is following up with you on a Dr. Velasco day. Do you still want her to have those diagnostics and follow up with you?

## 2018-09-17 DIAGNOSIS — G43.719 INTRACTABLE CHRONIC MIGRAINE WITHOUT AURA AND WITHOUT STATUS MIGRAINOSUS: ICD-10-CM

## 2018-09-17 RX ORDER — SUMATRIPTAN SUCCINATE 100 MG/1
TABLET ORAL
Qty: 10 TABLET | Refills: 11 | Status: ON HOLD | OUTPATIENT
Start: 2018-09-17 | End: 2018-09-21 | Stop reason: CLARIF

## 2018-09-19 RX ORDER — METHIMAZOLE 10 MG/1
10 TABLET ORAL DAILY
COMMUNITY

## 2018-09-19 RX ORDER — HYDROCODONE BITARTRATE AND ACETAMINOPHEN 5; 325 MG/1; MG/1
1 TABLET ORAL EVERY 6 HOURS PRN
COMMUNITY
End: 2018-10-09 | Stop reason: SDUPTHER

## 2018-09-19 RX ORDER — MONTELUKAST SODIUM 10 MG/1
10 TABLET ORAL NIGHTLY
COMMUNITY
End: 2021-02-05 | Stop reason: ALTCHOICE

## 2018-09-19 RX ORDER — METOPROLOL SUCCINATE 25 MG/1
25 TABLET, EXTENDED RELEASE ORAL DAILY
COMMUNITY
End: 2019-04-02 | Stop reason: SDUPTHER

## 2018-09-19 RX ORDER — NAPROXEN SODIUM 220 MG/1
81 TABLET, FILM COATED ORAL DAILY
COMMUNITY

## 2018-09-19 RX ORDER — OMEPRAZOLE 20 MG/1
20 CAPSULE, DELAYED RELEASE ORAL DAILY
COMMUNITY

## 2018-09-19 RX ORDER — POTASSIUM CHLORIDE 750 MG/1
10 CAPSULE, EXTENDED RELEASE ORAL ONCE
COMMUNITY
End: 2023-06-01 | Stop reason: CLARIF

## 2018-09-19 RX ORDER — CYCLOBENZAPRINE HCL 10 MG
10 TABLET ORAL 3 TIMES DAILY PRN
COMMUNITY
End: 2018-10-02 | Stop reason: SDUPTHER

## 2018-09-20 DIAGNOSIS — M51.36 DDD (DEGENERATIVE DISC DISEASE), LUMBAR: Primary | ICD-10-CM

## 2018-09-21 ENCOUNTER — HOSPITAL ENCOUNTER (OUTPATIENT)
Dept: RADIOLOGY | Facility: HOSPITAL | Age: 38
Discharge: HOME | End: 2018-09-21
Attending: ANESTHESIOLOGY | Admitting: ANESTHESIOLOGY
Payer: MEDICARE

## 2018-09-21 ENCOUNTER — HOSPITAL ENCOUNTER (OUTPATIENT)
Facility: HOSPITAL | Age: 38
Discharge: HOME | End: 2018-09-21
Attending: ANESTHESIOLOGY | Admitting: ANESTHESIOLOGY
Payer: MEDICARE

## 2018-09-21 VITALS
SYSTOLIC BLOOD PRESSURE: 120 MMHG | DIASTOLIC BLOOD PRESSURE: 64 MMHG | WEIGHT: 248 LBS | HEIGHT: 57 IN | HEART RATE: 68 BPM | BODY MASS INDEX: 53.5 KG/M2 | RESPIRATION RATE: 18 BRPM | OXYGEN SATURATION: 99 % | TEMPERATURE: 98 F

## 2018-09-21 DIAGNOSIS — M47.816 LUMBAR SPONDYLOSIS: Primary | ICD-10-CM

## 2018-09-21 DIAGNOSIS — M51.36 DDD (DEGENERATIVE DISC DISEASE), LUMBAR: ICD-10-CM

## 2018-09-21 DIAGNOSIS — Z79.891 OPIOID CONTRACT EXISTS: ICD-10-CM

## 2018-09-21 LAB
B-HCG UR QL: NEGATIVE
CTP QC/QA: YES

## 2018-09-21 PROCEDURE — 76000 FLUOROSCOPY <1 HR PHYS/QHP: CPT | Mod: TC,PO

## 2018-09-21 PROCEDURE — 64635 DESTROY LUMB/SAC FACET JNT: CPT | Mod: 50,,, | Performed by: ANESTHESIOLOGY

## 2018-09-21 PROCEDURE — 64635 DESTROY LUMB/SAC FACET JNT: CPT | Mod: 50,PO | Performed by: ANESTHESIOLOGY

## 2018-09-21 PROCEDURE — 63600175 PHARM REV CODE 636 W HCPCS: Mod: PO | Performed by: ANESTHESIOLOGY

## 2018-09-21 PROCEDURE — 25000003 PHARM REV CODE 250: Mod: PO | Performed by: ANESTHESIOLOGY

## 2018-09-21 PROCEDURE — A4216 STERILE WATER/SALINE, 10 ML: HCPCS | Mod: PO | Performed by: ANESTHESIOLOGY

## 2018-09-21 PROCEDURE — 64636 DESTROY L/S FACET JNT ADDL: CPT | Mod: 50,PO | Performed by: ANESTHESIOLOGY

## 2018-09-21 PROCEDURE — 64636 DESTROY L/S FACET JNT ADDL: CPT | Mod: 50,,, | Performed by: ANESTHESIOLOGY

## 2018-09-21 PROCEDURE — 99152 MOD SED SAME PHYS/QHP 5/>YRS: CPT | Mod: ,,, | Performed by: ANESTHESIOLOGY

## 2018-09-21 PROCEDURE — 81025 URINE PREGNANCY TEST: CPT | Mod: PO | Performed by: ANESTHESIOLOGY

## 2018-09-21 RX ORDER — LIDOCAINE HYDROCHLORIDE 10 MG/ML
INJECTION, SOLUTION EPIDURAL; INFILTRATION; INTRACAUDAL; PERINEURAL
Status: DISCONTINUED | OUTPATIENT
Start: 2018-09-21 | End: 2018-09-21 | Stop reason: HOSPADM

## 2018-09-21 RX ORDER — METHYLPREDNISOLONE ACETATE 40 MG/ML
INJECTION, SUSPENSION INTRA-ARTICULAR; INTRALESIONAL; INTRAMUSCULAR; SOFT TISSUE
Status: DISCONTINUED | OUTPATIENT
Start: 2018-09-21 | End: 2018-09-21 | Stop reason: HOSPADM

## 2018-09-21 RX ORDER — LIDOCAINE HYDROCHLORIDE 10 MG/ML
1 INJECTION INFILTRATION; PERINEURAL ONCE
Status: COMPLETED | OUTPATIENT
Start: 2018-09-21 | End: 2018-09-21

## 2018-09-21 RX ORDER — MIDAZOLAM HYDROCHLORIDE 2 MG/2ML
INJECTION, SOLUTION INTRAMUSCULAR; INTRAVENOUS
Status: DISCONTINUED | OUTPATIENT
Start: 2018-09-21 | End: 2018-09-21 | Stop reason: HOSPADM

## 2018-09-21 RX ORDER — SODIUM CHLORIDE 9 MG/ML
INJECTION, SOLUTION INTRAMUSCULAR; INTRAVENOUS; SUBCUTANEOUS
Status: DISCONTINUED | OUTPATIENT
Start: 2018-09-21 | End: 2018-09-21 | Stop reason: HOSPADM

## 2018-09-21 RX ORDER — LIDOCAINE HYDROCHLORIDE 20 MG/ML
INJECTION, SOLUTION EPIDURAL; INFILTRATION; INTRACAUDAL; PERINEURAL
Status: DISCONTINUED | OUTPATIENT
Start: 2018-09-21 | End: 2018-09-21 | Stop reason: HOSPADM

## 2018-09-21 RX ORDER — SODIUM CHLORIDE, SODIUM LACTATE, POTASSIUM CHLORIDE, CALCIUM CHLORIDE 600; 310; 30; 20 MG/100ML; MG/100ML; MG/100ML; MG/100ML
INJECTION, SOLUTION INTRAVENOUS CONTINUOUS
Status: DISCONTINUED | OUTPATIENT
Start: 2018-09-21 | End: 2018-09-21 | Stop reason: HOSPADM

## 2018-09-21 RX ADMIN — SODIUM CHLORIDE, SODIUM LACTATE, POTASSIUM CHLORIDE, AND CALCIUM CHLORIDE: .6; .31; .03; .02 INJECTION, SOLUTION INTRAVENOUS at 01:09

## 2018-09-21 RX ADMIN — LIDOCAINE HYDROCHLORIDE 1 ML: 10 INJECTION, SOLUTION EPIDURAL; INFILTRATION; INTRACAUDAL; PERINEURAL at 01:09

## 2018-09-21 NOTE — OP NOTE
PROCEDURE DATE: 9/21/2018    PROCEDURE:  Radiofrequency ablation of the bilateral L2,3,4 medial branch nerves on the bilateral-side utilizing fluoroscopy    DIAGNOSIS:  Lumbar spondylosis    Post op Diagnosis: Same    PHYSICIAN: Santana Pak MD    MEDICATIONS INJECTED:  From a mixture of 4ml of 2% lidocaine and 40mg of methylprednisone, 1ml of this solution was injected at each level.    LOCAL ANESTHETIC USED: Lidocaine 1%, 4 ml given at each site.    SEDATION MEDICATIONS: 2mg versed    ESTIMATED BLOOD LOSS:  none    COMPLICATIONS:  noned    TECHNIQUE:  A time out was taken to identify patient and procedure side prior to starting the procedure. Laying in a prone position, the patient was prepped and draped in the usual sterile fashion using ChloraPrep and sterile towels.  The levels were determined under fluoroscopic guidance and then marked.  Local anesthetic was given by raising a wheal at the skin over each site and then infiltrated approximately 2cm deeper.  A 20-gauge  150 mm Vita Sound RF needle was introduced to the anatomic location of the right and then left L2,3,4 medial branch nerves.  Motor stimulation up to 2 Volts at each level confirmed no motor nerve involvement.  Impedance was less than 800 ohms at each level. The above noted medication was then injected slowly.  Ablation was performed per level utilizing Socrates radiofrequency generator 80°C for 90 seconds. The patient tolerated the procedure well.     The patient was monitored after the procedure.  Patient was given post procedure and discharge instructions to follow at home.  The patient was discharged in a stable condition

## 2018-09-21 NOTE — DISCHARGE SUMMARY
[facility]  Discharge Note  Short Stay    Admit Date: 9/21/2018    Discharge Date: 9/21/2018    Attending Physician: Santana Pak MD     Discharge Provider: Santana Pak    Diagnoses:  Active Hospital Problems    Diagnosis  POA    *Lumbar spondylosis [M47.816]  Yes      Resolved Hospital Problems   No resolved problems to display.       Discharged Condition: good    Final Diagnoses: Opioid contract exists [Z02.89]    Disposition: Home or Self Care    Hospital Course: no complications, uneventful    Outcome of Hospitalization, Treatment, Procedure, or Surgery:  Patient was admitted for outpatient procedure. The patient underwent procedure without complications and are discharged home    Follow up/Patient Instructions:  Follow up as scheduled in Pain Management clinic in 3-4 weeks/Patient has received instructions and follow up date and time    Medications:  Continue previous medications    Discharge Procedure Orders   Call MD for:  temperature >100.4     Call MD for:  severe uncontrolled pain     Call MD for:  redness, tenderness, or signs of infection (pain, swelling, redness, odor or green/yellow discharge around incision site)     Call MD for:  severe persistent headache     No dressing needed         Discharge Procedure Orders (must include Diet, Follow-up, Activity):   Discharge Procedure Orders (must include Diet, Follow-up, Activity)   Call MD for:  temperature >100.4     Call MD for:  severe uncontrolled pain     Call MD for:  redness, tenderness, or signs of infection (pain, swelling, redness, odor or green/yellow discharge around incision site)     Call MD for:  severe persistent headache     No dressing needed

## 2018-09-21 NOTE — DISCHARGE INSTRUCTIONS
Recovery After Procedural Sedation (Adult)  You have been given medicine by vein to make you sleep during your surgery. This may have included both a pain medicine and sleeping medicine. Most of the effects have worn off. But you may still have some drowsiness for the next 6 to 8 hours.  Home care  Follow these guidelines when you get home:  · For the next 8 hours, you should be watched by a responsible adult. This person should make sure your condition is not getting worse.  · Don't drink any alcohol for the next 24 hours.  · Don't drive, operate dangerous machinery, or make important business or personal decisions during the next 24 hours.  Note: Your healthcare provider may tell you not to take any medicine by mouth for pain or sleep in the next 4 hours. These medicines may react with the medicines you were given in the hospital. This could cause a much stronger response than usual.  Follow-up care  Follow up with your healthcare provider if you are not alert and back to your usual level of activity within 12 hours.  When to seek medical advice  Call your healthcare provider right away if any of these occur:  · Drowsiness gets worse  · Weakness or dizziness gets worse  · Repeated vomiting  · You can't be awakened   Date Last Reviewed: 10/18/2016  © 2155-7829 [employer]. [address] [place] [address], [place] [address]. All rights reserved. This information is not intended as a substitute for professional medical care. Always follow your healthcare professional's instructions.      Home care instructions  Apply ice pack to the injection site for 20 minutes periods for the first 24 hrs for soreness/discomfort at injection site DO NOT USE HEAT FOR 24 HOURS  Keep site clean and dry for 24 hours, remove bandaid when desired  Do not drive until tomorrow  Take care when walking after a lumbar injection  Avoid strenuous activities for 2 days  Make take 2 weeks to feel the full effects   Resume home medication  as prescribed today  Resume Aspirin, Plavix, or Coumadin the day after the procedure unless otherwise instructed.    SEE IMMEDIATE MEDICAL HELP FOR:  Severe increase in your usual pain or appearance of new pain  Prolonged or increasing weakness or numbness in the legs or arms  Drainage, redness, active bleeding, or increased swelling at the injection site  Temperature over 100.0 degrees F.  Headache that increases when your head is upright and decreases when you lie flat    CALL 911 OR GO DIRECTLY TO EMERGENCY DEPARTMENT FOR:  Shortness of breath, chest pain, or problems breathing

## 2018-09-21 NOTE — H&P
CC: Back pain    HPI: The patient is a 39yo woman with a history of lumbar spondylosis here for bilateral L2,3,4 RFA. There are no major changes in history and physical from 7/6/18.    Past Medical History:   Diagnosis Date    Asthma     Bronchitis     Chiari malformation type I     Chronic headache     Graves disease     Graves disease     Hyperlipidemia     Hypertension     Murmur, heart     NPH (normal pressure hydrocephalus)     Obesity     MANUEL on CPAP     Pseudotumor cerebri     Thyroid disease     Hadley syndrome        Past Surgical History:   Procedure Laterality Date    SHAYNA HOLES-ICP MONITOR--  Left frontal ICP bolt Left 3/6/2017    [facility]    CARPAL TUNNEL RELEASE      CERVICAL SPINE SURGERY      CHOLECYSTECTOMY      CRANIOTOMY-POSTERIOR FOSSA-- posterior fossa decompression N/A 6/7/2016    [facility]    EAR TUBE REMOVAL Bilateral     EYE SURGERY      strabismus    INJECTION OF FACET JOINT Bilateral 5/23/2018    Procedure: INJECTION-FACET L3/4 and L4/5;  Surgeon: Santana Pak MD;  [facility]: [facility] OR;  Service: Pain Management;  Laterality: Bilateral;  sacralization of L5 and a rudimentary disc space    INJECTION-FACET L3/4 and L4/5 Bilateral 5/23/2018    [facility] at [facility] OR    INJECTION-STEROID-EPIDURAL-CERVICAL N/A 11/14/2017    Performed by [facility] at [facility] OR    INJECTION-STEROID-EPIDURAL-CERVICAL C7/T1 N/A 10/4/2017    Per[facility] by [facility] at [facility] OR    MYRINGOTOMY W/ TUBES      TONSILLECTOMY      VENTRICULOPERITONEAL SHUNT         Family History   Problem Relation Age of Onset    Diabetes Mother     Hypertension Father     Heart disease Father     Heart disease Maternal Grandfather        Social History     Socioeconomic History    Marital status: Single     Spouse name: None    Number of children: None    Years of education: None  "   Highest education level: None   Social Needs    Financial resource strain: None    Food insecurity - worry: None    Food insecurity - inability: None    Transportation needs - medical: None    Transportation needs - non-medical: None   Occupational History    None   Tobacco Use    Smoking status: Never Smoker    Smokeless tobacco: Never Used   Substance and Sexual Activity    Alcohol use: No    Drug use: No    Sexual activity: None   Other Topics Concern    None   Social History Narrative    None       Current Facility-Administered Medications   Medication Dose Route Frequency Provider Last Rate Last Dose    lactated ringers infusion   Intravenous Continuous Santana Pak MD 25 mL/hr at 09/21/18 1305         Review of patient's allergies indicates:   Allergen Reactions    Diamox [acetazolamide] Hives    Dye Swelling and Rash     Iodine       Vitals:    09/21/18 1245 09/21/18 1251   BP:  111/74   Pulse:  60   Resp:  18   Temp:  98.1 °F (36.7 °C)   TempSrc:  Tympanic   SpO2:  97%   Weight: 112.5 kg (248 lb)    Height: 4' 9" (1.448 m)        REVIEW OF SYSTEMS:     GENERAL: No weight loss, malaise or fevers.  HEENT:  No recent changes in vision or hearing  NECK: Negative for lumps, no difficulty with swallowing.  RESPIRATORY: Negative for cough, wheezing or shortness of breath, patient denies any recent URI.  CARDIOVASCULAR: Negative for chest pain, leg swelling or palpitations.  GI: Negative for abdominal discomfort, blood in stools or black stools or change in bowel habits.  MUSCULOSKELETAL: See HPI.  SKIN: Negative for lesions, rash, and itching.  PSYCH: No suicidal or homicidal ideations, no current mood disturbances.  HEMATOLOGY/LYMPHOLOGY: Negative for prolonged bleeding, bruising easily or swollen nodes. Patient is not currently taking any anti-coagulants  ENDO: No history of diabetes or thyroid dysfunction  NEURO: No history of syncope, paralysis, seizures or tremors.All other reviewed " and negative other than HPI.    Physical exam:  Gen: A and O x3, pleasant, well-groomed  Skin: No rashes or obvious lesions  HEENT: PERRLA, no obvious deformities on ears or in canals. No thyroid masses, trachea midline, no palpable lymph nodes in neck, axilla.  CVS: Regular rate and rhythm, normal S1 and S2, no murmurs.  Resp: Clear to auscultation bilaterally.  Abdomen: Soft, NT/ND, normal bowel sounds present.  Musculoskeletal/Neuro: Moving all extremities    Assessment:  Opioid contract exists  -     Case Request Operating Room: RADIOFREQUENCY THERMAL COAGULATION, NERVE, SYMPATHETIC, LUMBAR L2, L3, L4  -     Place in Outpatient; Standing  -     Diet NPO; Standing  -     lactated ringers infusion; Inject into the vein continuous.  -     Notify physician ; Standing  -     Notify physician ; Standing  -     Notify physician (specify); Standing  -     Place 18-22 gauage peripheral IV ; Standing  -     Verify informed consent; Standing  -     Vital signs; Standing    Lumbar spondylosis    Other orders  -     lidocaine HCL 10 mg/ml (1%) injection 1 mL; Inject 1 mL into the skin once.  -     POCT urine pregnancy; Standing

## 2018-10-01 ENCOUNTER — TELEPHONE (OUTPATIENT)
Dept: NEUROLOGY | Facility: CLINIC | Age: 38
End: 2018-10-01

## 2018-10-01 NOTE — TELEPHONE ENCOUNTER
Tried to call patient. No answer. Unable to leave message on number ending in [phone]. Left voicemail on number ending in [phone]. Instructed patient to call back to reschedule. Appointment canceled for now.

## 2018-10-02 RX ORDER — CYCLOBENZAPRINE HCL 10 MG
10 TABLET ORAL 3 TIMES DAILY PRN
Qty: 90 TABLET | Refills: 0 | Status: SHIPPED | OUTPATIENT
Start: 2018-10-02 | End: 2018-10-11 | Stop reason: SDUPTHER

## 2018-10-02 NOTE — TELEPHONE ENCOUNTER
----- Message from Mar Tapia sent at 10/2/2018 12:35 PM CDT -----  Patient needs a refill of her muscle relaxer. Cyclosenzaprive sent to [facility].

## 2018-10-03 ENCOUNTER — TELEPHONE (OUTPATIENT)
Dept: NEUROLOGY | Facility: CLINIC | Age: 38
End: 2018-10-03

## 2018-10-03 DIAGNOSIS — G43.719 INTRACTABLE CHRONIC MIGRAINE WITHOUT AURA AND WITHOUT STATUS MIGRAINOSUS: Primary | ICD-10-CM

## 2018-10-04 ENCOUNTER — PROCEDURE VISIT (OUTPATIENT)
Dept: NEUROLOGY | Facility: CLINIC | Age: 38
End: 2018-10-04
Payer: MEDICARE

## 2018-10-04 VITALS
RESPIRATION RATE: 16 BRPM | HEART RATE: 54 BPM | HEIGHT: 57 IN | BODY MASS INDEX: 53.5 KG/M2 | SYSTOLIC BLOOD PRESSURE: 113 MMHG | DIASTOLIC BLOOD PRESSURE: 74 MMHG | WEIGHT: 248 LBS

## 2018-10-04 DIAGNOSIS — G43.719 INTRACTABLE CHRONIC MIGRAINE WITHOUT AURA AND WITHOUT STATUS MIGRAINOSUS: Primary | ICD-10-CM

## 2018-10-04 PROCEDURE — 64615 CHEMODENERV MUSC MIGRAINE: CPT | Mod: PBBFAC,PO | Performed by: PSYCHIATRY & NEUROLOGY

## 2018-10-04 PROCEDURE — 64615 CHEMODENERV MUSC MIGRAINE: CPT | Mod: S$PBB,,, | Performed by: PSYCHIATRY & NEUROLOGY

## 2018-10-04 RX ORDER — FUROSEMIDE 80 MG/1
80 TABLET ORAL DAILY
COMMUNITY
Start: 2018-09-17

## 2018-10-04 RX ORDER — POTASSIUM CHLORIDE 750 MG/1
TABLET, EXTENDED RELEASE ORAL
COMMUNITY
Start: 2018-09-17 | End: 2021-06-01 | Stop reason: SDUPTHER

## 2018-10-04 RX ORDER — MELOXICAM 7.5 MG/1
TABLET ORAL
COMMUNITY
Start: 2018-09-17 | End: 2023-06-01 | Stop reason: CLARIF

## 2018-10-04 RX ORDER — PAROXETINE HYDROCHLORIDE 20 MG/1
40 TABLET, FILM COATED ORAL
COMMUNITY
Start: 2018-09-17 | End: 2023-06-01 | Stop reason: CLARIF

## 2018-10-04 RX ADMIN — ONABOTULINUMTOXINA 200 UNITS: 100 INJECTION, POWDER, LYOPHILIZED, FOR SOLUTION INTRADERMAL; INTRAMUSCULAR at 01:10

## 2018-10-04 NOTE — PROCEDURES
BOTOX PROCEDURE    PROCEDURE PERFORMED: Botulinum toxin injection (79810)    CLINICAL INDICATION: G43.719    A time out was conducted just before the start of the procedure to verify the correct patient and procedure, procedure location, and all relevant critical information.     Conventional methods of treatment but the patient has been unresponsive and refractory.The patient meets criteria for chronic headaches according to the ICHD-II, the patient has more than 15 headaches a month which last for more than 4 hours a day.    The Botox injections have achieved well over 50%  improvement in the patient's symptoms. Migraines have been reduced at least 7 days per month and the number of cumulative hours suffering with headaches has been reduced at least 100 total hours per month. Today she does have a headache indicating that the Botox has worn off. Frequency of treatment is every 3 months unless no response to the treatments, at which time we will discontinue the injections.    DESCRIPTION OF PROCEDURE: After obtaining informed consent and under aseptic technique, a total of 155 units of botulinum toxin type A were injected in the following muscles: Procerus 5 units,  5 units bilaterally, frontalis 20 units, temporalis 20 units bilaterally, occipitalis 15 units, upper cervical paraspinals 10 units bilaterally and trapezius 15 units bilaterally. The patient was given a total of 155 units in 31 sites.The patient tolerated the procedure well. There were no complications. The patient was given a prescription for repeat treatment in 3 months.     Unavoidable waste 45 units          Cee Campbell M.D  Medical Director, Headache and Facial Pain  [facility]

## 2018-10-09 RX ORDER — HYDROCODONE BITARTRATE AND ACETAMINOPHEN 5; 325 MG/1; MG/1
1 TABLET ORAL EVERY 12 HOURS PRN
Qty: 60 TABLET | Refills: 0 | Status: SHIPPED | OUTPATIENT
Start: 2018-10-09 | End: 2018-10-17 | Stop reason: SDUPTHER

## 2018-10-09 NOTE — TELEPHONE ENCOUNTER
----- Message from Mirtha Rose sent at 10/9/2018 12:53 PM CDT -----  Type:  RX Refill Request    Who Called:  Patient  RX Name and Strength: Dix  Preferred Pharmacy with phone number:  [facility]  Local or Mail Order:  local  Ordering Provider:  Dr. Pasha Royal Call Back Number:  [phone]  Additional Information:

## 2018-10-11 RX ORDER — CYCLOBENZAPRINE HCL 10 MG
TABLET ORAL
Qty: 90 TABLET | Refills: 0 | Status: SHIPPED | OUTPATIENT
Start: 2018-10-11 | End: 2018-10-17 | Stop reason: SDUPTHER

## 2018-10-17 ENCOUNTER — OFFICE VISIT (OUTPATIENT)
Dept: PAIN MEDICINE | Facility: CLINIC | Age: 38
End: 2018-10-17
Payer: MEDICARE

## 2018-10-17 ENCOUNTER — HOSPITAL ENCOUNTER (OUTPATIENT)
Dept: RADIOLOGY | Facility: HOSPITAL | Age: 38
Discharge: HOME | End: 2018-10-17
Attending: PHYSICIAN ASSISTANT
Payer: MEDICARE

## 2018-10-17 ENCOUNTER — OFFICE VISIT (OUTPATIENT)
Dept: NEUROSURGERY | Facility: CLINIC | Age: 38
End: 2018-10-17
Payer: MEDICARE

## 2018-10-17 VITALS
HEART RATE: 60 BPM | OXYGEN SATURATION: 99 % | TEMPERATURE: 98 F | SYSTOLIC BLOOD PRESSURE: 113 MMHG | BODY MASS INDEX: 57.25 KG/M2 | WEIGHT: 264.56 LBS | DIASTOLIC BLOOD PRESSURE: 69 MMHG | RESPIRATION RATE: 20 BRPM

## 2018-10-17 VITALS
SYSTOLIC BLOOD PRESSURE: 113 MMHG | DIASTOLIC BLOOD PRESSURE: 77 MMHG | BODY MASS INDEX: 57.24 KG/M2 | HEART RATE: 49 BPM | TEMPERATURE: 98 F | WEIGHT: 265.31 LBS | HEIGHT: 57 IN

## 2018-10-17 DIAGNOSIS — Q07.00 ARNOLD-CHIARI MALFORMATION: ICD-10-CM

## 2018-10-17 DIAGNOSIS — Z98.2 VP (VENTRICULOPERITONEAL) SHUNT STATUS: ICD-10-CM

## 2018-10-17 DIAGNOSIS — Z98.2 VP (VENTRICULOPERITONEAL) SHUNT STATUS: Primary | ICD-10-CM

## 2018-10-17 DIAGNOSIS — M51.36 DDD (DEGENERATIVE DISC DISEASE), LUMBAR: Primary | ICD-10-CM

## 2018-10-17 DIAGNOSIS — Z79.891 OPIOID CONTRACT EXISTS: ICD-10-CM

## 2018-10-17 DIAGNOSIS — M48.02 SPINAL STENOSIS OF CERVICAL REGION: ICD-10-CM

## 2018-10-17 DIAGNOSIS — M50.30 DEGENERATION OF CERVICAL DISC WITHOUT MYELOPATHY: ICD-10-CM

## 2018-10-17 DIAGNOSIS — M50.30 DDD (DEGENERATIVE DISC DISEASE), CERVICAL: ICD-10-CM

## 2018-10-17 DIAGNOSIS — G93.2 PSEUDOTUMOR CEREBRI: ICD-10-CM

## 2018-10-17 DIAGNOSIS — R51.9 WORSENING HEADACHES: ICD-10-CM

## 2018-10-17 PROCEDURE — 76000 FLUOROSCOPY <1 HR PHYS/QHP: CPT | Mod: 26,,, | Performed by: RADIOLOGY

## 2018-10-17 PROCEDURE — 72141 MRI NECK SPINE W/O DYE: CPT | Mod: TC,PO

## 2018-10-17 PROCEDURE — 80307 DRUG TEST PRSMV CHEM ANLYZR: CPT

## 2018-10-17 PROCEDURE — 99999 PR PBB SHADOW E&M-EST. PATIENT-LVL IV: CPT | Mod: PBBFAC,,, | Performed by: PHYSICIAN ASSISTANT

## 2018-10-17 PROCEDURE — 72141 MRI NECK SPINE W/O DYE: CPT | Mod: 26,,, | Performed by: RADIOLOGY

## 2018-10-17 PROCEDURE — 99215 OFFICE O/P EST HI 40 MIN: CPT | Mod: PBBFAC,25,27,PN | Performed by: PHYSICIAN ASSISTANT

## 2018-10-17 PROCEDURE — 99214 OFFICE O/P EST MOD 30 MIN: CPT | Mod: PBBFAC,25,PN | Performed by: PHYSICIAN ASSISTANT

## 2018-10-17 PROCEDURE — 99999 PR PBB SHADOW E&M-EST. PATIENT-LVL V: CPT | Mod: PBBFAC,,, | Performed by: PHYSICIAN ASSISTANT

## 2018-10-17 PROCEDURE — 99213 OFFICE O/P EST LOW 20 MIN: CPT | Mod: S$PBB,,, | Performed by: PHYSICIAN ASSISTANT

## 2018-10-17 PROCEDURE — 99214 OFFICE O/P EST MOD 30 MIN: CPT | Mod: S$PBB,,, | Performed by: PHYSICIAN ASSISTANT

## 2018-10-17 PROCEDURE — 76000 FLUOROSCOPY <1 HR PHYS/QHP: CPT | Mod: TC,PO

## 2018-10-17 RX ORDER — HYDROCODONE BITARTRATE AND ACETAMINOPHEN 5; 325 MG/1; MG/1
1 TABLET ORAL EVERY 12 HOURS PRN
Qty: 60 TABLET | Refills: 0 | Status: SHIPPED | OUTPATIENT
Start: 2018-11-09 | End: 2018-12-09

## 2018-10-17 RX ORDER — HYDROCODONE BITARTRATE AND ACETAMINOPHEN 5; 325 MG/1; MG/1
1 TABLET ORAL EVERY 12 HOURS PRN
Qty: 60 TABLET | Refills: 0 | Status: SHIPPED | OUTPATIENT
Start: 2019-01-08 | End: 2019-01-16 | Stop reason: SDUPTHER

## 2018-10-17 RX ORDER — HYDROCODONE BITARTRATE AND ACETAMINOPHEN 5; 325 MG/1; MG/1
1 TABLET ORAL EVERY 12 HOURS PRN
Qty: 60 TABLET | Refills: 0 | Status: SHIPPED | OUTPATIENT
Start: 2018-12-09 | End: 2019-01-08

## 2018-10-17 RX ORDER — CYCLOBENZAPRINE HCL 10 MG
10 TABLET ORAL 3 TIMES DAILY PRN
Qty: 90 TABLET | Refills: 2 | Status: SHIPPED | OUTPATIENT
Start: 2018-10-17 | End: 2019-04-25 | Stop reason: SDUPTHER

## 2018-10-21 LAB
6MAM UR QL: NOT DETECTED
7AMINOCLONAZEPAM UR QL: NOT DETECTED
A-OH ALPRAZ UR QL: NOT DETECTED
ALPRAZ UR QL: NOT DETECTED
AMPHET UR QL SCN: NOT DETECTED
ANNOTATION COMMENT IMP: NORMAL
ANNOTATION COMMENT IMP: NORMAL
BARBITURATES UR QL: NOT DETECTED
BUPRENORPHINE UR QL: NOT DETECTED
BZE UR QL: NOT DETECTED
CARBOXYTHC UR QL: NOT DETECTED
CARISOPRODOL UR QL: NOT DETECTED
CLONAZEPAM UR QL: NOT DETECTED
CODEINE UR QL: NOT DETECTED
CREAT UR-MCNC: 62.7 MG/DL (ref 20–400)
DIAZEPAM UR QL: NOT DETECTED
ETHYL GLUCURONIDE UR QL: NOT DETECTED
FENTANYL UR QL: NOT DETECTED
HYDROCODONE UR QL: PRESENT
HYDROMORPHONE UR QL: NOT DETECTED
LORAZEPAM UR QL: NOT DETECTED
MDA UR QL: NOT DETECTED
MDEA UR QL: NOT DETECTED
MDMA UR QL: NOT DETECTED
ME-PHENIDATE UR QL: NOT DETECTED
MEPERIDINE UR QL: NOT DETECTED
METHADONE UR QL: NOT DETECTED
METHAMPHET UR QL: NOT DETECTED
MIDAZOLAM UR QL SCN: NOT DETECTED
MORPHINE UR QL: NOT DETECTED
NORBUPRENORPHINE UR QL CFM: NOT DETECTED
NORDIAZEPAM UR QL: NOT DETECTED
NORFENTANYL UR QL: NOT DETECTED
NORHYDROCODONE UR QL CFM: PRESENT
NOROXYCODONE UR QL CFM: NOT DETECTED
NOROXYMORPHONE: NOT DETECTED
OXAZEPAM UR QL: NOT DETECTED
OXYCODONE UR QL: NOT DETECTED
OXYMORPHONE UR QL: NOT DETECTED
PATHOLOGY STUDY: NORMAL
PCP UR QL: NOT DETECTED
PHENTERMINE UR QL: NOT DETECTED
PROPOXYPH UR QL: NOT DETECTED
SERVICE CMNT-IMP: NORMAL
TAPENTADOL UR QL SCN: NOT DETECTED
TAPENTADOL-O-SULF: NOT DETECTED
TEMAZEPAM UR QL: NOT DETECTED
TRAMADOL UR QL: NOT DETECTED
ZOLPIDEM UR QL: NOT DETECTED

## 2018-10-22 ENCOUNTER — TELEPHONE (OUTPATIENT)
Dept: NEUROSURGERY | Facility: CLINIC | Age: 38
End: 2018-10-22

## 2018-10-22 NOTE — PROGRESS NOTES
This note was completed with dictation software and grammatical errors may exist.    CC: Back pain, Neck pain, headaches    HPI: The patient is a 38-year-old woman with a history of Chiari malformation, Hadley syndrome, pseudotumor cerebri with shunt, morbid obesity who presents in referral from Dr. Velasco for neck pain. She is status post bilateral L2, 3 and 4 medial branch radiofrequency ablation on 09/21/2018 with 75% relief.  She feels that her low back pain is currently tolerable.  Her main complaint is neck pain radiating to her arms.  This is worse with turning her head and associated with weakness in her arms and numbness in her hands.  She reports relief with pain medication.  She denies bladder or bowel incontinence.    Pain intervention history: She was seeing Dr. Gerson Renteria, pain management and until recently had been taking hydrocodone 5/325 once a day in addition to Flexeril 3 times a day and gabapentin 600 mg 3 times a day.  She apparently tested positive for Valium and so was dismissed from his practice. She is status post C7-T1 cervical interlaminar epidural steroid injection on 10/4/17 with 100% relief lasting 2 weeks.  She is status post a second cervical TABITHA on 11/14/17 with almost complete relief again but only lasting 2-3 weeks. She is status post bilateral L2, 3 and 4 medial branch radiofrequency ablation on 09/21/2018 with 75% relief.     ROS: She reports weight loss, headaches, easy bruising and back pain.  Balance of review of systems is negative.    Past Medical History:   Diagnosis Date    Asthma     Bronchitis     Chiari malformation type I     Chronic headache     Graves disease     Graves disease     Hyperlipidemia     Hypertension     Murmur, heart     NPH (normal pressure hydrocephalus)     Obesity     MANUEL on CPAP     Pseudotumor cerebri     Thyroid disease     Hadley syndrome        Past Surgical History:   Procedure Laterality Date    SHAYNA HOLES-ICP MONITOR--   Left frontal ICP bolt Left 3/6/2017    [facility]    CARPAL TUNNEL RELEASE      CERVICAL SPINE SURGERY      CHOLECYSTECTOMY      CRANIOTOMY-POSTERIOR FOSSA-- posterior fossa decompression N/A 6/7/2016    [facility]    EAR TUBE REMOVAL Bilateral     EYE SURGERY      strabismus    INJECTION OF FACET JOINT Bilateral 5/23/2018    Procedure: INJECTION-FACET L3/4 and L4/5;  Surgeon: Santana Pak MD;  Location: [facility] OR;  Service: Pain Management;  Laterality: Bilateral;  sacralization of L5 and a rudimentary disc space    INJECTION-FACET L3/4 and L4/5 Bilateral 5/23/2018    Performed by Santana Pak MD at [facility] OR    INJECTION-STEROID-EPIDURAL-CERVICAL N/A 11/14/2017    Performed by [facility]ana Pak MD at [facility] OR    INJECTION-STEROID-EPIDURAL-CERVICAL C7/T1 N/A 10/4/2017    Performed by Santana Pak MD at [facility] OR    MYRINGOTOMY W/ TUBES      RADIOFREQUENCY ABLATION OF LUMBAR MEDIAL BRANCH NERVE AT SINGLE LEVEL Bilateral 9/21/2018    Procedure: RADIOFREQUENCY ABLATION, NERVE, SPINAL, LUMBAR, MEDIAL BRANCH, L2, L3, L4;  Surgeon: Santana Pak MD;  Location: [facility] OR;  Service: Pain Management;  Laterality: Bilateral;    RADIOFREQUENCY ABLATION, NERVE, SPINAL, LUMBAR, MEDIAL BRANCH, L2, L3, L4 Bilateral 9/21/2018    Performed by Santana Pak MD at [facility] OR    TONSILLECTOMY      VENTRICULOPERITONEAL SHUNT         Social History     Socioeconomic History    Marital status: Single     Spouse name: None    Number of children: None    Years of education: None    Highest education level: None   Social Needs    Financial resource strain: None    Food insecurity - worry: None    Food insecurity - inability: None    Transportation needs - medical: None    Transportation needs - non-medical: None   Occupational History    None   Tobacco Use    Smoking status: Never Smoker    Smokeless tobacco: Never Used    Substance and Sexual Activity    Alcohol use: No    Drug use: No    Sexual activity: None   Other Topics Concern    None   Social History Narrative    None         Medications/Allergies: See med card    Vitals:    10/17/18 1113   BP: 113/69   Pulse: 60   Resp: 20   Temp: 97.6 °F (36.4 °C)   TempSrc: Oral   SpO2: 99%   Weight: 120 kg (264 lb 8.8 oz)   PainSc:   6   PainLoc: Arm     Body mass index is 57.25 kg/m².      Physical exam:  Gen: A and O x3, pleasant, obese  Skin: No rashes or obvious lesions  HEENT: PERRLA, no obvious deformities on ears or in canals.Trachea midline.  CVS: Regular rate and rhythm, normal palpable pulses.  Resp: Clear to auscultation bilaterally, no wheezes or rales.  Abdomen: Soft, NT/ND.  Musculoskeletal:  Wide-based gait.    Neuro:  Upper extremities: 5/5 strength bilaterally   Lower extremities: 5/5 strength bilaterally  Reflexes: Brachioradialis 2+, Bicep 2+, Tricep 2+. Patellar 2+, Achilles 2+ bilaterally.  Sensory: Intact and symmetrical to light touch and pinprick in C2-T1 dermatomes bilaterally.  Intact and symmetrical to light touch and pinprick in L2-S1 dermatomes bilaterally.    Cervical Spine:  Cervical spine: Range of motion is mildly reduced with flexion with no increased pain, moderately reduced with extension with increased neck pain, lateral rotation mildly reduced with increased pain, worse on the left.  Spurling's maneuver causes lateral neck pain.  Myofascial exam: Mild tenderness to palpation across cervical paraspinous region bilaterally.    Lumbar spine:  Lumbar spine: Range of motion is moderately reduced with extension and mildly reduced with flexion without increased pain.  Derrick's test causes no increased pain on either side.    Supine straight leg raise is negative bilaterally.    Internal and external rotation of the hip causes no increased pain on either side.  Myofascial exam: Mild tenderness to palpation across the lumbar paraspinous  muscles.      Imaging:  MRI from 2/9/17 cervical spine demonstrates congenitally narrowed canal with disc bulging most prominently at C4/5 to the right side causing anterior cord compression but no signal changes.  There is narrowing of the canal at C3/4 to a lesser degree.      Assessment:   The patient is a 38-year-old woman with a history of Chiari malformation, pseudotumor cerebri with shunt, morbid obesity who presents in referral from Dr. Velasco for neck pain.  1. DDD (degenerative disc disease), lumbar     2. DDD (degenerative disc disease), cervical     3. Opioid contract exists  Pain Clinic Drug Screen       Plan:  1.  Urine drug screen and confirmation testing was ordered as documented on the requisition form for the following purposes.  Dr. Pak provided prescriptions for hydrocodone-acetaminophen 5/325 mg up to 2 times a day as needed for pain.  I have reviewed the [place] Board of Pharmacy website and there are no abberancies.    2.  She had excellent relief following the bilateral L2, 3 and 4 medial branch RFA.  This can be repeated in the future if necessary.  3.  She is going to discuss her neck and arm pain with Neurosurgery.  She did not have lasting relief with cervical epidural steroid injections.  4.  Follow-up in 3 months or sooner as needed.

## 2018-10-22 NOTE — TELEPHONE ENCOUNTER
Pt will be near our clinic on November 1st. She is stating she needs her shunts to be adjusted ( I believe is what she said). I wasn't sure if she needed an early appt for that or what.    Thank you

## 2018-10-23 ENCOUNTER — TELEPHONE (OUTPATIENT)
Dept: NEUROSURGERY | Facility: CLINIC | Age: 38
End: 2018-10-23

## 2018-10-23 NOTE — TELEPHONE ENCOUNTER
"Spoke with the pt, she reports the was told her "x-rays of her shunts do not look right". Please review the imaging and advise. There x-ray report states the previous setting was at 150 mm H2O  but the current setting is closer to 130 mm H2O. Thank you.    "

## 2018-11-01 ENCOUNTER — OFFICE VISIT (OUTPATIENT)
Dept: NEUROSURGERY | Facility: CLINIC | Age: 38
End: 2018-11-01
Payer: MEDICARE

## 2018-11-01 ENCOUNTER — HOSPITAL ENCOUNTER (OUTPATIENT)
Dept: RADIOLOGY | Facility: HOSPITAL | Age: 38
Discharge: HOME | End: 2018-11-01
Attending: PHYSICIAN ASSISTANT
Payer: MEDICARE

## 2018-11-01 ENCOUNTER — OFFICE VISIT (OUTPATIENT)
Dept: CARDIOLOGY | Facility: CLINIC | Age: 38
End: 2018-11-01
Payer: MEDICARE

## 2018-11-01 VITALS — TEMPERATURE: 98 F | WEIGHT: 265.44 LBS | HEIGHT: 57 IN | RESPIRATION RATE: 20 BRPM | BODY MASS INDEX: 57.27 KG/M2

## 2018-11-01 VITALS
SYSTOLIC BLOOD PRESSURE: 128 MMHG | DIASTOLIC BLOOD PRESSURE: 78 MMHG | BODY MASS INDEX: 56.83 KG/M2 | WEIGHT: 263.44 LBS | HEIGHT: 57 IN | HEART RATE: 60 BPM

## 2018-11-01 DIAGNOSIS — Z98.2 S/P VENTRICULOPERITONEAL SHUNT: ICD-10-CM

## 2018-11-01 DIAGNOSIS — E66.01 MORBID OBESITY: ICD-10-CM

## 2018-11-01 DIAGNOSIS — G93.5 ARNOLD-CHIARI MALFORMATION, TYPE I: ICD-10-CM

## 2018-11-01 DIAGNOSIS — Z98.2 VP (VENTRICULOPERITONEAL) SHUNT STATUS: Primary | ICD-10-CM

## 2018-11-01 DIAGNOSIS — Z98.2 VP (VENTRICULOPERITONEAL) SHUNT STATUS: ICD-10-CM

## 2018-11-01 DIAGNOSIS — I10 ESSENTIAL HYPERTENSION: ICD-10-CM

## 2018-11-01 DIAGNOSIS — R01.1 MURMUR, HEART: ICD-10-CM

## 2018-11-01 PROCEDURE — 99999 PR PBB SHADOW E&M-EST. PATIENT-LVL III: CPT | Mod: PBBFAC,,, | Performed by: INTERNAL MEDICINE

## 2018-11-01 PROCEDURE — 99214 OFFICE O/P EST MOD 30 MIN: CPT | Mod: S$PBB,,, | Performed by: INTERNAL MEDICINE

## 2018-11-01 PROCEDURE — 99999 PR PBB SHADOW E&M-EST. PATIENT-LVL IV: CPT | Mod: PBBFAC,,, | Performed by: PHYSICIAN ASSISTANT

## 2018-11-01 PROCEDURE — 62252 CSF SHUNT REPROGRAM: CPT | Mod: PBBFAC,PN | Performed by: PHYSICIAN ASSISTANT

## 2018-11-01 PROCEDURE — 76000 FLUOROSCOPY <1 HR PHYS/QHP: CPT | Mod: TC,PO

## 2018-11-01 PROCEDURE — 99213 OFFICE O/P EST LOW 20 MIN: CPT | Mod: PBBFAC,27,PO | Performed by: INTERNAL MEDICINE

## 2018-11-01 PROCEDURE — 99214 OFFICE O/P EST MOD 30 MIN: CPT | Mod: PBBFAC,PN,25 | Performed by: PHYSICIAN ASSISTANT

## 2018-11-01 PROCEDURE — 62252 CSF SHUNT REPROGRAM: CPT | Mod: 26,S$PBB,, | Performed by: PHYSICIAN ASSISTANT

## 2018-11-01 PROCEDURE — 99499 UNLISTED E&M SERVICE: CPT | Mod: S$PBB,,, | Performed by: PHYSICIAN ASSISTANT

## 2018-11-01 PROCEDURE — 76000 FLUOROSCOPY <1 HR PHYS/QHP: CPT | Mod: 26,,, | Performed by: RADIOLOGY

## 2018-11-01 RX ORDER — ONDANSETRON 4 MG/1
8 TABLET, ORALLY DISINTEGRATING ORAL
COMMUNITY

## 2018-11-01 RX ORDER — BEPOTASTINE BESILATE 15 MG/ML
SOLUTION/ DROPS OPHTHALMIC
COMMUNITY

## 2018-11-01 RX ORDER — LEVOTHYROXINE SODIUM 25 UG/1
25 TABLET ORAL DAILY
COMMUNITY
End: 2020-07-31

## 2018-11-01 NOTE — PROGRESS NOTES
Patient presents today for shunt adjustment as her previous shunt was set at 130 mm of water from her original 150 mm of water.  Her Codman shunt was adjusted to 150 mm of water we will order fluoroscopic shunt check today.

## 2018-11-01 NOTE — PROGRESS NOTES
Subjective:    Patient ID:  Shanna Douglass is a 38 y.o. female who presents for evaluation of Heart Murmur (establish care) and Leg Swelling      Pt here to establish care. 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 Echo last year described normal valve structures but noted an increased gradient across the AV. She has no related symptoms. She was also concerned about her cholesterol because her father has high cholesterol and heart disease.         Review of Systems   Constitution: Negative for weight gain and weight loss.   HENT: Negative.    Eyes: Negative.    Cardiovascular: Positive for leg swelling. Negative for chest pain, claudication, cyanosis, irregular heartbeat, near-syncope, orthopnea (no PND) and palpitations.   Respiratory: Negative for cough, hemoptysis, shortness of breath and snoring.    Endocrine: Negative.    Skin: Negative.    Musculoskeletal: Positive for arthritis, back pain and joint pain. Negative for muscle cramps, muscle weakness and myalgias.        Chronic pain   Gastrointestinal: Negative for diarrhea, hematemesis, nausea and vomiting.   Genitourinary: Negative.    Neurological: Positive for headaches. Negative for focal weakness, numbness, paresthesias and seizures.   Psychiatric/Behavioral: Negative.         Objective:    Physical Exam   Constitutional: She is oriented to person, place, and time. She appears well-developed and well-nourished.   Eyes: Pupils are equal, round, and reactive to light.   Neck: Normal range of motion. No thyromegaly present.   Cardiovascular: Normal rate, regular rhythm, S1 normal, S2 normal, intact distal pulses and normal pulses.  No extrasystoles are present. PMI is not displaced. Exam reveals no friction rub.   Murmur heard.   Medium-pitched systolic murmur is present with a grade of 1/6 at the upper right sternal border and upper left sternal border.  Pulmonary/Chest: Effort normal and breath sounds normal. She has no  wheezes. She has no rales. She exhibits no tenderness.   Abdominal: Soft. Bowel sounds are normal. She exhibits no distension and no mass. There is no tenderness.   Musculoskeletal: Normal range of motion. She exhibits edema (1+).   Neurological: She is alert and oriented to person, place, and time.   Skin: Skin is warm and dry.   Vitals reviewed.        Assessment:       1. Essential hypertension    2. Murmur, heart    3. Morbid obesity    4. Arnold-Chiari malformation, type I    5. S/P ventriculoperitoneal shunt         Plan:       Echo  Lipid panel   Will call with results  Can f/u as needed

## 2018-11-08 ENCOUNTER — TELEPHONE (OUTPATIENT)
Dept: NEUROLOGY | Facility: CLINIC | Age: 38
End: 2018-11-08

## 2018-11-08 NOTE — PROGRESS NOTES
Neurosurgery History & Physical    Patient ID: Shanna Douglass is a 38 y.o. female.    Chief Complaint   Patient presents with    Cervical Spine Pain (C-spine)     Patient is following up after MRI and EMG study due to neck pain. Patient rates pain 8/10 at this time.       Review of Systems   Constitutional: Positive for chills and fatigue. Negative for activity change, appetite change, fever and unexpected weight change.   HENT: Negative for tinnitus, trouble swallowing and voice change.    Respiratory: Positive for cough. Negative for apnea, chest tightness and shortness of breath.    Cardiovascular: Negative for chest pain and palpitations.   Gastrointestinal: Positive for abdominal pain and diarrhea. Negative for constipation, nausea and vomiting.   Genitourinary: Positive for enuresis. Negative for difficulty urinating, dysuria, frequency and urgency.   Musculoskeletal: Positive for back pain and neck pain. Negative for gait problem and neck stiffness.   Skin: Negative for wound.   Neurological: Positive for weakness and headaches. Negative for dizziness, tremors, seizures, facial asymmetry, speech difficulty, light-headedness and numbness.   Psychiatric/Behavioral: Negative for confusion and decreased concentration.       Past Medical History:   Diagnosis Date    Asthma     Bronchitis     Chiari malformation type I     Chronic headache     Graves disease     Graves disease     Hyperlipidemia     Hypertension     Murmur, heart     NPH (normal pressure hydrocephalus)     Obesity     MANUEL on CPAP     Pseudotumor cerebri     Thyroid disease     Hadley syndrome      Social History     Socioeconomic History    Marital status: Single     Spouse name: Not on file    Number of children: Not on file    Years of education: Not on file    Highest education level: Not on file   Social Needs    Financial resource strain: Not on file    Food insecurity - worry: Not on file    Food insecurity -  inability: Not on file    Transportation needs - medical: Not on file    Transportation needs - non-medical: Not on file   Occupational History    Not on file   Tobacco Use    Smoking status: Never Smoker    Smokeless tobacco: Never Used   Substance and Sexual Activity    Alcohol use: No    Drug use: No    Sexual activity: Not on file   Other Topics Concern    Not on file   Social History Narrative    Not on file     Family History   Problem Relation Age of Onset    Diabetes Mother     Hypertension Father     Heart disease Father     Heart disease Maternal Grandfather      Review of patient's allergies indicates:   Allergen Reactions    Diamox [acetazolamide] Hives    Dye Swelling and Rash       Current Outpatient Medications:     aspirin 81 MG Chew, Take 81 mg by mouth once daily., Disp: , Rfl:     celecoxib (CELEBREX) 100 MG capsule, Take 2 capsules (200 mg total) by mouth 2 (two) times daily as needed (headache and sharp pain). No more than 3 days per week., Disp: 30 capsule, Rfl: 11    cyclobenzaprine (FLEXERIL) 10 MG tablet, Take 1 tablet (10 mg total) by mouth 3 (three) times daily as needed for Muscle spasms., Disp: 90 tablet, Rfl: 2    furosemide (LASIX) 80 MG tablet, Take 80 mg by mouth once daily. , Disp: , Rfl:     [START ON 11/9/2018] HYDROcodone-acetaminophen (NORCO) 5-325 mg per tablet, Take 1 tablet by mouth every 12 (twelve) hours as needed for Pain., Disp: 60 tablet, Rfl: 0    [START ON 12/9/2018] HYDROcodone-acetaminophen (NORCO) 5-325 mg per tablet, Take 1 tablet by mouth every 12 (twelve) hours as needed for Pain., Disp: 60 tablet, Rfl: 0    [START ON 1/8/2019] HYDROcodone-acetaminophen (NORCO) 5-325 mg per tablet, Take 1 tablet by mouth every 12 (twelve) hours as needed for Pain., Disp: 60 tablet, Rfl: 0    meloxicam (MOBIC) 7.5 MG tablet, , Disp: , Rfl:     methIMAzole (TAPAZOLE) 10 MG Tab, Take 10 mg by mouth 2 (two) times daily., Disp: , Rfl:     metoprolol succinate  "(TOPROL-XL) 25 MG 24 hr tablet, Take 25 mg by mouth once daily., Disp: , Rfl:     montelukast (SINGULAIR) 10 mg tablet, Take 10 mg by mouth every evening., Disp: , Rfl:     omeprazole (PRILOSEC) 20 MG capsule, Take 20 mg by mouth once daily., Disp: , Rfl:     paroxetine (PAXIL) 20 MG tablet, , Disp: , Rfl:     potassium chloride (MICRO-K) 10 MEQ CpSR, Take 10 mEq by mouth once., Disp: , Rfl:     potassium chloride SA (K-DUR,KLOR-CON) 10 MEQ tablet, , Disp: , Rfl:     ranitidine (ZANTAC) 300 MG capsule, Take 300 mg by mouth every evening., Disp: , Rfl:     topiramate (TOPAMAX) 100 MG tablet, Take 1 tablet (100 mg total) by mouth 2 (two) times daily., Disp: 60 tablet, Rfl: 11    bepotastine besilate (BEPREVE) 1.5 % Drop, Apply to eye., Disp: , Rfl:     levothyroxine (SYNTHROID) 25 MCG tablet, Take 25 mcg by mouth once daily., Disp: , Rfl:     ondansetron (ZOFRAN-ODT) 4 MG TbDL, Take 8 mg by mouth every 12 (twelve) hours., Disp: , Rfl:     Current Facility-Administered Medications:     onabotulinumtoxina injection 200 Units, 200 Units, Intramuscular, Q90 Days, Flory Chamorro MD, 200 Units at 07/06/18 1548    onabotulinumtoxina injection 200 Units, 200 Units, Intramuscular, Q90 Days, Swetha Campbell MD, 200 Units at 10/04/18 1348    Vitals:    10/17/18 1526   BP: 113/77   Pulse: (!) 49   Temp: 97.5 °F (36.4 °C)   Weight: 120.4 kg (265 lb 5.2 oz)   Height: 4' 9" (1.448 m)   PainSc:   8   PainLoc: Neck       Physical Exam   Constitutional: She is oriented to person, place, and time. She appears well-developed and well-nourished.   HENT:   Head: Normocephalic and atraumatic.   Eyes: Pupils are equal, round, and reactive to light.   Neck: Normal range of motion. Neck supple.   Cardiovascular: Normal rate.   Pulmonary/Chest: Effort normal.   Musculoskeletal: Normal range of motion. She exhibits no edema.   Neurological: She is alert and oriented to person, place, and time. She has a normal Finger-Nose-Finger " Test, a normal Heel to Shin Test, a normal Romberg Test and a normal Tandem Gait Test. Gait normal.   Reflex Scores:       Tricep reflexes are 2+ on the right side and 2+ on the left side.       Bicep reflexes are 2+ on the right side and 2+ on the left side.       Brachioradialis reflexes are 2+ on the right side and 2+ on the left side.       Patellar reflexes are 2+ on the right side and 2+ on the left side.       Achilles reflexes are 2+ on the right side and 2+ on the left side.  Skin: Skin is warm, dry and intact.   Psychiatric: She has a normal mood and affect. Her speech is normal and behavior is normal. Judgment and thought content normal.   Nursing note and vitals reviewed.      Neurologic Exam     Mental Status   Oriented to person, place, and time.   Oriented to person.   Oriented to place.   Oriented to time.   Follows 3 step commands.   Attention: normal. Concentration: normal.   Speech: speech is normal   Level of consciousness: alert  Knowledge: consistent with education.   Able to name object. Able to read. Able to repeat. Able to write. Normal comprehension.     Cranial Nerves     CN II   Visual acuity: normal  Right visual field deficit: none  Left visual field deficit: none     CN III, IV, VI   Pupils are equal, round, and reactive to light.  Right pupil: Size: 3 mm. Shape: regular. Reactivity: brisk. Consensual response: intact.   Left pupil: Size: 3 mm. Shape: regular. Reactivity: brisk. Consensual response: intact.   CN III: no CN III palsy  CN VI: no CN VI palsy  Nystagmus: none   Diplopia: none  Ophthalmoparesis: none  Conjugate gaze: present    CN V   Right facial sensation deficit: none  Left facial sensation deficit: none    CN VII   Right facial weakness: none  Left facial weakness: none    CN VIII   Hearing: intact    CN IX, X   CN IX normal.   CN X normal.     CN XI   Right sternocleidomastoid strength: normal  Left sternocleidomastoid strength: normal  Right trapezius strength:  normal  Left trapezius strength: normal    CN XII   Fasciculations: absent  Tongue deviation: none    Motor Exam   Muscle bulk: normal  Overall muscle tone: normal  Right arm pronator drift: absent  Left arm pronator drift: absent    Strength   Right neck flexion: 5/5  Left neck flexion: 5/5  Right neck extension: 5/5  Left neck extension: 5/5  Right deltoid: 5/5  Left deltoid: 5/5  Right biceps: 5/5  Left biceps: 5/5  Right triceps: 5/5  Left triceps: 5/5  Right wrist flexion: 5/5  Left wrist flexion: 5/5  Right wrist extension: 5/5  Left wrist extension: 5/5  Right interossei: 5/5  Left interossei: 5/  Right abdominals: 5/  Left abdominals: 5/  Right iliopsoas:   Left iliopsoas:   Right quadriceps:   Left quadriceps:   Right hamstrin/5  Left hamstrin/5  Right glutei:   Left glutei:   Right anterior tibial: 5  Left anterior tibial:   Right posterior tibial: 5/5  Left posterior tibial:   Right peroneal: 5  Left peroneal:   Right gastroc: /  Left gastroc:     Sensory Exam   Right arm light touch: normal  Left arm light touch: normal  Right leg light touch: normal  Left leg light touch: normal  Right arm vibration: normal  Left arm vibration: normal  Right arm pinprick: normal  Left arm pinprick: normal    Gait, Coordination, and Reflexes     Gait  Gait: normal    Coordination   Romberg: negative  Finger to nose coordination: normal  Heel to shin coordination: normal  Tandem walking coordination: normal    Tremor   Resting tremor: absent  Intention tremor: absent  Action tremor: absent    Reflexes   Right brachioradialis: 2+  Left brachioradialis: 2+  Right biceps: 2+  Left biceps: 2+  Right triceps: 2+  Left triceps: 2+  Right patellar: 2+  Left patellar: 2+  Right achilles: 2+  Left achilles: 2+  Right Cardenas: absent  Left Cardenas: absent  Right ankle clonus: absent  Left ankle clonus: absent      Provider dictation:    The patient is 38 year-old morbidly obese   female with pseudotumor cerebri and Chiari Type 1 malformation s/p posterior fossa decompression in June 2016 following up for persistent headache and neck pain after undergoing EMG and MRI.  She reports that she has bilateral leg weakness and pressure in her neck.  She reports that the Botox injections significantly helped but she still has headaches.  She does report that she fell on her knees on Sunday and she felt a shock of pain like lightening go through her body.    Her PHQ was 66 and her mini-mental status is 28    On physical examination, she is an obese  female with an abnormal tandem walk but otherwise stable neurologic exam.  She has no tenderness to palpation to the shunt.  The shunt refills appropriately.  She has subjective diminished sensation in the upper extremities in a nondermatomal distribution.  I do not appreciate weakness on exam.    X-ray shunt series demonstrates no complication and continuity of the tubing of the  shunt and continued setting at 150 mm of water.  CT of the head demonstrates stable positioning of the  shunt without evidence of hydrocephalus.    MRI of the cervical spine independently reviewed.  She has congenitally narrow spinal canal.  She has right krystal cord distortion at C5-6 with right paracentral disc herniation.  There is left foraminal stenosis at C4-5    EMG from 10/04/2018 demonstrates no evidence of myelopathy or radiculopathy.  She was diagnosed with cervical myofascial pain and recommended for conservative management    At this time the patient has stable neurologic exam and continues to experience myofascial neck pain and headaches.  I have recommended her to continue with her headache specialist as well as continue with conservative treatment with physical therapy stretching and healthy back and spine practices.  She had previously seen Dr. Velasco who recommended significant weight loss prior to any surgical intervention for her C5-C6 disc  herniation. I have again discussed this with her but she is not interested in pursuing a bariatric consultation or nutrition educator. She will follow up with us as needed.     Visit Diagnosis:   (ventriculoperitoneal) shunt status  -     FL Shunt Setting; Future; Expected date: 10/17/2018    Spinal stenosis of cervical region    Arnold-Chiari malformation    Pseudotumor cerebri    Worsening headaches    BMI 50.0-59.9, adult    Degeneration of cervical disc without myelopathy    she has a BMI of 57 indicative of morbid obesity. I have recommend lifestyle modification with diet and exercise to reduce weight and improve symptoms. I have also recommended referral to Bariatric surgery       Total time spent counseling greater than fifty percent of total visit time.  Counseling included discussion regarding imaging findings, diagnosis possibilities, treatment options, risks and benefits.   The patient had many questions regarding the options and long-term effects.

## 2018-11-08 NOTE — TELEPHONE ENCOUNTER
Called and spoke with patient. Botox appointment with Dr. Campbell rescheduled. Patient verbalized understanding.

## 2018-12-03 RX ORDER — CYCLOBENZAPRINE HCL 10 MG
TABLET ORAL
Qty: 90 TABLET | Refills: 0 | Status: SHIPPED | OUTPATIENT
Start: 2018-12-03 | End: 2019-11-04

## 2018-12-21 ENCOUNTER — PROCEDURE VISIT (OUTPATIENT)
Dept: NEUROLOGY | Facility: CLINIC | Age: 38
End: 2018-12-21
Payer: MEDICARE

## 2018-12-21 ENCOUNTER — CLINICAL SUPPORT (OUTPATIENT)
Dept: CARDIOLOGY | Facility: CLINIC | Age: 38
End: 2018-12-21
Attending: INTERNAL MEDICINE
Payer: MEDICARE

## 2018-12-21 VITALS
SYSTOLIC BLOOD PRESSURE: 128 MMHG | WEIGHT: 263 LBS | DIASTOLIC BLOOD PRESSURE: 84 MMHG | DIASTOLIC BLOOD PRESSURE: 88 MMHG | HEIGHT: 57 IN | WEIGHT: 263.44 LBS | HEIGHT: 59 IN | HEART RATE: 62 BPM | SYSTOLIC BLOOD PRESSURE: 132 MMHG | BODY MASS INDEX: 56.83 KG/M2 | HEART RATE: 65 BPM | RESPIRATION RATE: 16 BRPM | BODY MASS INDEX: 53.02 KG/M2

## 2018-12-21 DIAGNOSIS — R01.1 MURMUR, HEART: ICD-10-CM

## 2018-12-21 DIAGNOSIS — G93.5 ARNOLD-CHIARI MALFORMATION, TYPE I: ICD-10-CM

## 2018-12-21 DIAGNOSIS — Z98.2 S/P VENTRICULOPERITONEAL SHUNT: ICD-10-CM

## 2018-12-21 DIAGNOSIS — G43.719 INTRACTABLE CHRONIC MIGRAINE WITHOUT AURA AND WITHOUT STATUS MIGRAINOSUS: Primary | ICD-10-CM

## 2018-12-21 DIAGNOSIS — E66.01 MORBID OBESITY: ICD-10-CM

## 2018-12-21 DIAGNOSIS — I10 ESSENTIAL HYPERTENSION: ICD-10-CM

## 2018-12-21 PROCEDURE — 93306 TTE W/DOPPLER COMPLETE: CPT | Mod: PBBFAC,PO | Performed by: INTERNAL MEDICINE

## 2018-12-21 PROCEDURE — 99999 PR PBB SHADOW E&M-EST. PATIENT-LVL II: CPT | Mod: PBBFAC,,,

## 2018-12-21 PROCEDURE — 64615 CHEMODENERV MUSC MIGRAINE: CPT | Mod: PBBFAC,PO | Performed by: PSYCHIATRY & NEUROLOGY

## 2018-12-21 PROCEDURE — 64615 CHEMODENERV MUSC MIGRAINE: CPT | Mod: S$PBB,,, | Performed by: PSYCHIATRY & NEUROLOGY

## 2018-12-21 PROCEDURE — 99212 OFFICE O/P EST SF 10 MIN: CPT | Mod: PBBFAC,25,PO

## 2018-12-21 RX ORDER — LEVOTHYROXINE SODIUM 137 UG/1
137 TABLET ORAL
COMMUNITY
Start: 2018-12-04 | End: 2020-07-31

## 2018-12-21 RX ADMIN — ONABOTULINUMTOXINA 200 UNITS: 100 INJECTION, POWDER, LYOPHILIZED, FOR SOLUTION INTRADERMAL; INTRAMUSCULAR at 01:12

## 2018-12-21 NOTE — PROCEDURES
BOTOX PROCEDURE    PROCEDURE PERFORMED: Botulinum toxin injection (54731)    CLINICAL INDICATION: G43.719    A time out was conducted just before the start of the procedure to verify the correct patient and procedure, procedure location, and all relevant critical information.     Conventional methods of treatment but the patient has been unresponsive and refractory.The patient meets criteria for chronic headaches according to the ICHD-II, the patient has more than 15 headaches a month which last for more than 4 hours a day.    The Botox injections have achieved well over 50%  improvement in the patient's symptoms. Migraines have been reduced at least 7 days per month and the number of cumulative hours suffering with headaches has been reduced at least 100 total hours per month. Today she does have a headache indicating that the Botox has worn off. Frequency of treatment is every 3 months unless no response to the treatments, at which time we will discontinue the injections.    DESCRIPTION OF PROCEDURE: After obtaining informed consent and under aseptic technique, a total of 155 units of botulinum toxin type A were injected in the following muscles: Procerus 5 units,  5 units bilaterally, frontalis 20 units, temporalis 20 units bilaterally, occipitalis 15 units, upper cervical paraspinals 10 units bilaterally and trapezius 15 units bilaterally. The patient was given a total of 155 units in 31 sites.The patient tolerated the procedure well. There were no complications. The patient was given a prescription for repeat treatment in 3 months.     Unavoidable waste 45 units          Cee Campbell M.D  Medical Director, Headache and Facial Pain  [facility]

## 2018-12-27 LAB
ASCENDING AORTA: 2.44 CM
AV INDEX (PROSTH): 0.67
AV MEAN GRADIENT: 5.15 MMHG
AV PEAK GRADIENT: 9.73 MMHG
AV VALVE AREA: 2.03 CM2
BSA FOR ECHO PROCEDURE: 2.23 M2
CV ECHO LV RWT: 0.51 CM
DOP CALC AO PEAK VEL: 1.56 M/S
DOP CALC AO VTI: 35.48 CM
DOP CALC LVOT AREA: 3.05 CM2
DOP CALC LVOT DIAMETER: 1.97 CM
DOP CALC LVOT STROKE VOLUME: 72.02 CM3
DOP CALCLVOT PEAK VEL VTI: 23.64 CM
E WAVE DECELERATION TIME: 249.9 MSEC
E/A RATIO: 1.15
E/E' RATIO: 15.71
ECHO LV POSTERIOR WALL: 1.2 CM (ref 0.6–1.1)
FRACTIONAL SHORTENING: 18 % (ref 28–44)
INTERVENTRICULAR SEPTUM: 1.24 CM (ref 0.6–1.1)
IVRT: 0.1 MSEC
LA MAJOR: 5.86 CM
LA MINOR: 5.24 CM
LA WIDTH: 2.76 CM
LEFT ATRIUM SIZE: 3.34 CM
LEFT ATRIUM VOLUME INDEX: 20.9 ML/M2
LEFT ATRIUM VOLUME: 43.35 CM3
LEFT INTERNAL DIMENSION IN SYSTOLE: 3.87 CM (ref 2.1–4)
LEFT VENTRICLE DIASTOLIC VOLUME INDEX: 49.39 ML/M2
LEFT VENTRICLE DIASTOLIC VOLUME: 102.32 ML
LEFT VENTRICLE MASS INDEX: 104.8 G/M2
LEFT VENTRICLE SYSTOLIC VOLUME INDEX: 31.3 ML/M2
LEFT VENTRICLE SYSTOLIC VOLUME: 64.85 ML
LEFT VENTRICULAR INTERNAL DIMENSION IN DIASTOLE: 4.7 CM (ref 3.5–6)
LEFT VENTRICULAR MASS: 217.06 G
LV LATERAL E/E' RATIO: 12.22
LV SEPTAL E/E' RATIO: 22
MV PEAK A VEL: 0.96 M/S
MV PEAK E VEL: 1.1 M/S
PISA TR MAX VEL: 1.96 M/S
PULM VEIN S/D RATIO: 1.11
PV PEAK D VEL: 0.37 M/S
PV PEAK S VEL: 0.41 M/S
RA MAJOR: 4.93 CM
RA PRESSURE: 8 MMHG
RA WIDTH: 2.57 CM
RIGHT VENTRICULAR END-DIASTOLIC DIMENSION: 2.97 CM
RV TISSUE DOPPLER FREE WALL SYSTOLIC VELOCITY 1 (APICAL 4 CHAMBER VIEW): 10.49 M/S
SINUS: 2.49 CM
STJ: 2.24 CM
TDI LATERAL: 0.09
TDI SEPTAL: 0.05
TDI: 0.07
TR MAX PG: 15.37 MMHG
TRICUSPID ANNULAR PLANE SYSTOLIC EXCURSION: 2.97 CM
TV REST PULMONARY ARTERY PRESSURE: 23 MMHG

## 2019-01-03 ENCOUNTER — TELEPHONE (OUTPATIENT)
Dept: CARDIOLOGY | Facility: CLINIC | Age: 39
End: 2019-01-03

## 2019-01-03 NOTE — TELEPHONE ENCOUNTER
Test(s) Reviewed  I have reviewed the following in detail:  []  Stress test   []  Angiography   [x]  Echocardiogram   []  Labs   []  Other:        Poor echo images but no concerning findings  Would recommend repeat with optison   Valves appear ok.  Lipids not done

## 2019-01-07 ENCOUNTER — PATIENT MESSAGE (OUTPATIENT)
Dept: ADMINISTRATIVE | Facility: OTHER | Age: 39
End: 2019-01-07

## 2019-01-09 ENCOUNTER — TELEPHONE (OUTPATIENT)
Dept: NEUROLOGY | Facility: CLINIC | Age: 39
End: 2019-01-09

## 2019-01-09 NOTE — TELEPHONE ENCOUNTER
Called and spoke with patient about rescheduling appointment on 01/17/2019. Patient has had daily headaches since her last Botox appointment on 12/21/2018. Patient rates her pain at 10/10. Patient has been taking her Topamax and Celebrex as prescribed. Please advise.

## 2019-01-10 NOTE — TELEPHONE ENCOUNTER
"Per Dr. Chamorro, "Ok will address at her appt". Tried to call the patient. No answer. Left voicemail.   "

## 2019-01-16 RX ORDER — HYDROCODONE BITARTRATE AND ACETAMINOPHEN 5; 325 MG/1; MG/1
1 TABLET ORAL EVERY 12 HOURS PRN
Qty: 60 TABLET | Refills: 0 | Status: SHIPPED | OUTPATIENT
Start: 2019-02-07 | End: 2019-03-09

## 2019-01-16 RX ORDER — HYDROCODONE BITARTRATE AND ACETAMINOPHEN 5; 325 MG/1; MG/1
1 TABLET ORAL EVERY 12 HOURS PRN
Qty: 60 TABLET | Refills: 0 | Status: SHIPPED | OUTPATIENT
Start: 2019-04-08 | End: 2019-05-01 | Stop reason: SDUPTHER

## 2019-01-16 RX ORDER — HYDROCODONE BITARTRATE AND ACETAMINOPHEN 5; 325 MG/1; MG/1
1 TABLET ORAL EVERY 12 HOURS PRN
Qty: 60 TABLET | Refills: 0 | Status: SHIPPED | OUTPATIENT
Start: 2019-03-09 | End: 2019-04-08

## 2019-01-17 ENCOUNTER — OFFICE VISIT (OUTPATIENT)
Dept: PAIN MEDICINE | Facility: CLINIC | Age: 39
End: 2019-01-17
Payer: MEDICARE

## 2019-01-17 ENCOUNTER — OFFICE VISIT (OUTPATIENT)
Dept: NEUROLOGY | Facility: CLINIC | Age: 39
End: 2019-01-17
Payer: MEDICARE

## 2019-01-17 ENCOUNTER — LAB VISIT (OUTPATIENT)
Dept: LAB | Facility: HOSPITAL | Age: 39
End: 2019-01-17
Attending: INTERNAL MEDICINE
Payer: MEDICARE

## 2019-01-17 VITALS
SYSTOLIC BLOOD PRESSURE: 122 MMHG | BODY MASS INDEX: 56.96 KG/M2 | DIASTOLIC BLOOD PRESSURE: 80 MMHG | RESPIRATION RATE: 20 BRPM | RESPIRATION RATE: 16 BRPM | SYSTOLIC BLOOD PRESSURE: 124 MMHG | WEIGHT: 282 LBS | WEIGHT: 282.63 LBS | HEART RATE: 57 BPM | HEIGHT: 59 IN | BODY MASS INDEX: 56.98 KG/M2 | HEART RATE: 84 BPM | DIASTOLIC BLOOD PRESSURE: 72 MMHG | OXYGEN SATURATION: 98 % | TEMPERATURE: 96 F

## 2019-01-17 DIAGNOSIS — M50.30 DDD (DEGENERATIVE DISC DISEASE), CERVICAL: ICD-10-CM

## 2019-01-17 DIAGNOSIS — E66.01 MORBID OBESITY: ICD-10-CM

## 2019-01-17 DIAGNOSIS — G93.2 PSEUDOTUMOR CEREBRI SYNDROME: ICD-10-CM

## 2019-01-17 DIAGNOSIS — G43.719 INTRACTABLE CHRONIC MIGRAINE WITHOUT AURA AND WITHOUT STATUS MIGRAINOSUS: Primary | ICD-10-CM

## 2019-01-17 DIAGNOSIS — M51.36 DDD (DEGENERATIVE DISC DISEASE), LUMBAR: ICD-10-CM

## 2019-01-17 DIAGNOSIS — Z79.891 OPIOID CONTRACT EXISTS: ICD-10-CM

## 2019-01-17 DIAGNOSIS — Z98.2 S/P VENTRICULOPERITONEAL SHUNT: ICD-10-CM

## 2019-01-17 DIAGNOSIS — I10 ESSENTIAL HYPERTENSION: ICD-10-CM

## 2019-01-17 DIAGNOSIS — M48.02 CERVICAL STENOSIS OF SPINAL CANAL: Primary | ICD-10-CM

## 2019-01-17 DIAGNOSIS — R01.1 MURMUR, HEART: ICD-10-CM

## 2019-01-17 DIAGNOSIS — G93.5 ARNOLD-CHIARI MALFORMATION, TYPE I: ICD-10-CM

## 2019-01-17 DIAGNOSIS — E66.01 MORBID OBESITY WITH BMI OF 50.0-59.9, ADULT: ICD-10-CM

## 2019-01-17 LAB
CHOLEST SERPL-MCNC: 246 MG/DL
CHOLEST/HDLC SERPL: 6 {RATIO}
HDLC SERPL-MCNC: 41 MG/DL
HDLC SERPL: 16.7 %
LDLC SERPL CALC-MCNC: 157.6 MG/DL
NONHDLC SERPL-MCNC: 205 MG/DL
TRIGL SERPL-MCNC: 237 MG/DL

## 2019-01-17 PROCEDURE — 99999 PR PBB SHADOW E&M-EST. PATIENT-LVL III: CPT | Mod: PBBFAC,,, | Performed by: PSYCHIATRY & NEUROLOGY

## 2019-01-17 PROCEDURE — 99213 OFFICE O/P EST LOW 20 MIN: CPT | Mod: S$PBB,,, | Performed by: PHYSICIAN ASSISTANT

## 2019-01-17 PROCEDURE — 99999 PR PBB SHADOW E&M-EST. PATIENT-LVL V: ICD-10-PCS | Mod: PBBFAC,,, | Performed by: PHYSICIAN ASSISTANT

## 2019-01-17 PROCEDURE — 99213 PR OFFICE/OUTPT VISIT, EST, LEVL III, 20-29 MIN: ICD-10-PCS | Mod: S$PBB,,, | Performed by: PHYSICIAN ASSISTANT

## 2019-01-17 PROCEDURE — 99999 PR PBB SHADOW E&M-EST. PATIENT-LVL III: ICD-10-PCS | Mod: PBBFAC,,, | Performed by: PSYCHIATRY & NEUROLOGY

## 2019-01-17 PROCEDURE — 36415 COLL VENOUS BLD VENIPUNCTURE: CPT | Mod: PO

## 2019-01-17 PROCEDURE — 99214 PR OFFICE/OUTPT VISIT, EST, LEVL IV, 30-39 MIN: ICD-10-PCS | Mod: S$PBB,,, | Performed by: PSYCHIATRY & NEUROLOGY

## 2019-01-17 PROCEDURE — 99999 PR PBB SHADOW E&M-EST. PATIENT-LVL V: CPT | Mod: PBBFAC,,, | Performed by: PHYSICIAN ASSISTANT

## 2019-01-17 PROCEDURE — 99213 OFFICE O/P EST LOW 20 MIN: CPT | Mod: PBBFAC,PO,25 | Performed by: PSYCHIATRY & NEUROLOGY

## 2019-01-17 PROCEDURE — 80061 LIPID PANEL: CPT

## 2019-01-17 PROCEDURE — 99214 OFFICE O/P EST MOD 30 MIN: CPT | Mod: S$PBB,,, | Performed by: PSYCHIATRY & NEUROLOGY

## 2019-01-17 PROCEDURE — 99215 OFFICE O/P EST HI 40 MIN: CPT | Mod: PBBFAC,27,PN | Performed by: PHYSICIAN ASSISTANT

## 2019-01-17 RX ORDER — RIZATRIPTAN BENZOATE 10 MG/1
TABLET ORAL
Qty: 18 TABLET | Refills: 11 | Status: SHIPPED | OUTPATIENT
Start: 2019-01-17 | End: 2020-01-24

## 2019-01-17 RX ORDER — TOPIRAMATE 100 MG/1
100 TABLET, FILM COATED ORAL NIGHTLY
Qty: 30 TABLET | Refills: 11
Start: 2019-01-17 | End: 2019-06-14 | Stop reason: SDUPTHER

## 2019-01-17 RX ORDER — PROCHLORPERAZINE MALEATE 10 MG
10 TABLET ORAL EVERY 6 HOURS PRN
Qty: 60 TABLET | Refills: 11 | Status: SHIPPED | OUTPATIENT
Start: 2019-01-17 | End: 2020-05-21 | Stop reason: SDUPTHER

## 2019-01-17 RX ORDER — AMANTADINE HYDROCHLORIDE 100 MG/1
100 CAPSULE, GELATIN COATED ORAL DAILY
Qty: 30 CAPSULE | Refills: 11 | Status: SHIPPED | OUTPATIENT
Start: 2019-01-17 | End: 2019-12-30

## 2019-01-17 RX ORDER — ONDANSETRON 4 MG/1
8 TABLET, FILM COATED ORAL 2 TIMES DAILY
COMMUNITY
End: 2021-06-01 | Stop reason: SDUPTHER

## 2019-01-17 RX ORDER — HYDROXYZINE HYDROCHLORIDE 25 MG/1
25 TABLET, FILM COATED ORAL 3 TIMES DAILY
COMMUNITY
End: 2021-08-24 | Stop reason: SDUPTHER

## 2019-01-17 NOTE — PROGRESS NOTES
Date of service: 1/17/2019  Referring provider: No ref. provider found    Subjective:      Chief complaint: Migraine       Patient ID: Shanna Douglass is a 38 y.o. lady with Hadley syndrome, Chiari type 1 malformation, s/p posterior fossa decompression June 2016, morbid obesity, pseudotumor cerebri s/p shunt, obstructive sleep apnea on CPAP, presenting for headache follow up    History of Present Illness    INTERVAL HISTORY - 1/17/19     She is status post 4 botox injections most recent 12/21/18.  Today she reports she is much worse.  She hurts where her shunt valve is with some focal tenderness. She hurts with the shunt valve is an overhead.  Her pain score is 10 with a range of 5-11.  She also reports she is sick to her stomach when she eats and after she eats that she has no energy.  She thinks his symptoms started worsening after her grandmother passed away in November 2018 from advanced COPD.    She reports the Botox continues to be effective.  The Celebrex that she tried was completely ineffective.  She is using her Norco for to treat headaches.    INTERVAL HISTORY - 9/12/18    She started Botox for 13 18 and reported 100% improvement with that session.  Her 2nd session was 07/06/2018, she reports this did help initially but about 3 weeks ago which was close to this 6-7 week daniel after Botox her headache started to return.  She reports she is much worse.  She has sharp pains in the neck and all over her head.  Her current pain score is 8 with range of 5-8.  The sumatriptan was making her heart race so she asked for an alternative.  Neurosurgery is working her up for potential shunt infection she will have labs done today.  She also reports that in the interim she was diagnosed with Graves disease and she is undergoing blood work for this.    ORIGINAL HEADACHE HISTORY - 3/26/18   Age at onset and course over time: headaches began in 1999. During that time had an eye exam and was found to have papilledema,  "had an Lp, and a brain MRI initial workup done at [facility]. In 2007 was treated at [facility] with a  shunt. Has not had any revisions since then. She reports she has never lost vision. Dr. Blackwood is her eye doctor in [place], [place]. 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     Codman shunt set to 150 - she reports last reprogram had a positive effect on her headaches     Location: frontotemporal, sometimes occipial  Quality: stabbing, pressure, throbbing  Severity: current 5, range 3 to 10   Duration: hours  Frequency: daily x 1 year, previous to that every other day. Wakes up without headache and it escalates as day goes on. Rarely, will wake up with headache   Alleviated by: darkness, heat, menses  Exacerbated by: fatigue, light, noise, smells, cough, sneezing, menses, change in weather   Associated with: Photophobia, phonophobia, osmophobia, blurred vision, double vision, vomiting, dizziness, vertigo, tinnitus, congestion, problems concentrating, neck tightness   Sleep habits:  Caffeine intake: 2 per day     Current acute treatment:  -- celebrex - not helping   -- norco 5mg BID - for headaches and back pain   -- tylenol     Current prevention:  -- botox - initiated 04/13/2018  -- metoprolol XL 25mg   -- topiramate 100mg twice a day - makes her mouth dry, sleepy, has helped somewhat   (paxil)  (lasix 80mg)     Previously tried/failed acute treatment:  -- fioricet  -- excedrin   -- BC powder   -- a sumatriptan - palpitations    Previously tried/failed preventative treatment:  -- gabapentin   -- diamox - hives       Review of patient's allergies indicates:   Allergen Reactions "    Diamox [acetazolamide] Hives    Dye Swelling and Rash     Current Outpatient Medications   Medication Sig Dispense Refill    aspirin 81 MG Chew Take 81 mg by mouth once daily.      bepotastine besilate (BEPREVE) 1.5 % Drop Apply to eye.      celecoxib (CELEBREX) 100 MG capsule Take 2 capsules (200 mg total) by mouth 2 (two) times daily as needed (headache and sharp pain). No more than 3 days per week. 30 capsule 11    cyclobenzaprine (FLEXERIL) 10 MG tablet Take 1 tablet (10 mg total) by mouth 3 (three) times daily as needed for Muscle spasms. 90 tablet 2    cyclobenzaprine (FLEXERIL) 10 MG tablet TAKE 1 TABLET 3 TIMES DAILY AS NEEDED FOR MUSCLE SPASMS *THANK YOU* 90 tablet 0    furosemide (LASIX) 80 MG tablet Take 80 mg by mouth once daily.       [START ON 2/7/2019] HYDROcodone-acetaminophen (NORCO) 5-325 mg per tablet Take 1 tablet by mouth every 12 (twelve) hours as needed for Pain. 60 tablet 0    [START ON 3/9/2019] HYDROcodone-acetaminophen (NORCO) 5-325 mg per tablet Take 1 tablet by mouth every 12 (twelve) hours as needed for Pain. 60 tablet 0    [START ON 4/8/2019] HYDROcodone-acetaminophen (NORCO) 5-325 mg per tablet Take 1 tablet by mouth every 12 (twelve) hours as needed for Pain. 60 tablet 0    hydrOXYzine HCl (ATARAX) 25 MG tablet Take 25 mg by mouth 3 (three) times daily.      levothyroxine (SYNTHROID) 137 MCG Tab tablet Take 137 mcg by mouth.      levothyroxine (SYNTHROID) 25 MCG tablet Take 25 mcg by mouth once daily.      meloxicam (MOBIC) 7.5 MG tablet       methIMAzole (TAPAZOLE) 10 MG Tab Take 10 mg by mouth 2 (two) times daily.      metoprolol succinate (TOPROL-XL) 25 MG 24 hr tablet Take 25 mg by mouth once daily.      montelukast (SINGULAIR) 10 mg tablet Take 10 mg by mouth every evening.      omeprazole (PRILOSEC) 20 MG capsule Take 20 mg by mouth once daily.      ondansetron (ZOFRAN) 4 MG tablet Take 8 mg by mouth 2 (two) times daily.      ondansetron (ZOFRAN-ODT) 4  MG TbDL Take 8 mg by mouth every 12 (twelve) hours.      paroxetine (PAXIL) 20 MG tablet 40 mg.       potassium chloride (MICRO-K) 10 MEQ CpSR Take 10 mEq by mouth once.      potassium chloride SA (K-DUR,KLOR-CON) 10 MEQ tablet       ranitidine (ZANTAC) 300 MG capsule Take 300 mg by mouth every evening.      topiramate (TOPAMAX) 100 MG tablet Take 1 tablet (100 mg total) by mouth every evening. 30 tablet 11    amantadine HCl (SYMMETREL) 100 mg capsule Take 1 capsule (100 mg total) by mouth once daily. For energy 30 capsule 11    prochlorperazine (COMPAZINE) 10 MG tablet Take 1 tablet (10 mg total) by mouth every 6 (six) hours as needed (migraine or nausea). 60 tablet 11    rizatriptan (MAXALT) 10 MG tablet 1 tab PO PRN migraine. May repeat every 2 hours for max 3 tabs in 24 hours. Use no more than 10 days per month. 18 tablet 11     Current Facility-Administered Medications   Medication Dose Route Frequency Provider Last Rate Last Dose    onabotulinumtoxina injection 200 Units  200 Units Intramuscular Q90 Days Flory Chamorro MD   200 Units at 07/06/18 1548    onabotulinumtoxina injection 200 Units  200 Units Intramuscular Q90 Days Swetha Campbell MD   200 Units at 10/04/18 1348    onabotulinumtoxina injection 200 Units  200 Units Intramuscular Q90 Days Swetha Campbell MD   200 Units at 12/21/18 1350       Past Medical History  Past Medical History:   Diagnosis Date    Asthma     Bronchitis     Chiari malformation type I     Chronic headache     Graves disease     Graves disease     Hyperlipidemia     Hypertension     Murmur, heart     NPH (normal pressure hydrocephalus)     Obesity     MANUEL on CPAP     Pseudotumor cerebri     Thyroid disease     Hadley syndrome        Past Surgical History  Past Surgical History:   Procedure Laterality Date    SHAYNA HOLES-ICP MONITOR--  Left frontal ICP bolt Left 3/6/2017    [facility]    CARPAL TUNNEL RELEASE       CERVICAL SPINE SURGERY      CHOLECYSTECTOMY      CRANIOTOMY-POSTERIOR FOSSA-- posterior fossa decompression N/A 6/7/2016    [facility] OR    EAR TUBE REMOVAL Bilateral     EYE SURGERY      strabismus    INJECTION-FACET L3/4 and L4/5 Bilateral 5/23/2018    [facility] at [facility] OR    INJECTION-STEROID-EPIDURAL-CERVICAL N/A 11/14/2017    Per[facility] at [facility] OR    INJECTION-STEROID-EPIDURAL-CERVICAL C7/T1 N/A 10/4/2017    Per[facility] Pak [facility] at [facility] OR    MYRINGOTOMY W/ TUBES      RADIOFREQUENCY ABLATION, NERVE, SPINAL, LUMBAR, MEDIAL BRANCH, L2, L3, L4 Bilateral 9/21/2018    Per[facility] at [facility] OR    TONSILLECTOMY      VENTRICULOPERITONEAL SHUNT         Family History  Family History   Problem Relation Age of Onset    Diabetes Mother     Hypertension Father     Heart disease Father     Heart disease Maternal Grandfather        Social History  Social History     Socioeconomic History    Marital status: Single     Spouse name: Not on file    Number of children: Not on file    Years of education: Not on file    Highest education level: Not on file   Social Needs    Financial resource strain: Not on file    Food insecurity - worry: Not on file    Food insecurity - inability: Not on file    Transportation needs - medical: Not on file    Transportation needs - non-medical: Not on file   Occupational History    Not on file   Tobacco Use    Smoking status: Never Smoker    Smokeless tobacco: Never Used   Substance and Sexual Activity    Alcohol use: No    Drug use: No    Sexual activity: Not on file   Other Topics Concern    Not on file   Social History Narrative    Not on file        Review of Systems  14-point review of systems as follows:   No check daniel indicates NEGATIVE response   Constitutional: [] weight loss, [] change to appetite   Eyes: [] change in vision,  [] double vision   Ears, nose, mouth, throat: [] frequent nose bleeds, [] ringing in the ears   Respiratory: [x] cough, [x] wheezing   Cardiovascular: [x] chest pain, [] palpitations   Gastrointestinal: [] jaundice, [] nausea/vomiting   Genitourinary: [] incontinence, [] burning with urination   Hematologic/lymphatic: [x] easy bruising/bleeding, [] night sweats   Neurological: [x] numbness, [] weakness   Endocrine: [] fatigue, [x] heat/cold intolerance   Allergy/Immunologic: [] fevers, [] chills   Musculoskeletal: [] muscle pain, [x] joint pain   Psychiatric: [] thoughts of harming self/others, [] depression   Integumentary: [x] rashes, [] sores that do not heal       Objective:        Vitals:    01/17/19 1016   Resp: 16     Body mass index is 57.08 kg/m².    Optic discs normal bilaterally   Skin over shunt markedly tender, but in general her scalp is allodynic     PREVIOUS:  Constitutional: appears in no acute distress, well-developed, well-nourished. Father provides a significant amount of history due to patient's intellectual status.     Eyes: normal conjunctiva, PERRLA, optic discs not visualized well - inability to hold gaze.   Confrontational fields - full in all quadrants bilaterally  Color vision (HRR plates 5-10): 4.5/6 bilaterally (1/2 point lost on plate 6, and full point lost on plate 7 bilaterally)    Ears, nose, mouth, throat: face appears syndromic. Right eye appears esotropic at rest, but ductions full. Hearing grossly intact     Cardiovascular: regular rate and rhythm, no murmurs appreciated    Respiratory: unlabored respirations, breath sounds normal bilaterally    Gastrointestinal: no visible abdominal masses, no guarding, no visible hernia    Musculoskeletal: normal tone in all four extremities. No atrophy. No abnormal movements. Strength in all muscles groups of the upper and lower extremities is 5/5. Normal gait and station. Digits and nails normal.      Spine:   CERVICAL SPINE:  Inspection:  midline healed surgical scar in upper cervical spine   ROM: normal   MUSCLE SPASM: mild throughout   FACET LOADING: + bilateral   SPURLING: no  JERSON / ARIANA tender: + bilateral     Psychiatric: normal judgment and insight. Oriented to situation.     Neurologic:   Cortical functions: recent and remote memory intact, normal attention span and concentration, speech fluent, adequate fund of knowledge   Cranial nerves: visual fields full, PERRLA, EOMI, facial sensation intact in V1-V3, symmetric facial strength, hearing intact to finger rub, palate elevates symmetrically, shoulder shrug 5/5, tongue protrudes midline   Reflexes: 2+ in the upper and lower extremities, no Cardenas  Sensation: intact to temperature   Coordination: normal finger to nose    Data Review:     I have personally reviewed the referring provider's notes, labs, & imaging made available to me today.      RADIOLOGY STUDIES:  I have personally reviewed the pertinent images performed.       Results for orders placed or performed during the hospital encounter of 01/04/18   MRI Brain Without Contrast    Narrative    EXAM: Brain MRI without contrast.    INDICATION: Preoperative planning for posterior fossa decompression for Chiari malformation    TECHNIQUE: Multiplanar, multisequence brain MRI was performed. DWI was performed. ADC map was generated. CSF flow/CINE sequencing was performed.    COMPARISON: The cervical spine MRI dated 02/09/2017, head CT dated 06/08/2016, brain MRI dated 05/23/2016      FINDINGS:     Intracranial contents: There are postsurgical changes of posterior fossa decompression since the prior MRI dated 05/23/2016.  These postoperative changes are clearly demonstrated on head CT dated 06/08/2016.  There is, however, is still crowding at the foramen magnum with diminished CSF flow demonstrated on this any sequences.  There is flattening of the ventral surface at the cervical medullary junction in significant decrease in CSF at the foramen  magnum surrounding the lower brainstem and cerebellar tonsils.  There are no regions of restricted diffusion to suggest acute infarction.  Demonstrated is a right frontal approach  shunt catheter with the tip near the 3rd ventricle.  The ventricles remain completely decompressed, nearly slitlike.  Correlate clinically.  This is unchanged.  There is minimal flair and T2 hyperintense signal traversing the right frontal lobe along the catheter course likely representing minimal stable gliosis.  There is no hemorrhage or mass effect.  There is no acute infarction.  The orbits are grossly normal.  There is no definite flattening of the posterior optic disc.  There is no abnormal extra-axial fluid collection.  Flow voids indicating patency are present in the major vessels at the base of the brain but there is crowding of the basilar cistern.  Small caliber of the basilar artery may represent developmental variation with fetal origin of the posterior cerebral arteries.    Extracranial contents: Marrow signal intensity is grossly normal paracolic posterior nasopharyngeal soft tissue is nonspecific but likely reflects reactive lymphoid tissue.  The paranasal sinuses and mastoid air cells are clear.    Impression         1. There are postsurgical changes of prior posterior fossa decompression but there is still crowding at the foramen magnum with diminished CSF flow seen on this any sequences.    2.  No change in position of the ventriculoperitoneal shunt catheter from right frontal approach with complete decompression of the lateral and 3rd ventricles.  The 4th ventricle is normal in position.  There is no hydrocephalus.  These findings are unchanged.    3.  There is no acute hemorrhage or acute infarction.      Electronically signed by: Dr. Jn Calvin  Date:     01/04/18  Time:    12:19    Results for orders placed or performed during the hospital encounter of 06/07/16   CT Head Without Contrast    Narrative    CT  HEAD WITHOUT CONTRAST:    CPT 70469    Total DLP: 803 mGy-cm  AEC (Automated Exposure Control) was utilized to reduce the radiation dose to the patient.    HISTORY:  35-year-old status post suboccipital craniotomy for Chiari I malformation and history of prior ventriculostomy shunt catheter placement for intracranial hypertension.  Comparison outside of the brain from 05/23/2016 and CT scan of the head from 12/10/2014.    TECHNIQUE: Multiple contiguous axial images were acquired from the base of the skull and the vertex without contrast administration.    FINDINGS:  There is minimal right greater than left suboccipital craniotomy.  The cerebellar tonsils still project caudally below the level of what would be an intact foramen magnum.  There is some pneumocephalus inferiorly and extending cephalad above the cerebellum in the region of the tectal plate and view of interpositum area.  There is also some and both inferior middle cranial fossa and overlying the right frontal lobe superiorly.  There is unchanged appearance of a right frontal ventriculostomy shunt catheter with the catheter tip unchanged in position as it extends through the right lateral meniscal frontal horn and into versus adjacent the right foramen of Monro and into the third ventricle.  The right lateral ventricle is slitlike with the left unchanged in position and nearly slitlike.  The third ventricle and fourth ventricles also remain slitlike.  No cerebral or cerebellar parenchymal abnormality is identified. There is no hemorrhage, midline shift,   significant mass-effect, or extra-axial fluid collection. The partially visualized paranasal sinuses and mastoid air cells are clear. The calvarium is intact.    Impression    1. Interval suboccipital craniotomy for decompression of a Chiari I malformation.  There is a small amount of pneumocephalus present.    2.  Unchanged appearance of right frontal ventriculostomy shunt catheter, as above, with tip  in the third ventricle and would be ventricles unchanged in their slitlike appearance.  ______________________________________     Electronically signed by: KATERINE GARCIA MD  Date:     06/08/16  Time:    08:43    Results for orders placed or performed during the hospital encounter of 05/23/16   MRI Brain W WO Contrast    Narrative    Exam: MRI of the brain without and with contrast    Comparison: None.    Technique: Multiplanar MR imaging of the brain was performed both before and following the intravenous administration of 10 cc of Gadavist contrast material.    Findings:     There is a ventriculostomy shunt catheter entering the brain via a right frontal approach which terminates in the vicinity of the 3rd ventricle.  There is gliosis along the course of the catheter.  The lateral ventricles and 3rd ventricle have a slitlike appearance, and over shunting cannot be excluded based on the provided images.  The 4th ventricle is normal in size.    The brain appears normally formed with preserved gray-white matter junction differentiation.  No evidence of acute/recent major vascular territory cerebral infarction, enhancing intra-axial mass, or parenchymal hemorrhage.  No vascular malformations appreciated.    There is a Chiari type I malformation present at the craniocervical junction with approximately 6-7 mm of inferior extension of the cerebellar tonsils through the foramen magnum.  The tip of the cerebellar tonsils have a somewhat pointed appearance.  There is foreshortening/rounding of the clivus.  There is diminished CSF pulsation visible at the craniocervical junction on the cine images.  No associated cervical cord syrinx within the imaged portion of the cervical spinal cord.    No hydrocephalus.  No extra-axial fluid collections or blood products.  No abnormal leptomeningeal enhancement or enhancing extra-axial mass.  The skull base flow voids are unremarkable.  There is mucoperiosteal thickening observed at  the floor of the left maxillary sinus.    The visualized orbits are unremarkable.  There is fatty replacement of the parotid glands.  There is an 11 mm left submandibular region probable intraparotid lymph node (series 9 image 25).    Impression    1.  Chiari type I malformation with approximately 6-7 mm of inferior extension of the cerebellar tonsils through the foramen magnum.  Additional details are provided above.  No associated cervical cord syrinx.    2.  Ventriculostomy shunt catheter entering the brain via a right frontal approach with probable gliosis along the shunt catheter tract.  The 3rd ventricle and lateral ventricles have a somewhat slit like appearance, and over-shunting cannot be excluded.    3.  Mild maxillary sinus disease.  ______________________________________     Electronically signed by: Michael Montiel MD  Date:     05/23/16  Time:    11:19        Lab Results   Component Value Date     09/12/2018    K 4.0 09/12/2018     09/12/2018    CO2 23 09/12/2018    BUN 14 09/12/2018    CREATININE 0.7 09/12/2018    GLU 94 09/12/2018    HGBA1C 5.8 11/30/2012    AST 14 09/12/2018    ALT 16 09/12/2018    ALBUMIN 3.4 (L) 09/12/2018    PROT 7.7 09/12/2018    BILITOT 0.3 09/12/2018       Lab Results   Component Value Date    WBC 7.46 09/12/2018    HGB 13.8 09/12/2018    HCT 44.7 09/12/2018    MCV 85 09/12/2018     09/12/2018       Lab Results   Component Value Date    TSH 3.25 11/30/2012           Assessment & Plan:       Problem List Items Addressed This Visit        Neuro    Intractable chronic migraine without aura and without status migrainosus - Primary    Overview     As is common in this population, patient with pseudotumor in remission as evidenced by stable fundoscopic exam / stable visual fields but with ongoing chronic headaches of the migrainous type. Explained to patient and family that once pressure is controlled, I treat the remaining headaches according to phenotype in  "this case chronic migraine. Patient has failed several "good' preventatives, must be on a daily preventative due to daily frequency of headaches, and must have a weight neutral option due to morbid obesity / PTC / sleep apnea. Discussed the most effective weight neutral option going forward is Botox. Patient agreed to proceed.    The patient has chronic migraines (G43.719) and suffers from headaches more than 15 days a month lasting more than 4 hours a day with no relief of symptoms despite trying multiple medications including but not limited to anti-epileptics (topiramate, gabapentin, diamox), beta blockers (metoprolol),  and antidepressants (paxil - cannot trial additional ones due to being on Paxil). Botox treatment was approved for chronic migraines in October 2010. The patient will be an ideal candidate for Botox. We are planning for 3 treatments 3 months apart and aiming for at least 50% improvement in the symptoms. If we see no improvement after 3 treatments, we will discontinue the injections.    In addition, I discussed that the negative role that chronic opiate therapy plays in migraine chronification and worsening of migraines as dose was reduced being supportive of this.     ------------    Botox continues to give her over 50% relief and headaches.  Recent worsening of headaches in the setting of her grandmother's death.    Sumatriptan was not tolerated due to palpitations, Celebrex ineffective, changed to Maxalt.  Start Compazine for nausea and headaches.           Relevant Medications    prochlorperazine (COMPAZINE) 10 MG tablet    rizatriptan (MAXALT) 10 MG tablet    amantadine HCl (SYMMETREL) 100 mg capsule    topiramate (TOPAMAX) 100 MG tablet    Pseudotumor cerebri syndrome    Overview     Diagnosed in 1999 with headaches and papilledema.  shunt placed in [facility] in 2007, her only shunt thus far. Ventricles slit like. Patient also on full dose topiramate and lasix. Allergic (hives) to diamox. " Visual fields and color vision impaired, but fields at least have shown to be stable and discs show chronic atrophy without new edema. Does have MANUEL and uses CPAP.    Appears to be well controlled at this time.          S/P ventriculoperitoneal shunt    Overview     2007    Shunt valve is tender to palpation, send a message to Neurosurgery.                   Patient asked about driving from a headache standpoint.  I recommended no driving at this time.     TESTING:  -- none     PREVENTION (use daily regardless of headache):  -- continue Botox treatments for chronic migraine   -- REDUCE topiramate from 100mg twice a day to 100mg ONCE AT NIGHT - I think this will help your nausea   -- START amantadine 100mg in the morning for energy      ACUTE TREATMENT (use total no more than 12 days per month unless otherwise stated):  -- Stop Celerex since not heling   -- At onset of moderate/severe migraine (within one hour) take Maxalt (Rizatriptan) one tablet as needed, may repeat once in 2 hours - no more than 2 days per week   -- on a daily basis may use prochlorperazine (Compazine) as needed for nausea and headache     Follow-up in about 2 months (around 3/17/2019).    Flory Chamorro MD

## 2019-01-17 NOTE — PROGRESS NOTES
This note was completed with dictation software and grammatical errors may exist.    CC: Back pain, Neck pain, headaches    HPI: The patient is a 38-year-old woman with a history of Chiari malformation, Hadley syndrome, pseudotumor cerebri with shunt, morbid obesity who presents in referral from Dr. Velasco for neck pain. She returns in follow-up today with neck pain greater than back pain.  Her neck pain radiates to her shoulders and down her right arm.  She also complains of headaches and is being followed by Dr. Chamorro.  She reports that she is unable to have her cervical spine surgery unless she loses weight.  She has gained around 50 lb in the last year.  She reports weakness in her arms and numbness in her hands.  She denies bladder or bowel incontinence.    Pain intervention history: She was seeing Dr. Gerson Renteria, pain management and until recently had been taking hydrocodone 5/325 once a day in addition to Flexeril 3 times a day and gabapentin 600 mg 3 times a day.  She apparently tested positive for Valium and so was dismissed from his practice. She is status post C7-T1 cervical interlaminar epidural steroid injection on 10/4/17 with 100% relief lasting 2 weeks.  She is status post a second cervical TABITHA on 11/14/17 with almost complete relief again but only lasting 2-3 weeks. She is status post bilateral L2, 3 and 4 medial branch radiofrequency ablation on 09/21/2018 with 75% relief.     ROS: She reports weight loss, headaches, easy bruising and back pain.  Balance of review of systems is negative.    Past Medical History:   Diagnosis Date    Asthma     Bronchitis     Chiari malformation type I     Chronic headache     Graves disease     Graves disease     Hyperlipidemia     Hypertension     Murmur, heart     NPH (normal pressure hydrocephalus)     Obesity     MANUEL on CPAP     Pseudotumor cerebri     Thyroid disease     Hadley syndrome        Past Surgical History:   Procedure Laterality  Date    SHAYNA HOLES-ICP MONITOR--  Left frontal ICP bolt Left 3/6/2017    [facility]    CARPAL TUNNEL RELEASE      CERVICAL SPINE SURGERY      CHOLECYSTECTOMY      CRANIOTOMY-POSTERIOR FOSSA-- posterior fossa decompression N/A 6/7/2016    [facility] at [facility] OR    EAR TUBE REMOVAL Bilateral     EYE SURGERY      strabismus    INJECTION-FACET L3/4 and L4/5 Bilateral 5/23/2018    [facility] at [facility] OR    INJECTION-STEROID-EPIDURAL-CERVICAL N/A 11/14/2017    [facility]    INJECTION-STEROID-EPIDURAL-CERVICAL C7/T1 N/A 10/4/2017    [facility] OR    MYRINGOTOMY W/ TUBES      RADIOFREQUENCY ABLATION, NERVE, SPINAL, LUMBAR, MEDIAL BRANCH, L2, L3, L4 Bilateral 9/21/2018    [facility] OR    TONSILLECTOMY      VENTRICULOPERITONEAL SHUNT         Social History     Socioeconomic History    Marital status: Single     Spouse name: None    Number of children: None    Years of education: None    Highest education level: None   Social Needs    Financial resource strain: None    Food insecurity - worry: None    Food insecurity - inability: None    Transportation needs - medical: None    Transportation needs - non-medical: None   Occupational History    None   Tobacco Use    Smoking status: Never Smoker    Smokeless tobacco: Never Used   Substance and Sexual Activity    Alcohol use: No    Drug use: No    Sexual activity: None   Other Topics Concern    None   Social History Narrative    None         Medications/Allergies: See med card    Vitals:    01/17/19 1101   BP: 124/72   Pulse: (!) 57   Resp: 20   Temp: 96.4 °F (35.8 °C)   TempSrc: Oral   SpO2: 98%   Weight: 127.9 kg (282 lb)   PainSc: 10-Worst pain ever   PainLoc: Back     Body mass index is 56.96 kg/m².      Physical exam:  Gen: A and O x3, pleasant, obese  Skin: No  rashes or obvious lesions  HEENT: PERRLA, no obvious deformities on ears or in canals.Trachea midline.  CVS: Regular rate and rhythm, normal palpable pulses.  Resp: Clear to auscultation bilaterally, no wheezes or rales.  Abdomen: Soft, NT/ND.  Musculoskeletal:  Wide-based gait.    Neuro:  Upper extremities: 5/5 strength bilaterally   Lower extremities: 5/5 strength bilaterally  Reflexes: Brachioradialis 2+, Bicep 2+, Tricep 2+. Patellar 2+, Achilles 2+ bilaterally.  Sensory: Intact and symmetrical to light touch and pinprick in C2-T1 dermatomes bilaterally.  Intact and symmetrical to light touch and pinprick in L2-S1 dermatomes bilaterally.    Cervical Spine:  Cervical spine: Range of motion is mildly reduced with flexion with no increased pain, moderately reduced with extension with increased neck pain, lateral rotation mildly reduced with increased pain, worse on the left.  Spurling's maneuver causes lateral neck pain.  Myofascial exam:  Moderate tenderness to palpation across cervical paraspinous region and trapezius muscles bilaterally.    Lumbar spine:  Lumbar spine: Range of motion is moderately reduced with extension and mildly reduced with flexion without increased pain.  Derrick's test causes no increased pain on either side.    Supine straight leg raise is negative bilaterally.    Internal and external rotation of the hip causes no increased pain on either side.  Myofascial exam: Mild tenderness to palpation across the lumbar paraspinous muscles.      Imaging:  MRI from 2/9/17 cervical spine demonstrates congenitally narrowed canal with disc bulging most prominently at C4/5 to the right side causing anterior cord compression but no signal changes.  There is narrowing of the canal at C3/4 to a lesser degree.      Assessment:   The patient is a 38-year-old woman with a history of Chiari malformation, pseudotumor cerebri with shunt, morbid obesity who presents in referral from Dr. Velasco for neck pain.  1.  Cervical stenosis of spinal canal  Ambulatory Referral to Physical/Occupational Therapy   2. DDD (degenerative disc disease), cervical  Ambulatory Referral to Physical/Occupational Therapy   3. DDD (degenerative disc disease), lumbar  Ambulatory Referral to Physical/Occupational Therapy   4. Morbid obesity with BMI of 50.0-59.9, adult  Ambulatory Referral to Physical/Occupational Therapy   5. Opioid contract exists         Plan:  1.  Dr. Pak provided prescriptions for hydrocodone-acetaminophen 5/325 mg up to 2 times a day as needed for pain.  I have reviewed the [place] Board of Pharmacy website and there are no abberancies.    2.  We had a long discussion regarding weight loss.  I reviewed her typical diet with her and made suggestions on how to reduce calories and make healthier decisions.  I have also referred her to a nutritionist.  I completed orders for physical therapy for the cervical and lumbar spine at [facility].  I will also have her do aquatic therapy there which will hopefully help her with weight loss, overall conditioning and pain. She would like to avoid bariatric surgery if possible.  3.  Follow-up in 3 months or sooner as needed.

## 2019-01-17 NOTE — PATIENT INSTRUCTIONS
TESTING:  -- none     PREVENTION (use daily regardless of headache):  -- continue Botox treatments for chronic migraine   -- REDUCE topiramate from 100mg twice a day to 100mg ONCE AT NIGHT - I think this will help your nausea   -- START amantadine 100mg in the morning for energy      ACUTE TREATMENT (use total no more than 12 days per month unless otherwise stated):  -- Stop Celerex since not heling   -- At onset of moderate/severe migraine (within one hour) take Maxalt (Rizatriptan) one tablet as needed, may repeat once in 2 hours - no more than 2 days per week   -- on a daily basis may use prochlorperazine (Compazine) as needed for nausea and headache

## 2019-01-18 ENCOUNTER — PATIENT MESSAGE (OUTPATIENT)
Dept: ADMINISTRATIVE | Facility: OTHER | Age: 39
End: 2019-01-18

## 2019-01-18 ENCOUNTER — TELEPHONE (OUTPATIENT)
Dept: CARDIOLOGY | Facility: CLINIC | Age: 39
End: 2019-01-18

## 2019-01-18 NOTE — TELEPHONE ENCOUNTER
Test(s) Reviewed  I have reviewed the following in detail:  [] Stress test   [] Angiography   [] Echocardiogram   [x] Labs   [] Other:       Call Pt and tell her Cholesterol and triglycerides are high  Recommend starting crestor 10 mg daily

## 2019-01-21 ENCOUNTER — TELEPHONE (OUTPATIENT)
Dept: NEUROSURGERY | Facility: CLINIC | Age: 39
End: 2019-01-21

## 2019-01-21 NOTE — TELEPHONE ENCOUNTER
Pt called stating her shunt is hurting where the valve is. Spoke with Neurology who stated they would contact Dr. Velasco. She is requesting a call back.

## 2019-01-21 NOTE — TELEPHONE ENCOUNTER
Spoke to patient regarding scheduled appointment with Nicolette. Date, time, and location confirmed.

## 2019-01-22 DIAGNOSIS — I10 HYPERTENSION, UNSPECIFIED TYPE: Primary | ICD-10-CM

## 2019-01-22 RX ORDER — ROSUVASTATIN CALCIUM 10 MG/1
10 TABLET, COATED ORAL DAILY
Qty: 90 TABLET | Refills: 3 | Status: SHIPPED | OUTPATIENT
Start: 2019-01-22 | End: 2019-01-24 | Stop reason: SDUPTHER

## 2019-01-22 RX ORDER — ROSUVASTATIN CALCIUM 10 MG/1
10 TABLET, COATED ORAL DAILY
Qty: 90 TABLET | Refills: 3 | OUTPATIENT
Start: 2019-01-22 | End: 2020-01-22

## 2019-01-23 ENCOUNTER — TELEPHONE (OUTPATIENT)
Dept: CARDIOLOGY | Facility: CLINIC | Age: 39
End: 2019-01-23

## 2019-01-23 NOTE — TELEPHONE ENCOUNTER
----- Message from Melanie Gonzalez sent at 1/23/2019 10:59 AM CST -----  Please call [phone] / pt states that pharmacy is requesting a prior auth for    rosuvastatin (CRESTOR) 10 MG tablet     [facility] - [address] [address]  Phone: [phone] Fax: [phone]

## 2019-01-24 NOTE — TELEPHONE ENCOUNTER
----- Message from Mirtha Cantu sent at 1/24/2019 11:20 AM CST -----  Type: Needs Medical Advice    Who Called:  Patient   Best Call Back Number: [phone]  Additional Information: patient is requesting a return call concenring authorization her medication , she could not give the name, or changing her medication if it could not be authorized    Thank you

## 2019-01-25 RX ORDER — ROSUVASTATIN CALCIUM 10 MG/1
10 TABLET, COATED ORAL DAILY
Qty: 90 TABLET | Refills: 3 | Status: SHIPPED | OUTPATIENT
Start: 2019-01-25 | End: 2020-02-27

## 2019-02-13 ENCOUNTER — TELEPHONE (OUTPATIENT)
Dept: PAIN MEDICINE | Facility: CLINIC | Age: 39
End: 2019-02-13

## 2019-02-13 ENCOUNTER — OFFICE VISIT (OUTPATIENT)
Dept: NEUROSURGERY | Facility: CLINIC | Age: 39
End: 2019-02-13
Payer: MEDICARE

## 2019-02-13 VITALS
DIASTOLIC BLOOD PRESSURE: 64 MMHG | TEMPERATURE: 98 F | BODY MASS INDEX: 61.16 KG/M2 | HEART RATE: 66 BPM | HEIGHT: 57 IN | SYSTOLIC BLOOD PRESSURE: 100 MMHG | WEIGHT: 283.5 LBS

## 2019-02-13 DIAGNOSIS — Z98.2 VP (VENTRICULOPERITONEAL) SHUNT STATUS: Primary | ICD-10-CM

## 2019-02-13 DIAGNOSIS — R51.9 HEADACHE DISORDER: ICD-10-CM

## 2019-02-13 PROCEDURE — 99214 OFFICE O/P EST MOD 30 MIN: CPT | Mod: S$PBB,,, | Performed by: PHYSICIAN ASSISTANT

## 2019-02-13 PROCEDURE — 99999 PR PBB SHADOW E&M-EST. PATIENT-LVL V: ICD-10-PCS | Mod: PBBFAC,,, | Performed by: PHYSICIAN ASSISTANT

## 2019-02-13 PROCEDURE — 99214 PR OFFICE/OUTPT VISIT, EST, LEVL IV, 30-39 MIN: ICD-10-PCS | Mod: S$PBB,,, | Performed by: PHYSICIAN ASSISTANT

## 2019-02-13 PROCEDURE — 99999 PR PBB SHADOW E&M-EST. PATIENT-LVL V: CPT | Mod: PBBFAC,,, | Performed by: PHYSICIAN ASSISTANT

## 2019-02-13 PROCEDURE — 99215 OFFICE O/P EST HI 40 MIN: CPT | Mod: PBBFAC,PN | Performed by: PHYSICIAN ASSISTANT

## 2019-02-13 RX ORDER — LIDOCAINE 50 MG/G
OINTMENT TOPICAL
Qty: 60 G | Refills: 1 | Status: SHIPPED | OUTPATIENT
Start: 2019-02-13 | End: 2019-08-20 | Stop reason: SDUPTHER

## 2019-03-06 ENCOUNTER — TELEPHONE (OUTPATIENT)
Dept: PAIN MEDICINE | Facility: CLINIC | Age: 39
End: 2019-03-06

## 2019-03-06 NOTE — TELEPHONE ENCOUNTER
Patient had a follow up appointment scheduled for 4/22. The provider is out of the office. 2/13 left voice message and sent a [employer] message. 3/6, No answer on number listed and will not accept messages. No response to date. Appointment canceled.

## 2019-03-07 ENCOUNTER — OFFICE VISIT (OUTPATIENT)
Dept: NEUROLOGY | Facility: CLINIC | Age: 39
End: 2019-03-07
Payer: MEDICARE

## 2019-03-07 VITALS
DIASTOLIC BLOOD PRESSURE: 76 MMHG | RESPIRATION RATE: 16 BRPM | WEIGHT: 286.06 LBS | BODY MASS INDEX: 61.71 KG/M2 | SYSTOLIC BLOOD PRESSURE: 126 MMHG | HEART RATE: 52 BPM | HEIGHT: 57 IN

## 2019-03-07 DIAGNOSIS — Z98.2 S/P VENTRICULOPERITONEAL SHUNT: ICD-10-CM

## 2019-03-07 DIAGNOSIS — G93.2 PSEUDOTUMOR CEREBRI SYNDROME: ICD-10-CM

## 2019-03-07 DIAGNOSIS — E66.01 MORBID OBESITY: ICD-10-CM

## 2019-03-07 DIAGNOSIS — G43.719 INTRACTABLE CHRONIC MIGRAINE WITHOUT AURA AND WITHOUT STATUS MIGRAINOSUS: Primary | ICD-10-CM

## 2019-03-07 PROCEDURE — 99214 OFFICE O/P EST MOD 30 MIN: CPT | Mod: S$PBB,,, | Performed by: PSYCHIATRY & NEUROLOGY

## 2019-03-07 PROCEDURE — 99214 PR OFFICE/OUTPT VISIT, EST, LEVL IV, 30-39 MIN: ICD-10-PCS | Mod: S$PBB,,, | Performed by: PSYCHIATRY & NEUROLOGY

## 2019-03-07 PROCEDURE — 99999 PR PBB SHADOW E&M-EST. PATIENT-LVL V: CPT | Mod: PBBFAC,,, | Performed by: PSYCHIATRY & NEUROLOGY

## 2019-03-07 PROCEDURE — 99215 OFFICE O/P EST HI 40 MIN: CPT | Mod: PBBFAC,PO | Performed by: PSYCHIATRY & NEUROLOGY

## 2019-03-07 PROCEDURE — 99999 PR PBB SHADOW E&M-EST. PATIENT-LVL V: ICD-10-PCS | Mod: PBBFAC,,, | Performed by: PSYCHIATRY & NEUROLOGY

## 2019-03-07 RX ORDER — LIOTHYRONINE SODIUM 5 UG/1
5 TABLET ORAL
COMMUNITY
Start: 2019-02-22 | End: 2020-07-31

## 2019-03-07 RX ORDER — LEVOTHYROXINE SODIUM 125 UG/1
125 TABLET ORAL
COMMUNITY
Start: 2019-02-22 | End: 2020-07-31

## 2019-03-07 RX ORDER — CYANOCOBALAMIN (VITAMIN B-12) 1000 MCG
200 TABLET, EXTENDED RELEASE ORAL DAILY
COMMUNITY

## 2019-03-07 NOTE — PATIENT INSTRUCTIONS
TESTING:  -- none     PREVENTION (use daily regardless of headache):  -- continue Botox treatments for chronic migraine   -- stay on topiramate 100mg at night   -- START Aimovig injection every 28 days (for migraine) (refrigerate it when it arrives in the mail)   -- continue amantadine 100mg in the morning for energy      ACUTE TREATMENT (use total no more than 12 days per month unless otherwise stated):  -- At onset of moderate/severe migraine (within one hour) take Maxalt (Rizatriptan) one tablet as needed, may repeat once in 2 hours - no more than 2 days per week   -- on a daily basis may use prochlorperazine (Compazine) as needed for nausea and headache

## 2019-03-07 NOTE — PROGRESS NOTES
Date of service: 3/7/2019  Referring provider: No ref. provider found    Subjective:      Chief complaint: Migraine       Patient ID: Shanna Douglass is a 38 y.o. lady with Hadley syndrome, Chiari type 1 malformation, s/p posterior fossa decompression June 2016, morbid obesity, pseudotumor cerebri s/p shunt, obstructive sleep apnea on CPAP, presenting for headache follow up    History of Present Illness    INTERVAL HISTORY-03/07/2019    Her 5th Botox will be 03/22/2019.  At last visit we reduce topiramate to help with nausea.  Started amantadine for energy, started Compazine and rizatriptan for migraine since Celebrex was ineffective.    Today she reports that the same.  She still has headaches and sharp pain every once in a while.  Her current pain score six learned to 4-10.  She thinks her body is used to the Topamax. She thinks the headaches have escalated since then. maxalt is very effective, compazine less so but still effective.     She had a recent visual field test which was good.     INTERVAL HISTORY - 1/17/19     She is status post 4 botox injections most recent 12/21/18.  Today she reports she is much worse.  She hurts where her shunt valve is with some focal tenderness. She hurts with the shunt valve is an overhead.  Her pain score is 10 with a range of 5-11.  She also reports she is sick to her stomach when she eats and after she eats that she has no energy.  She thinks his symptoms started worsening after her grandmother passed away in November 2018 from advanced COPD.    She reports the Botox continues to be effective.  The Celebrex that she tried was completely ineffective.  She is using her Norco for to treat headaches.    INTERVAL HISTORY - 9/12/18    She started Botox for 13 18 and reported 100% improvement with that session.  Her 2nd session was 07/06/2018, she reports this did help initially but about 3 weeks ago which was close to this 6-7 week daniel after Botox her headache started to  "return.  She reports she is much worse.  She has sharp pains in the neck and all over her head.  Her current pain score is 8 with range of 5-8.  The sumatriptan was making her heart race so she asked for an alternative.  Neurosurgery is working her up for potential shunt infection she will have labs done today.  She also reports that in the interim she was diagnosed with Graves disease and she is undergoing blood work for this.    ORIGINAL HEADACHE HISTORY - 3/26/18   Age at onset and course over time: headaches began in 1999. During that time had an eye exam and was found to have papilledema, had an Lp, and a brain MRI initial workup done at [facility]. In 2007 was treated at [facility] with a  shunt. Has not had any revisions since then. She reports she has never lost vision. Dr. Blackwood is her eye doctor in [place], [place]. 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     Codman shunt set to 150 - she reports last reprogram had a positive effect on her headaches     Location: frontotemporal, sometimes occipial  Quality: stabbing, pressure, throbbing  Severity: current 5, range 3 to 10   Duration: hours  Frequency: daily x 1 year, previous to that every other day. Wakes up without headache and it escalates as day goes on. Rarely, will wake up with headache   Alleviated by: darkness, heat, menses  Exacerbated by: fatigue, light, noise, smells, cough, sneezing, menses, change in weather   Associated with: Photophobia, phonophobia, osmophobia, blurred vision, double vision, vomiting, dizziness, vertigo, tinnitus, congestion, problems concentrating, neck tightness   Sleep " habits:  Caffeine intake: 2 per day     Current acute treatment:  -- maxalt - effective  -- compazine - effective   -- norco 5mg BID - for headaches and back pain   -- tylenol     Current prevention:  -- botox - initiated 04/13/2018  -- metoprolol XL 25mg   -- topiramate 100mg qHS -  At BID makes her mouth dry, sleepy, has helped somewhat   (paxil)  (lasix 80mg)     Previously tried/failed acute treatment:  -- fioricet  -- excedrin   -- BC powder   -- a sumatriptan - palpitations  -- celebrex - not helping     Previously tried/failed preventative treatment:    -- gabapentin   -- diamox - hives       Review of patient's allergies indicates:   Allergen Reactions    Diamox [acetazolamide] Hives    Dye Swelling and Rash     Current Outpatient Medications   Medication Sig Dispense Refill    amantadine HCl (SYMMETREL) 100 mg capsule Take 1 capsule (100 mg total) by mouth once daily. For energy 30 capsule 11    aspirin 81 MG Chew Take 81 mg by mouth once daily.      bepotastine besilate (BEPREVE) 1.5 % Drop Apply to eye.      celecoxib (CELEBREX) 100 MG capsule Take 2 capsules (200 mg total) by mouth 2 (two) times daily as needed (headache and sharp pain). No more than 3 days per week. 30 capsule 11    cyclobenzaprine (FLEXERIL) 10 MG tablet Take 1 tablet (10 mg total) by mouth 3 (three) times daily as needed for Muscle spasms. 90 tablet 2    cyclobenzaprine (FLEXERIL) 10 MG tablet TAKE 1 TABLET 3 TIMES DAILY AS NEEDED FOR MUSCLE SPASMS *THANK YOU* 90 tablet 0    furosemide (LASIX) 80 MG tablet Take 80 mg by mouth once daily.       HYDROcodone-acetaminophen (NORCO) 5-325 mg per tablet Take 1 tablet by mouth every 12 (twelve) hours as needed for Pain. 60 tablet 0    [START ON 3/9/2019] HYDROcodone-acetaminophen (NORCO) 5-325 mg per tablet Take 1 tablet by mouth every 12 (twelve) hours as needed for Pain. 60 tablet 0    [START ON 4/8/2019] HYDROcodone-acetaminophen (NORCO) 5-325 mg per tablet Take 1 tablet by  mouth every 12 (twelve) hours as needed for Pain. 60 tablet 0    hydrOXYzine HCl (ATARAX) 25 MG tablet Take 25 mg by mouth 3 (three) times daily.      levothyroxine (SYNTHROID) 125 MCG tablet Take 125 mcg by mouth.      lidocaine (XYLOCAINE) 5 % Oint ointment Apply topically as needed. 60 g 1    liothyronine (CYTOMEL) 5 MCG Tab Take 5 mcg by mouth.      meloxicam (MOBIC) 7.5 MG tablet       methIMAzole (TAPAZOLE) 10 MG Tab Take 10 mg by mouth 2 (two) times daily.      metoprolol succinate (TOPROL-XL) 25 MG 24 hr tablet Take 25 mg by mouth once daily.      montelukast (SINGULAIR) 10 mg tablet Take 10 mg by mouth every evening.      omeprazole (PRILOSEC) 20 MG capsule Take 20 mg by mouth once daily.      ondansetron (ZOFRAN) 4 MG tablet Take 8 mg by mouth 2 (two) times daily.      ondansetron (ZOFRAN-ODT) 4 MG TbDL Take 8 mg by mouth every 12 (twelve) hours.      paroxetine (PAXIL) 20 MG tablet 40 mg.       potassium chloride (MICRO-K) 10 MEQ CpSR Take 10 mEq by mouth once.      potassium chloride SA (K-DUR,KLOR-CON) 10 MEQ tablet       prochlorperazine (COMPAZINE) 10 MG tablet Take 1 tablet (10 mg total) by mouth every 6 (six) hours as needed (migraine or nausea). 60 tablet 11    ranitidine (ZANTAC) 300 MG capsule Take 300 mg by mouth every evening.      rizatriptan (MAXALT) 10 MG tablet 1 tab PO PRN migraine. May repeat every 2 hours for max 3 tabs in 24 hours. Use no more than 10 days per month. 18 tablet 11    rosuvastatin (CRESTOR) 10 MG tablet Take 1 tablet (10 mg total) by mouth once daily. 90 tablet 3    selenium 200 mcg Cap Take 200 mcg by mouth once daily.      topiramate (TOPAMAX) 100 MG tablet Take 1 tablet (100 mg total) by mouth every evening. 30 tablet 11    erenumab-aooe (AIMOVIG AUTOINJECTOR) 70 mg/mL AtIn Inject 2 mLs (140 mg total) into the skin every 28 days. 2 mL 11    levothyroxine (SYNTHROID) 137 MCG Tab tablet Take 137 mcg by mouth.      levothyroxine (SYNTHROID) 25 MCG  tablet Take 25 mcg by mouth once daily.       Current Facility-Administered Medications   Medication Dose Route Frequency Provider Last Rate Last Dose    onabotulinumtoxina injection 200 Units  200 Units Intramuscular Q90 Days Flory Chamorro MD   200 Units at 07/06/18 1548    onabotulinumtoxina injection 200 Units  200 Units Intramuscular Q90 Days Swetha Campbell MD   200 Units at 10/04/18 1348    onabotulinumtoxina injection 200 Units  200 Units Intramuscular Q90 Days Swetha Campbell MD   200 Units at 12/21/18 1350       Past Medical History  Past Medical History:   Diagnosis Date    Asthma     Bronchitis     Chiari malformation type I     Chronic headache     Graves disease     Graves disease     Hyperlipidemia     Hypertension     Murmur, heart     NPH (normal pressure hydrocephalus)     Obesity     MANUEL on CPAP     Pseudotumor cerebri     Thyroid disease     Hadley syndrome        Past Surgical History  Past Surgical History:   Procedure Laterality Date    SHAYNA HOLES-ICP MONITOR--  Left frontal ICP bolt Left 3/6/2017    [facility] OR    CARPAL TUNNEL RELEASE      CERVICAL SPINE SURGERY      CHOLECYSTECTOMY      CRANIOTOMY-POSTERIOR FOSSA-- posterior fossa decompression N/A 6/7/2016    Per[facility] at [facility] OR    EAR TUBE REMOVAL Bilateral     EYE SURGERY      strabismus    INJECTION-FACET L3/4 and L4/5 Bilateral 5/23/2018    [facility] at [facility] OR    INJECTION-STEROID-EPIDURAL-CERVICAL N/A 11/14/2017    [facility] by [facility] at [facility] OR    INJECTION-STEROID-EPIDURAL-CERVICAL C7/T1 N/A 10/4/2017    Per[facility] by [facility] at [facility] OR    MYRINGOTOMY W/ TUBES      RADIOFREQUENCY ABLATION, NERVE, SPINAL, LUMBAR, MEDIAL BRANCH, L2, L3, L4 Bilateral 9/21/2018    [facility] by [facility] at [facility] OR    TONSILLECTOMY      VENTRICULOPERITONEAL SHUNT         Family  History  Family History   Problem Relation Age of Onset    Diabetes Mother     Hypertension Father     Heart disease Father     Heart disease Maternal Grandfather        Social History  Social History     Socioeconomic History    Marital status: Single     Spouse name: Not on file    Number of children: Not on file    Years of education: Not on file    Highest education level: Not on file   Social Needs    Financial resource strain: Not on file    Food insecurity - worry: Not on file    Food insecurity - inability: Not on file    Transportation needs - medical: Not on file    Transportation needs - non-medical: Not on file   Occupational History    Not on file   Tobacco Use    Smoking status: Never Smoker    Smokeless tobacco: Never Used   Substance and Sexual Activity    Alcohol use: No    Drug use: No    Sexual activity: Not on file   Other Topics Concern    Not on file   Social History Narrative    Not on file        Review of Systems  14-point review of systems as follows:   No check daniel indicates NEGATIVE response   Constitutional: [] weight loss, [] change to appetite   Eyes: [] change in vision, [] double vision   Ears, nose, mouth, throat: [] frequent nose bleeds, [] ringing in the ears   Respiratory: [x] cough, [x] wheezing   Cardiovascular: [x] chest pain, [] palpitations   Gastrointestinal: [] jaundice, [] nausea/vomiting   Genitourinary: [] incontinence, [] burning with urination   Hematologic/lymphatic: [] easy bruising/bleeding, [] night sweats   Neurological: [x] numbness, [x] weakness   Endocrine: [] fatigue, [x] heat/cold intolerance   Allergy/Immunologic: [] fevers, [] chills   Musculoskeletal: [x] muscle pain, [x] joint pain   Psychiatric: [] thoughts of harming self/others, [] depression   Integumentary: [] rashes, [] sores that do not heal       Objective:        Vitals:    03/07/19 1337   BP: 126/76   Pulse: (!) 52   Resp: 16     Body mass index is 61.9 kg/m².    Optic  discs normal bilaterally     PREVIOUS:  Constitutional: appears in no acute distress, well-developed, well-nourished. Father provides a significant amount of history due to patient's intellectual status.     Eyes: normal conjunctiva, PERRLA, optic discs not visualized well - inability to hold gaze.   Confrontational fields - full in all quadrants bilaterally  Color vision (HRR plates 5-10): 4.5/6 bilaterally (1/2 point lost on plate 6, and full point lost on plate 7 bilaterally)    Ears, nose, mouth, throat: face appears syndromic. Right eye appears esotropic at rest, but ductions full. Hearing grossly intact     Cardiovascular: regular rate and rhythm, no murmurs appreciated    Respiratory: unlabored respirations, breath sounds normal bilaterally    Gastrointestinal: no visible abdominal masses, no guarding, no visible hernia    Musculoskeletal: normal tone in all four extremities. No atrophy. No abnormal movements. Strength in all muscles groups of the upper and lower extremities is 5/5. Normal gait and station. Digits and nails normal.      Spine:   CERVICAL SPINE:  Inspection: midline healed surgical scar in upper cervical spine   ROM: normal   MUSCLE SPASM: mild throughout   FACET LOADING: + bilateral   SPURLING: no  JERSON / ARIANA tender: + bilateral     Psychiatric: normal judgment and insight. Oriented to situation.     Neurologic:   Cortical functions: recent and remote memory intact, normal attention span and concentration, speech fluent, adequate fund of knowledge   Cranial nerves: visual fields full, PERRLA, EOMI, facial sensation intact in V1-V3, symmetric facial strength, hearing intact to finger rub, palate elevates symmetrically, shoulder shrug 5/5, tongue protrudes midline   Reflexes: 2+ in the upper and lower extremities, no Cardenas  Sensation: intact to temperature   Coordination: normal finger to nose    Data Review:     I have personally reviewed the referring provider's notes, labs, & imaging made  available to me today.      RADIOLOGY STUDIES:  I have personally reviewed the pertinent images performed.       Results for orders placed or performed during the hospital encounter of 01/04/18   MRI Brain Without Contrast    Narrative    EXAM: Brain MRI without contrast.    INDICATION: Preoperative planning for posterior fossa decompression for Chiari malformation    TECHNIQUE: Multiplanar, multisequence brain MRI was performed. DWI was performed. ADC map was generated. CSF flow/CINE sequencing was performed.    COMPARISON: The cervical spine MRI dated 02/09/2017, head CT dated 06/08/2016, brain MRI dated 05/23/2016      FINDINGS:     Intracranial contents: There are postsurgical changes of posterior fossa decompression since the prior MRI dated 05/23/2016.  These postoperative changes are clearly demonstrated on head CT dated 06/08/2016.  There is, however, is still crowding at the foramen magnum with diminished CSF flow demonstrated on this any sequences.  There is flattening of the ventral surface at the cervical medullary junction in significant decrease in CSF at the foramen magnum surrounding the lower brainstem and cerebellar tonsils.  There are no regions of restricted diffusion to suggest acute infarction.  Demonstrated is a right frontal approach  shunt catheter with the tip near the 3rd ventricle.  The ventricles remain completely decompressed, nearly slitlike.  Correlate clinically.  This is unchanged.  There is minimal flair and T2 hyperintense signal traversing the right frontal lobe along the catheter course likely representing minimal stable gliosis.  There is no hemorrhage or mass effect.  There is no acute infarction.  The orbits are grossly normal.  There is no definite flattening of the posterior optic disc.  There is no abnormal extra-axial fluid collection.  Flow voids indicating patency are present in the major vessels at the base of the brain but there is crowding of the basilar cistern.   Small caliber of the basilar artery may represent developmental variation with fetal origin of the posterior cerebral arteries.    Extracranial contents: Marrow signal intensity is grossly normal paracolic posterior nasopharyngeal soft tissue is nonspecific but likely reflects reactive lymphoid tissue.  The paranasal sinuses and mastoid air cells are clear.    Impression         1. There are postsurgical changes of prior posterior fossa decompression but there is still crowding at the foramen magnum with diminished CSF flow seen on this any sequences.    2.  No change in position of the ventriculoperitoneal shunt catheter from right frontal approach with complete decompression of the lateral and 3rd ventricles.  The 4th ventricle is normal in position.  There is no hydrocephalus.  These findings are unchanged.    3.  There is no acute hemorrhage or acute infarction.      Electronically signed by: Dr. Jn Calvin  Date:     01/04/18  Time:    12:19    Results for orders placed or performed during the hospital encounter of 06/07/16   CT Head Without Contrast    Narrative    CT HEAD WITHOUT CONTRAST:    CPT 65922    Total DLP: 803 mGy-cm  AEC (Automated Exposure Control) was utilized to reduce the radiation dose to the patient.    HISTORY:  35-year-old status post suboccipital craniotomy for Chiari I malformation and history of prior ventriculostomy shunt catheter placement for intracranial hypertension.  Comparison outside of the brain from 05/23/2016 and CT scan of the head from 12/10/2014.    TECHNIQUE: Multiple contiguous axial images were acquired from the base of the skull and the vertex without contrast administration.    FINDINGS:  There is minimal right greater than left suboccipital craniotomy.  The cerebellar tonsils still project caudally below the level of what would be an intact foramen magnum.  There is some pneumocephalus inferiorly and extending cephalad above the cerebellum in the region of the  tectal plate and view of interpositum area.  There is also some and both inferior middle cranial fossa and overlying the right frontal lobe superiorly.  There is unchanged appearance of a right frontal ventriculostomy shunt catheter with the catheter tip unchanged in position as it extends through the right lateral meniscal frontal horn and into versus adjacent the right foramen of Monro and into the third ventricle.  The right lateral ventricle is slitlike with the left unchanged in position and nearly slitlike.  The third ventricle and fourth ventricles also remain slitlike.  No cerebral or cerebellar parenchymal abnormality is identified. There is no hemorrhage, midline shift,   significant mass-effect, or extra-axial fluid collection. The partially visualized paranasal sinuses and mastoid air cells are clear. The calvarium is intact.    Impression    1. Interval suboccipital craniotomy for decompression of a Chiari I malformation.  There is a small amount of pneumocephalus present.    2.  Unchanged appearance of right frontal ventriculostomy shunt catheter, as above, with tip in the third ventricle and would be ventricles unchanged in their slitlike appearance.  ______________________________________     Electronically signed by: KATERINE GARCIA MD  Date:     06/08/16  Time:    08:43    Results for orders placed or performed during the hospital encounter of 05/23/16   MRI Brain W WO Contrast    Narrative    Exam: MRI of the brain without and with contrast    Comparison: None.    Technique: Multiplanar MR imaging of the brain was performed both before and following the intravenous administration of 10 cc of Gadavist contrast material.    Findings:     There is a ventriculostomy shunt catheter entering the brain via a right frontal approach which terminates in the vicinity of the 3rd ventricle.  There is gliosis along the course of the catheter.  The lateral ventricles and 3rd ventricle have a slitlike appearance,  and over shunting cannot be excluded based on the provided images.  The 4th ventricle is normal in size.    The brain appears normally formed with preserved gray-white matter junction differentiation.  No evidence of acute/recent major vascular territory cerebral infarction, enhancing intra-axial mass, or parenchymal hemorrhage.  No vascular malformations appreciated.    There is a Chiari type I malformation present at the craniocervical junction with approximately 6-7 mm of inferior extension of the cerebellar tonsils through the foramen magnum.  The tip of the cerebellar tonsils have a somewhat pointed appearance.  There is foreshortening/rounding of the clivus.  There is diminished CSF pulsation visible at the craniocervical junction on the cine images.  No associated cervical cord syrinx within the imaged portion of the cervical spinal cord.    No hydrocephalus.  No extra-axial fluid collections or blood products.  No abnormal leptomeningeal enhancement or enhancing extra-axial mass.  The skull base flow voids are unremarkable.  There is mucoperiosteal thickening observed at the floor of the left maxillary sinus.    The visualized orbits are unremarkable.  There is fatty replacement of the parotid glands.  There is an 11 mm left submandibular region probable intraparotid lymph node (series 9 image 25).    Impression    1.  Chiari type I malformation with approximately 6-7 mm of inferior extension of the cerebellar tonsils through the foramen magnum.  Additional details are provided above.  No associated cervical cord syrinx.    2.  Ventriculostomy shunt catheter entering the brain via a right frontal approach with probable gliosis along the shunt catheter tract.  The 3rd ventricle and lateral ventricles have a somewhat slit like appearance, and over-shunting cannot be excluded.    3.  Mild maxillary sinus disease.  ______________________________________     Electronically signed by: Michael Montiel MD  Date:  "    05/23/16  Time:    11:19        Lab Results   Component Value Date     09/12/2018    K 4.0 09/12/2018     09/12/2018    CO2 23 09/12/2018    BUN 14 09/12/2018    CREATININE 0.7 09/12/2018    GLU 94 09/12/2018    HGBA1C 5.8 11/30/2012    AST 14 09/12/2018    ALT 16 09/12/2018    ALBUMIN 3.4 (L) 09/12/2018    PROT 7.7 09/12/2018    BILITOT 0.3 09/12/2018    CHOL 246 (H) 01/17/2019    HDL 41 01/17/2019    LDLCALC 157.6 01/17/2019    TRIG 237 (H) 01/17/2019       Lab Results   Component Value Date    WBC 7.46 09/12/2018    HGB 13.8 09/12/2018    HCT 44.7 09/12/2018    MCV 85 09/12/2018     09/12/2018       Lab Results   Component Value Date    TSH 3.25 11/30/2012           Assessment & Plan:       Problem List Items Addressed This Visit        Neuro    Intractable chronic migraine without aura and without status migrainosus - Primary    Overview     As is common in this population, patient with pseudotumor in remission as evidenced by stable fundoscopic exam / stable visual fields but with ongoing chronic headaches of the migrainous type. Explained to patient and family that once pressure is controlled, I treat the remaining headaches according to phenotype in this case chronic migraine. Patient has failed several "good' preventatives, must be on a daily preventative due to daily frequency of headaches, and must have a weight neutral option due to morbid obesity / PTC / sleep apnea. Discussed the most effective weight neutral option going forward is Botox. Patient agreed to proceed.    The patient has chronic migraines (G43.719) and suffers from headaches more than 15 days a month lasting more than 4 hours a day with no relief of symptoms despite trying multiple medications including but not limited to anti-epileptics (topiramate, gabapentin, diamox), beta blockers (metoprolol),  and antidepressants (paxil - cannot trial additional ones due to being on Paxil). Botox treatment was approved for " chronic migraines in October 2010. The patient will be an ideal candidate for Botox. We are planning for 3 treatments 3 months apart and aiming for at least 50% improvement in the symptoms. If we see no improvement after 3 treatments, we will discontinue the injections.    In addition, I discussed that the negative role that chronic opiate therapy plays in migraine chronification and worsening of migraines as dose was reduced being supportive of this.     ------------    Botox was initially at least 50% effective but then there was worsening of headaches in the setting of her grandmother's death and her headaches never recovered.  We reduced her topiramate which was causing some nausea and headaches have suffered.  Add on Aimovig for additional headache prevention.    Sumatriptan was not tolerated due to palpitations, Celebrex ineffective, changed to Maxalt and compazine, effective, continue         Relevant Medications    erenumab-aooe (AIMOVIG AUTOINJECTOR) 70 mg/mL AtIn    Pseudotumor cerebri syndrome    Overview     Diagnosed in 1999 with headaches and papilledema.  shunt placed in [facility] in 2007, her only shunt thus far. Ventricles slit like. Patient also on full dose topiramate and lasix. Allergic (hives) to diamox. Visual fields and color vision impaired, but fields at least have shown to be stable and discs show chronic atrophy without new edema. Does have MANUEL and uses CPAP.    Appears to be well controlled at this time.          S/P ventriculoperitoneal shunt    Overview     2007    Shunt valve site tender to palpation, this area is not amenable to blocking.            Endocrine    Morbid obesity    Overview     Resulting in PTC, sleep apnea. All migraine medications must be weight neutral.                    Patient asked about driving from a headache standpoint.  I recommended no driving at this time.     TESTING:  -- none     PREVENTION (use daily regardless of headache):  -- continue Botox treatments  for chronic migraine   -- stay on topiramate 100mg at night   -- START Aimovig injection every 28 days (for migraine) (refrigerate it when it arrives in the mail)   -- continue amantadine 100mg in the morning for energy      ACUTE TREATMENT (use total no more than 12 days per month unless otherwise stated):  -- At onset of moderate/severe migraine (within one hour) take Maxalt (Rizatriptan) one tablet as needed, may repeat once in 2 hours - no more than 2 days per week   -- on a daily basis may use prochlorperazine (Compazine) as needed for nausea and headache     Follow-up in about 3 months (around 6/7/2019).    Flory Chamorro MD

## 2019-03-11 ENCOUNTER — TELEPHONE (OUTPATIENT)
Dept: PHARMACY | Facility: CLINIC | Age: 39
End: 2019-03-11

## 2019-03-11 NOTE — PROGRESS NOTES
Neurosurgery History & Physical    Patient ID: Shanna Douglass is a 38 y.o. female.    Chief Complaint   Patient presents with    Shunt Problem     Patient states for the last few months, she's had worsening headaches, dizziness, bladder incontinence, and worsening memory.        Review of Systems   Constitutional: Positive for chills and fatigue. Negative for activity change, appetite change, fever and unexpected weight change.   HENT: Negative for tinnitus, trouble swallowing and voice change.    Respiratory: Positive for cough. Negative for apnea, chest tightness and shortness of breath.    Cardiovascular: Negative for chest pain and palpitations.   Gastrointestinal: Positive for abdominal pain and diarrhea. Negative for constipation, nausea and vomiting.   Genitourinary: Positive for enuresis. Negative for difficulty urinating, dysuria, frequency and urgency.   Musculoskeletal: Positive for back pain and neck pain. Negative for gait problem and neck stiffness.   Skin: Negative for wound.   Neurological: Positive for weakness and headaches. Negative for dizziness, tremors, seizures, facial asymmetry, speech difficulty, light-headedness and numbness.   Psychiatric/Behavioral: Negative for confusion and decreased concentration.       Past Medical History:   Diagnosis Date    Asthma     Bronchitis     Chiari malformation type I     Chronic headache     Graves disease     Graves disease     Hyperlipidemia     Hypertension     Murmur, heart     NPH (normal pressure hydrocephalus)     Obesity     MANUEL on CPAP     Pseudotumor cerebri     Thyroid disease     Hadley syndrome      Social History     Socioeconomic History    Marital status: Single     Spouse name: Not on file    Number of children: Not on file    Years of education: Not on file    Highest education level: Not on file   Social Needs    Financial resource strain: Not on file    Food insecurity - worry: Not on file    Food insecurity -  inability: Not on file    Transportation needs - medical: Not on file    Transportation needs - non-medical: Not on file   Occupational History    Not on file   Tobacco Use    Smoking status: Never Smoker    Smokeless tobacco: Never Used   Substance and Sexual Activity    Alcohol use: No    Drug use: No    Sexual activity: Not on file   Other Topics Concern    Not on file   Social History Narrative    Not on file     Family History   Problem Relation Age of Onset    Diabetes Mother     Hypertension Father     Heart disease Father     Heart disease Maternal Grandfather      Review of patient's allergies indicates:   Allergen Reactions    Diamox [acetazolamide] Hives    Dye Swelling and Rash       Current Outpatient Medications:     amantadine HCl (SYMMETREL) 100 mg capsule, Take 1 capsule (100 mg total) by mouth once daily. For energy, Disp: 30 capsule, Rfl: 11    aspirin 81 MG Chew, Take 81 mg by mouth once daily., Disp: , Rfl:     bepotastine besilate (BEPREVE) 1.5 % Drop, Apply to eye., Disp: , Rfl:     celecoxib (CELEBREX) 100 MG capsule, Take 2 capsules (200 mg total) by mouth 2 (two) times daily as needed (headache and sharp pain). No more than 3 days per week., Disp: 30 capsule, Rfl: 11    cyclobenzaprine (FLEXERIL) 10 MG tablet, Take 1 tablet (10 mg total) by mouth 3 (three) times daily as needed for Muscle spasms., Disp: 90 tablet, Rfl: 2    cyclobenzaprine (FLEXERIL) 10 MG tablet, TAKE 1 TABLET 3 TIMES DAILY AS NEEDED FOR MUSCLE SPASMS *THANK YOU*, Disp: 90 tablet, Rfl: 0    furosemide (LASIX) 80 MG tablet, Take 80 mg by mouth once daily. , Disp: , Rfl:     HYDROcodone-acetaminophen (NORCO) 5-325 mg per tablet, Take 1 tablet by mouth every 12 (twelve) hours as needed for Pain., Disp: 60 tablet, Rfl: 0    [START ON 4/8/2019] HYDROcodone-acetaminophen (NORCO) 5-325 mg per tablet, Take 1 tablet by mouth every 12 (twelve) hours as needed for Pain., Disp: 60 tablet, Rfl: 0     hydrOXYzine HCl (ATARAX) 25 MG tablet, Take 25 mg by mouth 3 (three) times daily., Disp: , Rfl:     levothyroxine (SYNTHROID) 137 MCG Tab tablet, Take 137 mcg by mouth., Disp: , Rfl:     levothyroxine (SYNTHROID) 25 MCG tablet, Take 25 mcg by mouth once daily., Disp: , Rfl:     meloxicam (MOBIC) 7.5 MG tablet, , Disp: , Rfl:     methIMAzole (TAPAZOLE) 10 MG Tab, Take 10 mg by mouth 2 (two) times daily., Disp: , Rfl:     metoprolol succinate (TOPROL-XL) 25 MG 24 hr tablet, Take 25 mg by mouth once daily., Disp: , Rfl:     montelukast (SINGULAIR) 10 mg tablet, Take 10 mg by mouth every evening., Disp: , Rfl:     omeprazole (PRILOSEC) 20 MG capsule, Take 20 mg by mouth once daily., Disp: , Rfl:     ondansetron (ZOFRAN) 4 MG tablet, Take 8 mg by mouth 2 (two) times daily., Disp: , Rfl:     ondansetron (ZOFRAN-ODT) 4 MG TbDL, Take 8 mg by mouth every 12 (twelve) hours., Disp: , Rfl:     paroxetine (PAXIL) 20 MG tablet, 40 mg. , Disp: , Rfl:     potassium chloride (MICRO-K) 10 MEQ CpSR, Take 10 mEq by mouth once., Disp: , Rfl:     potassium chloride SA (K-DUR,KLOR-CON) 10 MEQ tablet, , Disp: , Rfl:     prochlorperazine (COMPAZINE) 10 MG tablet, Take 1 tablet (10 mg total) by mouth every 6 (six) hours as needed (migraine or nausea)., Disp: 60 tablet, Rfl: 11    ranitidine (ZANTAC) 300 MG capsule, Take 300 mg by mouth every evening., Disp: , Rfl:     rizatriptan (MAXALT) 10 MG tablet, 1 tab PO PRN migraine. May repeat every 2 hours for max 3 tabs in 24 hours. Use no more than 10 days per month., Disp: 18 tablet, Rfl: 11    rosuvastatin (CRESTOR) 10 MG tablet, Take 1 tablet (10 mg total) by mouth once daily., Disp: 90 tablet, Rfl: 3    topiramate (TOPAMAX) 100 MG tablet, Take 1 tablet (100 mg total) by mouth every evening., Disp: 30 tablet, Rfl: 11    erenumab-aooe (AIMOVIG AUTOINJECTOR) 70 mg/mL AtIn, Inject 2 mLs (140 mg total) into the skin every 28 days., Disp: 2 mL, Rfl: 11    levothyroxine  "(SYNTHROID) 125 MCG tablet, Take 125 mcg by mouth., Disp: , Rfl:     lidocaine (XYLOCAINE) 5 % Oint ointment, Apply topically as needed., Disp: 60 g, Rfl: 1    liothyronine (CYTOMEL) 5 MCG Tab, Take 5 mcg by mouth., Disp: , Rfl:     selenium 200 mcg Cap, Take 200 mcg by mouth once daily., Disp: , Rfl:     Current Facility-Administered Medications:     onabotulinumtoxina injection 200 Units, 200 Units, Intramuscular, Q90 Days, Flory Chamorro MD, 200 Units at 07/06/18 1548    onabotulinumtoxina injection 200 Units, 200 Units, Intramuscular, Q90 Days, Swetha Campbell MD, 200 Units at 10/04/18 1348    onabotulinumtoxina injection 200 Units, 200 Units, Intramuscular, Q90 Days, Swetha Campbell MD, 200 Units at 12/21/18 1350    Vitals:    02/13/19 1512   BP: 100/64   Pulse: 66   Temp: 97.7 °F (36.5 °C)   TempSrc: Oral   Weight: 128.6 kg (283 lb 8.2 oz)   Height: 4' 9" (1.448 m)   PainSc:   6   PainLoc: Head       Physical Exam   Constitutional: She is oriented to person, place, and time. She appears well-developed and well-nourished.   HENT:   Head: Normocephalic and atraumatic.   Eyes: Pupils are equal, round, and reactive to light.   Neck: Normal range of motion. Neck supple.   Cardiovascular: Normal rate.   Pulmonary/Chest: Effort normal.   Musculoskeletal: Normal range of motion. She exhibits no edema.   Neurological: She is alert and oriented to person, place, and time. She has a normal Finger-Nose-Finger Test, a normal Heel to Shin Test, a normal Romberg Test and a normal Tandem Gait Test. Gait normal.   Reflex Scores:       Tricep reflexes are 2+ on the right side and 2+ on the left side.       Bicep reflexes are 2+ on the right side and 2+ on the left side.       Brachioradialis reflexes are 2+ on the right side and 2+ on the left side.       Patellar reflexes are 2+ on the right side and 2+ on the left side.       Achilles reflexes are 2+ on the right side and 2+ on the left side.  Skin: Skin is " warm, dry and intact.   Psychiatric: She has a normal mood and affect. Her speech is normal and behavior is normal. Judgment and thought content normal.   Nursing note and vitals reviewed.      Neurologic Exam     Mental Status   Oriented to person, place, and time.   Oriented to person.   Oriented to place.   Oriented to time.   Follows 3 step commands.   Attention: normal. Concentration: normal.   Speech: speech is normal   Level of consciousness: alert  Knowledge: consistent with education.   Able to name object. Able to read. Able to repeat. Able to write. Normal comprehension.     Cranial Nerves     CN II   Visual acuity: normal  Right visual field deficit: none  Left visual field deficit: none     CN III, IV, VI   Pupils are equal, round, and reactive to light.  Right pupil: Size: 3 mm. Shape: regular. Reactivity: brisk. Consensual response: intact.   Left pupil: Size: 3 mm. Shape: regular. Reactivity: brisk. Consensual response: intact.   CN III: no CN III palsy  CN VI: no CN VI palsy  Nystagmus: none   Diplopia: none  Ophthalmoparesis: none  Conjugate gaze: present    CN V   Right facial sensation deficit: none  Left facial sensation deficit: none    CN VII   Right facial weakness: none  Left facial weakness: none    CN VIII   Hearing: intact    CN IX, X   CN IX normal.   CN X normal.     CN XI   Right sternocleidomastoid strength: normal  Left sternocleidomastoid strength: normal  Right trapezius strength: normal  Left trapezius strength: normal    CN XII   Fasciculations: absent  Tongue deviation: none    Motor Exam   Muscle bulk: normal  Overall muscle tone: normal  Right arm pronator drift: absent  Left arm pronator drift: absent    Strength   Right neck flexion: 5/5  Left neck flexion: 5/5  Right neck extension: 5/5  Left neck extension: 5/5  Right deltoid: 5/5  Left deltoid: 5/5  Right biceps: 5/5  Left biceps: 5/5  Right triceps: 5/5  Left triceps: 5/5  Right wrist flexion: 5/5  Left wrist flexion:  5/5  Right wrist extension: 5/5  Left wrist extension: 5/  Right interossei: 5/5  Left interossei: 5/  Right abdominals: 5/5  Left abdominals: 5/  Right iliopsoas: 5  Left iliopsoas: 5/  Right quadriceps: 5  Left quadriceps:   Right hamstrin/5  Left hamstrin/5  Right glutei:   Left glutei:   Right anterior tibial:   Left anterior tibial:   Right posterior tibial:   Left posterior tibial:   Right peroneal:   Left peroneal:   Right gastroc:   Left gastroc:     Sensory Exam   Right arm light touch: normal  Left arm light touch: normal  Right leg light touch: normal  Left leg light touch: normal  Right arm vibration: normal  Left arm vibration: normal  Right arm pinprick: normal  Left arm pinprick: normal    Gait, Coordination, and Reflexes     Gait  Gait: normal    Coordination   Romberg: negative  Finger to nose coordination: normal  Heel to shin coordination: normal  Tandem walking coordination: normal    Tremor   Resting tremor: absent  Intention tremor: absent  Action tremor: absent    Reflexes   Right brachioradialis: 2+  Left brachioradialis: 2+  Right biceps: 2+  Left biceps: 2+  Right triceps: 2+  Left triceps: 2+  Right patellar: 2+  Left patellar: 2+  Right achilles: 2+  Left achilles: 2+  Right Cardenas: absent  Left Cardenas: absent  Right ankle clonus: absent  Left ankle clonus: absent      Provider dictation:    The patient is 38 year-old morbidly obese  female with pseudotumor cerebri and Chiari Type 1 malformation s/p posterior fossa decompression in 2016 following up for persistent headache dizziness and continued bladder incontinence.  She reports she has some numbness around her shunt and tenderness around the shunt.  She has no improvement in her headaches.    She on physical exam is neurologically stable.  She does subjectively report tenderness to palpation over the shunt but she also has tenderness over the entire scalp even opposite to  the shunt.    At this time the patient has stable neurologic exam and continues to experience myofascial neck pain and headaches as well as shunt tenderness.  I have already discussed the case with Dr. Velasco who recommends no surgical intervention.  Given her poor wound healing and body habitus we would not recommend moving the shunt given that she is tender over the entire scalp.  She will follow up with us as needed.     Visit Diagnosis:   (ventriculoperitoneal) shunt status    Headache disorder    Other orders  -     lidocaine (XYLOCAINE) 5 % Oint ointment; Apply topically as needed.  Dispense: 60 g; Refill: 1    she has a BMI of 57 indicative of morbid obesity. I have recommend lifestyle modification with diet and exercise to reduce weight and improve symptoms. I have also recommended referral to Bariatric surgery       Total time spent counseling greater than fifty percent of total visit time.  Counseling included discussion regarding imaging findings, diagnosis possibilities, treatment options, risks and benefits.   The patient had many questions regarding the options and long-term effects.

## 2019-03-13 NOTE — TELEPHONE ENCOUNTER
DOCUMENTATION ONLY:  Prior Authorization for Aimovig approved from 12/30/18 to 12/31/19    Case Id: [id number]    Co-pay: $3.80    Patient Assistance IS NOT required.    Forwarded to the clinical pharmacist for consult and shipment.    -ARR

## 2019-03-22 ENCOUNTER — CLINICAL SUPPORT (OUTPATIENT)
Dept: CARDIOLOGY | Facility: CLINIC | Age: 39
End: 2019-03-22
Attending: INTERNAL MEDICINE
Payer: MEDICARE

## 2019-03-22 ENCOUNTER — PROCEDURE VISIT (OUTPATIENT)
Dept: NEUROLOGY | Facility: CLINIC | Age: 39
End: 2019-03-22
Payer: MEDICARE

## 2019-03-22 VITALS
SYSTOLIC BLOOD PRESSURE: 130 MMHG | DIASTOLIC BLOOD PRESSURE: 76 MMHG | HEIGHT: 58 IN | BODY MASS INDEX: 60.04 KG/M2 | HEART RATE: 65 BPM | WEIGHT: 286 LBS

## 2019-03-22 VITALS
HEART RATE: 67 BPM | DIASTOLIC BLOOD PRESSURE: 77 MMHG | SYSTOLIC BLOOD PRESSURE: 119 MMHG | RESPIRATION RATE: 16 BRPM | HEIGHT: 57 IN | WEIGHT: 286.19 LBS | BODY MASS INDEX: 61.74 KG/M2

## 2019-03-22 DIAGNOSIS — I10 HYPERTENSION, UNSPECIFIED TYPE: ICD-10-CM

## 2019-03-22 DIAGNOSIS — G43.719 INTRACTABLE CHRONIC MIGRAINE WITHOUT AURA AND WITHOUT STATUS MIGRAINOSUS: Primary | ICD-10-CM

## 2019-03-22 LAB
ASCENDING AORTA: 2.84 CM
AV INDEX (PROSTH): 0.71
AV MEAN GRADIENT: 6.83 MMHG
AV PEAK GRADIENT: 12.39 MMHG
AV VALVE AREA: 2.22 CM2
AV VELOCITY RATIO: 0.74
BSA FOR ECHO PROCEDURE: 2.3 M2
CV ECHO LV RWT: 0.53 CM
DOP CALC AO PEAK VEL: 1.76 M/S
DOP CALC AO VTI: 42.37 CM
DOP CALC LVOT AREA: 3.11 CM2
DOP CALC LVOT DIAMETER: 1.99 CM
DOP CALC LVOT PEAK VEL: 1.3 M/S
DOP CALC LVOT STROKE VOLUME: 93.88 CM3
DOP CALCLVOT PEAK VEL VTI: 30.2 CM
E WAVE DECELERATION TIME: 251.72 MSEC
E/A RATIO: 1.13
E/E' RATIO: 13.65
ECHO LV POSTERIOR WALL: 1.23 CM (ref 0.6–1.1)
FRACTIONAL SHORTENING: 32 % (ref 28–44)
INTERVENTRICULAR SEPTUM: 1.23 CM (ref 0.6–1.1)
IVRT: 0.1 MSEC
LA MAJOR: 5.78 CM
LA MINOR: 5.43 CM
LA WIDTH: 4.4 CM
LEFT ATRIUM SIZE: 3.55 CM
LEFT ATRIUM VOLUME INDEX: 35.1 ML/M2
LEFT ATRIUM VOLUME: 74.35 CM3
LEFT INTERNAL DIMENSION IN SYSTOLE: 3.16 CM (ref 2.1–4)
LEFT VENTRICLE DIASTOLIC VOLUME INDEX: 46.33 ML/M2
LEFT VENTRICLE DIASTOLIC VOLUME: 98.24 ML
LEFT VENTRICLE MASS INDEX: 100.8 G/M2
LEFT VENTRICLE SYSTOLIC VOLUME INDEX: 18.8 ML/M2
LEFT VENTRICLE SYSTOLIC VOLUME: 39.81 ML
LEFT VENTRICULAR INTERNAL DIMENSION IN DIASTOLE: 4.62 CM (ref 3.5–6)
LEFT VENTRICULAR MASS: 213.83 G
LV LATERAL E/E' RATIO: 12.89
LV SEPTAL E/E' RATIO: 14.5
MV PEAK A VEL: 1.03 M/S
MV PEAK E VEL: 1.16 M/S
PISA TR MAX VEL: 2.36 M/S
PULM VEIN S/D RATIO: 1.02
PV PEAK D VEL: 0.57 M/S
PV PEAK S VEL: 0.58 M/S
RA MAJOR: 4.43 CM
RA PRESSURE: 3 MMHG
RA WIDTH: 3.6 CM
SINUS: 2.42 CM
STJ: 2.4 CM
TDI LATERAL: 0.09
TDI SEPTAL: 0.08
TDI: 0.09
TR MAX PG: 22.28 MMHG
TRICUSPID ANNULAR PLANE SYSTOLIC EXCURSION: 3.85 CM
TV REST PULMONARY ARTERY PRESSURE: 25 MMHG

## 2019-03-22 PROCEDURE — 93306 TRANSTHORACIC ECHO (TTE) COMPLETE (CUPID ONLY): ICD-10-PCS | Mod: 26,S$PBB,, | Performed by: INTERNAL MEDICINE

## 2019-03-22 PROCEDURE — 93306 TTE W/DOPPLER COMPLETE: CPT | Mod: 26,S$PBB,, | Performed by: INTERNAL MEDICINE

## 2019-03-22 PROCEDURE — 64615 CHEMODENERV MUSC MIGRAINE: CPT | Mod: PBBFAC,PO | Performed by: PSYCHIATRY & NEUROLOGY

## 2019-03-22 PROCEDURE — 99999 PR PBB SHADOW E&M-EST. PATIENT-LVL II: ICD-10-PCS | Mod: PBBFAC,,,

## 2019-03-22 PROCEDURE — 64615 CHEMODENERV MUSC MIGRAINE: CPT | Mod: S$PBB,,, | Performed by: PSYCHIATRY & NEUROLOGY

## 2019-03-22 PROCEDURE — 99212 OFFICE O/P EST SF 10 MIN: CPT | Mod: PBBFAC,25,PO

## 2019-03-22 PROCEDURE — 99999 PR PBB SHADOW E&M-EST. PATIENT-LVL II: CPT | Mod: PBBFAC,,,

## 2019-03-22 PROCEDURE — C8929 TTE W OR WO FOL WCON,DOPPLER: HCPCS | Mod: PBBFAC,PO | Performed by: INTERNAL MEDICINE

## 2019-03-22 PROCEDURE — 64615 PR CHEMODENERVATION OF MUSCLE FOR CHRONIC MIGRAINE: ICD-10-PCS | Mod: S$PBB,,, | Performed by: PSYCHIATRY & NEUROLOGY

## 2019-03-22 RX ADMIN — ONABOTULINUMTOXINA 200 UNITS: 100 INJECTION, POWDER, LYOPHILIZED, FOR SOLUTION INTRADERMAL; INTRAMUSCULAR at 01:03

## 2019-03-22 NOTE — PROCEDURES
Procedures     PROCEDURE PERFORMED: Botulinum toxin injection (11708)    CLINICAL INDICATION: G43.719    A time out was conducted just before the start of the procedure to verify the correct patient and procedure, procedure location, and all relevant critical information.     Conventional methods of treatment such as multiple medications , both on and   off label have been tried including: anti-epileptics (topiramate, gabapentin, diamox), beta blockers (metoprolol),  and antidepressants (paxil - cannot trial additional ones due to being on Paxil). The patient has been unresponsive and refractory.The patient meets criteria for chronic headaches according to the ICHD-II, the patient has more than 15 headaches a month which last for more than 4 hours a day.    Injection shows session number: 5  Percentage relief since last session:  Last session with poor five but previous resulted in will 100% improvement so we are continuing    Frequency of treatment is every 3 months unless no response to the treatments, at which time we will discontinue the injections.     DESCRIPTION OF PROCEDURE: After obtaining informed consent and under   aseptic technique, a total of 145 units of botulinum toxin type A were   injected in the following muscles: Procerus 5 units,  5 units   bilaterally, frontalis 20 units, temporalis 20 units bilaterally,   occipitalis 15 units,  and trapezius 15 units bilaterally. The patient was given a total of 145 units in 29 sites.The patient tolerated the procedure well. There were no complications. The patient was given a prescription for repeat treatment in 3 months.     upper cervical paraspinals 10 units bilaterally spared due to prior surgery in this area     Unavoidable waste 55 units    Flory Chamorro MD

## 2019-04-01 ENCOUNTER — TELEPHONE (OUTPATIENT)
Dept: CARDIOLOGY | Facility: CLINIC | Age: 39
End: 2019-04-01

## 2019-04-01 NOTE — TELEPHONE ENCOUNTER
Test(s) Reviewed  I have reviewed the following in detail:  [] Stress test   [] Angiography   [x] Echocardiogram   [] Labs   [] Other:       Call Pt and tell her tests are ok  Overall heart function normal  No other concerning findings

## 2019-04-02 ENCOUNTER — TELEPHONE (OUTPATIENT)
Dept: NEUROLOGY | Facility: CLINIC | Age: 39
End: 2019-04-02

## 2019-04-02 ENCOUNTER — TELEPHONE (OUTPATIENT)
Dept: PHARMACY | Facility: CLINIC | Age: 39
End: 2019-04-02

## 2019-04-02 RX ORDER — METOPROLOL SUCCINATE 25 MG/1
25 TABLET, EXTENDED RELEASE ORAL DAILY
Qty: 90 TABLET | Refills: 1 | Status: SHIPPED | OUTPATIENT
Start: 2019-04-02 | End: 2021-01-22 | Stop reason: SDUPTHER

## 2019-04-02 NOTE — TELEPHONE ENCOUNTER
Returned call and spoke with patient. Patient was asking about her Aimovig injections. Patient still has not received them. Phone number given to speciality pharmacy. Patient verbalized understanding.

## 2019-04-02 NOTE — TELEPHONE ENCOUNTER
----- Message from Elicia West sent at 4/2/2019  9:37 AM CDT -----  Contact: self  Patient need refill on metoprolol succinate (TOPROL-XL) 25 MG 24 hr tablet      Please call to advice [phone] (home)         [facility] - [address] [address]  Phone: [phone] Fax: [phone]

## 2019-04-02 NOTE — TELEPHONE ENCOUNTER
----- Message from Elicia West sent at 4/2/2019  9:35 AM CDT -----  Contact: self  Patient need to speak to nurse regarding her shots at home and calling to get update on it per patient       Please call to advice [phone] (home)

## 2019-04-02 NOTE — TELEPHONE ENCOUNTER
Initial Aimovig consult completed on 19 . Aimovig 140mg will be shipped on 19 to arrive at patient's home on Wednesday 4/3/19 via [employer]. $3.80 copay and permission to charge CC on file. Patient intends to start Aimovig on 4/3/19. Address confirmed. Confirmed 2 patient identifiers - name and . Therapy Appropriate.    Indication: Migraine prophylaxis   Goals of treatment: Reduction in migraine frequency, duration, and/or intensity (may take 3 months to reach full efficacy)    --Injection experience: None  Informed patient on online injection video on  website.     Storage/Handeling: keep refrigerated, do not freeze. Take out of the refrigerator 30-60 minutes prior to injection. Do not shake. Keep in original box until use.     Counseled patient on administration directions:  - Inject 140mg (2 auto-injectors) into the skin every 28 days.   - Store in refrigerator prior to use (do not freeze, do not shake, keep in original box until use).   - Take out of the refrigerator 30-60 minutes prior to injection.  - Wash hands before and after injection.  - Monthly RX will come with gauze, band aids, and alcohol swabs.  - Patient may inject in either the tops of the thighs, abdomen- but at least 2 inches away from belly button, or the outer part of her upper arm (with assistance). Since the patient is injecting 2 pens, advised to use 2 different injection sites - one for each auto-injector.   - Patient is to wipe down the injection site with the alcohol pad, wait to dry.    - Remove white cap from pen  - Gently squeeze OR stretch the area of the cleaned skin and hold it firmly.  Place the pen flat against the skin then push down on the purple button and release - there will be an initial click; in 10-15 seconds you will hear a second click and the window will go from clear to yellow, indicating injection is complete.  - Patient should rotate injection sites.   - Patient will use sharps  container; once full, per [place] law, she/ he may lock the sharps container and place in trash. Pharmacy will replace the sharps at no additional charge.    Patient was counseled on possible side effects:  - Injection site reaction: redness, soreness, itching, bruising, which should resolve within 3-5 days.  - Signs/symptoms of infection at the injection site.   - constipation, muscle aches/spasms, possible dizziness.     Advised to keep a calendar to stay compliant.     Consultation included the importance of compliance and of keeping all follow up appointments.  Patient understands to report any medication changes to [facility] and provider. All questions answered and addressed to patients satisfaction. [facility] to contact patient in 3 weeks for refills.       Harley Hay, PharmD  Clinical Pharmacist  [facility]  P: [phone]    [facility] sent to provider on 4/2/19 at 11:09 am

## 2019-04-05 ENCOUNTER — PATIENT MESSAGE (OUTPATIENT)
Dept: ADMINISTRATIVE | Facility: OTHER | Age: 39
End: 2019-04-05

## 2019-04-22 ENCOUNTER — TELEPHONE (OUTPATIENT)
Dept: PAIN MEDICINE | Facility: CLINIC | Age: 39
End: 2019-04-22

## 2019-04-25 RX ORDER — CYCLOBENZAPRINE HCL 10 MG
TABLET ORAL
Qty: 90 TABLET | Refills: 2 | Status: SHIPPED | OUTPATIENT
Start: 2019-04-25 | End: 2019-11-04

## 2019-04-29 ENCOUNTER — TELEPHONE (OUTPATIENT)
Dept: NEUROLOGY | Facility: CLINIC | Age: 39
End: 2019-04-29

## 2019-04-29 NOTE — TELEPHONE ENCOUNTER
Returned call and spoke with patient. Patient is nauseated, has a headache, and has a fever. Patient reports the pain is located where her shunt is. Patient would like to have an MRI or CT to make sure her shunt is still in tact. Patient has taken; Maxalt and Compazine. Patient has tried Advil and Tylenol for her fever. Patient has had a fever for a week. Please advise.

## 2019-04-29 NOTE — TELEPHONE ENCOUNTER
----- Message from Maynor Porter sent at 4/29/2019  9:57 AM CDT -----  Contact: Patient  Type: Needs Medical Advice    Who Called:  Patient  Best Call Back Number: [phone]  Additional Information: Patient states they are nauseated with a fever. Please call to advise. Thanks!

## 2019-04-30 NOTE — TELEPHONE ENCOUNTER
Called and spoke with patient. Informed her per Dr. Campbell's response. Patient verbalized understanding.

## 2019-04-30 NOTE — TELEPHONE ENCOUNTER
"Per Dr. Campbell, "This patient likely has a shunt infection. Please send her to the ED urgently"  "

## 2019-05-01 ENCOUNTER — OFFICE VISIT (OUTPATIENT)
Dept: PAIN MEDICINE | Facility: CLINIC | Age: 39
End: 2019-05-01
Payer: MEDICARE

## 2019-05-01 VITALS
BODY MASS INDEX: 60.36 KG/M2 | OXYGEN SATURATION: 97 % | TEMPERATURE: 97 F | HEART RATE: 64 BPM | RESPIRATION RATE: 20 BRPM | DIASTOLIC BLOOD PRESSURE: 63 MMHG | SYSTOLIC BLOOD PRESSURE: 127 MMHG | WEIGHT: 288.81 LBS

## 2019-05-01 DIAGNOSIS — M79.18 MYOFASCIAL MUSCLE PAIN: ICD-10-CM

## 2019-05-01 DIAGNOSIS — R29.898 MUSCULAR DECONDITIONING: ICD-10-CM

## 2019-05-01 DIAGNOSIS — M51.36 DDD (DEGENERATIVE DISC DISEASE), LUMBAR: ICD-10-CM

## 2019-05-01 DIAGNOSIS — G93.2 PSEUDOTUMOR CEREBRI: ICD-10-CM

## 2019-05-01 DIAGNOSIS — M48.02 CERVICAL STENOSIS OF SPINAL CANAL: Primary | ICD-10-CM

## 2019-05-01 DIAGNOSIS — Z79.891 OPIOID CONTRACT EXISTS: ICD-10-CM

## 2019-05-01 DIAGNOSIS — E66.01 MORBID OBESITY WITH BMI OF 60.0-69.9, ADULT: ICD-10-CM

## 2019-05-01 DIAGNOSIS — M50.30 DDD (DEGENERATIVE DISC DISEASE), CERVICAL: ICD-10-CM

## 2019-05-01 DIAGNOSIS — Z98.2 S/P VENTRICULOPERITONEAL SHUNT: ICD-10-CM

## 2019-05-01 PROCEDURE — 99999 PR PBB SHADOW E&M-EST. PATIENT-LVL V: CPT | Mod: PBBFAC,,, | Performed by: PHYSICIAN ASSISTANT

## 2019-05-01 PROCEDURE — 99999 PR PBB SHADOW E&M-EST. PATIENT-LVL V: ICD-10-PCS | Mod: PBBFAC,,, | Performed by: PHYSICIAN ASSISTANT

## 2019-05-01 PROCEDURE — 99214 PR OFFICE/OUTPT VISIT, EST, LEVL IV, 30-39 MIN: ICD-10-PCS | Mod: S$PBB,,, | Performed by: PHYSICIAN ASSISTANT

## 2019-05-01 PROCEDURE — 99215 OFFICE O/P EST HI 40 MIN: CPT | Mod: PBBFAC,PN | Performed by: PHYSICIAN ASSISTANT

## 2019-05-01 PROCEDURE — 99214 OFFICE O/P EST MOD 30 MIN: CPT | Mod: S$PBB,,, | Performed by: PHYSICIAN ASSISTANT

## 2019-05-01 RX ORDER — HYDROCODONE BITARTRATE AND ACETAMINOPHEN 5; 325 MG/1; MG/1
1 TABLET ORAL EVERY 12 HOURS PRN
Qty: 60 TABLET | Refills: 0 | Status: SHIPPED | OUTPATIENT
Start: 2019-06-30 | End: 2019-07-29 | Stop reason: SDUPTHER

## 2019-05-01 RX ORDER — SULFAMETHOXAZOLE AND TRIMETHOPRIM 400; 80 MG/1; MG/1
1 TABLET ORAL
COMMUNITY
End: 2020-07-31

## 2019-05-01 RX ORDER — HYDROCODONE BITARTRATE AND ACETAMINOPHEN 5; 325 MG/1; MG/1
1 TABLET ORAL EVERY 12 HOURS PRN
Qty: 60 TABLET | Refills: 0 | Status: SHIPPED | OUTPATIENT
Start: 2019-05-01 | End: 2019-05-31

## 2019-05-01 RX ORDER — HYDROCODONE BITARTRATE AND ACETAMINOPHEN 5; 325 MG/1; MG/1
1 TABLET ORAL EVERY 12 HOURS PRN
Qty: 60 TABLET | Refills: 0 | Status: SHIPPED | OUTPATIENT
Start: 2019-05-31 | End: 2019-06-30

## 2019-05-03 NOTE — PROGRESS NOTES
This note was completed with dictation software and grammatical errors may exist.    CC: Back pain, Neck pain, headaches    HPI: The patient is a 38-year-old woman with a history of Chiari malformation, Hadley syndrome, pseudotumor cerebri with shunt, morbid obesity who presents in referral from Dr. Velasco for neck pain. She returns in follow-up today with neck greater than low back pain. She complains of severe left-sided neck pain radiating to the left occipital region, left trapezius muscle in down both arms.  She states that she cannot go back to physical therapy because she no showed too many visits at [facility].  She has not lost weight so she has not followed up with Neurosurgery for spine surgery.  She continues to complain of weakness in her arms and numbness in her hands.  She reports a worsening headache but states that she was told it was because she was training her eyes and has ordered new glasses.  She denies having any bladder or bowel incontinence.    Pain intervention history: She was seeing Dr. Gerson Renteria, pain management and until recently had been taking hydrocodone 5/325 once a day in addition to Flexeril 3 times a day and gabapentin 600 mg 3 times a day.  She apparently tested positive for Valium and so was dismissed from his practice. She is status post C7-T1 cervical interlaminar epidural steroid injection on 10/4/17 with 100% relief lasting 2 weeks.  She is status post a second cervical TABITHA on 11/14/17 with almost complete relief again but only lasting 2-3 weeks. She is status post bilateral L2, 3 and 4 medial branch radiofrequency ablation on 09/21/2018 with 75% relief.     ROS: She reports weight loss, headaches, easy bruising and back pain.  Balance of review of systems is negative.    Past Medical History:   Diagnosis Date    Asthma     Bronchitis     Chiari malformation type I     Chronic headache     Graves disease     Graves disease     Hyperlipidemia     Hypertension      Murmur, heart     NPH (normal pressure hydrocephalus)     Obesity     MANUEL on CPAP     Pseudotumor cerebri     Thyroid disease     Hadley syndrome        Past Surgical History:   Procedure Laterality Date    SHAYNA HOLES-ICP MONITOR--  Left frontal ICP bolt Left 3/6/2017    [facility]    CARPAL TUNNEL RELEASE      CERVICAL SPINE SURGERY      CHOLECYSTECTOMY      CRANIOTOMY-POSTERIOR FOSSA-- posterior fossa decompression N/A 6/7/2016    [facility]    EAR TUBE REMOVAL Bilateral     EYE SURGERY      strabismus    INJECTION-FACET L3/4 and L4/5 Bilateral 5/23/2018    [facility] by [facility] at [facility] OR    INJECTION-STEROID-EPIDURAL-CERVICAL N/A 11/14/2017    [facility] at [facility] OR    INJECTION-STEROID-EPIDURAL-CERVICAL C7/T1 N/A 10/4/2017    [facility] at [facility] OR    MYRINGOTOMY W/ TUBES      RADIOFREQUENCY ABLATION, NERVE, SPINAL, LUMBAR, MEDIAL BRANCH, L2, L3, L4 Bilateral 9/21/2018    [facility] by [facility] at [facility] OR    TONSILLECTOMY      VENTRICULOPERITONEAL SHUNT         Social History     Socioeconomic History    Marital status: Single     Spouse name: Not on file    Number of children: Not on file    Years of education: Not on file    Highest education level: Not on file   Occupational History    Not on file   Social Needs    Financial resource strain: Not on file    Food insecurity:     Worry: Not on file     Inability: Not on file    Transportation needs:     Medical: Not on file     Non-medical: Not on file   Tobacco Use    Smoking status: Never Smoker    Smokeless tobacco: Never Used   Substance and Sexual Activity    Alcohol use: No    Drug use: No    Sexual activity: Not on file   Lifestyle    Physical activity:     Days per week: Not on file     Minutes per session: Not on file    Stress: Not on file   Relationships    Social  connections:     Talks on phone: Not on file     Gets together: Not on file     Attends Scientologist service: Not on file     Active member of club or organization: Not on file     Attends meetings of clubs or organizations: Not on file     Relationship status: Not on file   Other Topics Concern    Not on file   Social History Narrative    Not on file         Medications/Allergies: See med card    Vitals:    05/01/19 0925   BP: 127/63   Pulse: 64   Resp: 20   Temp: 97.4 °F (36.3 °C)   TempSrc: Oral   SpO2: 97%   Weight: 131 kg (288 lb 12.8 oz)   PainSc:   6   PainLoc: Head     Body mass index is 60.36 kg/m².      Physical exam:  Gen: A and O x3, pleasant, obese  Skin: No rashes or obvious lesions  HEENT: PERRLA, no obvious deformities on ears or in canals.Trachea midline.  CVS: Regular rate and rhythm, normal palpable pulses.  Resp: Clear to auscultation bilaterally, no wheezes or rales.  Abdomen: Soft, NT/ND.  Musculoskeletal:  Wide-based gait.    Neuro:  Upper extremities: 5/5 strength bilaterally   Lower extremities: 5/5 strength bilaterally  Reflexes: Brachioradialis 2+, Bicep 2+, Tricep 2+. Patellar 2+, Achilles 2+ bilaterally.  Sensory: Intact and symmetrical to light touch and pinprick in C2-T1 dermatomes bilaterally.  Intact and symmetrical to light touch and pinprick in L2-S1 dermatomes bilaterally.    Cervical Spine:  Cervical spine: Range of motion is mildly reduced with flexion with no increased pain, moderately reduced with extension with increased neck pain, lateral rotation mildly reduced with increased pain, worse on the left.  Spurling's maneuver causes lateral neck pain.  Myofascial exam:  Moderate tenderness to palpation across cervical paraspinous region and trapezius muscles bilaterally.    Lumbar spine:  Lumbar spine: Range of motion is moderately reduced with extension and mildly reduced with flexion without increased pain.  Derrick's test causes no increased pain on either side.    Supine  straight leg raise is negative bilaterally.    Internal and external rotation of the hip causes no increased pain on either side.  Myofascial exam: Mild tenderness to palpation across the lumbar paraspinous muscles.      Imaging:  MRI from 2/9/17 cervical spine demonstrates congenitally narrowed canal with disc bulging most prominently at C4/5 to the right side causing anterior cord compression but no signal changes.  There is narrowing of the canal at C3/4 to a lesser degree.      Assessment:   The patient is a 38-year-old woman with a history of Chiari malformation, pseudotumor cerebri with shunt, morbid obesity who presents in referral from Dr. Velasco for neck pain.  1. Cervical stenosis of spinal canal  Ambulatory Referral to Physical/Occupational Therapy   2. DDD (degenerative disc disease), cervical  Ambulatory Referral to Physical/Occupational Therapy   3. DDD (degenerative disc disease), lumbar  Ambulatory Referral to Physical/Occupational Therapy   4. Muscular deconditioning  Ambulatory Referral to Physical/Occupational Therapy   5. Myofascial muscle pain  Ambulatory Referral to Physical/Occupational Therapy   6. Morbid obesity with BMI of 60.0-69.9, adult  Ambulatory Referral to Physical/Occupational Therapy   7. Pseudotumor cerebri  Ambulatory Referral to Physical/Occupational Therapy   8. S/P ventriculoperitoneal shunt  Ambulatory Referral to Physical/Occupational Therapy   9. Opioid contract exists         Plan:  1.  Dr. Pak provided prescriptions for hydrocodone-acetaminophen 5/325 mg up to 2 times a day as needed for pain.  I have reviewed the [place] Board of Pharmacy website and there are no abberancies.    2.  I completed orders for physical therapy at [facility] in [place].  3.  We again had a long discussion regarding weight loss.  She has gained over 50 lb in about a year to year and a half.  We discussed healthy eating and exercise.  She is not interested in bariatric surgery but would like to  have neck surgery.  She tells me she was told that she needs to lose 50 lb before having neck surgery.  4.  Follow-up in 3 months or sooner as needed.    Greater than 50% of this 30 min visit was spent counseling the patient.

## 2019-05-08 ENCOUNTER — TELEPHONE (OUTPATIENT)
Dept: PAIN MEDICINE | Facility: CLINIC | Age: 39
End: 2019-05-08

## 2019-05-08 DIAGNOSIS — M25.572 ACUTE BILATERAL ANKLE PAIN: Primary | ICD-10-CM

## 2019-05-08 DIAGNOSIS — M25.571 ACUTE BILATERAL ANKLE PAIN: Primary | ICD-10-CM

## 2019-05-08 NOTE — TELEPHONE ENCOUNTER
----- Message from Naima Astorga sent at 5/8/2019  9:07 AM CDT -----  Contact: pt  Pt calling would like the nurse to ask the Dr to call in something to go with her NORCO for pain,thinks have arthritis in her ankles.. Pt can be reached at .[phone]. Pt phone cut off so not sure of the pharmacy wants it sent to.

## 2019-05-08 NOTE — TELEPHONE ENCOUNTER
We did not discuss her ankles during our visit but I can order some x-rays to further evaluate.  I do not expect a muscle relaxer to help with ankle pain.

## 2019-05-08 NOTE — TELEPHONE ENCOUNTER
Spoke with patient. She stated her ankles have been hurting for 2 weeks and the flexeril is not helping with this. Please advise if there is something else we can try. Thanks.

## 2019-05-09 NOTE — TELEPHONE ENCOUNTER
Pt notified that medication was not for ankle pain. Pt advised that xray ordered. appt scheduled at requested facility. Pt advised she would be contacted with results and plan of care. Pt verbalized understanding,.

## 2019-05-13 ENCOUNTER — TELEPHONE (OUTPATIENT)
Dept: NEUROLOGY | Facility: CLINIC | Age: 39
End: 2019-05-13

## 2019-05-13 NOTE — TELEPHONE ENCOUNTER
Returned patients call. Patient states she is having night sweats. I asked the patient if she was taking any new medications. Patient stated no but informed me that she has only taking aimovig twice. Patient wanted to know if that could be why she was having night sweats. Informed patient I would send message to Dr. Chamorro and get back with her. Patient expressed understanding.

## 2019-05-13 NOTE — TELEPHONE ENCOUNTER
----- Message from Reginaldo Headley sent at 5/13/2019 11:48 AM CDT -----  Type:  Patient Call Back    Who Called:  pt  Who Left Message for Patient: rivas   Does the patient know what this is regarding?: returning the phone call  Best Call Back Number:[phone]  Additional Information:  Please call pt and leave a detailed message if there is no answer.

## 2019-05-13 NOTE — TELEPHONE ENCOUNTER
----- Message from Lauri Denson sent at 5/13/2019 11:18 AM CDT -----  Contact: self   Patient want to speak with a nurse regarding having night sweats is this normal please call back at [phone] (home)

## 2019-05-24 ENCOUNTER — TELEPHONE (OUTPATIENT)
Dept: PHARMACY | Facility: CLINIC | Age: 39
End: 2019-05-24

## 2019-06-14 ENCOUNTER — PROCEDURE VISIT (OUTPATIENT)
Dept: NEUROLOGY | Facility: CLINIC | Age: 39
End: 2019-06-14
Payer: MEDICARE

## 2019-06-14 VITALS
HEART RATE: 64 BPM | BODY MASS INDEX: 60.62 KG/M2 | WEIGHT: 288.81 LBS | DIASTOLIC BLOOD PRESSURE: 77 MMHG | RESPIRATION RATE: 16 BRPM | SYSTOLIC BLOOD PRESSURE: 117 MMHG | HEIGHT: 58 IN

## 2019-06-14 DIAGNOSIS — G43.719 INTRACTABLE CHRONIC MIGRAINE WITHOUT AURA AND WITHOUT STATUS MIGRAINOSUS: Primary | ICD-10-CM

## 2019-06-14 PROCEDURE — 64615 CHEMODENERV MUSC MIGRAINE: CPT | Mod: S$PBB,,, | Performed by: PSYCHIATRY & NEUROLOGY

## 2019-06-14 PROCEDURE — 64615 PR CHEMODENERVATION OF MUSCLE FOR CHRONIC MIGRAINE: ICD-10-PCS | Mod: S$PBB,,, | Performed by: PSYCHIATRY & NEUROLOGY

## 2019-06-14 PROCEDURE — 64615 CHEMODENERV MUSC MIGRAINE: CPT | Mod: PBBFAC,PO | Performed by: PSYCHIATRY & NEUROLOGY

## 2019-06-14 RX ORDER — TOPIRAMATE 100 MG/1
100 TABLET, FILM COATED ORAL NIGHTLY
Qty: 30 TABLET | Refills: 11
Start: 2019-06-14 | End: 2020-05-21 | Stop reason: SDUPTHER

## 2019-06-14 RX ADMIN — ONABOTULINUMTOXINA 200 UNITS: 100 INJECTION, POWDER, LYOPHILIZED, FOR SOLUTION INTRADERMAL; INTRAMUSCULAR at 01:06

## 2019-06-14 NOTE — PROCEDURES
Procedures       PROCEDURE PERFORMED: Botulinum toxin injection (20073)    CLINICAL INDICATION: G43.719    A time out was conducted just before the start of the procedure to verify the correct patient and procedure, procedure location, and all relevant critical information.     Conventional methods of treatment such as multiple medications , both on and   off label have been tried including: anti-epileptics (topiramate, gabapentin, diamox), beta blockers (metoprolol),  and antidepressants (paxil - cannot trial additional ones due to being on Paxil). The patient has been unresponsive and refractory.The patient meets criteria for chronic headaches according to the ICHD-II, the patient has more than 15 headaches a month which last for more than 4 hours a day.    Injection shows session number: 6  Percentage relief since last session: >50% weeks 1 to 8, daily headache weeks 8-12    Frequency of treatment is every 3 months unless no response to the treatments, at which time we will discontinue the injections.     DESCRIPTION OF PROCEDURE: After obtaining informed consent and under   aseptic technique, a total of 185 units of botulinum toxin type A were   injected in the following muscles: Procerus 5 units,  5 units   bilaterally, frontalis 20 units, temporalis 25 units bilaterally,   occipitalis 20 units,  and trapezius 15 units bilaterally and masseter 10 units bilaterally.The patient tolerated the procedure well. There were no complications. The patient was given a prescription for repeat treatment in 10 weeks    upper cervical paraspinals 10 units bilaterally spared due to prior surgery in this area     Unavoidable waste 15 units    Flory Chamorro MD    Next session make 10 weeks

## 2019-06-24 ENCOUNTER — TELEPHONE (OUTPATIENT)
Dept: PHARMACY | Facility: CLINIC | Age: 39
End: 2019-06-24

## 2019-07-29 ENCOUNTER — TELEPHONE (OUTPATIENT)
Dept: PAIN MEDICINE | Facility: CLINIC | Age: 39
End: 2019-07-29

## 2019-07-29 ENCOUNTER — TELEPHONE (OUTPATIENT)
Dept: NEUROLOGY | Facility: CLINIC | Age: 39
End: 2019-07-29

## 2019-07-29 DIAGNOSIS — G43.719 INTRACTABLE CHRONIC MIGRAINE WITHOUT AURA AND WITHOUT STATUS MIGRAINOSUS: Primary | ICD-10-CM

## 2019-07-29 RX ORDER — HYDROCODONE BITARTRATE AND ACETAMINOPHEN 5; 325 MG/1; MG/1
1 TABLET ORAL EVERY 12 HOURS PRN
Qty: 60 TABLET | Refills: 0 | Status: SHIPPED | OUTPATIENT
Start: 2019-07-30 | End: 2019-08-01 | Stop reason: SDUPTHER

## 2019-07-29 NOTE — TELEPHONE ENCOUNTER
----- Message from Lauren Erickson sent at 7/29/2019  8:09 AM CDT -----  Contact: self  Type:  RX Refill Request    Who Called:  self  Refill or New Rx:  refill  RX Name and Strength:  HYDROcodone-acetaminophen (NORCO) 5-325 mg per tablet  How is the patient currently taking it? (ex. 1XDay):  2Xday  Is this a 30 day or 90 day RX:  30  Preferred Pharmacy with phone number:  [facility]  Local or Mail Order:  local  Ordering Provider:  Odell June  Best Call Back Number:  [phone] (home)   Additional Information:  Patient will be out of medication today and does not have an appointment until 08/05/19. Please call patient. Thanks!    [facility] - [place], [place] - [address] [place] [id number]  Phone: [phone] Fax: [phone]

## 2019-07-30 NOTE — TELEPHONE ENCOUNTER
----- Message from Jimena Soto sent at 7/29/2019  4:03 PM CDT -----  Contact: patient  Type: Needs Medical Advice    Who Called:  patient  Symptoms (please be specific):  olivier  How long has patient had these symptoms:  olivier  Pharmacy name and phone #:  [facility]  Best Call Back Number:[phone]    Additional Information: Patient states she called her pharmacy about  HYDROcodone-acetaminophen (NORCO) 5-325 mg per tablet, system say it was sent.  Please call to advise. Thanks!

## 2019-08-01 RX ORDER — HYDROCODONE BITARTRATE AND ACETAMINOPHEN 5; 325 MG/1; MG/1
1 TABLET ORAL EVERY 12 HOURS PRN
Qty: 60 TABLET | Refills: 0 | Status: SHIPPED | OUTPATIENT
Start: 2019-09-28 | End: 2019-08-20 | Stop reason: SDUPTHER

## 2019-08-01 RX ORDER — HYDROCODONE BITARTRATE AND ACETAMINOPHEN 5; 325 MG/1; MG/1
1 TABLET ORAL EVERY 12 HOURS PRN
Qty: 60 TABLET | Refills: 0 | Status: SHIPPED | OUTPATIENT
Start: 2019-08-29 | End: 2019-08-20 | Stop reason: SDUPTHER

## 2019-08-01 RX ORDER — HYDROCODONE BITARTRATE AND ACETAMINOPHEN 5; 325 MG/1; MG/1
1 TABLET ORAL EVERY 12 HOURS PRN
Qty: 60 TABLET | Refills: 0 | Status: SHIPPED | OUTPATIENT
Start: 2019-10-28 | End: 2019-08-20 | Stop reason: SDUPTHER

## 2019-08-05 ENCOUNTER — OFFICE VISIT (OUTPATIENT)
Dept: PAIN MEDICINE | Facility: CLINIC | Age: 39
End: 2019-08-05
Payer: MEDICARE

## 2019-08-05 VITALS
HEART RATE: 65 BPM | SYSTOLIC BLOOD PRESSURE: 109 MMHG | OXYGEN SATURATION: 98 % | TEMPERATURE: 98 F | RESPIRATION RATE: 20 BRPM | BODY MASS INDEX: 62.46 KG/M2 | DIASTOLIC BLOOD PRESSURE: 60 MMHG | WEIGHT: 293 LBS

## 2019-08-05 DIAGNOSIS — R29.898 MUSCULAR DECONDITIONING: ICD-10-CM

## 2019-08-05 DIAGNOSIS — E66.01 MORBID OBESITY WITH BMI OF 60.0-69.9, ADULT: ICD-10-CM

## 2019-08-05 DIAGNOSIS — Z79.891 OPIOID CONTRACT EXISTS: ICD-10-CM

## 2019-08-05 DIAGNOSIS — M50.30 DDD (DEGENERATIVE DISC DISEASE), CERVICAL: ICD-10-CM

## 2019-08-05 DIAGNOSIS — M48.02 CERVICAL STENOSIS OF SPINAL CANAL: Primary | ICD-10-CM

## 2019-08-05 DIAGNOSIS — M51.36 DDD (DEGENERATIVE DISC DISEASE), LUMBAR: ICD-10-CM

## 2019-08-05 PROCEDURE — 99213 OFFICE O/P EST LOW 20 MIN: CPT | Mod: S$PBB,,, | Performed by: PHYSICIAN ASSISTANT

## 2019-08-05 PROCEDURE — 99215 OFFICE O/P EST HI 40 MIN: CPT | Mod: PBBFAC,PN | Performed by: PHYSICIAN ASSISTANT

## 2019-08-05 PROCEDURE — 99999 PR PBB SHADOW E&M-EST. PATIENT-LVL V: CPT | Mod: PBBFAC,,, | Performed by: PHYSICIAN ASSISTANT

## 2019-08-05 PROCEDURE — 99213 PR OFFICE/OUTPT VISIT, EST, LEVL III, 20-29 MIN: ICD-10-PCS | Mod: S$PBB,,, | Performed by: PHYSICIAN ASSISTANT

## 2019-08-05 PROCEDURE — 99999 PR PBB SHADOW E&M-EST. PATIENT-LVL V: ICD-10-PCS | Mod: PBBFAC,,, | Performed by: PHYSICIAN ASSISTANT

## 2019-08-05 NOTE — PROGRESS NOTES
This note was completed with dictation software and grammatical errors may exist.    CC: Back pain, Neck pain, headaches    HPI: The patient is a 39-year-old woman with a history of Chiari malformation, Hadley syndrome, pseudotumor cerebri with shunt, morbid obesity who presents in referral from Dr. Velasco for neck pain. She returns in follow-up today with worsening neck and low back pain. She continues to gain weight, has gained another 10 lb since her last visit making this about 60 lb in the last year and a half.  She states that she is now ready to discuss weight loss surgery but has not discussed it with her primary care physician yet.  She continues to have bilateral arm pain and bilateral leg pain.  She describes this as sharp, shooting, worse with activity and improved with rest and medication.  She also states that her pain is much worse when it ranged.  She reports weakness in her left arm and is dropping things.  She reports numbness in all of her extremities. She denies incontinence.  She reports being off balance and states that she fell last Saturday but did not hurt herself.    Pain intervention history: She was seeing Dr. Gerson Renteria, pain management and until recently had been taking hydrocodone 5/325 once a day in addition to Flexeril 3 times a day and gabapentin 600 mg 3 times a day.  She apparently tested positive for Valium and so was dismissed from his practice. She is status post C7-T1 cervical interlaminar epidural steroid injection on 10/4/17 with 100% relief lasting 2 weeks.  She is status post a second cervical TABITHA on 11/14/17 with almost complete relief again but only lasting 2-3 weeks. She is status post bilateral L2, 3 and 4 medial branch radiofrequency ablation on 09/21/2018 with 75% relief.     ROS: She reports weight loss, headaches, easy bruising and back pain.  Balance of review of systems is negative.    Past Medical History:   Diagnosis Date    Asthma     Bronchitis      Chiari malformation type I     Chronic headache     Graves disease     Graves disease     Hyperlipidemia     Hypertension     Murmur, heart     NPH (normal pressure hydrocephalus)     Obesity     MANUEL on CPAP     Pseudotumor cerebri     Thyroid disease     Hadley syndrome        Past Surgical History:   Procedure Laterality Date    SHAYNA HOLES-ICP MONITOR--  Left frontal ICP bolt Left 3/6/2017    [email or web address] OR    CARPAL TUNNEL RELEASE      CERVICAL SPINE SURGERY      CHOLECYSTECTOMY      CRANIOTOMY-POSTERIOR FOSSA-- posterior fossa decompression N/A 6/7/2016    [email or web address] OR    EAR TUBE REMOVAL Bilateral     EYE SURGERY      strabismus    INJECTION-FACET L3/4 and L4/5 Bilateral 5/23/2018    Performed by [facility] at [facility] OR    INJECTION-STEROID-EPIDURAL-CERVICAL N/A 11/14/2017    [facility] at [facility] OR    INJECTION-STEROID-EPIDURAL-CERVICAL C7/T1 N/A 10/4/2017    [facility] by [facility] at [facility] OR    MYRINGOTOMY W/ TUBES      RADIOFREQUENCY ABLATION, NERVE, SPINAL, LUMBAR, MEDIAL BRANCH, L2, L3, L4 Bilateral 9/21/2018    [facility] by [facility] at [facility] OR    TONSILLECTOMY      VENTRICULOPERITONEAL SHUNT         Social History     Socioeconomic History    Marital status: Single     Spouse name: Not on file    Number of children: Not on file    Years of education: Not on file    Highest education level: Not on file   Occupational History    Not on file   Social Needs    Financial resource strain: Not on file    Food insecurity:     Worry: Not on file     Inability: Not on file    Transportation needs:     Medical: Not on file     Non-medical: Not on file   Tobacco Use    Smoking status: Never Smoker    Smokeless tobacco: Never Used   Substance and Sexual Activity    Alcohol use: No    Drug use: No    Sexual activity: Not on file   Lifestyle    Physical  activity:     Days per week: Not on file     Minutes per session: Not on file    Stress: Not on file   Relationships    Social connections:     Talks on phone: Not on file     Gets together: Not on file     Attends Sabianist service: Not on file     Active member of club or organization: Not on file     Attends meetings of clubs or organizations: Not on file     Relationship status: Not on file   Other Topics Concern    Not on file   Social History Narrative    Not on file         Medications/Allergies: See med card    Vitals:    08/05/19 0900   BP: 109/60   Pulse: 65   Resp: 20   Temp: 98.2 °F (36.8 °C)   TempSrc: Oral   SpO2: 98%   Weight: 135.6 kg (298 lb 13.3 oz)   PainSc:   6   PainLoc: Neck     Body mass index is 62.46 kg/m².      Physical exam:  Gen: A and O x3, pleasant, obese  Skin: No rashes or obvious lesions  HEENT: PERRLA, no obvious deformities on ears or in canals.Trachea midline.  CVS: Regular rate and rhythm, normal palpable pulses.  Resp: Clear to auscultation bilaterally, no wheezes or rales.  Abdomen: Soft, NT/ND.  Musculoskeletal:  Wide-based gait.    Neuro:  Upper extremities: 5/5 strength bilaterally   Lower extremities: 5/5 strength bilaterally  Reflexes: Brachioradialis 2+, Bicep 2+, Tricep 2+. Patellar 2+, Achilles 2+ bilaterally.  Sensory: Intact and symmetrical to light touch and pinprick in C2-T1 dermatomes bilaterally.  Intact and symmetrical to light touch and pinprick in L2-S1 dermatomes bilaterally.    Cervical Spine:  Cervical spine: Range of motion is mildly reduced with flexion with no increased pain, moderately reduced with extension with increased neck pain, lateral rotation mildly reduced with increased pain, worse on the left.  Spurling's maneuver causes lateral neck pain.  Myofascial exam:  Moderate tenderness to palpation across cervical paraspinous region and trapezius muscles bilaterally.    Lumbar spine:  Lumbar spine: Range of motion is moderately reduced with  extension and mildly reduced with flexion without increased pain.  Derrick's test causes no increased pain on either side.    Supine straight leg raise is negative bilaterally.    Internal and external rotation of the hip causes no increased pain on either side.  Myofascial exam: Mild tenderness to palpation across the lumbar paraspinous muscles.      Imaging:  MRI from 2/9/17 cervical spine demonstrates congenitally narrowed canal with disc bulging most prominently at C4/5 to the right side causing anterior cord compression but no signal changes.  There is narrowing of the canal at C3/4 to a lesser degree.      Assessment:   The patient is a 39-year-old woman with a history of Chiari malformation, pseudotumor cerebri with shunt, morbid obesity who presents in referral from Dr. Velasco for neck pain.  1. Cervical stenosis of spinal canal     2. DDD (degenerative disc disease), cervical     3. DDD (degenerative disc disease), lumbar     4. Muscular deconditioning     5. Morbid obesity with BMI of 60.0-69.9, adult     6. Opioid contract exists         Plan:  1.  She continues to take hydrocodone twice a day and will  her prescriptions later this month.  2.  She continues to gain weight and is now interested in weight loss surgery.  I advised her to discuss this with her primary care physician.  She has to lose a significant amount of weight to undergo cervical spine surgery.  3.  Follow-up in 3 months or sooner as needed.    Greater than 50% of this 15 min visit was spent counseling the patient.

## 2019-08-16 DIAGNOSIS — G43.719 INTRACTABLE CHRONIC MIGRAINE WITHOUT AURA AND WITHOUT STATUS MIGRAINOSUS: ICD-10-CM

## 2019-08-16 RX ORDER — TOPIRAMATE 100 MG/1
TABLET, FILM COATED ORAL
Qty: 60 TABLET | Refills: 11 | Status: SHIPPED | OUTPATIENT
Start: 2019-08-16 | End: 2020-07-31 | Stop reason: SDUPTHER

## 2019-08-20 ENCOUNTER — OFFICE VISIT (OUTPATIENT)
Dept: NEUROLOGY | Facility: CLINIC | Age: 39
End: 2019-08-20
Payer: MEDICARE

## 2019-08-20 VITALS
HEART RATE: 61 BPM | BODY MASS INDEX: 61.5 KG/M2 | SYSTOLIC BLOOD PRESSURE: 125 MMHG | DIASTOLIC BLOOD PRESSURE: 74 MMHG | HEIGHT: 58 IN | RESPIRATION RATE: 17 BRPM | WEIGHT: 293 LBS

## 2019-08-20 DIAGNOSIS — G93.5 ARNOLD-CHIARI MALFORMATION, TYPE I: ICD-10-CM

## 2019-08-20 DIAGNOSIS — Z98.2 S/P VENTRICULOPERITONEAL SHUNT: ICD-10-CM

## 2019-08-20 DIAGNOSIS — G93.2 PSEUDOTUMOR CEREBRI SYNDROME: ICD-10-CM

## 2019-08-20 DIAGNOSIS — G43.719 INTRACTABLE CHRONIC MIGRAINE WITHOUT AURA AND WITHOUT STATUS MIGRAINOSUS: Primary | ICD-10-CM

## 2019-08-20 DIAGNOSIS — E66.01 MORBID OBESITY: ICD-10-CM

## 2019-08-20 DIAGNOSIS — M79.18 MUSCLE PAIN, MYOFASCIAL: ICD-10-CM

## 2019-08-20 PROCEDURE — 20552 NJX 1/MLT TRIGGER POINT 1/2: CPT | Mod: PBBFAC,PO | Performed by: PSYCHIATRY & NEUROLOGY

## 2019-08-20 PROCEDURE — 99215 OFFICE O/P EST HI 40 MIN: CPT | Mod: PBBFAC,PO,25 | Performed by: PSYCHIATRY & NEUROLOGY

## 2019-08-20 PROCEDURE — 99215 PR OFFICE/OUTPT VISIT, EST, LEVL V, 40-54 MIN: ICD-10-PCS | Mod: S$PBB,25,, | Performed by: PSYCHIATRY & NEUROLOGY

## 2019-08-20 PROCEDURE — 20552 NJX 1/MLT TRIGGER POINT 1/2: CPT | Mod: S$PBB,,, | Performed by: PSYCHIATRY & NEUROLOGY

## 2019-08-20 PROCEDURE — 20552 PR INJECT TRIGGER POINT, 1 OR 2: ICD-10-PCS | Mod: S$PBB,,, | Performed by: PSYCHIATRY & NEUROLOGY

## 2019-08-20 PROCEDURE — 99999 PR PBB SHADOW E&M-EST. PATIENT-LVL V: ICD-10-PCS | Mod: PBBFAC,,, | Performed by: PSYCHIATRY & NEUROLOGY

## 2019-08-20 PROCEDURE — 99215 OFFICE O/P EST HI 40 MIN: CPT | Mod: S$PBB,25,, | Performed by: PSYCHIATRY & NEUROLOGY

## 2019-08-20 PROCEDURE — 99999 PR PBB SHADOW E&M-EST. PATIENT-LVL V: CPT | Mod: PBBFAC,,, | Performed by: PSYCHIATRY & NEUROLOGY

## 2019-08-20 RX ORDER — HYDROCODONE BITARTRATE AND ACETAMINOPHEN 5; 325 MG/1; MG/1
1 TABLET ORAL EVERY 12 HOURS PRN
Qty: 60 TABLET | Refills: 0 | Status: SHIPPED | OUTPATIENT
Start: 2019-08-29 | End: 2019-09-28

## 2019-08-20 RX ORDER — LIDOCAINE HYDROCHLORIDE 10 MG/ML
1 INJECTION, SOLUTION EPIDURAL; INFILTRATION; INTRACAUDAL; PERINEURAL ONCE
Status: COMPLETED | OUTPATIENT
Start: 2019-08-20 | End: 2019-08-20

## 2019-08-20 RX ORDER — LIDOCAINE 50 MG/G
OINTMENT TOPICAL
Qty: 120 G | Refills: 11 | Status: SHIPPED | OUTPATIENT
Start: 2019-08-20 | End: 2020-07-31

## 2019-08-20 RX ORDER — HYDROCODONE BITARTRATE AND ACETAMINOPHEN 5; 325 MG/1; MG/1
1 TABLET ORAL EVERY 12 HOURS PRN
Qty: 60 TABLET | Refills: 0 | Status: SHIPPED | OUTPATIENT
Start: 2019-09-28 | End: 2019-10-28

## 2019-08-20 RX ORDER — BUPIVACAINE HYDROCHLORIDE 2.5 MG/ML
1 INJECTION, SOLUTION EPIDURAL; INFILTRATION; INTRACAUDAL
Status: COMPLETED | OUTPATIENT
Start: 2019-08-20 | End: 2019-08-20

## 2019-08-20 RX ORDER — HYDROCODONE BITARTRATE AND ACETAMINOPHEN 5; 325 MG/1; MG/1
1 TABLET ORAL EVERY 12 HOURS PRN
Qty: 60 TABLET | Refills: 0 | Status: SHIPPED | OUTPATIENT
Start: 2019-10-28 | End: 2019-11-04 | Stop reason: SDUPTHER

## 2019-08-20 RX ADMIN — LIDOCAINE HYDROCHLORIDE 10 MG: 10 INJECTION, SOLUTION EPIDURAL; INFILTRATION; INTRACAUDAL; PERINEURAL at 04:08

## 2019-08-20 RX ADMIN — BUPIVACAINE HYDROCHLORIDE 2.5 MG: 2.5 INJECTION, SOLUTION EPIDURAL; INFILTRATION; INTRACAUDAL; PERINEURAL at 04:08

## 2019-08-20 NOTE — PROGRESS NOTES
Date of service: 8/20/2019  Referring provider: No ref. provider found    Subjective:      Chief complaint: Migraine       Patient ID: Shanna Douglass is a 39 y.o. lady with Hadley syndrome, Chiari type 1 malformation, s/p posterior fossa decompression June 2016, morbid obesity, pseudotumor cerebri s/p shunt, obstructive sleep apnea on CPAP, presenting for headache follow up    History of Present Illness    INTERVAL HISTORY - 8/20/19     Today she reports she is a little worse.  There are times when it feels like someone is striking her under her shunt.  Her pain is associated around her shunt.  Her current pain score is 10 with a range of 3-10.     She has had 6 Botox sessions so far, most recent 06/14/2019.  She remains on Aimovig started at last visit.  She things Botox, Topamax, Aimovig are helping    She reports some changes to her vision, feels like it is blurred, needs to blink a few times. Optic discs last visit 3/7/19 were normal.    She is interested in pursuing weight loss surgery.      INTERVAL HISTORY-03/07/2019    Her 5th Botox will be 03/22/2019.  At last visit we reduce topiramate to help with nausea.  Started amantadine for energy, started Compazine and rizatriptan for migraine since Celebrex was ineffective.    Today she reports that the same.  She still has headaches and sharp pain every once in a while.  Her current pain score six learned to 4-10.  She thinks her body is used to the Topamax. She thinks the headaches have escalated since then. maxalt is very effective, compazine less so but still effective.     She had a recent visual field test which was good.     INTERVAL HISTORY - 1/17/19     She is status post 4 botox injections most recent 12/21/18.  Today she reports she is much worse.  She hurts where her shunt valve is with some focal tenderness. She hurts with the shunt valve is an overhead.  Her pain score is 10 with a range of 5-11.  She also reports she is sick to her stomach when  "she eats and after she eats that she has no energy.  She thinks his symptoms started worsening after her grandmother passed away in November 2018 from advanced COPD.    She reports the Botox continues to be effective.  The Celebrex that she tried was completely ineffective.  She is using her Norco for to treat headaches.    INTERVAL HISTORY - 9/12/18    She started Botox for 13 18 and reported 100% improvement with that session.  Her 2nd session was 07/06/2018, she reports this did help initially but about 3 weeks ago which was close to this 6-7 week daniel after Botox her headache started to return.  She reports she is much worse.  She has sharp pains in the neck and all over her head.  Her current pain score is 8 with range of 5-8.  The sumatriptan was making her heart race so she asked for an alternative.  Neurosurgery is working her up for potential shunt infection she will have labs done today.  She also reports that in the interim she was diagnosed with Graves disease and she is undergoing blood work for this.    ORIGINAL HEADACHE HISTORY - 3/26/18   Age at onset and course over time: headaches began in 1999. During that time had an eye exam and was found to have papilledema, had an Lp, and a brain MRI initial workup done at [facility]. In 2007 was treated at [facility] with a  shunt. Has not had any revisions since then. She reports she has never lost vision. Dr. Blackwood is her eye doctor in [place], [place]. 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     Codman shunt set to 150 - she reports last reprogram had a positive effect on her headaches     Location: frontotemporal, " sometimes occipial  Quality: stabbing, pressure, throbbing  Severity: current 5, range 3 to 10   Duration: hours  Frequency: daily x 1 year, previous to that every other day. Wakes up without headache and it escalates as day goes on. Rarely, will wake up with headache   Alleviated by: darkness, heat, menses  Exacerbated by: fatigue, light, noise, smells, cough, sneezing, menses, change in weather   Associated with: Photophobia, phonophobia, osmophobia, blurred vision, double vision, vomiting, dizziness, vertigo, tinnitus, congestion, problems concentrating, neck tightness   Sleep habits:  Caffeine intake: 2 per day     Current acute treatment:  -- maxalt - effective  -- compazine - effective   -- norco 5mg BID - for headaches and back pain   -- tylenol     Current prevention:  -- Aimovig started 03/2019  -- botox - initiated 04/13/2018  -- metoprolol XL 25mg   -- topiramate 100mg qHS -  At BID makes her mouth dry, sleepy, has helped somewhat   (paxil)  (lasix 80mg)     Previously tried/failed acute treatment:  -- fioricet  -- excedrin   -- BC powder   -- a sumatriptan - palpitations  -- celebrex - not helping     Previously tried/failed preventative treatment:    -- gabapentin   -- diamox - hives       Review of patient's allergies indicates:   Allergen Reactions    Diamox [acetazolamide] Hives    Dye Swelling and Rash     Current Outpatient Medications   Medication Sig Dispense Refill    amantadine HCl (SYMMETREL) 100 mg capsule Take 1 capsule (100 mg total) by mouth once daily. For energy 30 capsule 11    aspirin 81 MG Chew Take 81 mg by mouth once daily.      bepotastine besilate (BEPREVE) 1.5 % Drop Apply to eye.      celecoxib (CELEBREX) 100 MG capsule Take 2 capsules (200 mg total) by mouth 2 (two) times daily as needed (headache and sharp pain). No more than 3 days per week. 30 capsule 11    cyclobenzaprine (FLEXERIL) 10 MG tablet TAKE 1 TABLET 3 TIMES DAILY AS NEEDED FOR MUSCLE SPASMS *THANK YOU* 90  tablet 0    cyclobenzaprine (FLEXERIL) 10 MG tablet TAKE 1 TABLET 3 TIMES DAILY AS NEEDED FOR MUSCLE SPASM *THANK YOU* ** MAY CAUSE DROWSINESS ** 90 tablet 2    erenumab-aooe 140 mg/mL AtIn Inject 140 mg into the skin every 28 days. 1 mL 10    furosemide (LASIX) 80 MG tablet Take 80 mg by mouth once daily.       [START ON 8/29/2019] HYDROcodone-acetaminophen (NORCO) 5-325 mg per tablet Take 1 tablet by mouth every 12 (twelve) hours as needed for Pain. 60 tablet 0    [START ON 9/28/2019] HYDROcodone-acetaminophen (NORCO) 5-325 mg per tablet Take 1 tablet by mouth every 12 (twelve) hours as needed for Pain. 60 tablet 0    [START ON 10/28/2019] HYDROcodone-acetaminophen (NORCO) 5-325 mg per tablet Take 1 tablet by mouth every 12 (twelve) hours as needed for Pain. 60 tablet 0    hydrOXYzine HCl (ATARAX) 25 MG tablet Take 25 mg by mouth 3 (three) times daily.      levothyroxine (SYNTHROID) 125 MCG tablet Take 125 mcg by mouth.      levothyroxine (SYNTHROID) 137 MCG Tab tablet Take 137 mcg by mouth.      levothyroxine (SYNTHROID) 25 MCG tablet Take 25 mcg by mouth once daily.      lidocaine (XYLOCAINE) 5 % Oint ointment Apply small amount topically to the head (shunt area) 120 g 11    liothyronine (CYTOMEL) 5 MCG Tab Take 5 mcg by mouth.      meloxicam (MOBIC) 7.5 MG tablet       methIMAzole (TAPAZOLE) 10 MG Tab Take 10 mg by mouth 2 (two) times daily.      metoprolol succinate (TOPROL-XL) 25 MG 24 hr tablet Take 1 tablet (25 mg total) by mouth once daily. 90 tablet 1    montelukast (SINGULAIR) 10 mg tablet Take 10 mg by mouth every evening.      omeprazole (PRILOSEC) 20 MG capsule Take 40 mg by mouth once daily.       ondansetron (ZOFRAN) 4 MG tablet Take 8 mg by mouth 2 (two) times daily.      ondansetron (ZOFRAN-ODT) 4 MG TbDL Take 8 mg by mouth every 12 (twelve) hours.      paroxetine (PAXIL) 20 MG tablet 40 mg.       potassium chloride (MICRO-K) 10 MEQ CpSR Take 10 mEq by mouth once.       potassium chloride SA (K-DUR,KLOR-CON) 10 MEQ tablet       prochlorperazine (COMPAZINE) 10 MG tablet Take 1 tablet (10 mg total) by mouth every 6 (six) hours as needed (migraine or nausea). 60 tablet 11    ranitidine (ZANTAC) 300 MG capsule Take 300 mg by mouth every evening.      rizatriptan (MAXALT) 10 MG tablet 1 tab PO PRN migraine. May repeat every 2 hours for max 3 tabs in 24 hours. Use no more than 10 days per month. 18 tablet 11    rosuvastatin (CRESTOR) 10 MG tablet Take 1 tablet (10 mg total) by mouth once daily. 90 tablet 3    selenium 200 mcg Cap Take 200 mcg by mouth once daily.      sulfamethoxazole-trimethoprim 400-80mg (BACTRIM,SEPTRA) 400-80 mg per tablet Take 1 tablet by mouth every 12 (twelve) hours.      topiramate (TOPAMAX) 100 MG tablet Take 1 tablet (100 mg total) by mouth every evening. 30 tablet 11    topiramate (TOPAMAX) 100 MG tablet  TAKE 1 TABLET TWICE DAILY *THANK YOU* 60 tablet 11     Current Facility-Administered Medications   Medication Dose Route Frequency Provider Last Rate Last Dose    onabotulinumtoxina injection 200 Units  200 Units Intramuscular Q90 Days Flory Chamorro MD   200 Units at 07/06/18 1548    onabotulinumtoxina injection 200 Units  200 Units Intramuscular Q90 Days Swetha Campbell MD   200 Units at 10/04/18 1348    onabotulinumtoxina injection 200 Units  200 Units Intramuscular Q90 Days Swetha Campbell MD   200 Units at 06/14/19 1327    perflutren protein-A microsphr 0.22 mg/mL IV susp  2 mL Intravenous 1 time in Clinic/HOD Red Ward MD           Past Medical History  Past Medical History:   Diagnosis Date    Asthma     Bronchitis     Chiari malformation type I     Chronic headache     Graves disease     Graves disease     Hyperlipidemia     Hypertension     Murmur, heart     NPH (normal pressure hydrocephalus)     Obesity     MANUEL on CPAP     Pseudotumor cerebri     Thyroid disease     Hadley syndrome        Past Surgical  History  Past Surgical History:   Procedure Laterality Date    SHAYNA HOLES-ICP MONITOR--  Left frontal ICP bolt Left 3/6/2017    Per[facility] by [facility] at [facility] OR    CARPAL TUNNEL RELEASE      CERVICAL SPINE SURGERY      CHOLECYSTECTOMY      CRANIOTOMY-POSTERIOR FOSSA-- posterior fossa decompression N/A 6/7/2016    Performed by [facility] at [facility] OR    EAR TUBE REMOVAL Bilateral     EYE SURGERY      strabismus    INJECTION-FACET L3/4 and L4/5 Bilateral 5/23/2018    Per[facility] by [facility] at [facility] OR    INJECTION-STEROID-EPIDURAL-CERVICAL N/A 11/14/2017    Per[facility] by [facility] Pak [facility] at [facility] OR    INJECTION-STEROID-EPIDURAL-CERVICAL C7/T1 N/A 10/4/2017    Performed by [facility] Pak [facility] at [facility] OR    MYRINGOTOMY W/ TUBES      RADIOFREQUENCY ABLATION, NERVE, SPINAL, LUMBAR, MEDIAL BRANCH, L2, L3, L4 Bilateral 9/21/2018    Performed by [facility] Pak [facility] at [facility] OR    TONSILLECTOMY      VENTRICULOPERITONEAL SHUNT         Family History  Family History   Problem Relation Age of Onset    Diabetes Mother     Hypertension Father     Heart disease Father     Heart disease Maternal Grandfather        Social History  Social History     Socioeconomic History    Marital status: Single     Spouse name: Not on file    Number of children: Not on file    Years of education: Not on file    Highest education level: Not on file   Occupational History    Not on file   Social Needs    Financial resource strain: Not on file    Food insecurity:     Worry: Not on file     Inability: Not on file    Transportation needs:     Medical: Not on file     Non-medical: Not on file   Tobacco Use    Smoking status: Never Smoker    Smokeless tobacco: Never Used   Substance and Sexual Activity    Alcohol use: No    Drug use: No    Sexual activity: Not on file   Lifestyle    Physical activity:     Days per week: Not on file     Minutes per session: Not on file    Stress:  Not on file   Relationships    Social connections:     Talks on phone: Not on file     Gets together: Not on file     Attends Tenriism service: Not on file     Active member of club or organization: Not on file     Attends meetings of clubs or organizations: Not on file     Relationship status: Not on file   Other Topics Concern    Not on file   Social History Narrative    Not on file        Review of Systems  14-point review of systems as follows:   No check daniel indicates NEGATIVE response   Constitutional: [] weight loss, [] change to appetite   Eyes: [x] change in vision, [] double vision   Ears, nose, mouth, throat: [] frequent nose bleeds, [] ringing in the ears   Respiratory: [x] cough, [] wheezing   Cardiovascular: [x] chest pain, [] palpitations   Gastrointestinal: [] jaundice, [] nausea/vomiting   Genitourinary: [] incontinence, [] burning with urination   Hematologic/lymphatic: [x] easy bruising/bleeding, [x] night sweats   Neurological: [x] numbness, [x] weakness   Endocrine: [] fatigue, [x] heat/cold intolerance   Allergy/Immunologic: [] fevers, [] chills   Musculoskeletal: [x] muscle pain, [x] joint pain   Psychiatric: [] thoughts of harming self/others, [] depression   Integumentary: [x] rashes, [x] sores that do not heal       Objective:        Vitals:    08/20/19 1323   BP: 125/74   Pulse: 61   Resp: 17     Body mass index is 61.79 kg/m².    Optic discs appear elevated bilaterally     PREVIOUS:  Constitutional: appears in no acute distress, well-developed, well-nourished. Father provides a significant amount of history due to patient's intellectual status.     Eyes: normal conjunctiva, PERRLA, optic discs not visualized well - inability to hold gaze.   Confrontational fields - full in all quadrants bilaterally  Color vision (HRR plates 5-10): 4.5/6 bilaterally (1/2 point lost on plate 6, and full point lost on plate 7 bilaterally)    Ears, nose, mouth, throat: face appears syndromic. Right eye  appears esotropic at rest, but ductions full. Hearing grossly intact     Cardiovascular: regular rate and rhythm, no murmurs appreciated    Respiratory: unlabored respirations, breath sounds normal bilaterally    Gastrointestinal: no visible abdominal masses, no guarding, no visible hernia    Musculoskeletal: normal tone in all four extremities. No atrophy. No abnormal movements. Strength in all muscles groups of the upper and lower extremities is 5/5. Normal gait and station. Digits and nails normal.      Spine:   CERVICAL SPINE:  Inspection: midline healed surgical scar in upper cervical spine   ROM: normal   MUSCLE SPASM: mild throughout   FACET LOADING: + bilateral   SPURLING: no  JERSON / ARIANA tender: + bilateral     Psychiatric: normal judgment and insight. Oriented to situation.     Neurologic:   Cortical functions: recent and remote memory intact, normal attention span and concentration, speech fluent, adequate fund of knowledge   Cranial nerves: visual fields full, PERRLA, EOMI, facial sensation intact in V1-V3, symmetric facial strength, hearing intact to finger rub, palate elevates symmetrically, shoulder shrug 5/5, tongue protrudes midline   Reflexes: 2+ in the upper and lower extremities, no Cardenas  Sensation: intact to temperature   Coordination: normal finger to nose    Data Review:     I have personally reviewed the referring provider's notes, labs, & imaging made available to me today.      RADIOLOGY STUDIES:  I have personally reviewed the pertinent images performed.       Results for orders placed or performed during the hospital encounter of 01/04/18   MRI Brain Without Contrast    Narrative    EXAM: Brain MRI without contrast.    INDICATION: Preoperative planning for posterior fossa decompression for Chiari malformation    TECHNIQUE: Multiplanar, multisequence brain MRI was performed. DWI was performed. ADC map was generated. CSF flow/CINE sequencing was performed.    COMPARISON: The cervical spine  MRI dated 02/09/2017, head CT dated 06/08/2016, brain MRI dated 05/23/2016      FINDINGS:     Intracranial contents: There are postsurgical changes of posterior fossa decompression since the prior MRI dated 05/23/2016.  These postoperative changes are clearly demonstrated on head CT dated 06/08/2016.  There is, however, is still crowding at the foramen magnum with diminished CSF flow demonstrated on this any sequences.  There is flattening of the ventral surface at the cervical medullary junction in significant decrease in CSF at the foramen magnum surrounding the lower brainstem and cerebellar tonsils.  There are no regions of restricted diffusion to suggest acute infarction.  Demonstrated is a right frontal approach  shunt catheter with the tip near the 3rd ventricle.  The ventricles remain completely decompressed, nearly slitlike.  Correlate clinically.  This is unchanged.  There is minimal flair and T2 hyperintense signal traversing the right frontal lobe along the catheter course likely representing minimal stable gliosis.  There is no hemorrhage or mass effect.  There is no acute infarction.  The orbits are grossly normal.  There is no definite flattening of the posterior optic disc.  There is no abnormal extra-axial fluid collection.  Flow voids indicating patency are present in the major vessels at the base of the brain but there is crowding of the basilar cistern.  Small caliber of the basilar artery may represent developmental variation with fetal origin of the posterior cerebral arteries.    Extracranial contents: Marrow signal intensity is grossly normal paracolic posterior nasopharyngeal soft tissue is nonspecific but likely reflects reactive lymphoid tissue.  The paranasal sinuses and mastoid air cells are clear.    Impression         1. There are postsurgical changes of prior posterior fossa decompression but there is still crowding at the foramen magnum with diminished CSF flow seen on this any  sequences.    2.  No change in position of the ventriculoperitoneal shunt catheter from right frontal approach with complete decompression of the lateral and 3rd ventricles.  The 4th ventricle is normal in position.  There is no hydrocephalus.  These findings are unchanged.    3.  There is no acute hemorrhage or acute infarction.      Electronically signed by: Dr. Jn Calvin  Date:     01/04/18  Time:    12:19    Results for orders placed or performed during the hospital encounter of 06/07/16   CT Head Without Contrast    Narrative    CT HEAD WITHOUT CONTRAST:    CPT 94537    Total DLP: 803 mGy-cm  AEC (Automated Exposure Control) was utilized to reduce the radiation dose to the patient.    HISTORY:  35-year-old status post suboccipital craniotomy for Chiari I malformation and history of prior ventriculostomy shunt catheter placement for intracranial hypertension.  Comparison outside of the brain from 05/23/2016 and CT scan of the head from 12/10/2014.    TECHNIQUE: Multiple contiguous axial images were acquired from the base of the skull and the vertex without contrast administration.    FINDINGS:  There is minimal right greater than left suboccipital craniotomy.  The cerebellar tonsils still project caudally below the level of what would be an intact foramen magnum.  There is some pneumocephalus inferiorly and extending cephalad above the cerebellum in the region of the tectal plate and view of interpositum area.  There is also some and both inferior middle cranial fossa and overlying the right frontal lobe superiorly.  There is unchanged appearance of a right frontal ventriculostomy shunt catheter with the catheter tip unchanged in position as it extends through the right lateral meniscal frontal horn and into versus adjacent the right foramen of Monro and into the third ventricle.  The right lateral ventricle is slitlike with the left unchanged in position and nearly slitlike.  The third ventricle and  fourth ventricles also remain slitlike.  No cerebral or cerebellar parenchymal abnormality is identified. There is no hemorrhage, midline shift,   significant mass-effect, or extra-axial fluid collection. The partially visualized paranasal sinuses and mastoid air cells are clear. The calvarium is intact.    Impression    1. Interval suboccipital craniotomy for decompression of a Chiari I malformation.  There is a small amount of pneumocephalus present.    2.  Unchanged appearance of right frontal ventriculostomy shunt catheter, as above, with tip in the third ventricle and would be ventricles unchanged in their slitlike appearance.  ______________________________________     Electronically signed by: KATERINE GARCIA MD  Date:     06/08/16  Time:    08:43    Results for orders placed or performed during the hospital encounter of 05/23/16   MRI Brain W WO Contrast    Narrative    Exam: MRI of the brain without and with contrast    Comparison: None.    Technique: Multiplanar MR imaging of the brain was performed both before and following the intravenous administration of 10 cc of Gadavist contrast material.    Findings:     There is a ventriculostomy shunt catheter entering the brain via a right frontal approach which terminates in the vicinity of the 3rd ventricle.  There is gliosis along the course of the catheter.  The lateral ventricles and 3rd ventricle have a slitlike appearance, and over shunting cannot be excluded based on the provided images.  The 4th ventricle is normal in size.    The brain appears normally formed with preserved gray-white matter junction differentiation.  No evidence of acute/recent major vascular territory cerebral infarction, enhancing intra-axial mass, or parenchymal hemorrhage.  No vascular malformations appreciated.    There is a Chiari type I malformation present at the craniocervical junction with approximately 6-7 mm of inferior extension of the cerebellar tonsils through the foramen  magnum.  The tip of the cerebellar tonsils have a somewhat pointed appearance.  There is foreshortening/rounding of the clivus.  There is diminished CSF pulsation visible at the craniocervical junction on the cine images.  No associated cervical cord syrinx within the imaged portion of the cervical spinal cord.    No hydrocephalus.  No extra-axial fluid collections or blood products.  No abnormal leptomeningeal enhancement or enhancing extra-axial mass.  The skull base flow voids are unremarkable.  There is mucoperiosteal thickening observed at the floor of the left maxillary sinus.    The visualized orbits are unremarkable.  There is fatty replacement of the parotid glands.  There is an 11 mm left submandibular region probable intraparotid lymph node (series 9 image 25).    Impression    1.  Chiari type I malformation with approximately 6-7 mm of inferior extension of the cerebellar tonsils through the foramen magnum.  Additional details are provided above.  No associated cervical cord syrinx.    2.  Ventriculostomy shunt catheter entering the brain via a right frontal approach with probable gliosis along the shunt catheter tract.  The 3rd ventricle and lateral ventricles have a somewhat slit like appearance, and over-shunting cannot be excluded.    3.  Mild maxillary sinus disease.  ______________________________________     Electronically signed by: Michael Montiel MD  Date:     05/23/16  Time:    11:19        Lab Results   Component Value Date     09/12/2018    K 4.0 09/12/2018     09/12/2018    CO2 23 09/12/2018    BUN 14 09/12/2018    CREATININE 0.7 09/12/2018    GLU 94 09/12/2018    HGBA1C 5.8 11/30/2012    AST 14 09/12/2018    ALT 16 09/12/2018    ALBUMIN 3.4 (L) 09/12/2018    PROT 7.7 09/12/2018    BILITOT 0.3 09/12/2018    CHOL 246 (H) 01/17/2019    HDL 41 01/17/2019    LDLCALC 157.6 01/17/2019    TRIG 237 (H) 01/17/2019       Lab Results   Component Value Date    WBC 7.46 09/12/2018    HGB  "13.8 09/12/2018    HCT 44.7 09/12/2018    MCV 85 09/12/2018     09/12/2018       Lab Results   Component Value Date    TSH 3.25 11/30/2012           Assessment & Plan:       Problem List Items Addressed This Visit        Neuro    Intractable chronic migraine without aura and without status migrainosus - Primary    Overview     As is common in this population, patient with pseudotumor in remission as evidenced by stable fundoscopic exam / stable visual fields but with ongoing chronic headaches of the migrainous type. Explained to patient and family that once pressure is controlled, I treat the remaining headaches according to phenotype in this case chronic migraine. Patient has failed several "good' preventatives, must be on a daily preventative due to daily frequency of headaches, and must have a weight neutral option due to morbid obesity / PTC / sleep apnea. Discussed the most effective weight neutral option going forward is Botox. Patient agreed to proceed.    The patient has chronic migraines (G43.719) and suffers from headaches more than 15 days a month lasting more than 4 hours a day with no relief of symptoms despite trying multiple medications including but not limited to anti-epileptics (topiramate, gabapentin, diamox), beta blockers (metoprolol),  and antidepressants (paxil - cannot trial additional ones due to being on Paxil). Botox treatment was approved for chronic migraines in October 2010. The patient will be an ideal candidate for Botox. We are planning for 3 treatments 3 months apart and aiming for at least 50% improvement in the symptoms. If we see no improvement after 3 treatments, we will discontinue the injections.    In addition, I discussed that the negative role that chronic opiate therapy plays in migraine chronification and worsening of migraines as dose was reduced being supportive of this.     ------------    Botox was initially at least 50% effective but then there was worsening " of headaches in the setting of her grandmother's death and her headaches never recovered.  We reduced her topiramate which was causing some nausea and headaches have suffered.  Aimovig has been helpful.  It seems that we do have the migraines under control with her current regimen, and remaining pain is largely allodynia around the shunt site.    Sumatriptan was not tolerated due to palpitations, Celebrex ineffective, changed to Maxalt and compazine, effective, continue         Pseudotumor cerebri syndrome    Overview     Diagnosed in 1999 with headaches and papilledema.  shunt placed in [facility] in 2007, her only shunt thus far. Ventricles slit like. Patient also on full dose topiramate and lasix. Allergic (hives) to diamox. Visual fields and color vision impaired, but fields at least have shown to be previously stable. Does have MANUEL and uses CPAP.    In the interim having some blurred vision, refer to ophthalmology for updated exam as I appreciate disc elevation bilaterally, if present, will need shunt adjustment/revision         Relevant Medications    lidocaine (XYLOCAINE) 5 % Oint ointment    Other Relevant Orders    Ambulatory referral to Ophthalmology    Ambulatory consult to Bariatric Surgery    S/P ventriculoperitoneal shunt    Overview     2007    Shunt valve site tender to palpation, this area is not amenable to true nerve block but trial local infiltration of local anesthetic to the surrounding scalp and start topical lidocaine         Relevant Medications    lidocaine (XYLOCAINE) 5 % Oint ointment    Arnold-Chiari malformation, type I    Overview     S/p decompression 2016            Endocrine    Morbid obesity    Overview     Resulting in PTC, sleep apnea. All migraine medications must be weight neutral.     Patient would like a referral to bariatric for weight loss surgery; I did explain that a 20% weight loss would be curative for pseudotumor cerebri         Relevant Orders    Ambulatory consult to  Bariatric Surgery       Orthopedic    Muscle pain, myofascial    Overview     Shunt site is allodynic  Trial local anesthetic infiltration around the shunt  Trial topical lidocaine ointment                   Patient asked about driving from a headache standpoint.  I recommended no driving at this time.     TESTING:  -- none     REFERRAL:  -- refer to the eye doctor to check for recurrent swelling in the back of your eye (Dr. Montano)   -- refer to Dr. Wahl for consideration of weight loss surgery     PREVENTION (use daily regardless of headache):  -- continue Botox treatments for chronic migraine   -- stay on topiramate 100mg at night   -- continue Aimovig injection every 28 days (for migraine) (refrigerate it when it arrives in the mail)   -- continue amantadine 100mg in the morning for energy      ACUTE TREATMENT (use total no more than 12 days per month unless otherwise stated):  -- At onset of moderate/severe migraine (within one hour) take Maxalt (Rizatriptan) one tablet as needed, may repeat once in 2 hours - no more than 2 days per week   -- on a daily basis may use prochlorperazine (Compazine) as needed for nausea and headache   -- I did some injections of anesthetic around your shunt today   -- try a lidocaine ointment topically around the shunt     No follow-ups on file.    Flory Chamorro MD    TRIGGER POINT INJECTION (temporalis), 1 muscle right    Indication: myofascial pain     Anesthesia: none     After risks and benefits were explained including bleeding, infection, worsening of the pain, damage to the area being injected, weakness, allergic reaction to medications, vascular injection, and nerve damage, signed consent was obtained. All questions were answered.     Trigger points were identified by palpation of tight muscle fibers with reproduction of patient's pain and referral pattern upon palpation. The identified areas were prepped three times with alcohol and the alcohol allowed to dry. Next,  a 31 gauge 0.5 inch needle was placed in the anatomical area of the trigger point ot be injected. Once negative aspiration of air and heme was confirmed, I proceeded with the injection.     Medications used: bupivicaine 0.25% (60%), lidocaine 1% (40%)    Total volume infused: 1cc    Trigger points injected:  -- right temporalis/frontalis surrounding the tender shunt valve (small volumes peppered around the valve, no medication directly over valve or tubing)    Estimated blood loss: none     The patient did tolerate the procedure well and there were no complications.    RTC as scheduled

## 2019-08-20 NOTE — TELEPHONE ENCOUNTER
Patient came in to  her refills from last office visit. Please add the phrase to the RX. She came back to  prescriptions today after her other MD appointment. Please send to her pharmacy instead.

## 2019-08-20 NOTE — PATIENT INSTRUCTIONS
TESTING:  -- none     REFERRAL:  -- refer to the eye doctor to check for recurrent swelling in the back of your eye (Dr. Montano)   -- refer to Dr. Wahl for consideration of weight loss surgery     PREVENTION (use daily regardless of headache):  -- continue Botox treatments for chronic migraine   -- stay on topiramate 100mg at night   -- continue Aimovig injection every 28 days (for migraine) (refrigerate it when it arrives in the mail)   -- continue amantadine 100mg in the morning for energy      ACUTE TREATMENT (use total no more than 12 days per month unless otherwise stated):  -- At onset of moderate/severe migraine (within one hour) take Maxalt (Rizatriptan) one tablet as needed, may repeat once in 2 hours - no more than 2 days per week   -- on a daily basis may use prochlorperazine (Compazine) as needed for nausea and headache   -- I did some injections of anesthetic around your shunt today   -- try a lidocaine ointment topically around the shunt

## 2019-08-21 ENCOUNTER — TELEPHONE (OUTPATIENT)
Dept: PHARMACY | Facility: CLINIC | Age: 39
End: 2019-08-21

## 2019-08-27 ENCOUNTER — PROCEDURE VISIT (OUTPATIENT)
Dept: NEUROLOGY | Facility: CLINIC | Age: 39
End: 2019-08-27
Payer: MEDICARE

## 2019-08-27 VITALS
SYSTOLIC BLOOD PRESSURE: 115 MMHG | HEIGHT: 58 IN | WEIGHT: 293 LBS | BODY MASS INDEX: 61.5 KG/M2 | HEART RATE: 80 BPM | RESPIRATION RATE: 17 BRPM | DIASTOLIC BLOOD PRESSURE: 83 MMHG

## 2019-08-27 DIAGNOSIS — G43.719 INTRACTABLE CHRONIC MIGRAINE WITHOUT AURA AND WITHOUT STATUS MIGRAINOSUS: Primary | ICD-10-CM

## 2019-08-27 PROCEDURE — 64615 CHEMODENERV MUSC MIGRAINE: CPT | Mod: PBBFAC,PO | Performed by: PSYCHIATRY & NEUROLOGY

## 2019-08-27 PROCEDURE — 64615 CHEMODENERV MUSC MIGRAINE: CPT | Mod: S$PBB,,, | Performed by: PSYCHIATRY & NEUROLOGY

## 2019-08-27 PROCEDURE — 64615 PR CHEMODENERVATION OF MUSCLE FOR CHRONIC MIGRAINE: ICD-10-PCS | Mod: S$PBB,,, | Performed by: PSYCHIATRY & NEUROLOGY

## 2019-08-27 RX ADMIN — ONABOTULINUMTOXINA 200 UNITS: 100 INJECTION, POWDER, LYOPHILIZED, FOR SOLUTION INTRADERMAL; INTRAMUSCULAR at 02:08

## 2019-08-27 NOTE — PROGRESS NOTES
The kerline-shunt lidocaine injections were not helpful   In this case, topical lidocaine will not help    Eye exam will be 8/29/19 - patient knows to send us records

## 2019-08-27 NOTE — PROCEDURES
Procedures       PROCEDURE PERFORMED: Botulinum toxin injection (97554)    CLINICAL INDICATION: G43.719    A time out was conducted just before the start of the procedure to verify the correct patient and procedure, procedure location, and all relevant critical information.     Conventional methods of treatment such as multiple medications , both on and   off label have been tried including: anti-epileptics (topiramate, gabapentin, diamox), beta blockers (metoprolol),  and antidepressants (paxil - cannot trial additional ones due to being on Paxil). The patient has been unresponsive and refractory.The patient meets criteria for chronic headaches according to the ICHD-II, the patient has more than 15 headaches a month which last for more than 4 hours a day.    Injection shows session number: 7  Percentage relief since last session: >50% of migraine relief     DESCRIPTION OF PROCEDURE: After obtaining informed consent and under   aseptic technique, a total of 135 units of botulinum toxin type A were   injected in the following muscles:     -- Procerus 5 units  --  5 units bilaterally  -- Frontalis 20 units  -- Temporalis 20 units bilaterally  -- Occipitalis 15 units bilaterally  -- Trapezius 15 units bilaterally.     -- Upper cervical paraspinals 10 units bilaterally spared due to prior surgery in this area     The patient tolerated the procedure well. There were no complications. The patient was given a prescription for repeat treatment in 12 weeks     Unavoidable waste - 65 units       Flory Chamorro MD

## 2019-09-09 ENCOUNTER — TELEPHONE (OUTPATIENT)
Dept: OPHTHALMOLOGY | Facility: CLINIC | Age: 39
End: 2019-09-09

## 2019-09-09 ENCOUNTER — TELEPHONE (OUTPATIENT)
Dept: NEUROLOGY | Facility: CLINIC | Age: 39
End: 2019-09-09

## 2019-09-11 ENCOUNTER — TELEPHONE (OUTPATIENT)
Dept: NEUROSURGERY | Facility: CLINIC | Age: 39
End: 2019-09-11

## 2019-09-11 DIAGNOSIS — R51.9 WORSENING HEADACHES: ICD-10-CM

## 2019-09-11 DIAGNOSIS — Z98.2 VP (VENTRICULOPERITONEAL) SHUNT STATUS: Primary | ICD-10-CM

## 2019-09-11 DIAGNOSIS — G93.2 PSEUDOTUMOR CEREBRI: ICD-10-CM

## 2019-09-11 NOTE — TELEPHONE ENCOUNTER
Called and spoke with Nicolette regarding patient. She instructed me to order CT head, shunt series XR, and FL shunt setting and to have patient come to clinic for an appointment tomorrow. Called and informed patient who states she has no means of transportation until after 10/1. Appointment scheduled for 10/1. Date, time, and location confirmed.

## 2019-09-11 NOTE — TELEPHONE ENCOUNTER
Patient called and states that she had a hard fall down some stairs and hit her head about 3 weeks ago. Since, she's been having worsening headaches, dizziness, and issues with her balance. Please advise.

## 2019-09-12 ENCOUNTER — TELEPHONE (OUTPATIENT)
Dept: OPHTHALMOLOGY | Facility: CLINIC | Age: 39
End: 2019-09-12

## 2019-09-17 ENCOUNTER — TELEPHONE (OUTPATIENT)
Dept: OPHTHALMOLOGY | Facility: CLINIC | Age: 39
End: 2019-09-17

## 2019-10-01 ENCOUNTER — OFFICE VISIT (OUTPATIENT)
Dept: NEUROSURGERY | Facility: CLINIC | Age: 39
End: 2019-10-01
Payer: MEDICARE

## 2019-10-01 ENCOUNTER — HOSPITAL ENCOUNTER (OUTPATIENT)
Dept: RADIOLOGY | Facility: HOSPITAL | Age: 39
Discharge: HOME | End: 2019-10-01
Attending: NEUROLOGICAL SURGERY
Payer: MEDICARE

## 2019-10-01 VITALS
HEART RATE: 63 BPM | SYSTOLIC BLOOD PRESSURE: 118 MMHG | BODY MASS INDEX: 61.5 KG/M2 | WEIGHT: 293 LBS | DIASTOLIC BLOOD PRESSURE: 68 MMHG | HEIGHT: 58 IN

## 2019-10-01 DIAGNOSIS — Z98.2 VP (VENTRICULOPERITONEAL) SHUNT STATUS: ICD-10-CM

## 2019-10-01 DIAGNOSIS — G93.2 PSEUDOTUMOR CEREBRI: ICD-10-CM

## 2019-10-01 DIAGNOSIS — R51.9 WORSENING HEADACHES: ICD-10-CM

## 2019-10-01 DIAGNOSIS — Z98.2 S/P VP SHUNT: ICD-10-CM

## 2019-10-01 DIAGNOSIS — G93.2 PSEUDOTUMOR CEREBRI SYNDROME: Primary | ICD-10-CM

## 2019-10-01 DIAGNOSIS — Q07.00 ARNOLD-CHIARI MALFORMATION: ICD-10-CM

## 2019-10-01 PROCEDURE — 70250 XR SHUNT SERIES: ICD-10-PCS | Mod: 26,,, | Performed by: RADIOLOGY

## 2019-10-01 PROCEDURE — 99214 OFFICE O/P EST MOD 30 MIN: CPT | Mod: PBBFAC,25,PN | Performed by: PHYSICIAN ASSISTANT

## 2019-10-01 PROCEDURE — 99999 PR PBB SHADOW E&M-EST. PATIENT-LVL IV: CPT | Mod: PBBFAC,,, | Performed by: PHYSICIAN ASSISTANT

## 2019-10-01 PROCEDURE — 74018 RADEX ABDOMEN 1 VIEW: CPT | Mod: 26,,, | Performed by: RADIOLOGY

## 2019-10-01 PROCEDURE — 74018 XR SHUNT SERIES: ICD-10-PCS | Mod: 26,,, | Performed by: RADIOLOGY

## 2019-10-01 PROCEDURE — 99214 PR OFFICE/OUTPT VISIT, EST, LEVL IV, 30-39 MIN: ICD-10-PCS | Mod: S$PBB,,, | Performed by: PHYSICIAN ASSISTANT

## 2019-10-01 PROCEDURE — 70450 CT HEAD/BRAIN W/O DYE: CPT | Mod: TC,PO

## 2019-10-01 PROCEDURE — 70250 X-RAY EXAM OF SKULL: CPT | Mod: 26,,, | Performed by: RADIOLOGY

## 2019-10-01 PROCEDURE — 99999 PR PBB SHADOW E&M-EST. PATIENT-LVL IV: ICD-10-PCS | Mod: PBBFAC,,, | Performed by: PHYSICIAN ASSISTANT

## 2019-10-01 PROCEDURE — 70450 CT HEAD/BRAIN W/O DYE: CPT | Mod: 26,,, | Performed by: RADIOLOGY

## 2019-10-01 PROCEDURE — 99214 OFFICE O/P EST MOD 30 MIN: CPT | Mod: S$PBB,,, | Performed by: PHYSICIAN ASSISTANT

## 2019-10-01 PROCEDURE — 72020 XR SHUNT SERIES: ICD-10-PCS | Mod: 26,,, | Performed by: RADIOLOGY

## 2019-10-01 PROCEDURE — 70450 CT HEAD WITHOUT CONTRAST: ICD-10-PCS | Mod: 26,,, | Performed by: RADIOLOGY

## 2019-10-01 PROCEDURE — 71045 X-RAY EXAM CHEST 1 VIEW: CPT | Mod: TC,FY,PO

## 2019-10-01 PROCEDURE — 76000 FL SHUNT SETTING: ICD-10-PCS | Mod: 26,,, | Performed by: RADIOLOGY

## 2019-10-01 PROCEDURE — 71045 X-RAY EXAM CHEST 1 VIEW: CPT | Mod: 26,,, | Performed by: RADIOLOGY

## 2019-10-01 PROCEDURE — 74018 RADEX ABDOMEN 1 VIEW: CPT | Mod: TC,FY,PO

## 2019-10-01 PROCEDURE — 71045 XR SHUNT SERIES: ICD-10-PCS | Mod: 26,,, | Performed by: RADIOLOGY

## 2019-10-01 PROCEDURE — 76000 FLUOROSCOPY <1 HR PHYS/QHP: CPT | Mod: TC,PO

## 2019-10-01 PROCEDURE — 72020 X-RAY EXAM OF SPINE 1 VIEW: CPT | Mod: 26,,, | Performed by: RADIOLOGY

## 2019-10-01 PROCEDURE — 76000 FLUOROSCOPY <1 HR PHYS/QHP: CPT | Mod: 26,,, | Performed by: RADIOLOGY

## 2019-10-01 RX ORDER — CEPHALEXIN 500 MG/1
CAPSULE ORAL
COMMUNITY
Start: 2019-09-23 | End: 2020-07-31

## 2019-10-01 NOTE — PROGRESS NOTES
Neurosurgery History & Physical    Patient ID: Shanna Douglass is a 39 y.o. female.    Chief Complaint   Patient presents with    Shunt Problem     Patient had a fall about 3 weeks ago, hitting her head. Since the fall she has had worsening headaches, dizziness and balance problems.       Review of Systems   Constitutional: Negative for activity change, appetite change, fever and unexpected weight change.   HENT: Negative for tinnitus, trouble swallowing and voice change.    Eyes: Positive for visual disturbance (blurriness).   Respiratory: Negative for apnea, chest tightness and shortness of breath.    Cardiovascular: Negative for chest pain and palpitations.   Gastrointestinal: Negative for constipation, nausea and vomiting.   Genitourinary: Negative for difficulty urinating, dysuria, frequency and urgency.   Musculoskeletal: Negative for gait problem and neck stiffness.   Skin: Negative for wound.   Neurological: Positive for dizziness and headaches (located in frontal and occipital areas). Negative for tremors, seizures, facial asymmetry, speech difficulty, light-headedness and numbness.   Psychiatric/Behavioral: Negative for confusion and decreased concentration.       Past Medical History:   Diagnosis Date    Asthma     Bronchitis     Chiari malformation type I     Chronic headache     Graves disease     Graves disease     Hyperlipidemia     Hypertension     Murmur, heart     NPH (normal pressure hydrocephalus)     Obesity     MNAUEL on CPAP     Pseudotumor cerebri     Thyroid disease     Hadley syndrome      Social History     Socioeconomic History    Marital status: Single     Spouse name: Not on file    Number of children: Not on file    Years of education: Not on file    Highest education level: Not on file   Occupational History    Not on file   Social Needs    Financial resource strain: Not on file    Food insecurity:     Worry: Not on file     Inability: Not on file     Transportation needs:     Medical: Not on file     Non-medical: Not on file   Tobacco Use    Smoking status: Never Smoker    Smokeless tobacco: Never Used   Substance and Sexual Activity    Alcohol use: No    Drug use: No    Sexual activity: Not on file   Lifestyle    Physical activity:     Days per week: Not on file     Minutes per session: Not on file    Stress: Not on file   Relationships    Social connections:     Talks on phone: Not on file     Gets together: Not on file     Attends Advent service: Not on file     Active member of club or organization: Not on file     Attends meetings of clubs or organizations: Not on file     Relationship status: Not on file   Other Topics Concern    Not on file   Social History Narrative    Not on file     Family History   Problem Relation Age of Onset    Diabetes Mother     Hypertension Father     Heart disease Father     Heart disease Maternal Grandfather      Review of patient's allergies indicates:   Allergen Reactions    Diamox [acetazolamide] Hives    Dye Swelling and Rash       Current Outpatient Medications:     amantadine HCl (SYMMETREL) 100 mg capsule, Take 1 capsule (100 mg total) by mouth once daily. For energy, Disp: 30 capsule, Rfl: 11    aspirin 81 MG Chew, Take 81 mg by mouth once daily., Disp: , Rfl:     bepotastine besilate (BEPREVE) 1.5 % Drop, Apply to eye., Disp: , Rfl:     celecoxib (CELEBREX) 100 MG capsule, Take 2 capsules (200 mg total) by mouth 2 (two) times daily as needed (headache and sharp pain). No more than 3 days per week., Disp: 30 capsule, Rfl: 11    cephALEXin (KEFLEX) 500 MG capsule, , Disp: , Rfl:     cyclobenzaprine (FLEXERIL) 10 MG tablet, TAKE 1 TABLET 3 TIMES DAILY AS NEEDED FOR MUSCLE SPASM *THANK YOU* ** MAY CAUSE DROWSINESS **, Disp: 90 tablet, Rfl: 2    erenumab-aooe 140 mg/mL AtIn, Inject 140 mg into the skin every 28 days., Disp: 1 mL, Rfl: 10    furosemide (LASIX) 80 MG tablet, Take 80 mg by mouth  once daily. , Disp: , Rfl:     [START ON 10/28/2019] HYDROcodone-acetaminophen (NORCO) 5-325 mg per tablet, Take 1 tablet by mouth every 12 (twelve) hours as needed for Pain., Disp: 60 tablet, Rfl: 0    hydrOXYzine HCl (ATARAX) 25 MG tablet, Take 25 mg by mouth 3 (three) times daily., Disp: , Rfl:     levothyroxine (SYNTHROID) 137 MCG Tab tablet, Take 137 mcg by mouth., Disp: , Rfl:     levothyroxine (SYNTHROID) 25 MCG tablet, Take 25 mcg by mouth once daily., Disp: , Rfl:     lidocaine (XYLOCAINE) 5 % Oint ointment, Apply small amount topically to the head (shunt area), Disp: 120 g, Rfl: 11    liothyronine (CYTOMEL) 5 MCG Tab, Take 5 mcg by mouth., Disp: , Rfl:     meloxicam (MOBIC) 7.5 MG tablet, , Disp: , Rfl:     methIMAzole (TAPAZOLE) 10 MG Tab, Take 10 mg by mouth 2 (two) times daily., Disp: , Rfl:     metoprolol succinate (TOPROL-XL) 25 MG 24 hr tablet, Take 1 tablet (25 mg total) by mouth once daily., Disp: 90 tablet, Rfl: 1    montelukast (SINGULAIR) 10 mg tablet, Take 10 mg by mouth every evening., Disp: , Rfl:     omeprazole (PRILOSEC) 20 MG capsule, Take 40 mg by mouth once daily. , Disp: , Rfl:     ondansetron (ZOFRAN) 4 MG tablet, Take 8 mg by mouth 2 (two) times daily., Disp: , Rfl:     paroxetine (PAXIL) 20 MG tablet, 40 mg. , Disp: , Rfl:     potassium chloride (MICRO-K) 10 MEQ CpSR, Take 10 mEq by mouth once., Disp: , Rfl:     prochlorperazine (COMPAZINE) 10 MG tablet, Take 1 tablet (10 mg total) by mouth every 6 (six) hours as needed (migraine or nausea)., Disp: 60 tablet, Rfl: 11    ranitidine (ZANTAC) 300 MG capsule, Take 300 mg by mouth every evening., Disp: , Rfl:     rizatriptan (MAXALT) 10 MG tablet, 1 tab PO PRN migraine. May repeat every 2 hours for max 3 tabs in 24 hours. Use no more than 10 days per month., Disp: 18 tablet, Rfl: 11    rosuvastatin (CRESTOR) 10 MG tablet, Take 1 tablet (10 mg total) by mouth once daily., Disp: 90 tablet, Rfl: 3    selenium 200 mcg Cap,  "Take 200 mcg by mouth once daily., Disp: , Rfl:     topiramate (TOPAMAX) 100 MG tablet, Take 1 tablet (100 mg total) by mouth every evening., Disp: 30 tablet, Rfl: 11    cyclobenzaprine (FLEXERIL) 10 MG tablet, TAKE 1 TABLET 3 TIMES DAILY AS NEEDED FOR MUSCLE SPASMS *THANK YOU* (Patient not taking: Reported on 10/1/2019), Disp: 90 tablet, Rfl: 0    HYDROcodone-acetaminophen (NORCO) 5-325 mg per tablet, Take 1 tablet by mouth every 12 (twelve) hours as needed for Pain. (Patient not taking: Reported on 10/1/2019), Disp: 60 tablet, Rfl: 0    levothyroxine (SYNTHROID) 125 MCG tablet, Take 125 mcg by mouth., Disp: , Rfl:     ondansetron (ZOFRAN-ODT) 4 MG TbDL, Take 8 mg by mouth every 12 (twelve) hours., Disp: , Rfl:     potassium chloride SA (K-DUR,KLOR-CON) 10 MEQ tablet, , Disp: , Rfl:     sulfamethoxazole-trimethoprim 400-80mg (BACTRIM,SEPTRA) 400-80 mg per tablet, Take 1 tablet by mouth every 12 (twelve) hours., Disp: , Rfl:     topiramate (TOPAMAX) 100 MG tablet,  TAKE 1 TABLET TWICE DAILY *THANK YOU* (Patient not taking: Reported on 10/1/2019), Disp: 60 tablet, Rfl: 11    Current Facility-Administered Medications:     onabotulinumtoxina injection 200 Units, 200 Units, Intramuscular, Q90 Days, Flory Chamorro MD, 200 Units at 07/06/18 1548    onabotulinumtoxina injection 200 Units, 200 Units, Intramuscular, Q90 Days, Swetha Campbell MD, 200 Units at 06/14/19 1327    onabotulinumtoxina injection 200 Units, 200 Units, Intramuscular, Q90 Days, Flory Chamorro MD, 200 Units at 08/27/19 1431    perflutren protein-A microsphr 0.22 mg/mL IV susp, 2 mL, Intravenous, 1 time in Clinic/HOD, Red Ward MD    Vitals:    10/01/19 1641   BP: 118/68   Pulse: 63   Weight: 133.3 kg (293 lb 15.7 oz)   Height: 4' 10" (1.473 m)   PainSc:   8   PainLoc: Head       Physical Exam   Constitutional: She is oriented to person, place, and time. She appears well-developed and well-nourished.   HENT:   Head: Normocephalic " and atraumatic.   Eyes: Pupils are equal, round, and reactive to light.   Neck: Normal range of motion. Neck supple.   Cardiovascular: Normal rate.   Pulmonary/Chest: Effort normal.   Musculoskeletal: Normal range of motion. She exhibits no edema.   Neurological: She is alert and oriented to person, place, and time. She has a normal Finger-Nose-Finger Test, a normal Heel to Shin Test, a normal Romberg Test and a normal Tandem Gait Test. Gait normal.   Reflex Scores:       Tricep reflexes are 2+ on the right side and 2+ on the left side.       Bicep reflexes are 2+ on the right side and 2+ on the left side.       Brachioradialis reflexes are 2+ on the right side and 2+ on the left side.       Patellar reflexes are 2+ on the right side and 2+ on the left side.       Achilles reflexes are 2+ on the right side and 2+ on the left side.  Skin: Skin is warm, dry and intact.   Psychiatric: She has a normal mood and affect. Her speech is normal and behavior is normal. Judgment and thought content normal.   Nursing note and vitals reviewed.      Neurologic Exam     Mental Status   Oriented to person, place, and time.   Oriented to person.   Oriented to place.   Oriented to time.   Follows 3 step commands.   Attention: normal. Concentration: normal.   Speech: speech is normal   Level of consciousness: alert  Knowledge: consistent with education.   Able to name object. Able to read. Able to repeat. Able to write. Normal comprehension.     Cranial Nerves     CN II   Visual acuity: normal  Right visual field deficit: none  Left visual field deficit: none     CN III, IV, VI   Pupils are equal, round, and reactive to light.  Right pupil: Size: 3 mm. Shape: regular. Reactivity: brisk. Consensual response: intact.   Left pupil: Size: 3 mm. Shape: regular. Reactivity: brisk. Consensual response: intact.   CN III: no CN III palsy  CN VI: no CN VI palsy  Nystagmus: none   Diplopia: none  Ophthalmoparesis: none  Conjugate gaze:  present    CN V   Right facial sensation deficit: none  Left facial sensation deficit: none    CN VII   Right facial weakness: none  Left facial weakness: none    CN VIII   Hearing: intact    CN IX, X   CN IX normal.   CN X normal.     CN XI   Right sternocleidomastoid strength: normal  Left sternocleidomastoid strength: normal  Right trapezius strength: normal  Left trapezius strength: normal    CN XII   Fasciculations: absent  Tongue deviation: none    Motor Exam   Muscle bulk: normal  Overall muscle tone: normal  Right arm pronator drift: absent  Left arm pronator drift: absent    Strength   Right neck flexion: 5/5  Left neck flexion: 5/5  Right neck extension: 5/5  Left neck extension: 5/5  Right deltoid: 5/5  Left deltoid: 5/5  Right biceps: 5/5  Left biceps: 5/5  Right triceps: 5/5  Left triceps: 5/5  Right wrist flexion: 5/5  Left wrist flexion: 5/5  Right wrist extension: 5/5  Left wrist extension: 5/5  Right interossei: 5/5  Left interossei: 5/5  Right abdominals: 5/5  Left abdominals: 5/5  Right iliopsoas: 5/5  Left iliopsoas: 5/5  Right quadriceps: 5/5  Left quadriceps: 5/5  Right hamstrin/5  Left hamstrin/5  Right glutei: 5/5  Left glutei: 5/5  Right anterior tibial: 5/5  Left anterior tibial: 5/5  Right posterior tibial: 5/5  Left posterior tibial: 5/5  Right peroneal: 5/5  Left peroneal: 5/5  Right gastroc: 5/5  Left gastroc: 5/5    Sensory Exam   Right arm light touch: normal  Left arm light touch: normal  Right leg light touch: normal  Left leg light touch: normal  Right arm vibration: normal  Left arm vibration: normal  Right arm pinprick: normal  Left arm pinprick: normal    Gait, Coordination, and Reflexes     Gait  Gait: normal    Coordination   Romberg: negative  Finger to nose coordination: normal  Heel to shin coordination: normal  Tandem walking coordination: normal    Tremor   Resting tremor: absent  Intention tremor: absent  Action tremor: absent    Reflexes   Right brachioradialis:  2+  Left brachioradialis: 2+  Right biceps: 2+  Left biceps: 2+  Right triceps: 2+  Left triceps: 2+  Right patellar: 2+  Left patellar: 2+  Right achilles: 2+  Left achilles: 2+  Right Cardenas: absent  Left Cardenas: absent  Right ankle clonus: absent  Left ankle clonus: absent      Provider dictation:    Ms. Douglass is a 39 year old morbidly obese  female with pseudotumor cerebri and Chiari Type 1 malformation s/p posterior fossa decompression in June 2016 with Dr. Velasco. She is s/p right frontal  shunt for pseudotumor in 2004 performed by Neurosurgeon at [facility]. She denies any shunt revisions since, but it has been adjusted in our office multiple times. Last shunt adjustment was at 150 mm H2O. She reports falling frequently without injury. Most recent fall was about 3 weeks ago and pt reports hitting her head. She did not seek medical attention at that time. Since the fall, she has experienced an increase in headaches, dizziness, and visual disturbances. She reports headaches are frequently frontal or occipital. Blurry vision is transient and usually subsides after a few seconds and blinking. She sees Dr. Chamorro for headache management and receives Botox injections which improves the frequency and severity of the headaches. She has a long history of headaches, but reports overall improvement since the  shunt was placed in 2004.      On physical exam, she is neurologically intact.  shunt pumps and refills appropriately without tenderness to palpation.     CT Head demonstrates right frontal ventricular catheter with slit-like appearance of lateral ventricles and 3rd ventricle. There is expected postoperative changes of posterior fossa decompression. No acute intracranial abnormality or significant interval change since previous imaging.     XR shunt series demonstrates right frontal ventriculostomy catheter with continuous tubing throughout that terminates in the right hemipelvis. No complications  evident. Shunt setting is 150mm H2O.     We have discussed the patient's imaging and exam findings with the patient. There is no significant interval change and she is neurologically stable. We recommend she continue to follow up with Dr. Chamorro for headache management. She will follow up with us as needed. Patient verbalizes understanding and is agreeable to plan.      Visit Diagnosis:     1. Pseudotumor cerebri syndrome     2. S/P  shunt     3. Worsening headaches     4. Arnold-Chiari malformation

## 2019-10-23 ENCOUNTER — TELEPHONE (OUTPATIENT)
Dept: PHARMACY | Facility: CLINIC | Age: 39
End: 2019-10-23

## 2019-10-23 NOTE — TELEPHONE ENCOUNTER
Refill call regarding Aimovig at [facility]. Will prepare for shipment on 10/30 to arrive 10/31 with pt consent. Copay 0.00. Patient has not reported any new allergies/ side effects. Pt taking medication(s) as directed with no questions or concerns for RPH. Also no new medications. and Address verified.    next injection with no sharps needed

## 2019-11-04 ENCOUNTER — OFFICE VISIT (OUTPATIENT)
Dept: PAIN MEDICINE | Facility: CLINIC | Age: 39
End: 2019-11-04
Payer: MEDICARE

## 2019-11-04 VITALS
RESPIRATION RATE: 20 BRPM | HEART RATE: 61 BPM | SYSTOLIC BLOOD PRESSURE: 131 MMHG | BODY MASS INDEX: 63.12 KG/M2 | TEMPERATURE: 97 F | WEIGHT: 293 LBS | OXYGEN SATURATION: 98 % | DIASTOLIC BLOOD PRESSURE: 60 MMHG

## 2019-11-04 DIAGNOSIS — M48.02 CERVICAL STENOSIS OF SPINAL CANAL: ICD-10-CM

## 2019-11-04 DIAGNOSIS — Z79.891 OPIOID CONTRACT EXISTS: Primary | ICD-10-CM

## 2019-11-04 DIAGNOSIS — M50.30 DDD (DEGENERATIVE DISC DISEASE), CERVICAL: ICD-10-CM

## 2019-11-04 DIAGNOSIS — R29.898 MUSCULAR DECONDITIONING: ICD-10-CM

## 2019-11-04 DIAGNOSIS — M51.36 DDD (DEGENERATIVE DISC DISEASE), LUMBAR: ICD-10-CM

## 2019-11-04 DIAGNOSIS — E66.01 MORBID OBESITY WITH BMI OF 60.0-69.9, ADULT: ICD-10-CM

## 2019-11-04 PROCEDURE — 99999 PR PBB SHADOW E&M-EST. PATIENT-LVL V: CPT | Mod: PBBFAC,,, | Performed by: PHYSICIAN ASSISTANT

## 2019-11-04 PROCEDURE — 99213 PR OFFICE/OUTPT VISIT, EST, LEVL III, 20-29 MIN: ICD-10-PCS | Mod: S$PBB,,, | Performed by: PHYSICIAN ASSISTANT

## 2019-11-04 PROCEDURE — 99213 OFFICE O/P EST LOW 20 MIN: CPT | Mod: S$PBB,,, | Performed by: PHYSICIAN ASSISTANT

## 2019-11-04 PROCEDURE — 99999 PR PBB SHADOW E&M-EST. PATIENT-LVL V: ICD-10-PCS | Mod: PBBFAC,,, | Performed by: PHYSICIAN ASSISTANT

## 2019-11-04 PROCEDURE — 80307 DRUG TEST PRSMV CHEM ANLYZR: CPT

## 2019-11-04 PROCEDURE — 99215 OFFICE O/P EST HI 40 MIN: CPT | Mod: PBBFAC,PN | Performed by: PHYSICIAN ASSISTANT

## 2019-11-04 RX ORDER — HYDROCODONE BITARTRATE AND ACETAMINOPHEN 5; 325 MG/1; MG/1
1 TABLET ORAL EVERY 12 HOURS PRN
Qty: 60 TABLET | Refills: 0 | Status: SHIPPED | OUTPATIENT
Start: 2019-12-27 | End: 2020-01-26

## 2019-11-04 RX ORDER — HYDROCODONE BITARTRATE AND ACETAMINOPHEN 5; 325 MG/1; MG/1
1 TABLET ORAL EVERY 12 HOURS PRN
Qty: 60 TABLET | Refills: 0 | Status: SHIPPED | OUTPATIENT
Start: 2020-01-26 | End: 2020-02-04 | Stop reason: SDUPTHER

## 2019-11-04 RX ORDER — HYDROCODONE BITARTRATE AND ACETAMINOPHEN 5; 325 MG/1; MG/1
1 TABLET ORAL EVERY 12 HOURS PRN
Qty: 60 TABLET | Refills: 0 | Status: SHIPPED | OUTPATIENT
Start: 2019-11-27 | End: 2019-12-27

## 2019-11-04 RX ORDER — CYCLOBENZAPRINE HCL 10 MG
10 TABLET ORAL 3 TIMES DAILY PRN
Qty: 90 TABLET | Refills: 2 | Status: SHIPPED | OUTPATIENT
Start: 2019-11-04 | End: 2020-03-20 | Stop reason: SDUPTHER

## 2019-11-04 NOTE — PROGRESS NOTES
This note was completed with dictation software and grammatical errors may exist.    CC: Back pain, Neck pain, headaches    HPI: The patient is a 39-year-old woman with a history of Chiari malformation, Hadley syndrome, pseudotumor cerebri with shunt, morbid obesity who presents in referral from Dr. Velasco for neck pain. She returns in follow-up today with neck pain and back pain with intermittent radiation into her arms and legs.  She states that she discussed weight loss surgery with her primary care physician and she tells me that she was told she needed to lose a little weight on her own 1st.  However, she continues to gain weight.  She continues to take pain medication with mild relief of her pain. She denies any changes to her left upper extremity weakness or intermittent numbness in all of her extremities. She reports having right upper extremity muscle spasms recently which was relieved with her muscle relaxer.  She denies bladder or bowel incontinence.    Pain intervention history: She was seeing Dr. Gerson Renteria, pain management and until recently had been taking hydrocodone 5/325 once a day in addition to Flexeril 3 times a day and gabapentin 600 mg 3 times a day.  She apparently tested positive for Valium and so was dismissed from his practice. She is status post C7-T1 cervical interlaminar epidural steroid injection on 10/4/17 with 100% relief lasting 2 weeks.  She is status post a second cervical TABITHA on 11/14/17 with almost complete relief again but only lasting 2-3 weeks. She is status post bilateral L2, 3 and 4 medial branch radiofrequency ablation on 09/21/2018 with 75% relief.     ROS: She reports weight loss, headaches, easy bruising and back pain.  Balance of review of systems is negative.    Past Medical History:   Diagnosis Date    Asthma     Bronchitis     Chiari malformation type I     Chronic headache     Graves disease     Graves disease     Hyperlipidemia     Hypertension      Murmur, heart     NPH (normal pressure hydrocephalus)     Obesity     MANUEL on CPAP     Pseudotumor cerebri     Thyroid disease     Hadley syndrome        Past Surgical History:   Procedure Laterality Date    CARPAL TUNNEL RELEASE      CERVICAL SPINE SURGERY      CHOLECYSTECTOMY      EAR TUBE REMOVAL Bilateral     EYE SURGERY      strabismus    INJECTION OF FACET JOINT Bilateral 5/23/2018    Procedure: INJECTION-FACET L3/4 and L4/5;  Surgeon: Santana Pak MD;  Location: [facility];  Service: Pain Management;  Laterality: Bilateral;  sacralization of L5 and a rudimentary disc space    MYRINGOTOMY W/ TUBES      RADIOFREQUENCY ABLATION OF LUMBAR MEDIAL BRANCH NERVE AT SINGLE LEVEL Bilateral 9/21/2018    Procedure: RADIOFREQUENCY ABLATION, NERVE, SPINAL, LUMBAR, MEDIAL BRANCH, L2, L3, L4;  Surgeon: Santana Pak MD;  Location: [facility];  Service: Pain Management;  Laterality: Bilateral;    TONSILLECTOMY      VENTRICULOPERITONEAL SHUNT         Social History     Socioeconomic History    Marital status: Single     Spouse name: Not on file    Number of children: Not on file    Years of education: Not on file    Highest education level: Not on file   Occupational History    Not on file   Social Needs    Financial resource strain: Not on file    Food insecurity:     Worry: Not on file     Inability: Not on file    Transportation needs:     Medical: Not on file     Non-medical: Not on file   Tobacco Use    Smoking status: Never Smoker    Smokeless tobacco: Never Used   Substance and Sexual Activity    Alcohol use: No    Drug use: No    Sexual activity: Not on file   Lifestyle    Physical activity:     Days per week: Not on file     Minutes per session: Not on file    Stress: Not on file   Relationships    Social connections:     Talks on phone: Not on file     Gets together: Not on file     Attends Latter day service: Not on file     Active member of club or organization: Not on file      Attends meetings of clubs or organizations: Not on file     Relationship status: Not on file   Other Topics Concern    Not on file   Social History Narrative    Not on file         Medications/Allergies: See med card    Vitals:    11/04/19 0940   BP: 131/60   Pulse: 61   Resp: 20   Temp: 96.6 °F (35.9 °C)   TempSrc: Oral   SpO2: 98%   Weight: (!) 137 kg (302 lb 0.5 oz)   PainSc:   6   PainLoc: Back     Body mass index is 63.12 kg/m².      Physical exam:  Gen: A and O x3, pleasant, obese  Skin: No rashes or obvious lesions  HEENT: PERRLA, no obvious deformities on ears or in canals.Trachea midline.  CVS: Regular rate and rhythm, normal palpable pulses.  Resp: Clear to auscultation bilaterally, no wheezes or rales.  Abdomen: Soft, NT/ND.  Musculoskeletal:  Wide-based gait.    Neuro:  Upper extremities: 5/5 strength bilaterally   Lower extremities: 5/5 strength bilaterally  Reflexes: Brachioradialis 2+, Bicep 2+, Tricep 2+. Patellar 2+, Achilles 2+ bilaterally.  Sensory: Intact and symmetrical to light touch and pinprick in C2-T1 dermatomes bilaterally.  Intact and symmetrical to light touch and pinprick in L2-S1 dermatomes bilaterally.    Cervical Spine:  Cervical spine: Range of motion is mildly reduced with flexion with no increased pain, moderately reduced with extension with increased neck pain, lateral rotation mildly reduced with increased pain, worse on the left.  Spurling's maneuver causes lateral neck pain.  Myofascial exam:  Moderate tenderness to palpation across cervical paraspinous region and trapezius muscles bilaterally.    Lumbar spine:  Lumbar spine: Range of motion is moderately reduced with extension and mildly reduced with flexion without increased pain.  Derrick's test causes no increased pain on either side.    Supine straight leg raise is negative bilaterally.    Internal and external rotation of the hip causes no increased pain on either side.  Myofascial exam: Mild tenderness to palpation  across the lumbar paraspinous muscles.      Imaging:  MRI from 2/9/17 cervical spine demonstrates congenitally narrowed canal with disc bulging most prominently at C4/5 to the right side causing anterior cord compression but no signal changes.  There is narrowing of the canal at C3/4 to a lesser degree.      Assessment:   The patient is a 39-year-old woman with a history of Chiari malformation, pseudotumor cerebri with shunt, morbid obesity who presents in referral from Dr. Velasco for neck pain.  1. Opioid contract exists  Pain Clinic Drug Screen   2. Cervical stenosis of spinal canal     3. DDD (degenerative disc disease), cervical     4. DDD (degenerative disc disease), lumbar     5. Muscular deconditioning     6. Morbid obesity with BMI of 60.0-69.9, adult         Plan:  1.  Dr. Pak provided prescriptions for hydrocodone-acetaminophen 5/325 mg up to 2 times a day as needed for pain.  A urine drug screen was completed today.  I have reviewed the [place] Board of Pharmacy website and there are no abberancies.    2.  We again had a long discussion regarding weight loss and healthy eating habits.  3.  Follow-up in 3 months or sooner as needed.    Greater than 50% of this 15 min visit was spent counseling the patient.

## 2019-11-14 ENCOUNTER — TELEPHONE (OUTPATIENT)
Dept: PAIN MEDICINE | Facility: CLINIC | Age: 39
End: 2019-11-14

## 2019-11-14 NOTE — TELEPHONE ENCOUNTER
Tried calling patient but she has a voicemail that hasn't been set up. Called the other number but they said it was a wrong number.

## 2019-11-14 NOTE — TELEPHONE ENCOUNTER
----- Message from Kortney Britton sent at 11/13/2019  5:05 PM CST -----  Contact: lab test cancellation  There was an issue with the Urine pain clinic drug screen test ordered on this patient from 11/4/2019.    Unfortunately, the reference lab received a sample that was insufficient in volume (Quantity Not Sufficient;QNS) for testing so the order has been cancelled and will need to be reordered and recollected.    Please call the Sendout lab at [phone] ext. [phone] if there are any questions.  Anyone in the Sendout lab will be able to assist you.

## 2019-11-19 ENCOUNTER — PROCEDURE VISIT (OUTPATIENT)
Dept: NEUROLOGY | Facility: CLINIC | Age: 39
End: 2019-11-19
Payer: MEDICARE

## 2019-11-19 VITALS
WEIGHT: 293 LBS | HEART RATE: 69 BPM | RESPIRATION RATE: 16 BRPM | BODY MASS INDEX: 61.5 KG/M2 | DIASTOLIC BLOOD PRESSURE: 81 MMHG | HEIGHT: 58 IN | SYSTOLIC BLOOD PRESSURE: 132 MMHG

## 2019-11-19 DIAGNOSIS — G43.719 INTRACTABLE CHRONIC MIGRAINE WITHOUT AURA AND WITHOUT STATUS MIGRAINOSUS: Primary | ICD-10-CM

## 2019-11-19 PROCEDURE — 64615 PR CHEMODENERVATION OF MUSCLE FOR CHRONIC MIGRAINE: ICD-10-PCS | Mod: S$PBB,,, | Performed by: PSYCHIATRY & NEUROLOGY

## 2019-11-19 PROCEDURE — 64615 CHEMODENERV MUSC MIGRAINE: CPT | Mod: PBBFAC,PO | Performed by: PSYCHIATRY & NEUROLOGY

## 2019-11-19 PROCEDURE — 64615 CHEMODENERV MUSC MIGRAINE: CPT | Mod: S$PBB,,, | Performed by: PSYCHIATRY & NEUROLOGY

## 2019-11-19 RX ADMIN — ONABOTULINUMTOXINA 200 UNITS: 100 INJECTION, POWDER, LYOPHILIZED, FOR SOLUTION INTRADERMAL; INTRAMUSCULAR at 01:11

## 2019-11-25 ENCOUNTER — TELEPHONE (OUTPATIENT)
Dept: PHARMACY | Facility: CLINIC | Age: 39
End: 2019-11-25

## 2019-12-03 NOTE — TELEPHONE ENCOUNTER
----- Message from Cely Salgado RT sent at 4/22/2019  8:42 AM CDT -----  Contact: pt    pt , requesting a call back soon she do not understand why her appt was Cx for today, 01:00pm, thanks.  
Spoke with patient and explained to her that PA was out of office today and appointment rescheduled for 05/01/2019  
detailed exam

## 2019-12-11 ENCOUNTER — TELEPHONE (OUTPATIENT)
Dept: NEUROLOGY | Facility: CLINIC | Age: 39
End: 2019-12-11

## 2019-12-11 RX ORDER — PREDNISONE 10 MG/1
TABLET ORAL
Qty: 20 TABLET | Refills: 0 | Status: SHIPPED | OUTPATIENT
Start: 2019-12-11 | End: 2021-06-01 | Stop reason: SDUPTHER

## 2019-12-11 NOTE — TELEPHONE ENCOUNTER
Returned call and spoke with patient. Patient has had a migraine everyday for the last 2 weeks. Patient has tried Celebrex (patient can not remember when she last took it), Compazine (patient took yesterday), and her Topamax daily. Patient said she was instructed to stop Maxalt. Patient has not tried steroids for migraines in the past. Please advise.

## 2019-12-11 NOTE — TELEPHONE ENCOUNTER
Tried to call patient. No answer on either number listed in chart. [phone] did not have a voicemail set up. Left voicemail on [phone].

## 2019-12-11 NOTE — TELEPHONE ENCOUNTER
----- Message from Jimena Soto sent at 12/11/2019 11:43 AM CST -----  Contact: patient  Type: Needs Medical Advice    Who Called:  patient  Symptoms (please be specific):  Headaches everyday  How long has patient had these symptoms:  weeks  Pharmacy name and phone #:  na  Best Call Back Number: [phone]  Additional Information: Patient states she would like to talk to the nurse and get some advise on what to do about her constant headaches, patient states she is not supposed to blossom the medication prescribed to much like she has been doing lately.  Please call to advise.Thansk!

## 2019-12-23 ENCOUNTER — TELEPHONE (OUTPATIENT)
Dept: PHARMACY | Facility: CLINIC | Age: 39
End: 2019-12-23

## 2019-12-23 NOTE — TELEPHONE ENCOUNTER
RX call attempt 1 regarding Aimovig from [facility]. Patient not reached -- voicemail box is not set up. Sending a My Chart Message.  copay 0.00

## 2019-12-23 NOTE — TELEPHONE ENCOUNTER
RX incoming call regarding Aimovig to [facility]. Shipping 12/26 for 12/27 arrival with patients consent. Patients next injection is scheduled for 01/01. Patient denied the need for any supplies with this shipment.  copay 0.00 @ 004.

## 2019-12-30 DIAGNOSIS — G43.719 INTRACTABLE CHRONIC MIGRAINE WITHOUT AURA AND WITHOUT STATUS MIGRAINOSUS: ICD-10-CM

## 2019-12-30 RX ORDER — AMANTADINE HYDROCHLORIDE 100 MG/1
CAPSULE, GELATIN COATED ORAL
Qty: 30 CAPSULE | Refills: 11 | Status: SHIPPED | OUTPATIENT
Start: 2019-12-30 | End: 2021-01-22 | Stop reason: SDUPTHER

## 2019-12-31 ENCOUNTER — TELEPHONE (OUTPATIENT)
Dept: CARDIOLOGY | Facility: CLINIC | Age: 39
End: 2019-12-31

## 2019-12-31 ENCOUNTER — PATIENT MESSAGE (OUTPATIENT)
Dept: CARDIOLOGY | Facility: CLINIC | Age: 39
End: 2019-12-31

## 2019-12-31 NOTE — TELEPHONE ENCOUNTER
----- Message from Princess BARNEY Antoine sent at 12/31/2019  9:29 AM CST -----  Contact: Patient  Type: Needs Medical Advice    Who Called:  Patient  Best Call Back Number:  or   Additional Information: Requesting a call back in regards to changing a medication the Provider prescribed. The medication is interacting with her Thyroid medication. Please call to advise

## 2020-01-06 ENCOUNTER — TELEPHONE (OUTPATIENT)
Dept: CARDIOLOGY | Facility: CLINIC | Age: 40
End: 2020-01-06

## 2020-01-06 ENCOUNTER — PATIENT MESSAGE (OUTPATIENT)
Dept: CARDIOLOGY | Facility: CLINIC | Age: 40
End: 2020-01-06

## 2020-01-06 NOTE — TELEPHONE ENCOUNTER
----- Message from Brissa Stevenson sent at 1/6/2020 12:00 PM CST -----  Type: Needs Medical Advice    Who Called:  Patient  Pharmacy name and phone #:    [facility] - [place], [place] - [address]  [address] [place] [address]  Phone: [phone] Fax: [phone]    Best Call Back Number: [phone] (home)     Additional Information: States her endocrinologist is saying that she needs to change her heart medication due to it interfering with her diabetic medication. Please call for the details.

## 2020-01-08 ENCOUNTER — TELEPHONE (OUTPATIENT)
Dept: CARDIOLOGY | Facility: CLINIC | Age: 40
End: 2020-01-08

## 2020-01-08 RX ORDER — METOPROLOL SUCCINATE 25 MG/1
25 TABLET, EXTENDED RELEASE ORAL DAILY
COMMUNITY
End: 2021-06-01 | Stop reason: SDUPTHER

## 2020-01-08 NOTE — TELEPHONE ENCOUNTER
----- Message from Ginny Valadez sent at 1/8/2020 11:08 AM CST -----    Type:  RX Refill Request    Who Called:  pt   New Rx:    RX Name and Strength:    Pt  Needs a new  Heart  Med // the  Heart  Med  Pt is  On   Does not agree with    Pt thyroid   Preferred Pharmacy with phone number:    [facility] - [address] [address]  Phone: [phone] Fax: [phone]  Best Call Back Number:  [phone]  Additional Information:    Calling to  Get a  New  heart  Med // pt  Has  Been  Without  Her  Med  For 3  Days /  Pt  Has  Sent three  Messages  About her med

## 2020-01-08 NOTE — TELEPHONE ENCOUNTER
Patient asking for you to change her toprol xl 25mg daily,  That per her endocrinologis(Dr. Baugh)t it is interfering with her levothyroxine.  Please advise

## 2020-01-13 NOTE — TELEPHONE ENCOUNTER
DOCUMENTATION ONLY:  Prior authorization for Aimovig approved from 1/10/20 to 12/31/20    Case ID# [id number]    Co-pay: $3.90    Patient Assistance IS NOT required.    Forward to clinical pharmacist for consult & shipment. -R

## 2020-01-24 DIAGNOSIS — G43.719 INTRACTABLE CHRONIC MIGRAINE WITHOUT AURA AND WITHOUT STATUS MIGRAINOSUS: ICD-10-CM

## 2020-01-24 RX ORDER — RIZATRIPTAN BENZOATE 10 MG/1
TABLET ORAL
Qty: 18 TABLET | Refills: 11 | Status: SHIPPED | OUTPATIENT
Start: 2020-01-24 | End: 2020-03-26 | Stop reason: ALTCHOICE

## 2020-01-28 NOTE — TELEPHONE ENCOUNTER
Patient seen only 1 time, over a year ago.   Do not believe metoprolol will interfere with the levothyroxine  She  Can make appointment if she wants to discuss

## 2020-02-04 ENCOUNTER — TELEPHONE (OUTPATIENT)
Dept: NEUROSURGERY | Facility: CLINIC | Age: 40
End: 2020-02-04

## 2020-02-04 ENCOUNTER — TELEPHONE (OUTPATIENT)
Dept: NEUROLOGY | Facility: CLINIC | Age: 40
End: 2020-02-04

## 2020-02-04 ENCOUNTER — OFFICE VISIT (OUTPATIENT)
Dept: PAIN MEDICINE | Facility: CLINIC | Age: 40
End: 2020-02-04
Payer: MEDICARE

## 2020-02-04 VITALS
DIASTOLIC BLOOD PRESSURE: 66 MMHG | WEIGHT: 293 LBS | RESPIRATION RATE: 20 BRPM | HEART RATE: 67 BPM | TEMPERATURE: 96 F | OXYGEN SATURATION: 97 % | SYSTOLIC BLOOD PRESSURE: 122 MMHG | BODY MASS INDEX: 64.07 KG/M2

## 2020-02-04 DIAGNOSIS — M48.02 CERVICAL STENOSIS OF SPINAL CANAL: ICD-10-CM

## 2020-02-04 DIAGNOSIS — Z98.2 S/P VENTRICULOPERITONEAL SHUNT: ICD-10-CM

## 2020-02-04 DIAGNOSIS — G93.2 PSEUDOTUMOR CEREBRI: Primary | ICD-10-CM

## 2020-02-04 DIAGNOSIS — E66.01 MORBID OBESITY WITH BMI OF 60.0-69.9, ADULT: ICD-10-CM

## 2020-02-04 DIAGNOSIS — G43.719 INTRACTABLE CHRONIC MIGRAINE WITHOUT AURA AND WITHOUT STATUS MIGRAINOSUS: Primary | ICD-10-CM

## 2020-02-04 DIAGNOSIS — M48.02 SPINAL STENOSIS OF CERVICAL REGION: Primary | ICD-10-CM

## 2020-02-04 PROCEDURE — 3078F PR MOST RECENT DIASTOLIC BLOOD PRESSURE < 80 MM HG: ICD-10-PCS | Mod: CPTII,S$GLB,, | Performed by: ANESTHESIOLOGY

## 2020-02-04 PROCEDURE — 99213 OFFICE O/P EST LOW 20 MIN: CPT | Mod: S$GLB,,, | Performed by: ANESTHESIOLOGY

## 2020-02-04 PROCEDURE — 3078F DIAST BP <80 MM HG: CPT | Mod: CPTII,S$GLB,, | Performed by: ANESTHESIOLOGY

## 2020-02-04 PROCEDURE — 3008F BODY MASS INDEX DOCD: CPT | Mod: CPTII,S$GLB,, | Performed by: ANESTHESIOLOGY

## 2020-02-04 PROCEDURE — 3008F PR BODY MASS INDEX (BMI) DOCUMENTED: ICD-10-PCS | Mod: CPTII,S$GLB,, | Performed by: ANESTHESIOLOGY

## 2020-02-04 PROCEDURE — 99213 PR OFFICE/OUTPT VISIT, EST, LEVL III, 20-29 MIN: ICD-10-PCS | Mod: S$GLB,,, | Performed by: ANESTHESIOLOGY

## 2020-02-04 PROCEDURE — 99999 PR PBB SHADOW E&M-EST. PATIENT-LVL V: ICD-10-PCS | Mod: PBBFAC,,, | Performed by: ANESTHESIOLOGY

## 2020-02-04 PROCEDURE — 3074F SYST BP LT 130 MM HG: CPT | Mod: CPTII,S$GLB,, | Performed by: ANESTHESIOLOGY

## 2020-02-04 PROCEDURE — 3074F PR MOST RECENT SYSTOLIC BLOOD PRESSURE < 130 MM HG: ICD-10-PCS | Mod: CPTII,S$GLB,, | Performed by: ANESTHESIOLOGY

## 2020-02-04 PROCEDURE — 99999 PR PBB SHADOW E&M-EST. PATIENT-LVL V: CPT | Mod: PBBFAC,,, | Performed by: ANESTHESIOLOGY

## 2020-02-04 RX ORDER — HYDROCODONE BITARTRATE AND ACETAMINOPHEN 5; 325 MG/1; MG/1
1 TABLET ORAL EVERY 12 HOURS PRN
Qty: 60 TABLET | Refills: 0 | Status: SHIPPED | OUTPATIENT
Start: 2020-03-26 | End: 2020-04-13 | Stop reason: SDUPTHER

## 2020-02-04 RX ORDER — HYDROCODONE BITARTRATE AND ACETAMINOPHEN 5; 325 MG/1; MG/1
1 TABLET ORAL EVERY 12 HOURS PRN
Qty: 60 TABLET | Refills: 0 | Status: SHIPPED | OUTPATIENT
Start: 2020-02-25 | End: 2020-03-26

## 2020-02-04 NOTE — TELEPHONE ENCOUNTER
Called pt to schedule updated Mri and apt with Dr Mccarthy on the same day. Spoke with pt's father. He will give pt message to call back to schedule.

## 2020-02-04 NOTE — TELEPHONE ENCOUNTER
Called patient to reschedule botox appointment. Patient stated she wanted to be seen on the same day as Dr. Chandler which is on 2/24/2020. Informed patient Dr. Chamorro was out that whole week for vacation. Patient states she will call back

## 2020-02-04 NOTE — PROGRESS NOTES
This note was completed with dictation software and grammatical errors may exist.    CC: Back pain, Neck pain, headaches    HPI: The patient is a 39-year-old woman with a history of Chiari malformation, Hadley syndrome, pseudotumor cerebri with shunt, morbid obesity who presents in referral from Dr. Velasco for neck pain. She returns in follow-up today for chronic neck pain, back pain. She reports that her left arm pain has continued to bother her, numbness and tingling, reports that she continues to have clumsiness, dropping things with her left greater than right arm.  She also complains of balance issues, has been falling more often lately.  She showed me bruises from a fall just a week ago where she landed on her knees.  She she reports that she gets vertigo or lightheadedness at times but sometimes just falls without knowing why.  She complains that her headaches are bothering her more, she is seeing Dr. Chamorro today.  She states that the hydrocodone is not working as well anymore and just makes her tired but does not relieve her headaches.      Pain intervention history: She was seeing Dr. Gerson Renteria, pain management and until recently had been taking hydrocodone 5/325 once a day in addition to Flexeril 3 times a day and gabapentin 600 mg 3 times a day.  She apparently tested positive for Valium and so was dismissed from his practice. She is status post C7-T1 cervical interlaminar epidural steroid injection on 10/4/17 with 100% relief lasting 2 weeks.  She is status post a second cervical TABITHA on 11/14/17 with almost complete relief again but only lasting 2-3 weeks. She is status post bilateral L2, 3 and 4 medial branch radiofrequency ablation on 09/21/2018 with 75% relief.     ROS: She reports weight loss, headaches, easy bruising and back pain.  Balance of review of systems is negative.    Past Medical History:   Diagnosis Date    Asthma     Bronchitis     Chiari malformation type I     Chronic  headache     Graves disease     Graves disease     Hyperlipidemia     Hypertension     Murmur, heart     NPH (normal pressure hydrocephalus)     Obesity     MANUEL on CPAP     Pseudotumor cerebri     Thyroid disease     Hadley syndrome        Past Surgical History:   Procedure Laterality Date    CARPAL TUNNEL RELEASE      CERVICAL SPINE SURGERY      CHOLECYSTECTOMY      EAR TUBE REMOVAL Bilateral     EYE SURGERY      strabismus    INJECTION OF FACET JOINT Bilateral 5/23/2018    Procedure: INJECTION-FACET L3/4 and L4/5;  Surgeon: Santana Pak MD;  Location: [facility] OR;  Service: Pain Management;  Laterality: Bilateral;  sacralization of L5 and a rudimentary disc space    MYRINGOTOMY W/ TUBES      RADIOFREQUENCY ABLATION OF LUMBAR MEDIAL BRANCH NERVE AT SINGLE LEVEL Bilateral 9/21/2018    Procedure: RADIOFREQUENCY ABLATION, NERVE, SPINAL, LUMBAR, MEDIAL BRANCH, L2, L3, L4;  Surgeon: Santana Pak MD;  Location: [facility] OR;  Service: Pain Management;  Laterality: Bilateral;    TONSILLECTOMY      VENTRICULOPERITONEAL SHUNT         Social History     Socioeconomic History    Marital status: Single     Spouse name: Not on file    Number of children: Not on file    Years of education: Not on file    Highest education level: Not on file   Occupational History    Not on file   Social Needs    Financial resource strain: Not on file    Food insecurity:     Worry: Not on file     Inability: Not on file    Transportation needs:     Medical: Not on file     Non-medical: Not on file   Tobacco Use    Smoking status: Never Smoker    Smokeless tobacco: Never Used   Substance and Sexual Activity    Alcohol use: No    Drug use: No    Sexual activity: Not on file   Lifestyle    Physical activity:     Days per week: Not on file     Minutes per session: Not on file    Stress: Not on file   Relationships    Social connections:     Talks on phone: Not on file     Gets together: Not on file      Attends Rastafari service: Not on file     Active member of club or organization: Not on file     Attends meetings of clubs or organizations: Not on file     Relationship status: Not on file   Other Topics Concern    Not on file   Social History Narrative    Not on file         Medications/Allergies: See med card    Vitals:    02/04/20 1000   BP: 122/66   Pulse: 67   Resp: 20   Temp: 96.4 °F (35.8 °C)   TempSrc: Oral   SpO2: 97%   Weight: (!) 139 kg (306 lb 8.8 oz)   PainSc:   6   PainLoc: Back     Body mass index is 64.07 kg/m².      Physical exam:  Gen: A and O x3, pleasant, obese  Skin: No rashes or obvious lesions  HEENT: PERRLA, no obvious deformities on ears or in canals.Trachea midline.  CVS: Regular rate and rhythm, normal palpable pulses.  Resp: Clear to auscultation bilaterally, no wheezes or rales.  Abdomen: Soft, NT/ND.  Musculoskeletal:  Wide-based gait.    Neuro:  Upper extremities: 5/5 strength bilaterally   Lower extremities: 5/5 strength bilaterally  Reflexes: Brachioradialis 2+, Bicep 2+, Tricep 2+. Patellar 2+, Achilles 2+ bilaterally.  Sensory: Intact and symmetrical to light touch and pinprick in C2-T1 dermatomes bilaterally.  Intact and symmetrical to light touch and pinprick in L2-S1 dermatomes bilaterally.    Cervical Spine:  Cervical spine: Range of motion is mildly reduced with flexion with no increased pain, moderately reduced with extension with increased neck pain, lateral rotation mildly reduced with increased pain, worse on the left.  Spurling's maneuver causes lateral neck pain.  Myofascial exam:  Moderate tenderness to palpation across cervical paraspinous region and trapezius muscles bilaterally.      Imaging:  MRI from 2/9/17 cervical spine demonstrates congenitally narrowed canal with disc bulging most prominently at C4/5 to the right side causing anterior cord compression but no signal changes.  There is narrowing of the canal at C3/4 to a lesser degree.      Assessment:   The  patient is a 39-year-old woman with a history of Chiari malformation, pseudotumor cerebri with shunt, morbid obesity who presents in referral from Dr. Velasco for neck pain.    1. Pseudotumor cerebri  Ambulatory referral/consult to Neurosurgery   2. S/P ventriculoperitoneal shunt  Ambulatory referral/consult to Neurosurgery   3. Cervical stenosis of spinal canal  Ambulatory referral/consult to Neurosurgery   4. Morbid obesity with BMI of 60.0-69.9, adult         Plan:  1.  We discussed that the hydrocodone is not going to be effective for her headaches, I explained that she could discuss other options for treating her headaches with Dr. Chamorro but I did not recommend changing her opioid medication.  I explained that the hydrocodone may helps of her neck and back pain but I do not consider it as part of the treatment for her headaches.  2.  She is having more frequent falls, I am not sure if this has anything to do with her weight or perhaps vertigo.  On exam, she did not show any signs of myelopathy.  She was seen by Neurosurgery physicians assistant in October after a fall and everything seemed to be working with her  shunt.  She would like to get setback up with a neurosurgeon to discuss long-term management of the  shunt but also cervical spine surgery.  We discussed that she would need to lose weight before any surgical considerations.  Apparently she has discussed weight loss surgery with her PCP but he feels that she needs to lose even more weight before she does that.  I have set her up with a neurosurgery consultation.  3.  I have refilled her hydrocodone 5 milligrams twice daily and we discussed that if she does not feel that is helping as much, we may have her do a drug holiday and then resume it after about a month.  I have given her prescriptions for the next 2 months and I will have her call me for the next refill.  I will have her follow up in 3 months or sooner as needed

## 2020-02-04 NOTE — TELEPHONE ENCOUNTER
----- Message from Sidra Carballo sent at 2/4/2020  3:09 PM CST -----  Contact: Patient  Type: Needs Medical Advice    Who Called:  Patient  Best Call Back Number: [phone]  Additional Information: Calling to speak with the nurse to schedule botox appointment.

## 2020-02-05 ENCOUNTER — TELEPHONE (OUTPATIENT)
Dept: NEUROLOGY | Facility: CLINIC | Age: 40
End: 2020-02-05

## 2020-02-05 RX ORDER — PREDNISONE 10 MG/1
TABLET ORAL
Qty: 20 TABLET | Refills: 0 | Status: SHIPPED | OUTPATIENT
Start: 2020-02-05 | End: 2020-05-21 | Stop reason: SDUPTHER

## 2020-02-05 NOTE — TELEPHONE ENCOUNTER
Returned call and spoke with patient. Botox appointment scheduled to 02/14/2020. Patient would like something to help her with the pain from her migraines. Patient has tried Compazine and Prednisone. Prednisone did help her, but once she was finished taking it, the migraines came back. Please advise.

## 2020-02-05 NOTE — TELEPHONE ENCOUNTER
----- Message from Steffi Sierra sent at 2/5/2020  9:39 AM CST -----  Contact: patient  Type: Needs Medical Advice    Who Called:  patient  Symptoms (please be specific):    How long has patient had these symptoms:    Pharmacy name and phone #:    Best Call Back Number: [phone]  Additional Information: patient would like to ask some questions about getting Botox and would like to schedule it.

## 2020-02-06 NOTE — TELEPHONE ENCOUNTER
Returned call and spoke with patient. Informed her of Dr. Chamorro's note below. Patient verbalized understanding.

## 2020-02-13 ENCOUNTER — TELEPHONE (OUTPATIENT)
Dept: NEUROLOGY | Facility: CLINIC | Age: 40
End: 2020-02-13

## 2020-02-13 NOTE — TELEPHONE ENCOUNTER
----- Message from Brissa Stevenson sent at 2/13/2020  1:55 PM CST -----  Type: Needs Medical Advice    Who Called:  Patient  Best Call Back Number: [phone] (home)     Additional Information: States when she presses on her head shunts, it feels like a needle is sticking her. Please call to advise.

## 2020-02-13 NOTE — TELEPHONE ENCOUNTER
Returned call and spoke with patient. Patient says when she touches her shunt it feels like a pin in touch her brain. Her shunt is throbbing when she is not touching it. Informed patient that if pain is shunt related to contact Neurosurgery. Please advise.

## 2020-02-14 ENCOUNTER — PROCEDURE VISIT (OUTPATIENT)
Dept: NEUROLOGY | Facility: CLINIC | Age: 40
End: 2020-02-14
Payer: MEDICARE

## 2020-02-14 VITALS
DIASTOLIC BLOOD PRESSURE: 84 MMHG | BODY MASS INDEX: 61.5 KG/M2 | HEIGHT: 58 IN | SYSTOLIC BLOOD PRESSURE: 125 MMHG | RESPIRATION RATE: 16 BRPM | HEART RATE: 63 BPM | WEIGHT: 293 LBS

## 2020-02-14 DIAGNOSIS — G43.719 INTRACTABLE CHRONIC MIGRAINE WITHOUT AURA AND WITHOUT STATUS MIGRAINOSUS: Primary | ICD-10-CM

## 2020-02-14 PROCEDURE — 64615 PR CHEMODENERVATION OF MUSCLE FOR CHRONIC MIGRAINE: ICD-10-PCS | Mod: S$GLB,,, | Performed by: PSYCHIATRY & NEUROLOGY

## 2020-02-14 PROCEDURE — 64615 CHEMODENERV MUSC MIGRAINE: CPT | Mod: S$GLB,,, | Performed by: PSYCHIATRY & NEUROLOGY

## 2020-02-14 RX ADMIN — ONABOTULINUMTOXINA 200 UNITS: 100 INJECTION, POWDER, LYOPHILIZED, FOR SOLUTION INTRADERMAL; INTRAMUSCULAR at 01:02

## 2020-02-14 NOTE — PROCEDURES
Procedures       PROCEDURE PERFORMED: Botulinum toxin injection (43702)    CLINICAL INDICATION: G43.719    A time out was conducted just before the start of the procedure to verify the correct patient and procedure, procedure location, and all relevant critical information.     Conventional methods of treatment such as multiple medications , both on and   off label have been tried including: anti-epileptics (topiramate, gabapentin, diamox), beta blockers (metoprolol),  and antidepressants (paxil - cannot trial additional ones due to being on Paxil). The patient has been unresponsive and refractory.The patient meets criteria for chronic headaches according to the ICHD-II, the patient has more than 15 headaches a month which last for more than 4 hours a day.    Injection shows session number: 9  Percentage relief since last session: >50% of migraine relief     DESCRIPTION OF PROCEDURE: After obtaining informed consent and under   aseptic technique, a total of 135 units of botulinum toxin type A were   injected in the following muscles:     -- Procerus 5 units  --  5 units bilaterally  -- Frontalis 20 units  -- Temporalis 20 units bilaterally  -- Occipitalis 15 units bilaterally  -- Trapezius 15 units bilaterally.     -- Upper cervical paraspinals 10 units bilaterally spared due to prior surgery in this area     The patient tolerated the procedure well. There were no complications. The patient was given a prescription for repeat treatment in 12 weeks     Unavoidable waste - 65 units     Flory Chamorro MD    (few units were given around the shunt site)

## 2020-02-21 ENCOUNTER — TELEPHONE (OUTPATIENT)
Dept: PHARMACY | Facility: CLINIC | Age: 40
End: 2020-02-21

## 2020-02-24 ENCOUNTER — HOSPITAL ENCOUNTER (OUTPATIENT)
Dept: RADIOLOGY | Facility: HOSPITAL | Age: 40
Discharge: HOME | End: 2020-02-24
Attending: PHYSICIAN ASSISTANT
Payer: MEDICARE

## 2020-02-24 ENCOUNTER — OFFICE VISIT (OUTPATIENT)
Dept: NEUROSURGERY | Facility: CLINIC | Age: 40
End: 2020-02-24
Payer: MEDICARE

## 2020-02-24 ENCOUNTER — TELEPHONE (OUTPATIENT)
Dept: NEUROSURGERY | Facility: CLINIC | Age: 40
End: 2020-02-24

## 2020-02-24 VITALS
BODY MASS INDEX: 61.5 KG/M2 | WEIGHT: 293 LBS | HEIGHT: 58 IN | DIASTOLIC BLOOD PRESSURE: 64 MMHG | SYSTOLIC BLOOD PRESSURE: 114 MMHG | TEMPERATURE: 98 F

## 2020-02-24 DIAGNOSIS — Z98.2 S/P VENTRICULOPERITONEAL SHUNT: ICD-10-CM

## 2020-02-24 DIAGNOSIS — G93.2 PSEUDOTUMOR CEREBRI: ICD-10-CM

## 2020-02-24 DIAGNOSIS — M48.02 CERVICAL STENOSIS OF SPINAL CANAL: ICD-10-CM

## 2020-02-24 DIAGNOSIS — M48.02 SPINAL STENOSIS OF CERVICAL REGION: ICD-10-CM

## 2020-02-24 PROCEDURE — 3008F BODY MASS INDEX DOCD: CPT | Mod: CPTII,S$GLB,, | Performed by: NEUROLOGICAL SURGERY

## 2020-02-24 PROCEDURE — 72141 MRI NECK SPINE W/O DYE: CPT | Mod: 26,,, | Performed by: RADIOLOGY

## 2020-02-24 PROCEDURE — 99999 PR PBB SHADOW E&M-EST. PATIENT-LVL III: CPT | Mod: PBBFAC,,, | Performed by: NEUROLOGICAL SURGERY

## 2020-02-24 PROCEDURE — 99214 OFFICE O/P EST MOD 30 MIN: CPT | Mod: S$GLB,,, | Performed by: NEUROLOGICAL SURGERY

## 2020-02-24 PROCEDURE — 62252 PR REPROGRAMMING,PROGRAMMABLE CSF SHUNT: ICD-10-PCS | Mod: S$GLB,,, | Performed by: PHYSICIAN ASSISTANT

## 2020-02-24 PROCEDURE — 62252 CSF SHUNT REPROGRAM: CPT | Mod: S$GLB,,, | Performed by: PHYSICIAN ASSISTANT

## 2020-02-24 PROCEDURE — 76000 FLUOROSCOPY <1 HR PHYS/QHP: CPT | Mod: 26,,, | Performed by: RADIOLOGY

## 2020-02-24 PROCEDURE — 3074F PR MOST RECENT SYSTOLIC BLOOD PRESSURE < 130 MM HG: ICD-10-PCS | Mod: CPTII,S$GLB,, | Performed by: NEUROLOGICAL SURGERY

## 2020-02-24 PROCEDURE — 76000 FL SHUNT SETTING: ICD-10-PCS | Mod: 26,,, | Performed by: RADIOLOGY

## 2020-02-24 PROCEDURE — 72141 MRI NECK SPINE W/O DYE: CPT | Mod: TC,PO

## 2020-02-24 PROCEDURE — 3074F SYST BP LT 130 MM HG: CPT | Mod: CPTII,S$GLB,, | Performed by: NEUROLOGICAL SURGERY

## 2020-02-24 PROCEDURE — 3078F DIAST BP <80 MM HG: CPT | Mod: CPTII,S$GLB,, | Performed by: NEUROLOGICAL SURGERY

## 2020-02-24 PROCEDURE — 76000 FLUOROSCOPY <1 HR PHYS/QHP: CPT | Mod: TC,PO

## 2020-02-24 PROCEDURE — 99999 PR PBB SHADOW E&M-EST. PATIENT-LVL III: ICD-10-PCS | Mod: PBBFAC,,, | Performed by: NEUROLOGICAL SURGERY

## 2020-02-24 PROCEDURE — 3008F PR BODY MASS INDEX (BMI) DOCUMENTED: ICD-10-PCS | Mod: CPTII,S$GLB,, | Performed by: NEUROLOGICAL SURGERY

## 2020-02-24 PROCEDURE — 72141 MRI CERVICAL SPINE WITHOUT CONTRAST: ICD-10-PCS | Mod: 26,,, | Performed by: RADIOLOGY

## 2020-02-24 PROCEDURE — 99214 PR OFFICE/OUTPT VISIT, EST, LEVL IV, 30-39 MIN: ICD-10-PCS | Mod: S$GLB,,, | Performed by: NEUROLOGICAL SURGERY

## 2020-02-24 PROCEDURE — 3078F PR MOST RECENT DIASTOLIC BLOOD PRESSURE < 80 MM HG: ICD-10-PCS | Mod: CPTII,S$GLB,, | Performed by: NEUROLOGICAL SURGERY

## 2020-02-24 RX ORDER — CLINDAMYCIN HYDROCHLORIDE 300 MG/1
CAPSULE ORAL
COMMUNITY
Start: 2020-02-17 | End: 2020-07-31

## 2020-02-24 NOTE — LETTER
February 24, 2020      Santana Pak MD  [address] [facility] [address] [place] [address]           [place] [facility]  [address] [place] [address]  Phone: [phone]  Fax: [phone]          Patient: Shanna Douglass   MR Number: [id number]   YOB: 1980   Date of Visit: 2/24/2020       Dear Dr. Santana Pak:    Thank you for referring Shanna Douglass to me for evaluation. Attached you will find relevant portions of my assessment and plan of care.    If you have questions, please do not hesitate to call me. I look forward to following Shanna Douglass along with you.    Sincerely,    Sunita Mccarthy MD    Enclosure  CC:  No Recipients    If you would like to receive this communication electronically, please contact [email or web address] or [phone] to request more information on [facility] [place] Link access.    For providers and/or their staff who would like to refer a patient to [facility], please contact us through our one-stop-shop provider referral line, [facility], at [phone].    If you feel you have received this communication in error or would no longer like to receive these types of communications, please e-mail [email or web address]

## 2020-02-24 NOTE — PROGRESS NOTES
Neurosurgery History and Physical    Patient ID: Shanna Douglass is a 39 y.o. female.    Chief Complaint   Patient presents with    Cervical Spine Pain (C-spine)     neck pain for many months with gradual worsening. Patient states her pain radiates down both arms to her hands. She said she has been having trouble with her  and has been dropping items. She describes this pain as a numbness and tingling. Also said she has a stabbing pain when she presses down on her shunt with increased headaches. Pain is aggrevated by extension of her neck. Denies any alleviating factors. Denies bladder or bowel dysfunction.          Review of Systems   Constitutional: Negative.    HENT: Negative.    Eyes: Negative.    Cardiovascular: Negative.    Gastrointestinal: Negative.    Endocrine: Negative.    Genitourinary: Negative.    Musculoskeletal: Positive for gait problem and neck pain.   Skin: Negative.    Allergic/Immunologic: Negative.    Neurological: Positive for weakness, numbness and headaches.   Hematological: Negative.    Psychiatric/Behavioral: Negative.        Past Medical History:   Diagnosis Date    Asthma     Bronchitis     Chiari malformation type I     Chronic headache     Graves disease     Graves disease     Hyperlipidemia     Hypertension     Murmur, heart     NPH (normal pressure hydrocephalus)     Obesity     MANUEL on CPAP     Pseudotumor cerebri     Thyroid disease     Hadley syndrome      Social History     Socioeconomic History    Marital status: Single     Spouse name: Not on file    Number of children: Not on file    Years of education: Not on file    Highest education level: Not on file   Occupational History    Not on file   Social Needs    Financial resource strain: Not on file    Food insecurity:     Worry: Not on file     Inability: Not on file    Transportation needs:     Medical: Not on file     Non-medical: Not on file   Tobacco Use    Smoking status: Never Smoker     Smokeless tobacco: Never Used   Substance and Sexual Activity    Alcohol use: No    Drug use: No    Sexual activity: Not on file   Lifestyle    Physical activity:     Days per week: Not on file     Minutes per session: Not on file    Stress: Not on file   Relationships    Social connections:     Talks on phone: Not on file     Gets together: Not on file     Attends Yazidism service: Not on file     Active member of club or organization: Not on file     Attends meetings of clubs or organizations: Not on file     Relationship status: Not on file   Other Topics Concern    Not on file   Social History Narrative    Not on file     Family History   Problem Relation Age of Onset    Diabetes Mother     Hypertension Father     Heart disease Father     Heart disease Maternal Grandfather      Review of patient's allergies indicates:   Allergen Reactions    Diamox [acetazolamide] Hives    Dye Swelling and Rash     Iodine       Current Outpatient Medications:     amantadine HCl (SYMMETREL) 100 mg capsule,  TAKE ONE CAPSULE EVERY DAY FOR ENERGY *THANK YOU*, Disp: 30 capsule, Rfl: 11    aspirin 81 MG Chew, Take 81 mg by mouth once daily., Disp: , Rfl:     bepotastine besilate (BEPREVE) 1.5 % Drop, Apply to eye., Disp: , Rfl:     celecoxib (CELEBREX) 100 MG capsule, Take 2 capsules (200 mg total) by mouth 2 (two) times daily as needed (headache and sharp pain). No more than 3 days per week., Disp: 30 capsule, Rfl: 11    cyclobenzaprine (FLEXERIL) 10 MG tablet, Take 1 tablet (10 mg total) by mouth 3 (three) times daily as needed for Muscle spasms., Disp: 90 tablet, Rfl: 2    furosemide (LASIX) 80 MG tablet, Take 80 mg by mouth once daily. , Disp: , Rfl:     [START ON 2/25/2020] HYDROcodone-acetaminophen (NORCO) 5-325 mg per tablet, Take 1 tablet by mouth every 12 (twelve) hours as needed for Pain., Disp: 60 tablet, Rfl: 0    [START ON 3/26/2020] HYDROcodone-acetaminophen (NORCO) 5-325 mg per tablet, Take 1  tablet by mouth every 12 (twelve) hours as needed for Pain., Disp: 60 tablet, Rfl: 0    hydrOXYzine HCl (ATARAX) 25 MG tablet, Take 25 mg by mouth 3 (three) times daily., Disp: , Rfl:     levothyroxine (SYNTHROID) 125 MCG tablet, Take 125 mcg by mouth., Disp: , Rfl:     levothyroxine (SYNTHROID) 25 MCG tablet, Take 25 mcg by mouth once daily., Disp: , Rfl:     metoprolol succinate (TOPROL-XL) 25 MG 24 hr tablet, Take 1 tablet (25 mg total) by mouth once daily., Disp: 90 tablet, Rfl: 1    metoprolol succinate (TOPROL-XL) 25 MG 24 hr tablet, Take 25 mg by mouth once daily., Disp: , Rfl:     montelukast (SINGULAIR) 10 mg tablet, Take 10 mg by mouth every evening., Disp: , Rfl:     omeprazole (PRILOSEC) 20 MG capsule, Take 40 mg by mouth once daily. , Disp: , Rfl:     ondansetron (ZOFRAN) 4 MG tablet, Take 8 mg by mouth 2 (two) times daily., Disp: , Rfl:     ondansetron (ZOFRAN-ODT) 4 MG TbDL, Take 8 mg by mouth every 12 (twelve) hours., Disp: , Rfl:     paroxetine (PAXIL) 20 MG tablet, 40 mg. , Disp: , Rfl:     potassium chloride (MICRO-K) 10 MEQ CpSR, Take 10 mEq by mouth once., Disp: , Rfl:     potassium chloride SA (K-DUR,KLOR-CON) 10 MEQ tablet, , Disp: , Rfl:     prochlorperazine (COMPAZINE) 10 MG tablet, Take 1 tablet (10 mg total) by mouth every 6 (six) hours as needed (migraine or nausea)., Disp: 60 tablet, Rfl: 11    rizatriptan (MAXALT) 10 MG tablet, TAKE 1 TABLET FOR MIGRAINE MAY REPEAT EVERY 2 HOURS NOT TO EXCEED 3 TABLETS IN 24 HOURS *THANK YOU*, Disp: 18 tablet, Rfl: 11    selenium 200 mcg Cap, Take 200 mcg by mouth once daily., Disp: , Rfl:     topiramate (TOPAMAX) 100 MG tablet,  TAKE 1 TABLET TWICE DAILY *THANK YOU*, Disp: 60 tablet, Rfl: 11    cephALEXin (KEFLEX) 500 MG capsule, , Disp: , Rfl:     clindamycin (CLEOCIN) 300 MG capsule, , Disp: , Rfl:     erenumab-aooe 140 mg/mL AtIn, Inject 140 mg into the skin every 28 days., Disp: 1 mL, Rfl: 10    levothyroxine (SYNTHROID) 137 MCG  Tab tablet, Take 137 mcg by mouth., Disp: , Rfl:     lidocaine (XYLOCAINE) 5 % Oint ointment, Apply small amount topically to the head (shunt area) (Patient not taking: Reported on 2/24/2020), Disp: 120 g, Rfl: 11    liothyronine (CYTOMEL) 5 MCG Tab, Take 5 mcg by mouth., Disp: , Rfl:     meloxicam (MOBIC) 7.5 MG tablet, , Disp: , Rfl:     methIMAzole (TAPAZOLE) 10 MG Tab, Take 10 mg by mouth 2 (two) times daily., Disp: , Rfl:     predniSONE (DELTASONE) 10 MG tablet, 4 tab PO in AM  X 2 days, 3 tab in AM x 2 days, 2 tab in AM x 2 days, 1 tab in AM x 2 days then stop. (Patient not taking: Reported on 2/24/2020), Disp: 20 tablet, Rfl: 0    predniSONE (DELTASONE) 10 MG tablet, 4 tab PO in AM  X 2 days, 3 tab in AM x 2 days, 2 tab in AM x 2 days, 1 tab in AM x 2 days then stop. (Patient not taking: Reported on 2/24/2020), Disp: 20 tablet, Rfl: 0    ranitidine (ZANTAC) 300 MG capsule, Take 300 mg by mouth every evening., Disp: , Rfl:     rosuvastatin (CRESTOR) 10 MG tablet, Take 1 tablet (10 mg total) by mouth once daily., Disp: 90 tablet, Rfl: 3    sulfamethoxazole-trimethoprim 400-80mg (BACTRIM,SEPTRA) 400-80 mg per tablet, Take 1 tablet by mouth every 12 (twelve) hours., Disp: , Rfl:     topiramate (TOPAMAX) 100 MG tablet, Take 1 tablet (100 mg total) by mouth every evening. (Patient not taking: Reported on 2/24/2020), Disp: 30 tablet, Rfl: 11    Current Facility-Administered Medications:     onabotulinumtoxina injection 200 Units, 200 Units, Intramuscular, Q90 Days, Flory Chamorro MD, 200 Units at 07/06/18 1548    onabotulinumtoxina injection 200 Units, 200 Units, Intramuscular, Q90 Days, Flory Chamorro MD, 200 Units at 11/19/19 1312    onabotulinumtoxina injection 200 Units, 200 Units, Intramuscular, Q90 Days, Flory Chamorro MD, 200 Units at 02/14/20 1322    perflutren protein-A microsphr 0.22 mg/mL IV susp, 2 mL, Intravenous, 1 time in Clinic/HOD, Red Ward MD  Blood pressure 114/64, temperature  "98 °F (36.7 °C), height 4' 10" (1.473 m), weight (!) 137 kg (302 lb 0.5 oz).      Neurologic Exam     Mental Status   Oriented to person, place, and time.   Attention: normal. Concentration: normal.   Speech: speech is normal   Level of consciousness: alert  Knowledge: good.     Cranial Nerves     CN II   Visual acuity: normal    CN III, IV, VI   Pupils are equal, round, and reactive to light.  Extraocular motions are normal.     CN V   Facial sensation intact.     CN VII   Facial expression full, symmetric.     CN VIII   Hearing: intact    CN IX, X   Palate: symmetric    CN XI   CN XI normal.     CN XII   CN XII normal.     Motor Exam   Muscle bulk: normal  Overall muscle tone: normal  Right arm pronator drift: absent  Left arm pronator drift: absent    Strength   Right deltoid: 5/5  Left deltoid: 5/5  Right biceps: 5/5  Left biceps: 5/5  Right triceps: 5/5  Left triceps: 5/5  Right wrist flexion: 5/5  Left wrist flexion: 5/5  Right wrist extension: 5/5  Left wrist extension: 5/5  Right interossei: 5/5  Left interossei: 5/5  Right iliopsoas: 5/5  Left iliopsoas: 5/5  Right quadriceps: 5/5  Left quadriceps: 5/5  Right hamstrin/5  Left hamstrin/5  Right anterior tibial: 5/5  Left anterior tibial: 5/5  Right posterior tibial: 5/5  Left posterior tibial: 5/5  Right peroneal: 5/5  Left peroneal: 5/5  Right gastroc: 5/5  Left gastroc: 5/5    Sensory Exam   Right arm light touch: normal  Left arm light touch: left thumb.  Right leg light touch: normal  Left leg light touch: normal    Gait, Coordination, and Reflexes     Gait  Gait: normal    Coordination   Romberg: negative  Finger to nose coordination: normal    Tremor   Resting tremor: absent    Reflexes   Right brachioradialis: 2+  Left brachioradialis: 2+  Right biceps: 2+  Left biceps: 2+  Right triceps: 2+  Left triceps: 2+  Right patellar: 3+  Left patellar: 3+  Right achilles: 2+  Left achilles: 2+  Right plantar: normal  Left plantar: normal  Right " "Cardenas: present  Left Cardenas: present  Right ankle clonus: absent  Left ankle clonus: absent      Physical Exam   Constitutional: She is oriented to person, place, and time. She appears well-developed and well-nourished.   HENT:   Head: Normocephalic and atraumatic.   Eyes: Pupils are equal, round, and reactive to light. EOM are normal.   Neck: Normal range of motion. Neck supple.   Cardiovascular: Normal rate and regular rhythm.   Pulmonary/Chest: Effort normal.   Abdominal: Soft.   Musculoskeletal: Normal range of motion.   Neurological: She is alert and oriented to person, place, and time. She has a normal Finger-Nose-Finger Test and a normal Romberg Test. Gait normal.   Reflex Scores:       Tricep reflexes are 2+ on the right side and 2+ on the left side.       Bicep reflexes are 2+ on the right side and 2+ on the left side.       Brachioradialis reflexes are 2+ on the right side and 2+ on the left side.       Patellar reflexes are 3+ on the right side and 3+ on the left side.       Achilles reflexes are 2+ on the right side and 2+ on the left side.  Skin: Skin is warm and dry.   Psychiatric: She has a normal mood and affect. Her speech is normal and behavior is normal. Judgment and thought content normal.   Nursing note and vitals reviewed.      Vital Signs  Temp: 98 °F (36.7 °C)  BP: 114/64  Pain Score:   5  Height and Weight  Height: 4' 10" (147.3 cm)  Weight: (!) 137 kg (302 lb 0.5 oz)  BSA (Calculated - sq m): 2.37 sq meters  BMI (Calculated): 63.1  Weight in (lb) to have BMI = 25: 119.4]    Provider dictation:  I reviewed the imaging.  She has a congenitally narrow cervical spinal canal.  She has auto fusion of the C2 and C3 vertebrae.  There is evidence of prior suboccipital craniectomy.  At C5-C6, there is a right paracentral disc herniation resulting in severe stenosis with deformation of the spinal cord.  There is no abnormal signal in the spinal cord.  This finding is relatively stable compared to a " 2018 MRI.    Very complex patient with a history of pseudotumor status post ventriculoperitoneal shunt and Chiari malformation status post suboccipital craniectomy and morbid obesity.  She is a poor historian.  She complains of chronic neck pain for a few years.  She localizes the pain in the posterior cervical spine.  There is no clear radiation of the pain but she describes bilateral hand numbness.  She also endorses dropping things from her hands and clumsiness.  She also states that she has had multiple falls.  It is unclear whether those falls are mechanical due to her body habitus or whether there is any significant gait dysfunction on history.    On exam, she has no weakness.  She has significant numbness in her left thumb distal to the wrist.  She has evidence of bilateral carpal tunnel surgery.  She has bilateral Ramya signs.  She has mild hyperreflexia with bilateral patellar reflexes.  No other myelopathic reflexes.    Based on prior evaluations, the Ramya signs appear to be new findings.  This is concerning for new cervical myelopathy.  However, the pattern of her hand numbness on exam is more consistent with recurrence carpal tunnel syndrome.  Due to the multitude of medical and neurological issues, her falls and chronic neck pain may very well be multifactorial and not exclusively secondary to spinal cord dysfunction.  Therefore I would like to obtain an EMG nerve conduction test of her upper extremities to look for signs of cervical radiculopathy and/or carpal tunnel syndrome and observe her clinically with the follow-up visit in approximately 3 months.    Visit Diagnosis:  Pseudotumor cerebri  -     Ambulatory referral/consult to Neurosurgery    S/P ventriculoperitoneal shunt  -     Ambulatory referral/consult to Neurosurgery    Cervical stenosis of spinal canal  -     Ambulatory referral/consult to Neurosurgery

## 2020-02-24 NOTE — PROGRESS NOTES
Procedure:  Shunt reprogramming  Indication: Hydrocephalus        Mr./Ms. presents to clinic today to have his/her shunt reprogrammed following an MRI. The Codman  was placed over the shunt valve, and the setting was reset to 150 mm H2O. The patient tolerated this well, with no complications. She was informed to call the clinic with any further questions or concerns in the meantime. Xray Shunt series ordered for confirmation.       Nicolette Garcia PA-C   Neurosurgery

## 2020-02-26 ENCOUNTER — TELEPHONE (OUTPATIENT)
Dept: PHARMACY | Facility: CLINIC | Age: 40
End: 2020-02-26

## 2020-02-27 RX ORDER — ROSUVASTATIN CALCIUM 10 MG/1
TABLET, COATED ORAL
Qty: 90 TABLET | Refills: 3 | Status: SHIPPED | OUTPATIENT
Start: 2020-02-27 | End: 2020-09-09 | Stop reason: SDUPTHER

## 2020-03-12 ENCOUNTER — TELEPHONE (OUTPATIENT)
Dept: NEUROLOGY | Facility: CLINIC | Age: 40
End: 2020-03-12

## 2020-03-12 DIAGNOSIS — G43.719 INTRACTABLE CHRONIC MIGRAINE WITHOUT AURA AND WITHOUT STATUS MIGRAINOSUS: Primary | ICD-10-CM

## 2020-03-12 RX ORDER — ELETRIPTAN HYDROBROMIDE 40 MG/1
TABLET, FILM COATED ORAL
Qty: 10 TABLET | Refills: 11 | Status: SHIPPED | OUTPATIENT
Start: 2020-03-12 | End: 2020-03-26 | Stop reason: ALTCHOICE

## 2020-03-12 NOTE — TELEPHONE ENCOUNTER
Returned call and spoke with patient. Patient has taken Compazine and Celebrex with no relief. Patient is taking her Topamax nightly. Patient is having a headache and would like something called in. Please advise.

## 2020-03-12 NOTE — TELEPHONE ENCOUNTER
----- Message from Sosa Montano sent at 3/12/2020 12:38 PM CDT -----  Contact: Patient  Type: Needs Medical Advice    Who Called:  PATIENT  Symptoms (please be specific):  Headache and shunt pain  How long has patient had these symptoms:  All week  Best Call Back Number: [phone]  Additional Information: Patient is stating that she is having headaches and her shunt is hurting.Please call back and advise.

## 2020-03-20 ENCOUNTER — TELEPHONE (OUTPATIENT)
Dept: NEUROLOGY | Facility: CLINIC | Age: 40
End: 2020-03-20

## 2020-03-20 ENCOUNTER — TELEPHONE (OUTPATIENT)
Dept: PAIN MEDICINE | Facility: CLINIC | Age: 40
End: 2020-03-20

## 2020-03-20 RX ORDER — CYCLOBENZAPRINE HCL 10 MG
10 TABLET ORAL 3 TIMES DAILY PRN
Qty: 90 TABLET | Refills: 2 | Status: SHIPPED | OUTPATIENT
Start: 2020-03-20 | End: 2020-08-05 | Stop reason: SDUPTHER

## 2020-03-20 NOTE — TELEPHONE ENCOUNTER
----- Message from Lauren Erickson sent at 3/20/2020 10:28 AM CDT -----  Contact: self  Type:  RX Refill Request    Who Called:  self  Refill or New Rx:  refill  RX Name and Strength:  cyclobenzaprine (FLEXERIL) 10 MG tablet  How is the patient currently taking it? (ex. 1XDay):  1Xday  Is this a 30 day or 90 day RX:  30  Preferred Pharmacy with phone number:    [address] [address]  Phone: [phone] Fax: [phone]  Local or Mail Order:  local  Ordering Provider:  Farrukh June  Best Call Back Number:  [phone] (home)   Additional Information:  Patient is out of refills. Please call patient. Thanks!

## 2020-03-20 NOTE — TELEPHONE ENCOUNTER
----- Message from Lauren Erickson sent at 3/20/2020 10:26 AM CDT -----  Contact: self  Type:  RX Refill Request    Who Called:  self  Refill or New Rx:  refill  RX Name and Strength:  topiramate (TOPAMAX) 100 MG tablet  How is the patient currently taking it? (ex. 1XDay):  2Xday  Is this a 30 day or 90 day RX:  30  Preferred Pharmacy with phone number:    [address] [address]  Phone: [phone] Fax: [phone]  Local or Mail Order:  local  Ordering Provider:  Dr Ashtyn Royal Call Back Number:  [phone] (home)   Additional Information:  Patient is out of refills. Please call patient. Thanks!

## 2020-03-20 NOTE — TELEPHONE ENCOUNTER
Attempted to leave message for pt. Voicemail full. Called pt father who states pt will call back in regards to medication refill. Verbalized understanding.

## 2020-03-23 ENCOUNTER — TELEPHONE (OUTPATIENT)
Dept: PHARMACY | Facility: CLINIC | Age: 40
End: 2020-03-23

## 2020-03-25 ENCOUNTER — TELEPHONE (OUTPATIENT)
Dept: PHARMACY | Facility: CLINIC | Age: 40
End: 2020-03-25

## 2020-03-26 ENCOUNTER — TELEPHONE (OUTPATIENT)
Dept: NEUROLOGY | Facility: CLINIC | Age: 40
End: 2020-03-26

## 2020-03-26 DIAGNOSIS — G43.719 INTRACTABLE CHRONIC MIGRAINE WITHOUT AURA AND WITHOUT STATUS MIGRAINOSUS: Primary | ICD-10-CM

## 2020-03-26 RX ORDER — BUTALBITAL, ACETAMINOPHEN AND CAFFEINE 50; 325; 40 MG/1; MG/1; MG/1
TABLET ORAL
Qty: 10 TABLET | Refills: 3 | Status: SHIPPED | OUTPATIENT
Start: 2020-03-26 | End: 2020-07-31

## 2020-03-26 NOTE — TELEPHONE ENCOUNTER
Spoke with pt and informed to stop maxalt and relpax. Fioricet sent to [facility] pharmacy. Verbalized understanding.

## 2020-03-26 NOTE — TELEPHONE ENCOUNTER
Spoke with pt. States she broke out in hives after starting Relpax. Reports she took benedryl and is doing fine. Stopped taking Maxalt due to it causing her heart to race. Still having headaches with no relief.

## 2020-03-26 NOTE — TELEPHONE ENCOUNTER
----- Message from Nery Harris sent at 3/26/2020  8:58 AM CDT -----  Contact: patient  Type: Needs Medical Advice    Who Called:  patient  Symptoms (please be specific):    How long has patient had these symptoms:    Pharmacy name and phone #:    Best Call Back Number: [phone]  Additional Information: requesting a call back regarding allergic reaction for new medication that  prescribe

## 2020-03-26 NOTE — TELEPHONE ENCOUNTER
Ok stop relpax and maxalt; sent fioricet over to [facility] pharmacy please let her know

## 2020-03-31 ENCOUNTER — TELEPHONE (OUTPATIENT)
Dept: NEUROLOGY | Facility: CLINIC | Age: 40
End: 2020-03-31

## 2020-03-31 ENCOUNTER — DOCUMENTATION ONLY (OUTPATIENT)
Dept: NEUROLOGY | Facility: CLINIC | Age: 40
End: 2020-03-31

## 2020-03-31 NOTE — TELEPHONE ENCOUNTER
----- Message from Mirtha Catnu sent at 3/31/2020  1:38 PM CDT -----  Type: Needs Medical Advice    Who Called:  patient  Best Call Back Number: [phone]  Additional Information: contact patient regarding her home shots, she states insurance is denying.

## 2020-03-31 NOTE — TELEPHONE ENCOUNTER
Attempted to contact pt. Home number not available. Called mobile number of pt uncle who states pt will call back. Verbalized understanding.

## 2020-03-31 NOTE — TELEPHONE ENCOUNTER
Spoke with [facility]. Will sent formulary exception paperwork for Fioricet. Will await fax.

## 2020-03-31 NOTE — TELEPHONE ENCOUNTER
----- Message from Flory Chamorro MD sent at 3/31/2020  3:17 PM CDT -----  Send note along with the form

## 2020-03-31 NOTE — PROGRESS NOTES
Request for formulary exception for Fiorciet:    The patient needs an analgesic for her chronic intractable migraines. She is allergic/intolerant of sumatriptan, rizatriptan, and eletriptan. She is already on chronic coursed of celecoxib precluding the use of additional NSAIDs, and she is on a chronic opiate regimen from her pain management provider precluding the additional of opiates. She tried and failed prochlorperazine, Excedrin, acetaminophen, and lidocaine nasal spray. We are requesting a formulary exception for 10 tablets per month of generic Fioricet because this is literally the only class of rescue medicines remaining for this patient.    Flory Chamorro MD

## 2020-04-01 ENCOUNTER — TELEPHONE (OUTPATIENT)
Dept: NEUROLOGY | Facility: CLINIC | Age: 40
End: 2020-04-01

## 2020-04-08 ENCOUNTER — TELEPHONE (OUTPATIENT)
Dept: NEUROLOGY | Facility: CLINIC | Age: 40
End: 2020-04-08

## 2020-04-08 NOTE — TELEPHONE ENCOUNTER
Spoke with pt and informed formulary exception for fioricet approved with insurance. Instructed pt to call pharmacy to re-run medication. Pt verbalized understanding.

## 2020-04-08 NOTE — TELEPHONE ENCOUNTER
----- Message from Nery Harris sent at 4/6/2020  9:43 AM CDT -----  Contact: patient  Type: Needs Medical Advice    Who Called:  patient  Symptoms (please be specific):    How long has patient had these symptoms:    Pharmacy name and phone #:  [facility] pharmacy  Best Call Back Number:   Additional Information: called to advise that her insurance would not cover the caffeine medication,requesting another medication that insurance will cover

## 2020-04-09 ENCOUNTER — PATIENT MESSAGE (OUTPATIENT)
Dept: NEUROLOGY | Facility: CLINIC | Age: 40
End: 2020-04-09

## 2020-04-09 ENCOUNTER — PATIENT MESSAGE (OUTPATIENT)
Dept: NEUROSURGERY | Facility: CLINIC | Age: 40
End: 2020-04-09

## 2020-04-09 ENCOUNTER — PATIENT MESSAGE (OUTPATIENT)
Dept: PAIN MEDICINE | Facility: CLINIC | Age: 40
End: 2020-04-09

## 2020-04-09 DIAGNOSIS — G43.719 INTRACTABLE CHRONIC MIGRAINE WITHOUT AURA AND WITHOUT STATUS MIGRAINOSUS: Primary | ICD-10-CM

## 2020-04-09 RX ORDER — GALCANEZUMAB 120 MG/ML
INJECTION, SOLUTION SUBCUTANEOUS
Qty: 2 ML | Refills: 0 | Status: SHIPPED | OUTPATIENT
Start: 2020-04-09 | End: 2020-04-23 | Stop reason: ALTCHOICE

## 2020-04-11 ENCOUNTER — PATIENT MESSAGE (OUTPATIENT)
Dept: NEUROLOGY | Facility: CLINIC | Age: 40
End: 2020-04-11

## 2020-04-12 ENCOUNTER — PATIENT MESSAGE (OUTPATIENT)
Dept: PAIN MEDICINE | Facility: CLINIC | Age: 40
End: 2020-04-12

## 2020-04-13 ENCOUNTER — PATIENT MESSAGE (OUTPATIENT)
Dept: NEUROSURGERY | Facility: CLINIC | Age: 40
End: 2020-04-13

## 2020-04-13 RX ORDER — HYDROCODONE BITARTRATE AND ACETAMINOPHEN 5; 325 MG/1; MG/1
1 TABLET ORAL EVERY 12 HOURS PRN
Qty: 60 TABLET | Refills: 0 | Status: SHIPPED | OUTPATIENT
Start: 2020-04-22 | End: 2020-05-05 | Stop reason: SDUPTHER

## 2020-04-13 NOTE — TELEPHONE ENCOUNTER
I ordered ankle x-rays almost a year ago 05/08/2019 when she called complaining of ankle pain.  She does not need to do this now if this is no longer a problem.  Even if this is a problem, if it is not severe she should wait until the virus outbreak improves.

## 2020-04-13 NOTE — TELEPHONE ENCOUNTER
Called patient to let her know Dr. Pak refilled her pain medication, got voicemail, left return call message.

## 2020-04-14 ENCOUNTER — TELEPHONE (OUTPATIENT)
Dept: NEUROLOGY | Facility: CLINIC | Age: 40
End: 2020-04-14

## 2020-04-14 ENCOUNTER — TELEPHONE (OUTPATIENT)
Dept: PHARMACY | Facility: AMBULARY SURGERY CENTER | Age: 40
End: 2020-04-14

## 2020-04-20 ENCOUNTER — PATIENT MESSAGE (OUTPATIENT)
Dept: NEUROSURGERY | Facility: CLINIC | Age: 40
End: 2020-04-20

## 2020-04-20 ENCOUNTER — PATIENT MESSAGE (OUTPATIENT)
Dept: NEUROLOGY | Facility: CLINIC | Age: 40
End: 2020-04-20

## 2020-04-20 DIAGNOSIS — G43.719 INTRACTABLE CHRONIC MIGRAINE WITHOUT AURA AND WITHOUT STATUS MIGRAINOSUS: ICD-10-CM

## 2020-04-20 RX ORDER — GALCANEZUMAB 120 MG/ML
INJECTION, SOLUTION SUBCUTANEOUS
Qty: 2 ML | Refills: 0 | Status: CANCELLED | OUTPATIENT
Start: 2020-04-20 | End: 2020-05-18

## 2020-04-21 RX ORDER — CELECOXIB 100 MG/1
200 CAPSULE ORAL 2 TIMES DAILY PRN
Qty: 30 CAPSULE | Refills: 11 | Status: SHIPPED | OUTPATIENT
Start: 2020-04-21 | End: 2021-04-27

## 2020-04-21 NOTE — TELEPHONE ENCOUNTER
Spoke with pt and informed celebrex fill sent to [facility]pick[facility]. Verbalized understanding.

## 2020-04-23 ENCOUNTER — TELEPHONE (OUTPATIENT)
Dept: PHARMACY | Facility: CLINIC | Age: 40
End: 2020-04-23

## 2020-04-23 NOTE — TELEPHONE ENCOUNTER
Patient requested to fill her Emgality with [facility]. Notified her it is too soon on her insurance and we will reach back out closer to her due date.

## 2020-04-27 ENCOUNTER — PATIENT MESSAGE (OUTPATIENT)
Dept: PAIN MEDICINE | Facility: CLINIC | Age: 40
End: 2020-04-27

## 2020-04-29 ENCOUNTER — PATIENT MESSAGE (OUTPATIENT)
Dept: PHARMACY | Facility: CLINIC | Age: 40
End: 2020-04-29

## 2020-04-29 ENCOUNTER — PATIENT MESSAGE (OUTPATIENT)
Dept: NEUROSURGERY | Facility: CLINIC | Age: 40
End: 2020-04-29

## 2020-04-29 ENCOUNTER — TELEPHONE (OUTPATIENT)
Dept: PHARMACY | Facility: CLINIC | Age: 40
End: 2020-04-29

## 2020-04-29 ENCOUNTER — PATIENT MESSAGE (OUTPATIENT)
Dept: PAIN MEDICINE | Facility: CLINIC | Age: 40
End: 2020-04-29

## 2020-04-29 NOTE — TELEPHONE ENCOUNTER
RX call attempt 1 regarding Emgality refill from [facility]. Patient was not reached, no voicemail. Sent [employer] message. Copay $3.90.

## 2020-05-01 ENCOUNTER — TELEPHONE (OUTPATIENT)
Dept: NEUROSURGERY | Facility: CLINIC | Age: 40
End: 2020-05-01

## 2020-05-01 NOTE — TELEPHONE ENCOUNTER
Order for EMG faxed to Dr. Olea's office for scheduling. Patient said she will call after her EMG for appt with Dr. Mccarthy.

## 2020-05-05 ENCOUNTER — OFFICE VISIT (OUTPATIENT)
Dept: PAIN MEDICINE | Facility: CLINIC | Age: 40
End: 2020-05-05
Payer: MEDICARE

## 2020-05-05 ENCOUNTER — TELEPHONE (OUTPATIENT)
Dept: PAIN MEDICINE | Facility: CLINIC | Age: 40
End: 2020-05-05

## 2020-05-05 DIAGNOSIS — Z79.891 OPIOID CONTRACT EXISTS: ICD-10-CM

## 2020-05-05 DIAGNOSIS — M51.36 DDD (DEGENERATIVE DISC DISEASE), LUMBAR: ICD-10-CM

## 2020-05-05 DIAGNOSIS — M50.30 DDD (DEGENERATIVE DISC DISEASE), CERVICAL: ICD-10-CM

## 2020-05-05 DIAGNOSIS — R29.898 MUSCULAR DECONDITIONING: ICD-10-CM

## 2020-05-05 DIAGNOSIS — G43.719 INTRACTABLE CHRONIC MIGRAINE WITHOUT AURA AND WITHOUT STATUS MIGRAINOSUS: ICD-10-CM

## 2020-05-05 DIAGNOSIS — E66.01 MORBID OBESITY WITH BMI OF 60.0-69.9, ADULT: ICD-10-CM

## 2020-05-05 DIAGNOSIS — G93.2 PSEUDOTUMOR CEREBRI: ICD-10-CM

## 2020-05-05 DIAGNOSIS — M48.02 CERVICAL STENOSIS OF SPINAL CANAL: Primary | ICD-10-CM

## 2020-05-05 DIAGNOSIS — Z98.2 S/P VENTRICULOPERITONEAL SHUNT: ICD-10-CM

## 2020-05-05 PROCEDURE — 99213 PR OFFICE/OUTPT VISIT, EST, LEVL III, 20-29 MIN: ICD-10-PCS | Mod: 95,,, | Performed by: PHYSICIAN ASSISTANT

## 2020-05-05 PROCEDURE — 99213 OFFICE O/P EST LOW 20 MIN: CPT | Mod: 95,,, | Performed by: PHYSICIAN ASSISTANT

## 2020-05-05 RX ORDER — HYDROCODONE BITARTRATE AND ACETAMINOPHEN 5; 325 MG/1; MG/1
1 TABLET ORAL EVERY 12 HOURS PRN
Qty: 60 TABLET | Refills: 0 | Status: SHIPPED | OUTPATIENT
Start: 2020-06-22 | End: 2020-07-22

## 2020-05-05 RX ORDER — HYDROCODONE BITARTRATE AND ACETAMINOPHEN 5; 325 MG/1; MG/1
1 TABLET ORAL EVERY 12 HOURS PRN
Qty: 60 TABLET | Refills: 0 | Status: SHIPPED | OUTPATIENT
Start: 2020-07-22 | End: 2020-08-05 | Stop reason: SDUPTHER

## 2020-05-05 RX ORDER — HYDROCODONE BITARTRATE AND ACETAMINOPHEN 5; 325 MG/1; MG/1
1 TABLET ORAL EVERY 12 HOURS PRN
Qty: 60 TABLET | Refills: 0 | Status: SHIPPED | OUTPATIENT
Start: 2020-05-23 | End: 2020-06-22

## 2020-05-05 NOTE — TELEPHONE ENCOUNTER
Virtual visit completed.    I have reviewed the [place] Board of Pharmacy website and there are no abberancies.      Please send rx to pharmacy.

## 2020-05-06 ENCOUNTER — PATIENT MESSAGE (OUTPATIENT)
Dept: PAIN MEDICINE | Facility: CLINIC | Age: 40
End: 2020-05-06

## 2020-05-06 ENCOUNTER — PATIENT MESSAGE (OUTPATIENT)
Dept: NEUROSURGERY | Facility: CLINIC | Age: 40
End: 2020-05-06

## 2020-05-06 NOTE — PROGRESS NOTES
This note was completed with dictation software and grammatical errors may exist.    The patient location is: [facility]  The chief complaint leading to consultation is: neck pain  Visit type: Virtual visit with synchronous audio and video  Total time spent with patient: 15 minutes  Each patient to whom he or she provides medical services by telemedicine is:  (1) informed of the relationship between the physician and patient and the respective role of any other health care provider with respect to management of the patient; and (2) notified that he or she may decline to receive medical services by telemedicine and may withdraw from such care at any time.    CC: Back pain, Neck pain, headaches    HPI: The patient is a 39-year-old woman with a history of Chiari malformation, Hadley syndrome, pseudotumor cerebri with shunt, morbid obesity who presents in referral from Dr. Velasco for neck pain.  She presents in follow-up with continued neck pain, headaches and low back pain.  She is going to have Botox next week for her headaches.  She is awaiting a call for her EMG for Neurosurgery to discuss cervical spine surgery.  She describes pain in her neck and intermittently in both arms with numbness in her hands.  She does report weakness but is dropping things less often.  She falls frequency, denies dizziness and states that she just loses her balance.  She states she fell yesterday onto her knee but did not hit her head.  She describes low back pain without radiation but deals with it, not interested in repeating RFA because it was painful but does state that it helped and it is starting to wear off.    Pain intervention history: She was seeing Dr. Gerson Renteria, pain management and until recently had been taking hydrocodone 5/325 once a day in addition to Flexeril 3 times a day and gabapentin 600 mg 3 times a day.  She apparently tested positive for Valium and so was dismissed from his practice. She is status post  C7-T1 cervical interlaminar epidural steroid injection on 10/4/17 with 100% relief lasting 2 weeks.  She is status post a second cervical TABITHA on 11/14/17 with almost complete relief again but only lasting 2-3 weeks. She is status post bilateral L2, 3 and 4 medial branch radiofrequency ablation on 09/21/2018 with 75% relief.     ROS: She reports weight loss, headaches, easy bruising and back pain.  Balance of review of systems is negative.    Past Medical History:   Diagnosis Date    Asthma     Bronchitis     Chiari malformation type I     Chronic headache     Graves disease     Graves disease     Hyperlipidemia     Hypertension     Murmur, heart     NPH (normal pressure hydrocephalus)     Obesity     MANUEL on CPAP     Pseudotumor cerebri     Thyroid disease     Hadley syndrome        Past Surgical History:   Procedure Laterality Date    CARPAL TUNNEL RELEASE      CERVICAL SPINE SURGERY      CHOLECYSTECTOMY      EAR TUBE REMOVAL Bilateral     EYE SURGERY      strabismus    INJECTION OF FACET JOINT Bilateral 5/23/2018    Procedure: INJECTION-FACET L3/4 and L4/5;  Surgeon: Santana Pak MD;  Location: [facility] OR;  Service: Pain Management;  Laterality: Bilateral;  sacralization of L5 and a rudimentary disc space    MYRINGOTOMY W/ TUBES      RADIOFREQUENCY ABLATION OF LUMBAR MEDIAL BRANCH NERVE AT SINGLE LEVEL Bilateral 9/21/2018    Procedure: RADIOFREQUENCY ABLATION, NERVE, SPINAL, LUMBAR, MEDIAL BRANCH, L2, L3, L4;  Surgeon: Santana Pak MD;  Location: [facility] OR;  Service: Pain Management;  Laterality: Bilateral;    TONSILLECTOMY      VENTRICULOPERITONEAL SHUNT         Social History     Socioeconomic History    Marital status: Single     Spouse name: Not on file    Number of children: Not on file    Years of education: Not on file    Highest education level: Not on file   Occupational History    Not on file   Social Needs    Financial resource strain: Not on file    Food  insecurity:     Worry: Not on file     Inability: Not on file    Transportation needs:     Medical: Not on file     Non-medical: Not on file   Tobacco Use    Smoking status: Never Smoker    Smokeless tobacco: Never Used   Substance and Sexual Activity    Alcohol use: No    Drug use: No    Sexual activity: Not on file   Lifestyle    Physical activity:     Days per week: Not on file     Minutes per session: Not on file    Stress: Not on file   Relationships    Social connections:     Talks on phone: Not on file     Gets together: Not on file     Attends Latter day service: Not on file     Active member of club or organization: Not on file     Attends meetings of clubs or organizations: Not on file     Relationship status: Not on file   Other Topics Concern    Not on file   Social History Narrative    Not on file         Medications/Allergies: See med card    There were no vitals filed for this visit.  There is no height or weight on file to calculate BMI.    Physical exam:  Gen: A and O x3, pleasant, obese  HEENT: PERRLA  Resp:  No increased work of breathing.  Neuro:  Moving extremities appropriately.    Imaging:  MRI from 2/9/17 cervical spine demonstrates congenitally narrowed canal with disc bulging most prominently at C4/5 to the right side causing anterior cord compression but no signal changes.  There is narrowing of the canal at C3/4 to a lesser degree.    Assessment:   The patient is a 39-year-old woman with a history of Chiari malformation, pseudotumor cerebri with shunt, morbid obesity who presents in referral from Dr. Velasco for neck pain.    1. Cervical stenosis of spinal canal     2. DDD (degenerative disc disease), cervical     3. DDD (degenerative disc disease), lumbar     4. Intractable chronic migraine without aura and without status migrainosus     5. Pseudotumor cerebri     6. S/P ventriculoperitoneal shunt     7. Morbid obesity with BMI of 60.0-69.9, adult     8. Muscular deconditioning      9. Opioid contract exists         Plan:  1.  Dr. Pak provided prescriptions for hydrocodone-acetaminophen 5/325 milligrams up to 2 times daily as needed pain.  I have reviewed the [place] Board of Pharmacy website and there are no abberancies.    2.  She is going to an EMG at Dr. Olea's office and then follow up with Neurosurgery to discuss cervical spine surgery.  3.  She has repeat Botox injections for headaches with Neurology next week.  4.  Follow-up in 3 months or sooner as needed.

## 2020-05-07 ENCOUNTER — PATIENT MESSAGE (OUTPATIENT)
Dept: NEUROSURGERY | Facility: CLINIC | Age: 40
End: 2020-05-07

## 2020-05-08 ENCOUNTER — TELEPHONE (OUTPATIENT)
Dept: NEUROLOGY | Facility: CLINIC | Age: 40
End: 2020-05-08

## 2020-05-11 ENCOUNTER — PROCEDURE VISIT (OUTPATIENT)
Dept: NEUROLOGY | Facility: CLINIC | Age: 40
End: 2020-05-11
Payer: MEDICARE

## 2020-05-11 ENCOUNTER — PATIENT MESSAGE (OUTPATIENT)
Dept: CARDIOLOGY | Facility: CLINIC | Age: 40
End: 2020-05-11

## 2020-05-11 VITALS
DIASTOLIC BLOOD PRESSURE: 71 MMHG | HEART RATE: 66 BPM | BODY MASS INDEX: 61.5 KG/M2 | WEIGHT: 293 LBS | HEIGHT: 58 IN | SYSTOLIC BLOOD PRESSURE: 112 MMHG

## 2020-05-11 DIAGNOSIS — G93.2 PSEUDOTUMOR CEREBRI SYNDROME: ICD-10-CM

## 2020-05-11 DIAGNOSIS — G43.719 INTRACTABLE CHRONIC MIGRAINE WITHOUT AURA AND WITHOUT STATUS MIGRAINOSUS: Primary | ICD-10-CM

## 2020-05-11 PROCEDURE — 64615 PR CHEMODENERVATION OF MUSCLE FOR CHRONIC MIGRAINE: ICD-10-PCS | Mod: S$PBB,,, | Performed by: PSYCHIATRY & NEUROLOGY

## 2020-05-11 PROCEDURE — 64615 CHEMODENERV MUSC MIGRAINE: CPT | Mod: S$PBB,,, | Performed by: PSYCHIATRY & NEUROLOGY

## 2020-05-11 PROCEDURE — 64615 CHEMODENERV MUSC MIGRAINE: CPT | Mod: PBBFAC,PO | Performed by: PSYCHIATRY & NEUROLOGY

## 2020-05-11 RX ADMIN — ONABOTULINUMTOXINA 200 UNITS: 100 INJECTION, POWDER, LYOPHILIZED, FOR SOLUTION INTRADERMAL; INTRAMUSCULAR at 05:05

## 2020-05-11 NOTE — PROCEDURES
Procedures       PROCEDURE PERFORMED: Botulinum toxin injection (85613)    CLINICAL INDICATION: G43.719    A time out was conducted just before the start of the procedure to verify the correct patient and procedure, procedure location, and all relevant critical information.     Conventional methods of treatment such as multiple medications , both on and   off label have been tried including: anti-epileptics (topiramate, gabapentin, diamox), beta blockers (metoprolol),  and antidepressants (paxil - cannot trial additional ones due to being on Paxil). The patient has been unresponsive and refractory.The patient meets criteria for chronic headaches according to the ICHD-II, the patient has more than 15 headaches a month which last for more than 4 hours a day.    Injection shows session number: 10  Percentage relief since last session: >50% of migraine relief but only for 4-6 weeks     DESCRIPTION OF PROCEDURE: After obtaining informed consent and under   aseptic technique, a total of 135 units of botulinum toxin type A were   injected in the following muscles:     -- Procerus 5 units  --  5 units bilaterally  -- Frontalis 20 units  -- Temporalis 20 units bilaterally  -- Occipitalis 15 units bilaterally  -- Trapezius 15 units bilaterally.     -- Upper cervical paraspinals 10 units bilaterally spared due to prior surgery in this area     The patient tolerated the procedure well. There were no complications. The patient was given a prescription for repeat treatment in 12 weeks     Unavoidable waste - 65 units     Flory Chamorro MD    (few units were given around the shunt site)

## 2020-05-20 ENCOUNTER — TELEPHONE (OUTPATIENT)
Dept: NEUROLOGY | Facility: CLINIC | Age: 40
End: 2020-05-20

## 2020-05-20 NOTE — TELEPHONE ENCOUNTER
Attempted to reach pt at home number listed. No voicemail set up. Was able to leave VM on mobile notifying that EMG needed to be rescheduled due to broken machine. Will await call.

## 2020-05-21 DIAGNOSIS — G43.719 INTRACTABLE CHRONIC MIGRAINE WITHOUT AURA AND WITHOUT STATUS MIGRAINOSUS: ICD-10-CM

## 2020-05-21 RX ORDER — PROCHLORPERAZINE MALEATE 10 MG
10 TABLET ORAL EVERY 6 HOURS PRN
Qty: 60 TABLET | Refills: 11 | Status: SHIPPED | OUTPATIENT
Start: 2020-05-21

## 2020-05-21 RX ORDER — PREDNISONE 10 MG/1
TABLET ORAL
Qty: 20 TABLET | Refills: 0 | Status: SHIPPED | OUTPATIENT
Start: 2020-05-21 | End: 2021-12-28

## 2020-05-21 RX ORDER — TOPIRAMATE 100 MG/1
100 TABLET, FILM COATED ORAL NIGHTLY
Qty: 30 TABLET | Refills: 11
Start: 2020-05-21 | End: 2020-05-25 | Stop reason: SDUPTHER

## 2020-05-22 ENCOUNTER — TELEPHONE (OUTPATIENT)
Dept: PHARMACY | Facility: CLINIC | Age: 40
End: 2020-05-22

## 2020-05-25 ENCOUNTER — PATIENT MESSAGE (OUTPATIENT)
Dept: NEUROLOGY | Facility: CLINIC | Age: 40
End: 2020-05-25

## 2020-05-25 ENCOUNTER — TELEPHONE (OUTPATIENT)
Dept: NEUROLOGY | Facility: CLINIC | Age: 40
End: 2020-05-25

## 2020-05-25 ENCOUNTER — PATIENT MESSAGE (OUTPATIENT)
Dept: NEUROSURGERY | Facility: CLINIC | Age: 40
End: 2020-05-25

## 2020-05-25 DIAGNOSIS — G43.719 INTRACTABLE CHRONIC MIGRAINE WITHOUT AURA AND WITHOUT STATUS MIGRAINOSUS: ICD-10-CM

## 2020-05-25 RX ORDER — TOPIRAMATE 100 MG/1
100 TABLET, FILM COATED ORAL 2 TIMES DAILY
Qty: 180 TABLET | Refills: 3
Start: 2020-05-25 | End: 2021-01-22 | Stop reason: SDUPTHER

## 2020-05-25 NOTE — TELEPHONE ENCOUNTER
Returned call to pt and discussed EMG. Rescheduled due to personal reasons. Instructed pt to call us with any other changes. Verbalized understanding.

## 2020-05-25 NOTE — TELEPHONE ENCOUNTER
----- Message from Jimena Soto sent at 5/25/2020  9:21 AM CDT -----  Contact: patient  Type: Needs Medical Advice  Who Called:  patient  Best Call Back Number: [phone]  Additional Information: Patient is cancelling today's procedure and would like to reschedule on 5/28/20.  Please call to advise and schedule.  Thanks!

## 2020-05-26 ENCOUNTER — PATIENT MESSAGE (OUTPATIENT)
Dept: NEUROLOGY | Facility: CLINIC | Age: 40
End: 2020-05-26

## 2020-06-09 ENCOUNTER — TELEPHONE (OUTPATIENT)
Dept: NEUROLOGY | Facility: CLINIC | Age: 40
End: 2020-06-09

## 2020-06-09 NOTE — TELEPHONE ENCOUNTER
Pt reported a Headache this morning and wont be able to make it to her 1 pm  EMG appointment with dr. Gonzalez. I called and rescheduled her appointment.

## 2020-06-12 ENCOUNTER — TELEPHONE (OUTPATIENT)
Dept: NEUROLOGY | Facility: CLINIC | Age: 40
End: 2020-06-12

## 2020-06-12 NOTE — TELEPHONE ENCOUNTER
Left a voice mail instructing the pt to call us back to get her appointment rescheduled. I also informed her that I cancelled her appointment for July 14 2020 as she requested.

## 2020-06-25 ENCOUNTER — TELEPHONE (OUTPATIENT)
Dept: PHARMACY | Facility: CLINIC | Age: 40
End: 2020-06-25

## 2020-06-26 ENCOUNTER — TELEPHONE (OUTPATIENT)
Dept: PHARMACY | Facility: CLINIC | Age: 40
End: 2020-06-26

## 2020-07-02 DIAGNOSIS — G43.719 INTRACTABLE CHRONIC MIGRAINE WITHOUT AURA AND WITHOUT STATUS MIGRAINOSUS: ICD-10-CM

## 2020-07-02 RX ORDER — CELECOXIB 100 MG/1
200 CAPSULE ORAL 2 TIMES DAILY PRN
Qty: 30 CAPSULE | Refills: 11 | Status: CANCELLED | OUTPATIENT
Start: 2020-07-02

## 2020-07-21 ENCOUNTER — PROCEDURE VISIT (OUTPATIENT)
Dept: NEUROLOGY | Facility: CLINIC | Age: 40
End: 2020-07-21
Payer: MEDICARE

## 2020-07-21 DIAGNOSIS — M48.02 CERVICAL STENOSIS OF SPINAL CANAL: ICD-10-CM

## 2020-07-21 DIAGNOSIS — G93.2 PSEUDOTUMOR CEREBRI: ICD-10-CM

## 2020-07-21 DIAGNOSIS — Z98.2 S/P VENTRICULOPERITONEAL SHUNT: ICD-10-CM

## 2020-07-21 PROCEDURE — 95886 PR EMG COMPLETE, W/ NERVE CONDUCTION STUDIES, 5+ MUSCLES: ICD-10-PCS | Mod: 26,S$PBB,, | Performed by: PSYCHIATRY & NEUROLOGY

## 2020-07-21 PROCEDURE — 95911 NRV CNDJ TEST 9-10 STUDIES: CPT | Mod: 26,S$PBB,, | Performed by: PSYCHIATRY & NEUROLOGY

## 2020-07-21 PROCEDURE — 95911 PR NERVE CONDUCTION STUDY; 9-10 STUDIES: ICD-10-PCS | Mod: 26,S$PBB,, | Performed by: PSYCHIATRY & NEUROLOGY

## 2020-07-21 PROCEDURE — 95886 MUSC TEST DONE W/N TEST COMP: CPT | Mod: 26,S$PBB,, | Performed by: PSYCHIATRY & NEUROLOGY

## 2020-07-21 PROCEDURE — 95911 NRV CNDJ TEST 9-10 STUDIES: CPT | Mod: PBBFAC,PN | Performed by: PSYCHIATRY & NEUROLOGY

## 2020-07-21 PROCEDURE — 95886 MUSC TEST DONE W/N TEST COMP: CPT | Mod: PBBFAC,PN | Performed by: PSYCHIATRY & NEUROLOGY

## 2020-07-21 NOTE — PROCEDURES
[facility]   [facility] [place] [facility]  [address] [place] [address]  [phone]             Full Name: Shanna Douglass Gender: Female  Patient ID: [id number] YOB: 1980      Visit Date: 7/21/2020 11:55  Age: 40 Years 0 Months Old  Examining Physician: Radha Gonzalez MD   Referring Physician: Sunita Mccarthy M.D.   Technologist: Yisel RATLIFF   Height: 4 feet 9 inch  History: BUE, cervical stenosis of spinal canal, symptoms consist of numbness and tingling of the arms and hands. Pt has pain in the left arm and numbnessa round the elbow in both arms. Symptoms began two years go. Pt is on a blood thinner and is currently getting tested for DM.       Sensory NCS      Nerve / Sites Rec. Site Onset Lat Peak Lat NP Amp Segments Distance Velocity     ms ms µV  cm m/s   R Median - Digit II (Antidromic)      Wrist Dig II 2.19 3.02 40.0 Wrist - Dig II 13 59      Ref.   ?3.40 ?20.0 Ref.  ?50   L Median - Digit II (Antidromic)      Wrist Dig II 2.14 2.97 35.1 Wrist - Dig II 13 61      Ref.   ?3.40 ?20.0 Ref.  ?50   R Ulnar - Digit V (Antidromic)      Wrist Dig V 1.56 2.45 45.0 Wrist - Dig V 11 70      Ref.   ?3.10 ?15.0 Ref.  ?50   L Ulnar - Digit V (Antidromic)      Wrist Dig V 1.56 2.34 39.4 Wrist - Dig V 11 70      Ref.   ?3.10 ?15.0 Ref.  ?50       Motor NCS      Nerve / Sites Muscle Latency Ref. Amplitude Ref. Amp % Duration Segments Distance Lat Diff Velocity Ref.     ms ms mV mV % ms  cm ms m/s m/s   R Median - APB      Wrist APB 3.02 ?3.90 10.8 ?6.0 100 6.67 Wrist - APB          Elbow APB 6.41  9.8  91.2 6.77 Elbow - Wrist 18 3.39 53 ?50   L Median - APB      Wrist APB 3.33 ?3.90 9.2 ?6.0 100 6.61 Wrist - APB          Elbow APB 6.35  7.4  80.1 4.48 Elbow - Wrist 18 3.02 60 ?50   R Ulnar - ADM      Wrist ADM 2.03 ?3.10 12.3 ?7.0 100 4.90 Wrist - ADM          B.Elbow ADM 4.58  11.9  96.6 5.10 B.Elbow - Wrist 17.5 2.55 69 ?50      A.Elbow ADM 6.41  11.8  95.7 5.16  A.Elbow - B.Elbow 10 1.82 55    L Ulnar - ADM      Wrist ADM 2.08 ?3.10 8.5 ?7.0 100 4.95 Wrist - ADM          B.Elbow ADM 4.84  8.4  99.4 5.00 B.Elbow - Wrist 17.5 2.76 63 ?50      A.Elbow ADM 6.25  8.4  99 5.16 A.Elbow - B.Elbow 10 1.41 71    R Ulnar - FDI      Wrist FDI 2.19 ?4.30 6.6 ?7.0 100 5.31 Wrist - FDI          B.Elbow FDI 4.95  5.3  79.5 3.80 B.Elbow - Wrist 17.5 2.76 63 ?50      A.Elbow FDI 7.03  6.6  99.2 3.28 A.Elbow - B.Elbow 10 2.08 48        F  Wave      Nerve Fmin Ref. Absent  F    ms ms    R Median - APB 23.39 ?31.00 No   R Ulnar - ADM 23.28 ?32.00 No   L Median - APB 22.81 ?31.00 No   L Ulnar - ADM 22.81 ?32.00 No       EMG Summary Table     Spontaneous Recruitment Activation Duration Amplitude Polyphasia Comment   Muscle Ins Act Fib Fasc Pattern - - - - -   L. Deltoid Normal 0 0 Normal Normal Normal Normal Normal Normal   L. Biceps brachii Normal 0 0 Normal Normal Normal Normal Normal Normal   L. Triceps brachii Normal 0 0 Sl Dec Normal Normal N/+1 +1 Normal   L. Pronator teres Normal 0 0 Normal Normal Normal +1 N/+1 Normal   L. First dorsal interosseous Normal 0 0 Sl Dec Normal Normal Normal N/+1 Normal   L. Extensor indicis proprius Normal 0 0 Sl Dec Normal Normal Normal Normal Normal   R. First dorsal interosseous Normal 0 0 Normal Normal Normal Normal Normal Normal   R. Biceps brachii Normal 0 0 Sl Dec Normal Normal Normal Normal Normal   R. Triceps brachii Normal 0 0 Mod Dec Normal +1 +1 Normal Normal   R. Pronator teres Normal 0 0 Sl Dec Normal +1 +1 N/+1 Normal   R. Deltoid Normal 0 0 Normal Normal Normal Normal Normal Normal         Summary: Right upper extremity nerve conduction studies show normal studies other than slightly slow amplitude ulnar motor over the first dorsal interossei muscle but normal motor over the abductor digiti minimi. Left upper extremity nerve conduction studies were normal.     Needle examination showed reduced recruitment and mild neurogenic changes in left  C6/C7 innervated muscles and to a lesser extent left C8/T1 innervated muscles. There was reduced recruitment and neurogenic changes seen in right C6/7 innervated muscles. No fibrillation potentials were seen. Cervical paraspinal muscles were not examined due to prior posterior cervical spine surgery.     Impression: There is EMG evidence of: 1) right C6/7 cervical radiculopathy without active denervation, 2) left C6/7 cervical radiculopathy without active denervation, and 3) to a lesser extent, subtle changes suggesting possible subacute left C8/T1 radiculopathy without active denervation. No evidence of median mononeuropathy in either extremity. The isolated changes seen on nerve conduction of the right ulnar motor over the FDI is likely technical as no other evidence of ulnar neuropathy is present.       Thank you for referring to the [facility] in [place].  Please feel free to contact the clinic if you have any further questions regarding this study or report.      Radha Gonzalez MD

## 2020-07-22 ENCOUNTER — TELEPHONE (OUTPATIENT)
Dept: PHARMACY | Facility: CLINIC | Age: 40
End: 2020-07-22

## 2020-07-27 ENCOUNTER — PATIENT MESSAGE (OUTPATIENT)
Dept: NEUROSURGERY | Facility: CLINIC | Age: 40
End: 2020-07-27

## 2020-07-27 ENCOUNTER — PATIENT MESSAGE (OUTPATIENT)
Dept: NEUROLOGY | Facility: CLINIC | Age: 40
End: 2020-07-27

## 2020-07-28 ENCOUNTER — TELEPHONE (OUTPATIENT)
Dept: NEUROLOGY | Facility: CLINIC | Age: 40
End: 2020-07-28

## 2020-07-28 NOTE — TELEPHONE ENCOUNTER
----- Message from Maribel Strauss sent at 7/28/2020 10:14 AM CDT -----  Regarding: appt  Contact: pt  Appt   Patient called back to reschedule her appt she missed on July 27 , she forgot her appt.  Patient is asking if she  can be seen sooner Than October that is the next appt in the   System. Call back [phone]

## 2020-07-31 ENCOUNTER — TELEPHONE (OUTPATIENT)
Dept: OPHTHALMOLOGY | Facility: CLINIC | Age: 40
End: 2020-07-31

## 2020-07-31 ENCOUNTER — OFFICE VISIT (OUTPATIENT)
Dept: NEUROLOGY | Facility: CLINIC | Age: 40
End: 2020-07-31
Payer: MEDICARE

## 2020-07-31 ENCOUNTER — TELEPHONE (OUTPATIENT)
Dept: NEUROLOGY | Facility: CLINIC | Age: 40
End: 2020-07-31

## 2020-07-31 VITALS
BODY MASS INDEX: 61.5 KG/M2 | TEMPERATURE: 99 F | WEIGHT: 293 LBS | RESPIRATION RATE: 16 BRPM | HEIGHT: 58 IN | DIASTOLIC BLOOD PRESSURE: 73 MMHG | SYSTOLIC BLOOD PRESSURE: 113 MMHG | HEART RATE: 75 BPM

## 2020-07-31 DIAGNOSIS — G43.719 INTRACTABLE CHRONIC MIGRAINE WITHOUT AURA AND WITHOUT STATUS MIGRAINOSUS: ICD-10-CM

## 2020-07-31 PROCEDURE — 99214 PR OFFICE/OUTPT VISIT, EST, LEVL IV, 30-39 MIN: ICD-10-PCS | Mod: S$PBB,,, | Performed by: NURSE PRACTITIONER

## 2020-07-31 PROCEDURE — 99215 OFFICE O/P EST HI 40 MIN: CPT | Mod: PBBFAC,PO | Performed by: NURSE PRACTITIONER

## 2020-07-31 PROCEDURE — 99214 OFFICE O/P EST MOD 30 MIN: CPT | Mod: S$PBB,,, | Performed by: NURSE PRACTITIONER

## 2020-07-31 PROCEDURE — 99999 PR PBB SHADOW E&M-EST. PATIENT-LVL V: ICD-10-PCS | Mod: PBBFAC,,, | Performed by: NURSE PRACTITIONER

## 2020-07-31 PROCEDURE — 99999 PR PBB SHADOW E&M-EST. PATIENT-LVL V: CPT | Mod: PBBFAC,,, | Performed by: NURSE PRACTITIONER

## 2020-07-31 RX ORDER — METFORMIN HYDROCHLORIDE 500 MG/1
1000 TABLET, EXTENDED RELEASE ORAL
COMMUNITY
Start: 2020-07-23 | End: 2023-06-01 | Stop reason: CLARIF

## 2020-07-31 NOTE — PATIENT INSTRUCTIONS
TESTING:  -- none     REFERRAL:  -- refer to the eye doctor to check for recurrent swelling in the back of your eye (Dr. Montano)     PREVENTION (use daily regardless of headache):  -- continue Botox treatments for chronic migraine   -- stay on topiramate 100mg twice daily. Once Emgality takes effect, we can try to wean this back to once daily   -- continue Emgality. We want to try this for at least three months  -- continue amantadine 100mg in the morning for energy      ACUTE TREATMENT (use total no more than 12 days per month unless otherwise stated):  -- At onset of moderate/severe migraine (within one hour) take Maxalt (Rizatriptan) one tablet as needed, may repeat once in 2 hours - no more than 2 days per week   -- on a daily basis may use prochlorperazine (Compazine) as needed for nausea and headache   -- I did SPG block with viscous lidocaine toady  -- try a lidocaine ointment topically around the shunt   -- will try to have intranasal lidocaine approved    ------  INSTRUCTIONS FOR LIDOCAINE 4% NASAL SPRAY  1 spray into one or both nostrils up to 4 times per day as needed for headache     -- blow your nose to clear your nostrils   -- lay down with a pillow under your shoulders, or alternatively lay down with your head hanging off the edge of the bed so that your head is tilted back 45 degrees. Turn your head slightly to the side you will be spraying.   -- insert nasal applicator into affected side nostril   -- press down on the pump, letting the medication drip into your nose   -- repeat to the other nostril, if needed  -- remain laying down on your back for 2 minutes   -- may use 4 times per day as needed, daily if needed      This medication will come from [facility]. They MUST speak with you before the medication is shipped to your home. If you miss their phone call, you may reach the pharmacy at [phone].

## 2020-07-31 NOTE — PROGRESS NOTES
"Date of service: 7/31/2020  Referring provider: No ref. provider found    Subjective:      Chief complaint: Migraine       Patient ID: Shanna Douglass is a 40 y.o. lady with Hadley syndrome, Chiari type 1 malformation, s/p posterior fossa decompression June 2016, morbid obesity, pseudotumor cerebri s/p shunt, obstructive sleep apnea on CPAP, presenting for headache follow up    History of Present Illness    INTERVAL HISTORY 7/31/2020    At last visit almost a year ago with Dr. Chamorro, patient was doing a little worse. She was on Botox and Aimovig and Topamax. She was referred for weight loss surgery and for eye exam. Her last Botox session was 5/11/2020. In May, her Topamax was increased to 100 mg BID. She has taken one month of Emgality (loading dose).     Today she reports she is a little worse. She reports after last Botox wshe hurt worse and more pain where her shunt is. That pain is described as burning. Current pain 6 with range of 2-10. She has daily headaches. she takes tylenol rarely. She states tylenol and Relpax are "somewhat" helping the headaches. She has not had any headache-free days since last Botox. She is concerned as she has never had this happen where she begins with daily headaches two days after Botox injections.     She reports some memory issues since increasing Topamax. She has not noticed any improvement on the increased dose. She cannot recall last eye appointment. She has not seen bariatric surgery. She is trying to lose it on her own. Chart indicates she has gained ten pounds in past eleven months.     She also had an EMG with Dr. Gonzalez on 7/21/2020 which showed some cervical radiculopathy.     INTERVAL HISTORY - 8/20/19     Today she reports she is a little worse.  There are times when it feels like someone is striking her under her shunt.  Her pain is associated around her shunt.  Her current pain score is 10 with a range of 3-10.     She has had 6 Botox sessions so far, most recent " 06/14/2019.  She remains on Aimovig started at last visit.  She things Botox, Topamax, Aimovig are helping    She reports some changes to her vision, feels like it is blurred, needs to blink a few times. Optic discs last visit 3/7/19 were normal.    She is interested in pursuing weight loss surgery.      INTERVAL HISTORY-03/07/2019    Her 5th Botox will be 03/22/2019.  At last visit we reduce topiramate to help with nausea.  Started amantadine for energy, started Compazine and rizatriptan for migraine since Celebrex was ineffective.    Today she reports that the same.  She still has headaches and sharp pain every once in a while.  Her current pain score six learned to 4-10.  She thinks her body is used to the Topamax. She thinks the headaches have escalated since then. maxalt is very effective, compazine less so but still effective.     She had a recent visual field test which was good.     INTERVAL HISTORY - 1/17/19     She is status post 4 botox injections most recent 12/21/18.  Today she reports she is much worse.  She hurts where her shunt valve is with some focal tenderness. She hurts with the shunt valve is an overhead.  Her pain score is 10 with a range of 5-11.  She also reports she is sick to her stomach when she eats and after she eats that she has no energy.  She thinks his symptoms started worsening after her grandmother passed away in November 2018 from advanced COPD.    She reports the Botox continues to be effective.  The Celebrex that she tried was completely ineffective.  She is using her Norco for to treat headaches.    INTERVAL HISTORY - 9/12/18    She started Botox for 13 18 and reported 100% improvement with that session.  Her 2nd session was 07/06/2018, she reports this did help initially but about 3 weeks ago which was close to this 6-7 week daniel after Botox her headache started to return.  She reports she is much worse.  She has sharp pains in the neck and all over her head.  Her current pain  "score is 8 with range of 5-8.  The sumatriptan was making her heart race so she asked for an alternative.  Neurosurgery is working her up for potential shunt infection she will have labs done today.  She also reports that in the interim she was diagnosed with Graves disease and she is undergoing blood work for this.    ORIGINAL HEADACHE HISTORY - 3/26/18   Age at onset and course over time: headaches began in 1999. During that time had an eye exam and was found to have papilledema, had an Lp, and a brain MRI initial workup done at [facility]. In 2007 was treated at [facility] with a  shunt. Has not had any revisions since then. She reports she has never lost vision. Dr. Blackwood is her eye doctor in [place], [place]. 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     Codman shunt set to 150 - she reports last reprogram had a positive effect on her headaches     Location: frontotemporal, sometimes occipial  Quality: stabbing, pressure, throbbing  Severity: current 5, range 3 to 10   Duration: hours  Frequency: daily x 1 year, previous to that every other day. Wakes up without headache and it escalates as day goes on. Rarely, will wake up with headache   Alleviated by: darkness, heat, menses  Exacerbated by: fatigue, light, noise, smells, cough, sneezing, menses, change in weather   Associated with: Photophobia, phonophobia, osmophobia, blurred vision, double vision, vomiting, dizziness, vertigo, tinnitus, congestion, problems concentrating, neck tightness   Sleep habits:  Caffeine intake: 2 per day     Current acute treatment:  -- maxalt - effective  -- compazine - effective   -- norco 5mg " BID - for headaches and back pain   -- tylenol     Current prevention:  -- Aimovig started 03/2019  -- botox - initiated 04/13/2018  -- metoprolol XL 25mg   -- topiramate 100mg qHS -  At BID makes her mouth dry, sleepy, has helped somewhat   (paxil)  (lasix 80mg)     Previously tried/failed acute treatment:  -- fioricet  -- excedrin   -- BC powder   -- a sumatriptan - palpitations  -- celebrex - not helping     Previously tried/failed preventative treatment:    -- gabapentin   -- diamox - hives       Review of patient's allergies indicates:   Allergen Reactions    Diamox [acetazolamide] Hives    Dye Swelling and Rash     Current Outpatient Medications   Medication Sig Dispense Refill    amantadine HCl (SYMMETREL) 100 mg capsule  TAKE ONE CAPSULE EVERY DAY FOR ENERGY *THANK YOU* 30 capsule 11    aspirin 81 MG Chew Take 81 mg by mouth once daily.      bepotastine besilate (BEPREVE) 1.5 % Drop Apply to eye.      celecoxib (CELEBREX) 100 MG capsule Take 2 capsules (200 mg total) by mouth 2 (two) times daily as needed (headache and sharp pain). No more than 3 days per week. 30 capsule 11    cyclobenzaprine (FLEXERIL) 10 MG tablet Take 1 tablet (10 mg total) by mouth 3 (three) times daily as needed for Muscle spasms. 90 tablet 2    furosemide (LASIX) 80 MG tablet Take 80 mg by mouth once daily.       galcanezumab-gnlm (EMGALITY PEN) 120 mg/mL PnIj Inject 1 ml (120 mg) into the skin every 28 days. 1 mL 11    HYDROcodone-acetaminophen (NORCO) 5-325 mg per tablet Take 1 tablet by mouth every 12 (twelve) hours as needed for Pain. 60 tablet 0    hydrOXYzine HCl (ATARAX) 25 MG tablet Take 25 mg by mouth 3 (three) times daily.      meloxicam (MOBIC) 7.5 MG tablet       metFORMIN (GLUCOPHAGE-XR) 500 MG XR 24hr tablet Take 1,000 mg by mouth.      methIMAzole (TAPAZOLE) 10 MG Tab Take 10 mg by mouth 2 (two) times daily.      metoprolol succinate (TOPROL-XL) 25 MG 24 hr tablet Take 1 tablet (25 mg total) by mouth once  daily. 90 tablet 1    metoprolol succinate (TOPROL-XL) 25 MG 24 hr tablet Take 25 mg by mouth once daily.      montelukast (SINGULAIR) 10 mg tablet Take 10 mg by mouth every evening.      multivit-minerals/folic acid (ONE-A-DAY WOMEN VITACRAVES ORAL)       omeprazole (PRILOSEC) 20 MG capsule Take 40 mg by mouth once daily.       ondansetron (ZOFRAN) 4 MG tablet Take 8 mg by mouth 2 (two) times daily.      ondansetron (ZOFRAN-ODT) 4 MG TbDL Take 8 mg by mouth every 12 (twelve) hours.      paroxetine (PAXIL) 20 MG tablet 40 mg.       potassium chloride (MICRO-K) 10 MEQ CpSR Take 10 mEq by mouth once.      potassium chloride SA (K-DUR,KLOR-CON) 10 MEQ tablet       predniSONE (DELTASONE) 10 MG tablet 4 tab PO in AM  X 2 days, 3 tab in AM x 2 days, 2 tab in AM x 2 days, 1 tab in AM x 2 days then stop. 20 tablet 0    predniSONE (DELTASONE) 10 MG tablet 4 tab PO in AM  X 2 days, 3 tab in AM x 2 days, 2 tab in AM x 2 days, 1 tab in AM x 2 days then stop. 20 tablet 0    prochlorperazine (COMPAZINE) 10 MG tablet Take 1 tablet (10 mg total) by mouth every 6 (six) hours as needed (migraine or nausea). 60 tablet 11    ranitidine (ZANTAC) 300 MG capsule Take 300 mg by mouth every evening.      rosuvastatin (CRESTOR) 10 MG tablet TAKE 1 TABLET DAILY FOR CHOLESTEROL *THANK YOU* 90 tablet 3    selenium 200 mcg Cap Take 200 mcg by mouth once daily.      topiramate (TOPAMAX) 100 MG tablet Take 1 tablet (100 mg total) by mouth 2 (two) times daily. 180 tablet 3     Current Facility-Administered Medications   Medication Dose Route Frequency Provider Last Rate Last Dose    onabotulinumtoxina injection 200 Units  200 Units Intramuscular Q90 Days Flory Chamorro MD   200 Units at 07/06/18 1548    onabotulinumtoxina injection 200 Units  200 Units Intramuscular Q90 Days Flory Chamorro MD   200 Units at 11/19/19 1312    onabotulinumtoxina injection 200 Units  200 Units Intramuscular Q90 Days Flory Chamorro MD   200 Units at  05/11/20 1705    perflutren protein-A microsphr 0.22 mg/mL IV susp  2 mL Intravenous 1 time in Clinic/HOD Red Ward MD           Past Medical History  Past Medical History:   Diagnosis Date    Asthma     Bronchitis     Chiari malformation type I     Chronic headache     Graves disease     Graves disease     Hyperlipidemia     Hypertension     Murmur, heart     NPH (normal pressure hydrocephalus)     Obesity     MANUEL on CPAP     Pseudotumor cerebri     Thyroid disease     Hadley syndrome        Past Surgical History  Past Surgical History:   Procedure Laterality Date    CARPAL TUNNEL RELEASE      CERVICAL SPINE SURGERY      CHOLECYSTECTOMY      EAR TUBE REMOVAL Bilateral     EYE SURGERY      strabismus    INJECTION OF FACET JOINT Bilateral 5/23/2018    Procedure: INJECTION-FACET L3/4 and L4/5;  Surgeon: Santana Pak MD;  Location: [facility] OR;  Service: Pain Management;  Laterality: Bilateral;  sacralization of L5 and a rudimentary disc space    MYRINGOTOMY W/ TUBES      RADIOFREQUENCY ABLATION OF LUMBAR MEDIAL BRANCH NERVE AT SINGLE LEVEL Bilateral 9/21/2018    Procedure: RADIOFREQUENCY ABLATION, NERVE, SPINAL, LUMBAR, MEDIAL BRANCH, L2, L3, L4;  Surgeon: Santana Pak MD;  Location: [facility] OR;  Service: Pain Management;  Laterality: Bilateral;    TONSILLECTOMY      VENTRICULOPERITONEAL SHUNT         Family History  Family History   Problem Relation Age of Onset    Diabetes Mother     Hypertension Father     Heart disease Father     Heart disease Maternal Grandfather        Social History  Social History     Socioeconomic History    Marital status: Single     Spouse name: Not on file    Number of children: Not on file    Years of education: Not on file    Highest education level: Not on file   Occupational History    Not on file   Social Needs    Financial resource strain: Not on file    Food insecurity     Worry: Not on file     Inability: Not on file     Transportation needs     Medical: Not on file     Non-medical: Not on file   Tobacco Use    Smoking status: Never Smoker    Smokeless tobacco: Never Used   Substance and Sexual Activity    Alcohol use: No    Drug use: No    Sexual activity: Not on file   Lifestyle    Physical activity     Days per week: Not on file     Minutes per session: Not on file    Stress: Not on file   Relationships    Social connections     Talks on phone: Not on file     Gets together: Not on file     Attends Religion service: Not on file     Active member of club or organization: Not on file     Attends meetings of clubs or organizations: Not on file     Relationship status: Not on file   Other Topics Concern    Not on file   Social History Narrative    Not on file        Review of Systems  14-point review of systems as follows:   No check daniel indicates NEGATIVE response   Constitutional: [] weight loss, [] change to appetite   Eyes: [x] change in vision, [] double vision   Ears, nose, mouth, throat: [] frequent nose bleeds, [] ringing in the ears   Respiratory: [x] cough, [] wheezing   Cardiovascular: [x] chest pain, [] palpitations   Gastrointestinal: [] jaundice, [] nausea/vomiting   Genitourinary: [] incontinence, [] burning with urination   Hematologic/lymphatic: [x] easy bruising/bleeding, [x] night sweats   Neurological: [x] numbness, [x] weakness   Endocrine: [] fatigue, [x] heat/cold intolerance   Allergy/Immunologic: [] fevers, [] chills   Musculoskeletal: [x] muscle pain, [x] joint pain   Psychiatric: [] thoughts of harming self/others, [] depression   Integumentary: [x] rashes, [x] sores that do not heal       Objective:        Vitals:    07/31/20 1347   BP: 113/73   Pulse: 75   Resp: 16   Temp: 99 °F (37.2 °C)     Body mass index is 63.84 kg/m².    Optic discs appear elevated bilaterally     PREVIOUS:  Constitutional: appears in no acute distress, well-developed, well-nourished. Father provides a significant amount  of history due to patient's intellectual status.     Eyes: normal conjunctiva, PERRLA, optic discs not visualized well - inability to hold gaze.   Confrontational fields - full in all quadrants bilaterally  Color vision (HRR plates 5-10): 4.5/6 bilaterally (1/2 point lost on plate 6, and full point lost on plate 7 bilaterally)    Ears, nose, mouth, throat: face appears syndromic. Right eye appears esotropic at rest, but ductions full. Hearing grossly intact     Cardiovascular: regular rate and rhythm, no murmurs appreciated    Respiratory: unlabored respirations, breath sounds normal bilaterally    Gastrointestinal: no visible abdominal masses, no guarding, no visible hernia    Musculoskeletal: normal tone in all four extremities. No atrophy. No abnormal movements. Strength in all muscles groups of the upper and lower extremities is 5/5. Normal gait and station. Digits and nails normal.      Spine:   CERVICAL SPINE:  Inspection: midline healed surgical scar in upper cervical spine   ROM: normal   MUSCLE SPASM: mild throughout   FACET LOADING: + bilateral   SPURLING: no  JERSON / ARIANA tender: + bilateral     Psychiatric: normal judgment and insight. Oriented to situation.     Neurologic:   Cortical functions: recent and remote memory intact, normal attention span and concentration, speech fluent, adequate fund of knowledge   Cranial nerves: visual fields full, PERRLA, EOMI, facial sensation intact in V1-V3, symmetric facial strength, hearing intact to finger rub, palate elevates symmetrically, shoulder shrug 5/5, tongue protrudes midline   Reflexes: 2+ in the upper and lower extremities, no Cardenas  Sensation: intact to temperature   Coordination: normal finger to nose    Data Review:     I have personally reviewed the referring provider's notes, labs, & imaging made available to me today.      RADIOLOGY STUDIES:  I have personally reviewed the pertinent images performed.       Results for orders placed or performed  during the hospital encounter of 01/04/18   MRI Brain Without Contrast    Narrative    EXAM: Brain MRI without contrast.    INDICATION: Preoperative planning for posterior fossa decompression for Chiari malformation    TECHNIQUE: Multiplanar, multisequence brain MRI was performed. DWI was performed. ADC map was generated. CSF flow/CINE sequencing was performed.    COMPARISON: The cervical spine MRI dated 02/09/2017, head CT dated 06/08/2016, brain MRI dated 05/23/2016      FINDINGS:     Intracranial contents: There are postsurgical changes of posterior fossa decompression since the prior MRI dated 05/23/2016.  These postoperative changes are clearly demonstrated on head CT dated 06/08/2016.  There is, however, is still crowding at the foramen magnum with diminished CSF flow demonstrated on this any sequences.  There is flattening of the ventral surface at the cervical medullary junction in significant decrease in CSF at the foramen magnum surrounding the lower brainstem and cerebellar tonsils.  There are no regions of restricted diffusion to suggest acute infarction.  Demonstrated is a right frontal approach  shunt catheter with the tip near the 3rd ventricle.  The ventricles remain completely decompressed, nearly slitlike.  Correlate clinically.  This is unchanged.  There is minimal flair and T2 hyperintense signal traversing the right frontal lobe along the catheter course likely representing minimal stable gliosis.  There is no hemorrhage or mass effect.  There is no acute infarction.  The orbits are grossly normal.  There is no definite flattening of the posterior optic disc.  There is no abnormal extra-axial fluid collection.  Flow voids indicating patency are present in the major vessels at the base of the brain but there is crowding of the basilar cistern.  Small caliber of the basilar artery may represent developmental variation with fetal origin of the posterior cerebral arteries.    Extracranial  contents: Marrow signal intensity is grossly normal paracolic posterior nasopharyngeal soft tissue is nonspecific but likely reflects reactive lymphoid tissue.  The paranasal sinuses and mastoid air cells are clear.    Impression         1. There are postsurgical changes of prior posterior fossa decompression but there is still crowding at the foramen magnum with diminished CSF flow seen on this any sequences.    2.  No change in position of the ventriculoperitoneal shunt catheter from right frontal approach with complete decompression of the lateral and 3rd ventricles.  The 4th ventricle is normal in position.  There is no hydrocephalus.  These findings are unchanged.    3.  There is no acute hemorrhage or acute infarction.      Electronically signed by: Dr. Jn Calvin  Date:     01/04/18  Time:    12:19    Results for orders placed or performed during the hospital encounter of 06/07/16   CT Head Without Contrast    Narrative    CT HEAD WITHOUT CONTRAST:    CPT 61068    Total DLP: 803 mGy-cm  AEC (Automated Exposure Control) was utilized to reduce the radiation dose to the patient.    HISTORY:  35-year-old status post suboccipital craniotomy for Chiari I malformation and history of prior ventriculostomy shunt catheter placement for intracranial hypertension.  Comparison outside of the brain from 05/23/2016 and CT scan of the head from 12/10/2014.    TECHNIQUE: Multiple contiguous axial images were acquired from the base of the skull and the vertex without contrast administration.    FINDINGS:  There is minimal right greater than left suboccipital craniotomy.  The cerebellar tonsils still project caudally below the level of what would be an intact foramen magnum.  There is some pneumocephalus inferiorly and extending cephalad above the cerebellum in the region of the tectal plate and view of interpositum area.  There is also some and both inferior middle cranial fossa and overlying the right frontal lobe  superiorly.  There is unchanged appearance of a right frontal ventriculostomy shunt catheter with the catheter tip unchanged in position as it extends through the right lateral meniscal frontal horn and into versus adjacent the right foramen of Monro and into the third ventricle.  The right lateral ventricle is slitlike with the left unchanged in position and nearly slitlike.  The third ventricle and fourth ventricles also remain slitlike.  No cerebral or cerebellar parenchymal abnormality is identified. There is no hemorrhage, midline shift,   significant mass-effect, or extra-axial fluid collection. The partially visualized paranasal sinuses and mastoid air cells are clear. The calvarium is intact.    Impression    1. Interval suboccipital craniotomy for decompression of a Chiari I malformation.  There is a small amount of pneumocephalus present.    2.  Unchanged appearance of right frontal ventriculostomy shunt catheter, as above, with tip in the third ventricle and would be ventricles unchanged in their slitlike appearance.  ______________________________________     Electronically signed by: KATERINE GARCIA MD  Date:     06/08/16  Time:    08:43    Results for orders placed or performed during the hospital encounter of 05/23/16   MRI Brain W WO Contrast    Narrative    Exam: MRI of the brain without and with contrast    Comparison: None.    Technique: Multiplanar MR imaging of the brain was performed both before and following the intravenous administration of 10 cc of Gadavist contrast material.    Findings:     There is a ventriculostomy shunt catheter entering the brain via a right frontal approach which terminates in the vicinity of the 3rd ventricle.  There is gliosis along the course of the catheter.  The lateral ventricles and 3rd ventricle have a slitlike appearance, and over shunting cannot be excluded based on the provided images.  The 4th ventricle is normal in size.    The brain appears normally formed  with preserved gray-white matter junction differentiation.  No evidence of acute/recent major vascular territory cerebral infarction, enhancing intra-axial mass, or parenchymal hemorrhage.  No vascular malformations appreciated.    There is a Chiari type I malformation present at the craniocervical junction with approximately 6-7 mm of inferior extension of the cerebellar tonsils through the foramen magnum.  The tip of the cerebellar tonsils have a somewhat pointed appearance.  There is foreshortening/rounding of the clivus.  There is diminished CSF pulsation visible at the craniocervical junction on the cine images.  No associated cervical cord syrinx within the imaged portion of the cervical spinal cord.    No hydrocephalus.  No extra-axial fluid collections or blood products.  No abnormal leptomeningeal enhancement or enhancing extra-axial mass.  The skull base flow voids are unremarkable.  There is mucoperiosteal thickening observed at the floor of the left maxillary sinus.    The visualized orbits are unremarkable.  There is fatty replacement of the parotid glands.  There is an 11 mm left submandibular region probable intraparotid lymph node (series 9 image 25).    Impression    1.  Chiari type I malformation with approximately 6-7 mm of inferior extension of the cerebellar tonsils through the foramen magnum.  Additional details are provided above.  No associated cervical cord syrinx.    2.  Ventriculostomy shunt catheter entering the brain via a right frontal approach with probable gliosis along the shunt catheter tract.  The 3rd ventricle and lateral ventricles have a somewhat slit like appearance, and over-shunting cannot be excluded.    3.  Mild maxillary sinus disease.  ______________________________________     Electronically signed by: Michael Montiel MD  Date:     05/23/16  Time:    11:19        Lab Results   Component Value Date     09/12/2018    K 4.0 09/12/2018     09/12/2018    CO2 23  "09/12/2018    BUN 14 09/12/2018    CREATININE 0.7 09/12/2018    GLU 94 09/12/2018    HGBA1C 6.3 (H) 07/20/2020    HGBA1C 5.8 11/30/2012    AST 14 09/12/2018    ALT 16 09/12/2018    ALBUMIN 3.4 (L) 09/12/2018    PROT 7.7 09/12/2018    BILITOT 0.3 09/12/2018    CHOL 246 (H) 01/17/2019    HDL 41 01/17/2019    LDLCALC 157.6 01/17/2019    TRIG 237 (H) 01/17/2019       Lab Results   Component Value Date    WBC 7.46 09/12/2018    HGB 13.8 09/12/2018    HCT 44.7 09/12/2018    MCV 85 09/12/2018     09/12/2018       Lab Results   Component Value Date    TSH 3.25 11/30/2012           Assessment & Plan:       Problem List Items Addressed This Visit        Neuro    Intractable chronic migraine without aura and without status migrainosus    Overview     As is common in this population, patient with pseudotumor in remission as evidenced by stable fundoscopic exam / stable visual fields but with ongoing chronic headaches of the migrainous type. Explained to patient and family that once pressure is controlled, I treat the remaining headaches according to phenotype in this case chronic migraine. Patient has failed several "good' preventatives, must be on a daily preventative due to daily frequency of headaches, and must have a weight neutral option due to morbid obesity / PTC / sleep apnea. Discussed the most effective weight neutral option going forward is Botox. Patient agreed to proceed.    The patient has chronic migraines (G43.719) and suffers from headaches more than 15 days a month lasting more than 4 hours a day with no relief of symptoms despite trying multiple medications including but not limited to anti-epileptics (topiramate, gabapentin, diamox), beta blockers (metoprolol),  and antidepressants (paxil - cannot trial additional ones due to being on Paxil). Botox treatment was approved for chronic migraines in October 2010. The patient will be an ideal candidate for Botox. We are planning for 3 treatments 3 months " apart and aiming for at least 50% improvement in the symptoms. If we see no improvement after 3 treatments, we will discontinue the injections.    In addition, I discussed that the negative role that chronic opiate therapy plays in migraine chronification and worsening of migraines as dose was reduced being supportive of this.     ------------    Botox was initially at least 50% effective but then there was worsening of headaches in the setting of her grandmother's death and her headaches never recovered.  We reduced her topiramate which was causing some nausea and headaches have suffered.  Aimovig has been helpful.  It seems that we do have the migraines under control with her current regimen, and remaining pain is largely allodynia around the shunt site.    Sumatriptan was not tolerated due to palpitations, Celebrex ineffective, changed to Maxalt and compazine, effective, continue    --------------    Patient having breakthrough migraines at the end of Botox cycles. Limited medication options given pseudotumor cerebri and allergies to triptans. Plan to switch Aimovig 140 to Emgality.     The patient has chronic migraines (G43.719) and suffers from headaches more than 15 days a month lasting more than 4 hours a day with no relief of symptoms despite trying multiple medications for at least 3 months if tolerated, including but not limited to the follwing:   anti-epileptics - topiramate, acetazolamide, gabapentin   beta blockers - metoprolol  calcium channel blockers - unable to trial patient on metoprolol   Antidepressants - unable to trial patient stable on paroxetine     Galcanezumab (Emgality) treatment was approved for the prevention of acute and chronic migraine on 9/27/2018. The patient will be an ideal candidate for Emgality. We are planning for 3 treatments 28 days apart. If we see no improvement after 3 treatments, we will discontinue the injections.            Current Assessment & Plan     Continue  Emgality for a fair trial of 3-6 months. Botox has previously done well to help prevent her migraines. No new deficits. SPG block with viscous lidocaine done in clinic today, bilateral, and patient reported improvement in headache pain. Will send for lidocaine NS at [facility].    Will follow up on referral to Dr. Montano. Encouraged continued weight loss.                    Patient asked about driving from a headache standpoint.  I recommended no driving at this time.     TESTING:  -- none     REFERRAL:  -- refer to the eye doctor to check for recurrent swelling in the back of your eye (Dr. Montano)     PREVENTION (use daily regardless of headache):  -- continue Botox treatments for chronic migraine   -- stay on topiramate 100mg twice daily. Once Emgality takes effect, we can try to wean this back to once daily   -- continue Emgality. We want to try this for at least three months  -- continue amantadine 100mg in the morning for energy      ACUTE TREATMENT (use total no more than 12 days per month unless otherwise stated):  -- At onset of moderate/severe migraine (within one hour) take Maxalt (Rizatriptan) one tablet as needed, may repeat once in 2 hours - no more than 2 days per week   -- on a daily basis may use prochlorperazine (Compazine) as needed for nausea and headache   -- I did SPG block with viscous lidocaine toady  -- try a lidocaine ointment topically around the shunt   -- will try to have intranasal lidocaine approved    Follow up for keep routine follow up.  30 minutes spent on this visit, with greater than 50% of the time spent on discussion of diagnosis and treatment/coordination of care as documented above.    Randi Antoine, NP

## 2020-07-31 NOTE — ASSESSMENT & PLAN NOTE
Continue Emgality for a fair trial of 3-6 months. Botox has previously done well to help prevent her migraines. No new deficits. SPG block with viscous lidocaine done in clinic today, bilateral, and patient reported improvement in headache pain. Will send for lidocaine NS at [facility].    Will follow up on referral to Dr. Montano. Encouraged continued weight loss.

## 2020-08-05 ENCOUNTER — PROCEDURE VISIT (OUTPATIENT)
Dept: NEUROLOGY | Facility: CLINIC | Age: 40
End: 2020-08-05
Payer: MEDICARE

## 2020-08-05 ENCOUNTER — OFFICE VISIT (OUTPATIENT)
Dept: PAIN MEDICINE | Facility: CLINIC | Age: 40
End: 2020-08-05
Payer: MEDICARE

## 2020-08-05 VITALS
HEART RATE: 65 BPM | WEIGHT: 293 LBS | BODY MASS INDEX: 63.21 KG/M2 | HEIGHT: 57 IN | DIASTOLIC BLOOD PRESSURE: 59 MMHG | SYSTOLIC BLOOD PRESSURE: 104 MMHG

## 2020-08-05 VITALS
WEIGHT: 293 LBS | SYSTOLIC BLOOD PRESSURE: 135 MMHG | DIASTOLIC BLOOD PRESSURE: 82 MMHG | HEART RATE: 74 BPM | RESPIRATION RATE: 16 BRPM | HEIGHT: 57 IN | BODY MASS INDEX: 63.21 KG/M2

## 2020-08-05 DIAGNOSIS — M51.36 DDD (DEGENERATIVE DISC DISEASE), LUMBAR: ICD-10-CM

## 2020-08-05 DIAGNOSIS — M48.02 CERVICAL STENOSIS OF SPINAL CANAL: Primary | ICD-10-CM

## 2020-08-05 DIAGNOSIS — G43.719 INTRACTABLE CHRONIC MIGRAINE WITHOUT AURA AND WITHOUT STATUS MIGRAINOSUS: Primary | ICD-10-CM

## 2020-08-05 DIAGNOSIS — M50.30 DDD (DEGENERATIVE DISC DISEASE), CERVICAL: ICD-10-CM

## 2020-08-05 DIAGNOSIS — Z79.891 OPIOID CONTRACT EXISTS: ICD-10-CM

## 2020-08-05 DIAGNOSIS — E66.01 MORBID OBESITY WITH BMI OF 60.0-69.9, ADULT: ICD-10-CM

## 2020-08-05 PROCEDURE — 99214 PR OFFICE/OUTPT VISIT, EST, LEVL IV, 30-39 MIN: ICD-10-PCS | Mod: S$PBB,,, | Performed by: PHYSICIAN ASSISTANT

## 2020-08-05 PROCEDURE — 99215 OFFICE O/P EST HI 40 MIN: CPT | Mod: PBBFAC,25,PN | Performed by: PHYSICIAN ASSISTANT

## 2020-08-05 PROCEDURE — 64615 CHEMODENERV MUSC MIGRAINE: CPT | Mod: S$PBB,,, | Performed by: PSYCHIATRY & NEUROLOGY

## 2020-08-05 PROCEDURE — 99999 PR PBB SHADOW E&M-EST. PATIENT-LVL V: CPT | Mod: PBBFAC,,, | Performed by: PHYSICIAN ASSISTANT

## 2020-08-05 PROCEDURE — 99214 OFFICE O/P EST MOD 30 MIN: CPT | Mod: S$PBB,,, | Performed by: PHYSICIAN ASSISTANT

## 2020-08-05 PROCEDURE — 64615 PR CHEMODENERVATION OF MUSCLE FOR CHRONIC MIGRAINE: ICD-10-PCS | Mod: S$PBB,,, | Performed by: PSYCHIATRY & NEUROLOGY

## 2020-08-05 PROCEDURE — 80307 DRUG TEST PRSMV CHEM ANLYZR: CPT

## 2020-08-05 PROCEDURE — 99999 PR PBB SHADOW E&M-EST. PATIENT-LVL V: ICD-10-PCS | Mod: PBBFAC,,, | Performed by: PHYSICIAN ASSISTANT

## 2020-08-05 PROCEDURE — 64615 CHEMODENERV MUSC MIGRAINE: CPT | Mod: PBBFAC,PO | Performed by: PSYCHIATRY & NEUROLOGY

## 2020-08-05 RX ORDER — HYDROCODONE BITARTRATE AND ACETAMINOPHEN 5; 325 MG/1; MG/1
1 TABLET ORAL EVERY 12 HOURS PRN
Qty: 60 TABLET | Refills: 0 | Status: SHIPPED | OUTPATIENT
Start: 2020-10-20 | End: 2020-11-19

## 2020-08-05 RX ORDER — CYCLOBENZAPRINE HCL 10 MG
10 TABLET ORAL 3 TIMES DAILY PRN
Qty: 90 TABLET | Refills: 2 | Status: SHIPPED | OUTPATIENT
Start: 2020-08-05 | End: 2020-10-15 | Stop reason: SDUPTHER

## 2020-08-05 RX ORDER — HYDROCODONE BITARTRATE AND ACETAMINOPHEN 5; 325 MG/1; MG/1
1 TABLET ORAL EVERY 12 HOURS PRN
Qty: 60 TABLET | Refills: 0 | Status: SHIPPED | OUTPATIENT
Start: 2020-09-20 | End: 2020-10-15 | Stop reason: SDUPTHER

## 2020-08-05 RX ORDER — HYDROCODONE BITARTRATE AND ACETAMINOPHEN 5; 325 MG/1; MG/1
1 TABLET ORAL EVERY 12 HOURS PRN
Qty: 60 TABLET | Refills: 0 | Status: SHIPPED | OUTPATIENT
Start: 2020-08-21 | End: 2020-09-20

## 2020-08-05 RX ADMIN — ONABOTULINUMTOXINA 200 UNITS: 100 INJECTION, POWDER, LYOPHILIZED, FOR SOLUTION INTRADERMAL; INTRAMUSCULAR at 03:08

## 2020-08-05 NOTE — PROCEDURES
Procedures       PROCEDURE PERFORMED: Botulinum toxin injection (49756)    CLINICAL INDICATION: G43.719    A time out was conducted just before the start of the procedure to verify the correct patient and procedure, procedure location, and all relevant critical information.     Conventional methods of treatment such as multiple medications , both on and   off label have been tried including: anti-epileptics (topiramate, gabapentin, diamox), beta blockers (metoprolol),  and antidepressants (paxil - cannot trial additional ones due to being on Paxil). The patient has been unresponsive and refractory.The patient meets criteria for chronic headaches according to the ICHD-II, the patient has more than 15 headaches a month which last for more than 4 hours a day.    Injection shows session number: 11  Percentage relief since last session: >50% of migraine relief but < 12 weeks     DESCRIPTION OF PROCEDURE: After obtaining informed consent and under   aseptic technique, a total of 135 units of botulinum toxin type A were   injected in the following muscles:     -- Procerus 5 units  --  5 units bilaterally  -- Frontalis 20 units  -- Temporalis 20 units bilaterally  -- Occipitalis 15 units bilaterally  -- Trapezius 15 units bilaterally.   (few units were given around the right frontal shunt site)    -- Upper cervical paraspinals 10 units bilaterally spared due to prior surgery in this area     The patient tolerated the procedure well. There were no complications. The patient was given a prescription for repeat treatment in 10 weeks     Unavoidable waste - 65 units     Flory Chamorro MD

## 2020-08-05 NOTE — TELEPHONE ENCOUNTER
Patient seen in clinic.  Please send prescriptions to her pharmacy.    I have reviewed the [place] Board of Pharmacy website and she filled 20 hydrocodone 10/325 from a nurse practitioner, Jammie Chinchilla on 6/11.  She told me that she forgot she was not supposed to receive medicine from anyone else.  This was for a wound on her right shin from falling.  A urine drug screen was ordered today.

## 2020-08-09 LAB
6MAM UR QL: NOT DETECTED
7AMINOCLONAZEPAM UR QL: NOT DETECTED
A-OH ALPRAZ UR QL: NOT DETECTED
ALPRAZ UR QL: NOT DETECTED
AMPHET UR QL SCN: NOT DETECTED
ANNOTATION COMMENT IMP: NORMAL
ANNOTATION COMMENT IMP: NORMAL
BARBITURATES UR QL: NOT DETECTED
BUPRENORPHINE UR QL: NOT DETECTED
BZE UR QL: NOT DETECTED
CARBOXYTHC UR QL: NOT DETECTED
CARISOPRODOL UR QL: NOT DETECTED
CLONAZEPAM UR QL: NOT DETECTED
CODEINE UR QL: NOT DETECTED
CREAT UR-MCNC: 101 MG/DL (ref 20–400)
DIAZEPAM UR QL: NOT DETECTED
ETHYL GLUCURONIDE UR QL: NOT DETECTED
FENTANYL UR QL: NOT DETECTED
HYDROCODONE UR QL: PRESENT
HYDROMORPHONE UR QL: NOT DETECTED
LORAZEPAM UR QL: NOT DETECTED
MDA UR QL: NOT DETECTED
MDEA UR QL: NOT DETECTED
MDMA UR QL: NOT DETECTED
ME-PHENIDATE UR QL: NOT DETECTED
MEPERIDINE UR QL: NOT DETECTED
METHADONE UR QL: NOT DETECTED
METHAMPHET UR QL: NOT DETECTED
MIDAZOLAM UR QL SCN: NOT DETECTED
MORPHINE UR QL: NOT DETECTED
NORBUPRENORPHINE UR QL CFM: NOT DETECTED
NORDIAZEPAM UR QL: NOT DETECTED
NORFENTANYL UR QL: NOT DETECTED
NORHYDROCODONE UR QL CFM: PRESENT
NOROXYCODONE UR QL CFM: NOT DETECTED
NOROXYMORPHONE: NOT DETECTED
OXAZEPAM UR QL: NOT DETECTED
OXYCODONE UR QL: NOT DETECTED
OXYMORPHONE UR QL: NOT DETECTED
PATHOLOGY STUDY: NORMAL
PCP UR QL: NOT DETECTED
PHENTERMINE UR QL: NOT DETECTED
PROPOXYPH UR QL: NOT DETECTED
SERVICE CMNT-IMP: NORMAL
TAPENTADOL UR QL SCN: NOT DETECTED
TAPENTADOL-O-SULF: NOT DETECTED
TEMAZEPAM UR QL: NOT DETECTED
TRAMADOL UR QL: NOT DETECTED
ZOLPIDEM UR QL: NOT DETECTED

## 2020-08-10 ENCOUNTER — PATIENT MESSAGE (OUTPATIENT)
Dept: PAIN MEDICINE | Facility: CLINIC | Age: 40
End: 2020-08-10

## 2020-08-10 ENCOUNTER — OFFICE VISIT (OUTPATIENT)
Dept: NEUROSURGERY | Facility: CLINIC | Age: 40
End: 2020-08-10
Payer: MEDICARE

## 2020-08-10 VITALS
DIASTOLIC BLOOD PRESSURE: 68 MMHG | WEIGHT: 293 LBS | BODY MASS INDEX: 63.21 KG/M2 | TEMPERATURE: 98 F | SYSTOLIC BLOOD PRESSURE: 118 MMHG | HEIGHT: 57 IN

## 2020-08-10 DIAGNOSIS — M50.20 HNP (HERNIATED NUCLEUS PULPOSUS), CERVICAL: Primary | ICD-10-CM

## 2020-08-10 DIAGNOSIS — Z13.9 SCREENING PROCEDURE: ICD-10-CM

## 2020-08-10 PROCEDURE — 99215 OFFICE O/P EST HI 40 MIN: CPT | Mod: PBBFAC,PN | Performed by: NEUROLOGICAL SURGERY

## 2020-08-10 PROCEDURE — 99214 PR OFFICE/OUTPT VISIT, EST, LEVL IV, 30-39 MIN: ICD-10-PCS | Mod: S$PBB,,, | Performed by: NEUROLOGICAL SURGERY

## 2020-08-10 PROCEDURE — 99999 PR PBB SHADOW E&M-EST. PATIENT-LVL V: CPT | Mod: PBBFAC,,, | Performed by: NEUROLOGICAL SURGERY

## 2020-08-10 PROCEDURE — 99214 OFFICE O/P EST MOD 30 MIN: CPT | Mod: S$PBB,,, | Performed by: NEUROLOGICAL SURGERY

## 2020-08-10 PROCEDURE — 99999 PR PBB SHADOW E&M-EST. PATIENT-LVL V: ICD-10-PCS | Mod: PBBFAC,,, | Performed by: NEUROLOGICAL SURGERY

## 2020-08-10 NOTE — PROGRESS NOTES
I have seen the patient, reviewed the Advanced Practice Provider's history and physical, assessment and plan. I have personally interviewed and examined the patient at bedside and interpreted the relevant imaging and lab work and I agree with the findings. I personally performed the documented services. See below for any additional comments.    Denies any worsening of her neck pain, balance difficulties, clumsiness, numbness.  MRI of the cervical spine is stable compared to prior study and shows a right C5-C6 disc herniation with deformation of the spinal cord without associated signal changes in the spinal cord.    On exam, she has no weakness.  She did not have Ramya signs or any other signs of myelopathy on today's examination.    Given the lack of conclusive clinical evidence of cervical myelopathy and her minimal, nonspecific neurologic complaints which had been stable over time, I do not recommend cervical spine surgery at this point.  She should follow up with Dr. Pak for ongoing nonsurgical management of her axial cervical spine pain.  I discussed the signs and symptoms of progressive cervical myelopathy with her and her father and they are aware they should seek medical attention if she develops progressive weakness, numbness, difficulty walking or any other concerning signs or symptoms.  She may follow up with me as needed.

## 2020-08-10 NOTE — PROGRESS NOTES
Neurosurgery History and Physical    Patient ID: Shanna Douglass is a 40 y.o. female.    Chief Complaint   Patient presents with    Cervical Spine Pain (C-spine)     Cervical follow-up with results of EMG. Patient states since last visit her neck pain has increased and travels up her neck to the back of her head.         Review of Systems   Constitutional: Negative.    HENT: Negative.    Eyes: Negative.    Cardiovascular: Negative.    Gastrointestinal: Negative.    Endocrine: Negative.    Genitourinary: Negative.    Musculoskeletal: Positive for gait problem and neck pain.   Skin: Negative.    Allergic/Immunologic: Negative.    Neurological: Positive for weakness, numbness and headaches.   Hematological: Negative.    Psychiatric/Behavioral: Negative.        Past Medical History:   Diagnosis Date    Asthma     Bronchitis     Chiari malformation type I     Chronic headache     Graves disease     Graves disease     Hyperlipidemia     Hypertension     Murmur, heart     NPH (normal pressure hydrocephalus)     Obesity     MANUEL on CPAP     Pseudotumor cerebri     Thyroid disease     Hadley syndrome      Social History     Socioeconomic History    Marital status: Single     Spouse name: Not on file    Number of children: Not on file    Years of education: Not on file    Highest education level: Not on file   Occupational History    Not on file   Social Needs    Financial resource strain: Not on file    Food insecurity     Worry: Not on file     Inability: Not on file    Transportation needs     Medical: Not on file     Non-medical: Not on file   Tobacco Use    Smoking status: Never Smoker    Smokeless tobacco: Never Used   Substance and Sexual Activity    Alcohol use: No    Drug use: No    Sexual activity: Not on file   Lifestyle    Physical activity     Days per week: Not on file     Minutes per session: Not on file    Stress: Not on file   Relationships    Social connections     Talks on  phone: Not on file     Gets together: Not on file     Attends [employer] service: Not on file     Active member of club or organization: Not on file     Attends meetings of clubs or organizations: Not on file     Relationship status: Not on file   Other Topics Concern    Not on file   Social History Narrative    Not on file     Family History   Problem Relation Age of Onset    Diabetes Mother     Hypertension Father     Heart disease Father     Heart disease Maternal Grandfather      Review of patient's allergies indicates:   Allergen Reactions    Diamox [acetazolamide] Hives    Relpax [eletriptan hbr] Hives    Dye Swelling and Rash     Iodine    Maxalt [rizatriptan] Palpitations    Sumatriptan Palpitations       Current Outpatient Medications:     amantadine HCl (SYMMETREL) 100 mg capsule,  TAKE ONE CAPSULE EVERY DAY FOR ENERGY *THANK YOU*, Disp: 30 capsule, Rfl: 11    aspirin 81 MG Chew, Take 81 mg by mouth once daily., Disp: , Rfl:     bepotastine besilate (BEPREVE) 1.5 % Drop, Apply to eye., Disp: , Rfl:     celecoxib (CELEBREX) 100 MG capsule, Take 2 capsules (200 mg total) by mouth 2 (two) times daily as needed (headache and sharp pain). No more than 3 days per week., Disp: 30 capsule, Rfl: 11    cyclobenzaprine (FLEXERIL) 10 MG tablet, Take 1 tablet (10 mg total) by mouth 3 (three) times daily as needed for Muscle spasms., Disp: 90 tablet, Rfl: 2    furosemide (LASIX) 80 MG tablet, Take 80 mg by mouth once daily. , Disp: , Rfl:     galcanezumab-gnlm (EMGALITY PEN) 120 mg/mL PnIj, Inject 1 ml (120 mg) into the skin every 28 days., Disp: 1 mL, Rfl: 11    [START ON 8/21/2020] HYDROcodone-acetaminophen (NORCO) 5-325 mg per tablet, Take 1 tablet by mouth every 12 (twelve) hours as needed for Pain., Disp: 60 tablet, Rfl: 0    [START ON 9/20/2020] HYDROcodone-acetaminophen (NORCO) 5-325 mg per tablet, Take 1 tablet by mouth every 12 (twelve) hours as needed for Pain., Disp: 60 tablet, Rfl: 0     [START ON 10/20/2020] HYDROcodone-acetaminophen (NORCO) 5-325 mg per tablet, Take 1 tablet by mouth every 12 (twelve) hours as needed for Pain., Disp: 60 tablet, Rfl: 0    hydrOXYzine HCl (ATARAX) 25 MG tablet, Take 25 mg by mouth 3 (three) times daily., Disp: , Rfl:     meloxicam (MOBIC) 7.5 MG tablet, , Disp: , Rfl:     metFORMIN (GLUCOPHAGE-XR) 500 MG XR 24hr tablet, Take 1,000 mg by mouth., Disp: , Rfl:     methIMAzole (TAPAZOLE) 10 MG Tab, Take 10 mg by mouth 2 (two) times daily., Disp: , Rfl:     metoprolol succinate (TOPROL-XL) 25 MG 24 hr tablet, Take 1 tablet (25 mg total) by mouth once daily., Disp: 90 tablet, Rfl: 1    metoprolol succinate (TOPROL-XL) 25 MG 24 hr tablet, Take 25 mg by mouth once daily., Disp: , Rfl:     montelukast (SINGULAIR) 10 mg tablet, Take 10 mg by mouth every evening., Disp: , Rfl:     multivit-minerals/folic acid (ONE-A-DAY WOMEN VITACRAVES ORAL), , Disp: , Rfl:     omeprazole (PRILOSEC) 20 MG capsule, Take 40 mg by mouth once daily. , Disp: , Rfl:     ondansetron (ZOFRAN) 4 MG tablet, Take 8 mg by mouth 2 (two) times daily., Disp: , Rfl:     ondansetron (ZOFRAN-ODT) 4 MG TbDL, Take 8 mg by mouth every 12 (twelve) hours., Disp: , Rfl:     paroxetine (PAXIL) 20 MG tablet, 40 mg. , Disp: , Rfl:     potassium chloride (MICRO-K) 10 MEQ CpSR, Take 10 mEq by mouth once., Disp: , Rfl:     potassium chloride SA (K-DUR,KLOR-CON) 10 MEQ tablet, , Disp: , Rfl:     predniSONE (DELTASONE) 10 MG tablet, 4 tab PO in AM  X 2 days, 3 tab in AM x 2 days, 2 tab in AM x 2 days, 1 tab in AM x 2 days then stop., Disp: 20 tablet, Rfl: 0    predniSONE (DELTASONE) 10 MG tablet, 4 tab PO in AM  X 2 days, 3 tab in AM x 2 days, 2 tab in AM x 2 days, 1 tab in AM x 2 days then stop., Disp: 20 tablet, Rfl: 0    prochlorperazine (COMPAZINE) 10 MG tablet, Take 1 tablet (10 mg total) by mouth every 6 (six) hours as needed (migraine or nausea)., Disp: 60 tablet, Rfl: 11    ranitidine (ZANTAC) 300  "MG capsule, Take 300 mg by mouth every evening., Disp: , Rfl:     rosuvastatin (CRESTOR) 10 MG tablet, TAKE 1 TABLET DAILY FOR CHOLESTEROL *THANK YOU*, Disp: 90 tablet, Rfl: 3    selenium 200 mcg Cap, Take 200 mcg by mouth once daily., Disp: , Rfl:     topiramate (TOPAMAX) 100 MG tablet, Take 1 tablet (100 mg total) by mouth 2 (two) times daily., Disp: 180 tablet, Rfl: 3    Current Facility-Administered Medications:     onabotulinumtoxina injection 200 Units, 200 Units, Intramuscular, Q90 Days, Flory Chamorro MD, 200 Units at 07/06/18 1548    onabotulinumtoxina injection 200 Units, 200 Units, Intramuscular, Q90 Days, Flory Chamorro MD, 200 Units at 11/19/19 1312    onabotulinumtoxina injection 200 Units, 200 Units, Intramuscular, Q90 Days, Flory Chamorro MD, 200 Units at 05/11/20 1705    onabotulinumtoxina injection 200 Units, 200 Units, Intramuscular, Q90 Days, Flory Chamorro MD, 200 Units at 08/05/20 1528    perflutren protein-A microsphr 0.22 mg/mL IV susp, 2 mL, Intravenous, 1 time in Clinic/HOD, Red Ward MD  Blood pressure 118/68, temperature 97.5 °F (36.4 °C), height 4' 9" (1.448 m), weight (!) 140 kg (308 lb 10.3 oz).      Neurologic Exam     Mental Status   Oriented to person, place, and time.   Attention: normal. Concentration: normal.   Speech: speech is normal   Level of consciousness: alert  Knowledge: good.     Cranial Nerves     CN II   Visual acuity: normal    CN III, IV, VI   Pupils are equal, round, and reactive to light.  Extraocular motions are normal.     CN V   Facial sensation intact.     CN VII   Facial expression full, symmetric.     CN VIII   Hearing: intact    CN IX, X   Palate: symmetric    CN XI   CN XI normal.     CN XII   CN XII normal.     Motor Exam   Muscle bulk: normal  Overall muscle tone: normal  Right arm pronator drift: absent  Left arm pronator drift: absent    Strength   Right deltoid: 5/5  Left deltoid: 5/5  Right biceps: 5/5  Left biceps: 5/5  Right triceps: " 5/5  Left triceps: 5/5  Right wrist flexion: 5/5  Left wrist flexion: 5/5  Right wrist extension: 5/5  Left wrist extension: 5/5  Right interossei: 5/5  Left interossei: 5/5  Right iliopsoas: 5/5  Left iliopsoas: 5/5  Right quadriceps: 5/5  Left quadriceps: 5/5  Right hamstrin/5  Left hamstrin/5  Right anterior tibial: 5/5  Left anterior tibial: 5/5  Right posterior tibial: 5/5  Left posterior tibial: 5/5  Right peroneal: 5/5  Left peroneal: 5/5  Right gastroc: 5/5  Left gastroc: 5/5    Sensory Exam   Right arm light touch: normal  Left arm light touch: normal (left thumb)  Right leg light touch: normal  Left leg light touch: normal    Gait, Coordination, and Reflexes     Gait  Gait: normal    Coordination   Romberg: negative  Finger to nose coordination: normal    Tremor   Resting tremor: absent    Reflexes   Right brachioradialis: 2+  Left brachioradialis: 2+  Right biceps: 2+  Left biceps: 2+  Right triceps: 2+  Left triceps: 2+  Right patellar: 2+  Left patellar: 2+  Right achilles: 2+  Left achilles: 2+  Right plantar: normal  Left plantar: equivocal  Right Cardenas: absent  Left Cardenas: absent  Right ankle clonus: absent  Left ankle clonus: absent      Physical Exam  Vitals signs and nursing note reviewed.   Constitutional:       Appearance: She is well-developed.   HENT:      Head: Normocephalic and atraumatic.   Eyes:      Extraocular Movements: EOM normal.      Pupils: Pupils are equal, round, and reactive to light.   Neck:      Musculoskeletal: Normal range of motion and neck supple.   Cardiovascular:      Rate and Rhythm: Normal rate and regular rhythm.   Pulmonary:      Effort: Pulmonary effort is normal.   Abdominal:      Palpations: Abdomen is soft.   Musculoskeletal: Normal range of motion.   Skin:     General: Skin is warm and dry.   Neurological:      Mental Status: She is alert and oriented to person, place, and time.      Coordination: Finger-Nose-Finger Test and Romberg Test normal.       "Gait: Gait is intact.      Deep Tendon Reflexes:      Reflex Scores:       Tricep reflexes are 2+ on the right side and 2+ on the left side.       Bicep reflexes are 2+ on the right side and 2+ on the left side.       Brachioradialis reflexes are 2+ on the right side and 2+ on the left side.       Patellar reflexes are 2+ on the right side and 2+ on the left side.       Achilles reflexes are 2+ on the right side and 2+ on the left side.  Psychiatric:         Speech: Speech normal.         Behavior: Behavior normal.         Thought Content: Thought content normal.         Judgment: Judgment normal.         Vital Signs  Temp: 97.5 °F (36.4 °C)  BP: 118/68  Pain Score:   7  Height and Weight  Height: 4' 9" (144.8 cm)  Weight: (!) 140 kg (308 lb 10.3 oz)  BSA (Calculated - sq m): 2.37 sq meters  BMI (Calculated): 66.8  Weight in (lb) to have BMI = 25: 115.3]    Provider dictation:  I reviewed the imaging.  She has a congenitally narrow cervical spinal canal.  She has auto fusion of the C2 and C3 vertebrae.  There is evidence of prior suboccipital craniectomy.  At C5-C6, there is a right paracentral disc herniation resulting in severe stenosis with deformation of the spinal cord.  There is no abnormal signal in the spinal cord.  This finding is relatively stable compared to a 2018 MRI.    EMG/NCS:  1) right C6/7 cervical radiculopathy without active denervation, 2) left C6/7 cervical radiculopathy without active denervation, and 3) to a lesser extent, subtle changes suggesting possible subacute left C8/T1 radiculopathy without active denervation. No evidence of median mononeuropathy in either extremity.     The patient is a 40 year old female who presents for neurosurgical follow-up. " She is a very complex patient with a history of pseudotumor status post ventriculoperitoneal shunt and Chiari malformation status post suboccipital craniectomy and morbid obesity.  She is a poor historian.  She complains of chronic neck " "pain for a few years.  She localizes the pain in the posterior cervical spine.  There is no clear radiation of the pain but she describes bilateral hand numbness.  She also endorses dropping things from her hands and clumsiness.  She also states that she has had multiple falls.  It is unclear whether those falls are mechanical due to her body habitus or whether there is any significant gait dysfunction on history."    Since last visit patient denies any worsening neck pain, gait instability, or weakness. She underwent prior cervical TABITHA with Dr. Pak in 2017 with some relief.     On exam, she has no weakness. She has evidence of bilateral carpal tunnel surgery.  She no longer has bilateral Ramya signs and no signs of myelopathy.    Currently on exam patient has no signs of myelopathy and she reports stable symptoms. We would not recommend surgery at this time. She will follow-up with Dr. Pak for ongoing conservative management of her neck pain. We discussed the signs and symptoms of progressive cervical myelopathy. Patient will follow-up as needed.       Visit Diagnosis:  1. HNP (herniated nucleus pulposus), cervical         "

## 2020-08-17 NOTE — TELEPHONE ENCOUNTER
Spoke with patient about scheduled the C TABITHA. She is prescribed aspirin from Dr. Keene. We will obtain clearance for her to hold this and call her back to schedule.

## 2020-08-20 ENCOUNTER — TELEPHONE (OUTPATIENT)
Dept: PHARMACY | Facility: CLINIC | Age: 40
End: 2020-08-20

## 2020-08-20 ENCOUNTER — PATIENT MESSAGE (OUTPATIENT)
Dept: PAIN MEDICINE | Facility: CLINIC | Age: 40
End: 2020-08-20

## 2020-08-20 NOTE — TELEPHONE ENCOUNTER
Refill call regarding Emgality at [facility]. Will prepare for shipment with patient consent on  to arrive . Copay 3.90 CCOF 9077. Patient has not started any new medications including OTC drugs. Patient has not had any medication/ dose or instruction changes. No new allergies or side effects reported with this shipment. Medication is being taken as prescribed by physician and properly stored. Two patient identifiers:  and Address verified. Patient has no questions or concerns for RPH. Next injection , sharps added.

## 2020-09-01 DIAGNOSIS — M54.12 CERVICAL RADICULOPATHY: Primary | ICD-10-CM

## 2020-09-01 RX ORDER — SODIUM CHLORIDE, SODIUM LACTATE, POTASSIUM CHLORIDE, CALCIUM CHLORIDE 600; 310; 30; 20 MG/100ML; MG/100ML; MG/100ML; MG/100ML
INJECTION, SOLUTION INTRAVENOUS CONTINUOUS
Status: CANCELLED | OUTPATIENT
Start: 2020-09-09

## 2020-09-01 NOTE — TELEPHONE ENCOUNTER
Spoke with patient and the cervical alda has been scheduled for 9/9. Pre-op instructions reviewed with patient.

## 2020-09-07 ENCOUNTER — PATIENT MESSAGE (OUTPATIENT)
Dept: PAIN MEDICINE | Facility: CLINIC | Age: 40
End: 2020-09-07

## 2020-09-09 DIAGNOSIS — I10 ESSENTIAL HYPERTENSION: Primary | ICD-10-CM

## 2020-09-09 RX ORDER — ROSUVASTATIN CALCIUM 10 MG/1
10 TABLET, COATED ORAL DAILY
Qty: 90 TABLET | Refills: 3 | Status: SHIPPED | OUTPATIENT
Start: 2020-09-09 | End: 2021-01-06

## 2020-09-14 ENCOUNTER — TELEPHONE (OUTPATIENT)
Dept: PAIN MEDICINE | Facility: CLINIC | Age: 40
End: 2020-09-14

## 2020-09-14 NOTE — TELEPHONE ENCOUNTER
----- Message from Alina Powell sent at 9/9/2020  8:10 AM CDT -----  Type:  Patient Returning Call    Who Called:  patient   Who Left Message for Patient:  Jessa   Does the patient know what this is regarding?:  yes   Best Call Back Number:  [phone]   Additional Information:  Please advise-thank you

## 2020-09-17 ENCOUNTER — PATIENT MESSAGE (OUTPATIENT)
Dept: NEUROLOGY | Facility: CLINIC | Age: 40
End: 2020-09-17

## 2020-09-18 ENCOUNTER — PATIENT MESSAGE (OUTPATIENT)
Dept: NEUROLOGY | Facility: CLINIC | Age: 40
End: 2020-09-18

## 2020-09-18 RX ORDER — PREDNISONE 10 MG/1
TABLET ORAL
Qty: 20 TABLET | Refills: 0 | Status: SHIPPED | OUTPATIENT
Start: 2020-09-18 | End: 2021-01-22 | Stop reason: SDUPTHER

## 2020-09-21 DIAGNOSIS — Z13.9 SCREENING PROCEDURE: ICD-10-CM

## 2020-09-22 ENCOUNTER — PATIENT MESSAGE (OUTPATIENT)
Dept: RESEARCH | Facility: OTHER | Age: 40
End: 2020-09-22

## 2020-09-23 ENCOUNTER — TELEPHONE (OUTPATIENT)
Dept: PHARMACY | Facility: CLINIC | Age: 40
End: 2020-09-23

## 2020-09-28 ENCOUNTER — LAB VISIT (OUTPATIENT)
Dept: FAMILY MEDICINE | Facility: CLINIC | Age: 40
End: 2020-09-28
Payer: MEDICARE

## 2020-09-28 ENCOUNTER — PATIENT MESSAGE (OUTPATIENT)
Dept: NEUROLOGY | Facility: CLINIC | Age: 40
End: 2020-09-28

## 2020-09-28 DIAGNOSIS — Z13.9 SCREENING PROCEDURE: ICD-10-CM

## 2020-09-28 PROCEDURE — U0003 INFECTIOUS AGENT DETECTION BY NUCLEIC ACID (DNA OR RNA); SEVERE ACUTE RESPIRATORY SYNDROME CORONAVIRUS 2 (SARS-COV-2) (CORONAVIRUS DISEASE [COVID-19]), AMPLIFIED PROBE TECHNIQUE, MAKING USE OF HIGH THROUGHPUT TECHNOLOGIES AS DESCRIBED BY CMS-2020-01-R: HCPCS

## 2020-09-29 ENCOUNTER — TELEPHONE (OUTPATIENT)
Dept: NEUROLOGY | Facility: CLINIC | Age: 40
End: 2020-09-29

## 2020-09-29 LAB — SARS-COV-2 RNA RESP QL NAA+PROBE: NOT DETECTED

## 2020-10-01 ENCOUNTER — HOSPITAL ENCOUNTER (OUTPATIENT)
Dept: RADIOLOGY | Facility: HOSPITAL | Age: 40
Discharge: HOME | End: 2020-10-01
Attending: ANESTHESIOLOGY | Admitting: ANESTHESIOLOGY
Payer: MEDICARE

## 2020-10-01 ENCOUNTER — HOSPITAL ENCOUNTER (OUTPATIENT)
Facility: HOSPITAL | Age: 40
Discharge: HOME | End: 2020-10-01
Attending: ANESTHESIOLOGY | Admitting: ANESTHESIOLOGY
Payer: MEDICARE

## 2020-10-01 VITALS
HEART RATE: 74 BPM | WEIGHT: 293 LBS | RESPIRATION RATE: 18 BRPM | HEIGHT: 57 IN | OXYGEN SATURATION: 98 % | TEMPERATURE: 98 F | BODY MASS INDEX: 63.21 KG/M2 | DIASTOLIC BLOOD PRESSURE: 74 MMHG | SYSTOLIC BLOOD PRESSURE: 140 MMHG

## 2020-10-01 DIAGNOSIS — M54.12 CERVICAL RADICULOPATHY: Primary | ICD-10-CM

## 2020-10-01 DIAGNOSIS — M54.12 CERVICAL RADICULOPATHY: ICD-10-CM

## 2020-10-01 LAB — GLUCOSE SERPL-MCNC: 89 MG/DL (ref 70–110)

## 2020-10-01 PROCEDURE — 25500020 PHARM REV CODE 255: Mod: PO | Performed by: ANESTHESIOLOGY

## 2020-10-01 PROCEDURE — A9579 GAD-BASE MR CONTRAST NOS,1ML: HCPCS | Mod: PO | Performed by: ANESTHESIOLOGY

## 2020-10-01 PROCEDURE — 62321 NJX INTERLAMINAR CRV/THRC: CPT | Mod: ,,, | Performed by: ANESTHESIOLOGY

## 2020-10-01 PROCEDURE — 76000 FLUOROSCOPY <1 HR PHYS/QHP: CPT | Mod: TC,PO

## 2020-10-01 PROCEDURE — 25000003 PHARM REV CODE 250: Mod: PO | Performed by: ANESTHESIOLOGY

## 2020-10-01 PROCEDURE — 62321 PR INJ CERV/THORAC, W/GUIDANCE: ICD-10-PCS | Mod: ,,, | Performed by: ANESTHESIOLOGY

## 2020-10-01 PROCEDURE — 62321 NJX INTERLAMINAR CRV/THRC: CPT | Mod: PO | Performed by: ANESTHESIOLOGY

## 2020-10-01 PROCEDURE — 63600175 PHARM REV CODE 636 W HCPCS: Mod: PO | Performed by: ANESTHESIOLOGY

## 2020-10-01 PROCEDURE — 82962 GLUCOSE BLOOD TEST: CPT | Mod: PO | Performed by: ANESTHESIOLOGY

## 2020-10-01 RX ORDER — METHYLPREDNISOLONE ACETATE 80 MG/ML
INJECTION, SUSPENSION INTRA-ARTICULAR; INTRALESIONAL; INTRAMUSCULAR; SOFT TISSUE
Status: DISCONTINUED | OUTPATIENT
Start: 2020-10-01 | End: 2020-10-01 | Stop reason: HOSPADM

## 2020-10-01 RX ORDER — LIDOCAINE HYDROCHLORIDE 10 MG/ML
1 INJECTION INFILTRATION; PERINEURAL ONCE
Status: COMPLETED | OUTPATIENT
Start: 2020-10-01 | End: 2020-10-01

## 2020-10-01 RX ORDER — SODIUM CHLORIDE, SODIUM LACTATE, POTASSIUM CHLORIDE, CALCIUM CHLORIDE 600; 310; 30; 20 MG/100ML; MG/100ML; MG/100ML; MG/100ML
INJECTION, SOLUTION INTRAVENOUS CONTINUOUS
Status: DISCONTINUED | OUTPATIENT
Start: 2020-10-01 | End: 2020-10-01 | Stop reason: HOSPADM

## 2020-10-01 RX ORDER — LIDOCAINE HYDROCHLORIDE 10 MG/ML
INJECTION, SOLUTION EPIDURAL; INFILTRATION; INTRACAUDAL; PERINEURAL
Status: DISCONTINUED | OUTPATIENT
Start: 2020-10-01 | End: 2020-10-01 | Stop reason: HOSPADM

## 2020-10-01 RX ADMIN — LIDOCAINE HYDROCHLORIDE 1 ML: 10 INJECTION, SOLUTION EPIDURAL; INFILTRATION; INTRACAUDAL; PERINEURAL at 02:10

## 2020-10-01 RX ADMIN — SODIUM CHLORIDE, SODIUM LACTATE, POTASSIUM CHLORIDE, AND CALCIUM CHLORIDE: .6; .31; .03; .02 INJECTION, SOLUTION INTRAVENOUS at 02:10

## 2020-10-01 NOTE — OP NOTE
PROCEDURE DATE: 10/1/2020    Procedure: C7-T1 cervical interlaminar epidural steroid injection under utilizing fluoroscopy.    Diagnosis: Cervical Radiculopathy    POSTOP DIAGNOSIS: SAME    Physician: Santana Pak MD    Medications injected:  Methylprednisone 80mg followed by a slow injection of 4 mL sterile, preservative-free normal saline.    Local anesthetic used: Lidocaine 1%, 4 ml.    Sedation Medications: none    Complications:  none    Estimated blood loss: none    Technique:  A time-out was taken to identify patient and procedure prior to starting the procedure.  With the patient laying in a prone position with the neck in a mid-flexed forward position, the area was prepped and draped in the usual sterile fashion using ChloraPrep and a fenestrated drape.  The area was determined under AP fluoroscopic guidance.  Local anesthetic was given using a 25-gauge 1.5 inch needle by raising a wheal and then infiltrating ventrally.  A 6 inch 18-gauge Touhy needle was introduced under fluoroscopic guidance to meet the lamina of C7.  The needle was then hinged under the lamina then advanced using loss of resistance technique.  Once the tip of the needle was in the desired position, the contrast dye Omniscan was injected to determine placement and no uptake.  The steroid was then injected slowly followed by a slow injection of 4 mL of the sterile preservative-free normal saline.  The patient tolerated the procedure well.    The patient was monitored after the procedure and was given post-procedure and discharge instructions to follow at home. The patient was discharged in a stable condition.

## 2020-10-01 NOTE — PLAN OF CARE
VSS, all questions answered. Denies recent fever or illness. Pt states ready for procedure. MD aware of pts BMI. Pt  Is not receiving any sedation per MD. IV for emergency only.

## 2020-10-01 NOTE — DISCHARGE SUMMARY
[facility]  Discharge Note  Short Stay    Admit Date: 10/1/2020    Discharge Date: 10/1/2020    Attending Physician: Santana Pak MD     Discharge Provider: Santana Pak    Diagnoses:  Active Hospital Problems    Diagnosis  POA    *Cervical radiculopathy [M54.12]  Yes      Resolved Hospital Problems   No resolved problems to display.       Discharged Condition: good    Final Diagnoses: Cervical radiculopathy [M54.12]    Disposition: Home or Self Care    Hospital Course: no complications, uneventful    Outcome of Hospitalization, Treatment, Procedure, or Surgery:  Patient was admitted for outpatient procedure. The patient underwent procedure without complications and are discharged home    Follow up/Patient Instructions:  Follow up as scheduled in Pain Management clinic in 3-4 weeks/Patient has received instructions and follow up date and time    Medications:  Continue previous medications    Discharge Procedure Orders   Call MD for:  temperature >100.4     Call MD for:  severe uncontrolled pain     Call MD for:  redness, tenderness, or signs of infection (pain, swelling, redness, odor or green/yellow discharge around incision site)     Call MD for:  severe persistent headache     No dressing needed         Discharge Procedure Orders (must include Diet, Follow-up, Activity):   Discharge Procedure Orders (must include Diet, Follow-up, Activity)   Call MD for:  temperature >100.4     Call MD for:  severe uncontrolled pain     Call MD for:  redness, tenderness, or signs of infection (pain, swelling, redness, odor or green/yellow discharge around incision site)     Call MD for:  severe persistent headache     No dressing needed

## 2020-10-01 NOTE — H&P
CC: Neck pain    HPI: The patient is a 39yo woman with a history of cervical radiculopathy here for cervical TABITHA. There are no major changes in history and physical from 8/5/20.    Past Medical History:   Diagnosis Date    Asthma     Bronchitis     Chiari malformation type I     Chronic headache     Diabetes mellitus     Graves disease     Graves disease     Hyperlipidemia     Hypertension     Murmur, heart     NPH (normal pressure hydrocephalus)     Obesity     MANUEL on CPAP     Pseudotumor cerebri     Thyroid disease     Hadley syndrome        Past Surgical History:   Procedure Laterality Date    CARPAL TUNNEL RELEASE      CERVICAL SPINE SURGERY      CHOLECYSTECTOMY      EAR TUBE REMOVAL Bilateral     EYE SURGERY      strabismus    INJECTION OF FACET JOINT Bilateral 5/23/2018    Procedure: INJECTION-FACET L3/4 and L4/5;  Surgeon: Santana Pak MD;  Location: [facility] OR;  Service: Pain Management;  Laterality: Bilateral;  sacralization of L5 and a rudimentary disc space    MYRINGOTOMY W/ TUBES      RADIOFREQUENCY ABLATION OF LUMBAR MEDIAL BRANCH NERVE AT SINGLE LEVEL Bilateral 9/21/2018    Procedure: RADIOFREQUENCY ABLATION, NERVE, SPINAL, LUMBAR, MEDIAL BRANCH, L2, L3, L4;  Surgeon: Santana Pak MD;  Location: [facility] OR;  Service: Pain Management;  Laterality: Bilateral;    TONSILLECTOMY      VENTRICULOPERITONEAL SHUNT         Family History   Problem Relation Age of Onset    Diabetes Mother     Hypertension Father     Heart disease Father     Heart disease Maternal Grandfather        Social History     Socioeconomic History    Marital status: Single     Spouse name: Not on file    Number of children: Not on file    Years of education: Not on file    Highest education level: Not on file   Occupational History    Not on file   Social Needs    Financial resource strain: Not on file    Food insecurity     Worry: Not on file     Inability: Not on file    Transportation  "needs     Medical: Not on file     Non-medical: Not on file   Tobacco Use    Smoking status: Never Smoker    Smokeless tobacco: Never Used   Substance and Sexual Activity    Alcohol use: No    Drug use: No    Sexual activity: Not on file   Lifestyle    Physical activity     Days per week: Not on file     Minutes per session: Not on file    Stress: Not on file   Relationships    Social connections     Talks on phone: Not on file     Gets together: Not on file     Attends Oriental orthodox service: Not on file     Active member of club or organization: Not on file     Attends meetings of clubs or organizations: Not on file     Relationship status: Not on file   Other Topics Concern    Not on file   Social History Narrative    Not on file       No current facility-administered medications for this encounter.        Review of patient's allergies indicates:   Allergen Reactions    Diamox [acetazolamide] Hives    Relpax [eletriptan hbr] Hives    Dye Swelling and Rash     Iodine    Maxalt [rizatriptan] Palpitations    Sumatriptan Palpitations       Vitals:    10/01/20 1355   BP: 124/73   Pulse: 60   Resp: 19   Temp: 98.1 °F (36.7 °C)   TempSrc: Skin   SpO2: 96%   Weight: (!) 139.7 kg (308 lb)   Height: 4' 9" (1.448 m)       ASA 2, mallampati 3      REVIEW OF SYSTEMS:     GENERAL: No weight loss, malaise or fevers.  HEENT:  No recent changes in vision or hearing  NECK: Negative for lumps, no difficulty with swallowing.  RESPIRATORY: Negative for cough, wheezing or shortness of breath, patient denies any recent URI.  CARDIOVASCULAR: Negative for chest pain, leg swelling or palpitations.  GI: Negative for abdominal discomfort, blood in stools or black stools or change in bowel habits.  MUSCULOSKELETAL: See HPI.  SKIN: Negative for lesions, rash, and itching.  PSYCH: No suicidal or homicidal ideations, no current mood disturbances.  HEMATOLOGY/LYMPHOLOGY: Negative for prolonged bleeding, bruising easily or swollen " nodes. Patient is not currently taking any anti-coagulants  ENDO: No history of diabetes or thyroid dysfunction  NEURO: No history of syncope, paralysis, seizures or tremors.All other reviewed and negative other than HPI.    Physical exam:  Gen: A and O x3, pleasant, well-groomed  Skin: No rashes or obvious lesions  HEENT: PERRLA, no obvious deformities on ears or in canals. No thyroid masses, trachea midline, no palpable lymph nodes in neck, axilla.  CVS: Regular rate and rhythm, normal S1 and S2, no murmurs.  Resp: Clear to auscultation bilaterally.  Abdomen: Soft, NT/ND, normal bowel sounds present.  Musculoskeletal/Neuro: Moving all extremities    Assessment:  Cervical radiculopathy  -     Case Request Operating Room: Injection-steroid-epidural-cervical C7- T1 interlaminar  -     Place in Outpatient; Standing  -     Diet NPO; Standing  -     lactated ringers infusion  -     Notify physician ; Standing  -     Notify physician ; Standing  -     Notify physician (specify); Standing  -     Place 18-22 gauage peripheral IV ; Standing  -     Verify informed consent; Standing  -     Vital signs; Standing    Other orders  -     Progressive Mobility Protocol (mobilize patient to their highest level of functioning at least twice daily); Standing  -     IP VTE LOW RISK PATIENT; Standing

## 2020-10-15 ENCOUNTER — OFFICE VISIT (OUTPATIENT)
Dept: PAIN MEDICINE | Facility: CLINIC | Age: 40
End: 2020-10-15
Payer: MEDICARE

## 2020-10-15 ENCOUNTER — PROCEDURE VISIT (OUTPATIENT)
Dept: NEUROLOGY | Facility: CLINIC | Age: 40
End: 2020-10-15
Payer: MEDICARE

## 2020-10-15 VITALS
HEART RATE: 71 BPM | DIASTOLIC BLOOD PRESSURE: 74 MMHG | TEMPERATURE: 98 F | WEIGHT: 293 LBS | RESPIRATION RATE: 20 BRPM | SYSTOLIC BLOOD PRESSURE: 114 MMHG | BODY MASS INDEX: 65.36 KG/M2

## 2020-10-15 VITALS
HEART RATE: 75 BPM | DIASTOLIC BLOOD PRESSURE: 89 MMHG | SYSTOLIC BLOOD PRESSURE: 128 MMHG | HEIGHT: 57 IN | WEIGHT: 293 LBS | BODY MASS INDEX: 63.21 KG/M2 | RESPIRATION RATE: 24 BRPM

## 2020-10-15 DIAGNOSIS — Z79.891 OPIOID CONTRACT EXISTS: ICD-10-CM

## 2020-10-15 DIAGNOSIS — M47.816 LUMBAR SPONDYLOSIS: ICD-10-CM

## 2020-10-15 DIAGNOSIS — M51.36 DDD (DEGENERATIVE DISC DISEASE), LUMBAR: ICD-10-CM

## 2020-10-15 DIAGNOSIS — G43.719 INTRACTABLE CHRONIC MIGRAINE WITHOUT AURA AND WITHOUT STATUS MIGRAINOSUS: Primary | ICD-10-CM

## 2020-10-15 DIAGNOSIS — M50.30 DDD (DEGENERATIVE DISC DISEASE), CERVICAL: ICD-10-CM

## 2020-10-15 DIAGNOSIS — E66.01 MORBID OBESITY WITH BMI OF 60.0-69.9, ADULT: ICD-10-CM

## 2020-10-15 DIAGNOSIS — M54.12 CERVICAL RADICULOPATHY: Primary | ICD-10-CM

## 2020-10-15 DIAGNOSIS — M48.02 CERVICAL STENOSIS OF SPINAL CANAL: ICD-10-CM

## 2020-10-15 PROCEDURE — 99215 OFFICE O/P EST HI 40 MIN: CPT | Mod: PBBFAC,25,PN | Performed by: PHYSICIAN ASSISTANT

## 2020-10-15 PROCEDURE — 99214 OFFICE O/P EST MOD 30 MIN: CPT | Mod: S$PBB,,, | Performed by: PHYSICIAN ASSISTANT

## 2020-10-15 PROCEDURE — 64615 CHEMODENERV MUSC MIGRAINE: CPT | Mod: PBBFAC,PO | Performed by: PSYCHIATRY & NEUROLOGY

## 2020-10-15 PROCEDURE — 64615 CHEMODENERV MUSC MIGRAINE: CPT | Mod: S$PBB,,, | Performed by: PSYCHIATRY & NEUROLOGY

## 2020-10-15 PROCEDURE — 64615 PR CHEMODENERVATION OF MUSCLE FOR CHRONIC MIGRAINE: ICD-10-PCS | Mod: S$PBB,,, | Performed by: PSYCHIATRY & NEUROLOGY

## 2020-10-15 PROCEDURE — 99214 PR OFFICE/OUTPT VISIT, EST, LEVL IV, 30-39 MIN: ICD-10-PCS | Mod: S$PBB,,, | Performed by: PHYSICIAN ASSISTANT

## 2020-10-15 PROCEDURE — 99999 PR PBB SHADOW E&M-EST. PATIENT-LVL V: CPT | Mod: PBBFAC,,, | Performed by: PHYSICIAN ASSISTANT

## 2020-10-15 PROCEDURE — 99999 PR PBB SHADOW E&M-EST. PATIENT-LVL V: ICD-10-PCS | Mod: PBBFAC,,, | Performed by: PHYSICIAN ASSISTANT

## 2020-10-15 RX ORDER — CYCLOBENZAPRINE HCL 10 MG
10 TABLET ORAL 3 TIMES DAILY PRN
Qty: 90 TABLET | Refills: 2 | Status: SHIPPED | OUTPATIENT
Start: 2020-10-15 | End: 2021-01-22 | Stop reason: SDUPTHER

## 2020-10-15 RX ORDER — HYDROCODONE BITARTRATE AND ACETAMINOPHEN 5; 325 MG/1; MG/1
1 TABLET ORAL EVERY 12 HOURS PRN
Qty: 60 TABLET | Refills: 0 | Status: SHIPPED | OUTPATIENT
Start: 2020-12-23 | End: 2021-01-13 | Stop reason: SDUPTHER

## 2020-10-15 RX ORDER — HYDROCODONE BITARTRATE AND ACETAMINOPHEN 5; 325 MG/1; MG/1
1 TABLET ORAL EVERY 12 HOURS PRN
Qty: 60 TABLET | Refills: 0 | Status: SHIPPED | OUTPATIENT
Start: 2020-11-23 | End: 2020-12-23

## 2020-10-15 RX ADMIN — ONABOTULINUMTOXINA 200 UNITS: 100 INJECTION, POWDER, LYOPHILIZED, FOR SOLUTION INTRADERMAL; INTRAMUSCULAR at 11:10

## 2020-10-15 NOTE — PROCEDURES
Procedures       PROCEDURE PERFORMED: Botulinum toxin injection (71840)    CLINICAL INDICATION: G43.719    A time out was conducted just before the start of the procedure to verify the correct patient and procedure, procedure location, and all relevant critical information.     Conventional methods of treatment such as multiple medications , both on and   off label have been tried including: anti-epileptics (topiramate, gabapentin, diamox), beta blockers (metoprolol),  and antidepressants (paxil - cannot trial additional ones due to being on Paxil). The patient has been unresponsive and refractory.The patient meets criteria for chronic headaches according to the ICHD-II, the patient has more than 15 headaches a month which last for more than 4 hours a day.    Injection shows session number: 10  Percentage relief since last session: >50% of migraine relief but only for 4-6 weeks     DESCRIPTION OF PROCEDURE: After obtaining informed consent and under   aseptic technique, a total of 135 units of botulinum toxin type A were   injected in the following muscles:     -- Procerus 5 units  --  5 units bilaterally  -- Frontalis 20 units  -- Temporalis 20 units bilaterally  -- Occipitalis 15 units bilaterally  -- Trapezius 15 units bilaterally.     -- Upper cervical paraspinals 10 units bilaterally spared due to prior surgery in this area     The patient tolerated the procedure well. There were no complications. The patient was given a prescription for repeat treatment in 12 weeks     Unavoidable waste - 65 units       Cee Campbell M.D  Medical Director, Headache and Facial Pain  [facility]

## 2020-10-15 NOTE — TELEPHONE ENCOUNTER
Pt seen in clinic. Please send Rx.    I have reviewed the [place] Board of Pharmacy website and there are no abberancies.

## 2020-10-19 NOTE — PROGRESS NOTES
This note was completed with dictation software and grammatical errors may exist.    CC: Back pain, Neck pain, headaches    HPI: The patient is a 40-year-old woman with a history of Chiari malformation, Hadley syndrome, pseudotumor cerebri with shunt, morbid obesity who presents in referral from Dr. Velasco for neck pain.  She is status post C7-T1 interlaminar epidural steroid injection on 10/01/2020 with 100% relief of her neck pain.  She is extremely pleased with these results.  She does continue to have headaches and low back pain.  Her low back pain is worse with standing and bending, improved with heating pad and medication.  She reports to me that she also lost 6 pounds.  She denies bladder or bowel incontinence.  She reports weakness and numbness in her legs but no longer in her upper extremities.    Pain intervention history: She was seeing Dr. Gerson Renteria, pain management and until recently had been taking hydrocodone 5/325 once a day in addition to Flexeril 3 times a day and gabapentin 600 mg 3 times a day.  She apparently tested positive for Valium and so was dismissed from his practice. She is status post C7-T1 cervical interlaminar epidural steroid injection on 10/4/17 with 100% relief lasting 2 weeks.  She is status post a second cervical TABITHA on 11/14/17 with almost complete relief again but only lasting 2-3 weeks. She is status post bilateral L2, 3 and 4 medial branch radiofrequency ablation on 09/21/2018 with 75% relief.   She is status post C7-T1 interlaminar epidural steroid injection on 10/01/2020 with 100% relief of her neck pain.      ROS: She reports weight loss, headaches, easy bruising and back pain.  Balance of review of systems is negative.    Past Medical History:   Diagnosis Date    Asthma     Bronchitis     Chiari malformation type I     Chronic headache     Diabetes mellitus     Graves disease     Graves disease     Hyperlipidemia     Hypertension     Murmur, heart     NPH  (normal pressure hydrocephalus)     Obesity     MANUEL on CPAP     Pseudotumor cerebri     Thyroid disease     Hadley syndrome        Past Surgical History:   Procedure Laterality Date    CARPAL TUNNEL RELEASE      CERVICAL SPINE SURGERY      CHOLECYSTECTOMY      EAR TUBE REMOVAL Bilateral     EPIDURAL STEROID INJECTION INTO CERVICAL SPINE N/A 10/1/2020    Procedure: Injection-steroid-epidural-cervical C7- T1 interlaminar;  Surgeon: Santana Pak MD;  Location: [facility] OR;  Service: Pain Management;  Laterality: N/A;    EYE SURGERY      strabismus    INJECTION OF FACET JOINT Bilateral 5/23/2018    Procedure: INJECTION-FACET L3/4 and L4/5;  Surgeon: Santana Pak MD;  Location: [facility] OR;  Service: Pain Management;  Laterality: Bilateral;  sacralization of L5 and a rudimentary disc space    MYRINGOTOMY W/ TUBES      RADIOFREQUENCY ABLATION OF LUMBAR MEDIAL BRANCH NERVE AT SINGLE LEVEL Bilateral 9/21/2018    Procedure: RADIOFREQUENCY ABLATION, NERVE, SPINAL, LUMBAR, MEDIAL BRANCH, L2, L3, L4;  Surgeon: Santana Pak MD;  Location: [facility] OR;  Service: Pain Management;  Laterality: Bilateral;    TONSILLECTOMY      VENTRICULOPERITONEAL SHUNT         Social History     Socioeconomic History    Marital status: Single     Spouse name: Not on file    Number of children: Not on file    Years of education: Not on file    Highest education level: Not on file   Occupational History    Not on file   Social Needs    Financial resource strain: Not on file    Food insecurity     Worry: Not on file     Inability: Not on file    Transportation needs     Medical: Not on file     Non-medical: Not on file   Tobacco Use    Smoking status: Never Smoker    Smokeless tobacco: Never Used   Substance and Sexual Activity    Alcohol use: No    Drug use: No    Sexual activity: Not on file   Lifestyle    Physical activity     Days per week: Not on file     Minutes per session: Not on file    Stress: Not on  file   Relationships    Social connections     Talks on phone: Not on file     Gets together: Not on file     Attends Orthodoxy service: Not on file     Active member of club or organization: Not on file     Attends meetings of clubs or organizations: Not on file     Relationship status: Not on file   Other Topics Concern    Not on file   Social History Narrative    Not on file         Medications/Allergies: See med card    Vitals:    10/15/20 1159   BP: 114/74   Pulse: 71   Resp: 20   Temp: 98 °F (36.7 °C)   TempSrc: Oral   Weight: (!) 137 kg (302 lb 0.5 oz)   PainSc:   6   PainLoc: Back     Body mass index is 65.36 kg/m².    Physical exam:  Gen: A and O x3, pleasant, obese  Skin: No rashes or obvious lesions  HEENT: PERRLA, no obvious deformities on ears or in canals.Trachea midline.  CVS: Regular rate and rhythm, normal palpable pulses.  Resp: Clear to auscultation bilaterally, no wheezes or rales.  Abdomen: Soft, NT/ND.  Musculoskeletal:  Wide-based gait.     Neuro:  Upper extremities: 5/5 strength bilaterally   Lower extremities: 5/5 strength bilaterally  Reflexes: Brachioradialis 2+, Bicep 2+, Tricep 2+. Patellar 2+, Achilles 2+ bilaterally.  Sensory: Intact and symmetrical to light touch and pinprick in C2-T1 dermatomes bilaterally.  Intact and symmetrical to light touch and pinprick in L2-S1 dermatomes bilaterally.     Cervical Spine:  Cervical spine: Range of motion is mildly reduced with flexion , extension and lateral rotation without pain.  Spurling's maneuver is negative bilaterally.  Myofascial exam:   no tenderness to palpation of the cervical paraspinous muscles.    Lumbar spine:  Lumbar spine: ROM is moderately limited with flexion and extension with increased low back pain during each maneuver.  Derrick's test causes no increased pain on either side.    Supine straight leg raise is negative bilaterally.    Internal and external rotation of the hip causes no increased pain on either  side.  Myofascial exam: No tenderness to palpation across lumbar paraspinous muscles.      Imaging:  MRI from 2/9/17 cervical spine demonstrates congenitally narrowed canal with disc bulging most prominently at C4/5 to the right side causing anterior cord compression but no signal changes.  There is narrowing of the canal at C3/4 to a lesser degree.    Assessment:   The patient is a 40-year-old woman with a history of Chiari malformation, pseudotumor cerebri with shunt, morbid obesity who presents in referral from Dr. Velasco for neck pain.    1. Cervical radiculopathy     2. Cervical stenosis of spinal canal     3. DDD (degenerative disc disease), cervical     4. DDD (degenerative disc disease), lumbar     5. Lumbar spondylosis     6. Opioid contract exists     7. Morbid obesity with BMI of 60.0-69.9, adult         Plan:  1.  Dr. Pak provided prescriptions for hydrocodone-acetaminophen 5/325 milligrams up to 2 times daily as needed pain.  I have reviewed the [place] Board of Pharmacy website and there are no abberancies.    2.  She had 100% relief of her neck and arm pain following the cervical TABITHA.  This can be repeated in the future if necessary.  3.  We discussed the importance of a healthy diet and exercise.  I commended her for her weight loss.  4.  If her back pain worsens she will contact me to set up bilateral L2, 3, 4, 5 medial branch radiofrequency ablation.  She did well with this in the past.  5.  Follow-up in 3 months or sooner as needed.    Greater than 50% of this 25 minutes visit was spent counseling the patient.

## 2020-10-20 ENCOUNTER — PATIENT MESSAGE (OUTPATIENT)
Dept: NEUROLOGY | Facility: CLINIC | Age: 40
End: 2020-10-20

## 2020-10-20 NOTE — TELEPHONE ENCOUNTER
Explained Dr Campbell will continue to see her for Headaches and peudotumor symptoms. If she is having problems with the shunt she should see Dr Velasco the neurosurgeon who placed the shunt. And continue with seeing pain management

## 2020-10-22 ENCOUNTER — PATIENT MESSAGE (OUTPATIENT)
Dept: NEUROLOGY | Facility: CLINIC | Age: 40
End: 2020-10-22

## 2020-11-05 ENCOUNTER — PATIENT MESSAGE (OUTPATIENT)
Dept: NEUROLOGY | Facility: CLINIC | Age: 40
End: 2020-11-05

## 2020-11-06 RX ORDER — RIMEGEPANT SULFATE 75 MG/75MG
TABLET, ORALLY DISINTEGRATING ORAL
Qty: 8 TABLET | Refills: 11 | Status: SHIPPED | OUTPATIENT
Start: 2020-11-06 | End: 2021-10-25 | Stop reason: SDUPTHER

## 2020-11-11 ENCOUNTER — TELEPHONE (OUTPATIENT)
Dept: PHARMACY | Facility: CLINIC | Age: 40
End: 2020-11-11

## 2020-11-11 NOTE — TELEPHONE ENCOUNTER
Good Afternoon,     The prior authorization for Shanna Douglass's Nurtec 75mg ODT prescription has been APPROVED FROM 11/6/2020 TO until further notice with copayment of $0.00.       We were unable to reach patient on 11/11/2020.  Voicemail was unavailable.  Will send patient portal message to the patient.    If there are any additional questions or concerns, please contact me.    Thank You!   Cynthia Rojas CPhT, B.A  Patient Care Advocate   [facility]  Phone: [phone] Ext [phone]  Fax: [phone]

## 2020-11-25 ENCOUNTER — TELEPHONE (OUTPATIENT)
Dept: PHARMACY | Facility: CLINIC | Age: 40
End: 2020-11-25

## 2020-12-14 ENCOUNTER — TELEPHONE (OUTPATIENT)
Dept: PAIN MEDICINE | Facility: CLINIC | Age: 40
End: 2020-12-14

## 2020-12-14 NOTE — TELEPHONE ENCOUNTER
The prescription was sent to the pharmacy during her October visit.  It is due to be picked up on 12/23

## 2020-12-14 NOTE — TELEPHONE ENCOUNTER
----- Message from Manuel Pope sent at 12/14/2020  3:04 PM CST -----  Regarding: refill  Type:  Pharmacy Calling to Clarify an RX    Name of Caller:  pt  Pharmacy Name:    [facility] - [place] - [address] [place] [address]  [address]  [place] [address]  Phone: [phone] Fax: [phone]    Prescription Name:    HYDROcodone-acetaminophen (NORCO) 5-325 mg per tablet 60 tablet 0 12/23/2020 1/22/2021   Sig - Route: Take 1 tablet by mouth every 12 (twelve) hours as needed for Pain. - Oral   Sent to pharmacy as: HYDROcodone-acetaminophen (NORCO) 5-325 mg per tablet   Earliest Fill Date: 12/23/2020   Notes to Pharmacy: Medically necessary for more than 7 days, ICD 10: G89.4     What do they need to clarify?:  Pharmacy does not have December Rx to fill on 12/23   Best Call Back Number:  [phone]  Additional Information:  Please Resend December Rx so Pt Can refill on 12/23

## 2021-01-06 ENCOUNTER — PATIENT MESSAGE (OUTPATIENT)
Dept: NEUROLOGY | Facility: CLINIC | Age: 41
End: 2021-01-06

## 2021-01-06 ENCOUNTER — PATIENT MESSAGE (OUTPATIENT)
Dept: PAIN MEDICINE | Facility: CLINIC | Age: 41
End: 2021-01-06

## 2021-01-06 ENCOUNTER — TELEPHONE (OUTPATIENT)
Dept: NEUROLOGY | Facility: CLINIC | Age: 41
End: 2021-01-06

## 2021-01-07 ENCOUNTER — TELEPHONE (OUTPATIENT)
Dept: NEUROLOGY | Facility: CLINIC | Age: 41
End: 2021-01-07

## 2021-01-13 ENCOUNTER — TELEPHONE (OUTPATIENT)
Dept: PAIN MEDICINE | Facility: CLINIC | Age: 41
End: 2021-01-13

## 2021-01-13 RX ORDER — HYDROCODONE BITARTRATE AND ACETAMINOPHEN 5; 325 MG/1; MG/1
1 TABLET ORAL EVERY 12 HOURS PRN
Qty: 60 TABLET | Refills: 0 | Status: SHIPPED | OUTPATIENT
Start: 2021-01-22 | End: 2021-02-05 | Stop reason: SDUPTHER

## 2021-01-15 ENCOUNTER — PATIENT MESSAGE (OUTPATIENT)
Dept: PHARMACY | Facility: CLINIC | Age: 41
End: 2021-01-15

## 2021-01-15 ENCOUNTER — TELEPHONE (OUTPATIENT)
Dept: PAIN MEDICINE | Facility: CLINIC | Age: 41
End: 2021-01-15

## 2021-01-21 ENCOUNTER — TELEPHONE (OUTPATIENT)
Dept: NEUROLOGY | Facility: CLINIC | Age: 41
End: 2021-01-21

## 2021-01-22 DIAGNOSIS — G43.719 INTRACTABLE CHRONIC MIGRAINE WITHOUT AURA AND WITHOUT STATUS MIGRAINOSUS: ICD-10-CM

## 2021-01-22 RX ORDER — TOPIRAMATE 100 MG/1
100 TABLET, FILM COATED ORAL 2 TIMES DAILY
Qty: 180 TABLET | Refills: 3 | Status: CANCELLED
Start: 2021-01-22 | End: 2022-01-22

## 2021-01-22 RX ORDER — PREDNISONE 10 MG/1
TABLET ORAL
Qty: 20 TABLET | Refills: 0 | Status: CANCELLED | OUTPATIENT
Start: 2021-01-22

## 2021-01-22 RX ORDER — TOPIRAMATE 100 MG/1
100 TABLET, FILM COATED ORAL 2 TIMES DAILY
Qty: 180 TABLET | Refills: 3 | Status: SHIPPED | OUTPATIENT
Start: 2021-01-22 | End: 2021-08-09 | Stop reason: SDUPTHER

## 2021-01-22 RX ORDER — PREDNISONE 10 MG/1
TABLET ORAL
Qty: 20 TABLET | Refills: 0 | Status: SHIPPED | OUTPATIENT
Start: 2021-01-22 | End: 2021-12-28 | Stop reason: ALTCHOICE

## 2021-02-05 ENCOUNTER — PROCEDURE VISIT (OUTPATIENT)
Dept: NEUROLOGY | Facility: CLINIC | Age: 41
End: 2021-02-05
Payer: MEDICARE

## 2021-02-05 ENCOUNTER — LAB VISIT (OUTPATIENT)
Dept: LAB | Facility: HOSPITAL | Age: 41
End: 2021-02-05
Attending: INTERNAL MEDICINE
Payer: MEDICARE

## 2021-02-05 ENCOUNTER — OFFICE VISIT (OUTPATIENT)
Dept: PAIN MEDICINE | Facility: CLINIC | Age: 41
End: 2021-02-05
Payer: MEDICARE

## 2021-02-05 ENCOUNTER — OFFICE VISIT (OUTPATIENT)
Dept: CARDIOLOGY | Facility: CLINIC | Age: 41
End: 2021-02-05
Payer: MEDICARE

## 2021-02-05 VITALS
TEMPERATURE: 98 F | HEART RATE: 64 BPM | RESPIRATION RATE: 20 BRPM | BODY MASS INDEX: 70.94 KG/M2 | DIASTOLIC BLOOD PRESSURE: 64 MMHG | OXYGEN SATURATION: 99 % | SYSTOLIC BLOOD PRESSURE: 117 MMHG | WEIGHT: 293 LBS

## 2021-02-05 VITALS
WEIGHT: 293 LBS | BODY MASS INDEX: 63.21 KG/M2 | HEART RATE: 64 BPM | DIASTOLIC BLOOD PRESSURE: 74 MMHG | SYSTOLIC BLOOD PRESSURE: 124 MMHG | HEIGHT: 57 IN

## 2021-02-05 VITALS
DIASTOLIC BLOOD PRESSURE: 85 MMHG | HEIGHT: 57 IN | RESPIRATION RATE: 17 BRPM | TEMPERATURE: 99 F | SYSTOLIC BLOOD PRESSURE: 141 MMHG | BODY MASS INDEX: 63.21 KG/M2 | HEART RATE: 60 BPM | WEIGHT: 293 LBS

## 2021-02-05 DIAGNOSIS — M48.02 CERVICAL STENOSIS OF SPINAL CANAL: ICD-10-CM

## 2021-02-05 DIAGNOSIS — G43.719 INTRACTABLE CHRONIC MIGRAINE WITHOUT AURA AND WITHOUT STATUS MIGRAINOSUS: Primary | ICD-10-CM

## 2021-02-05 DIAGNOSIS — M54.12 CERVICAL RADICULOPATHY: Primary | ICD-10-CM

## 2021-02-05 DIAGNOSIS — I10 ESSENTIAL HYPERTENSION: Primary | ICD-10-CM

## 2021-02-05 DIAGNOSIS — R01.1 MURMUR, HEART: ICD-10-CM

## 2021-02-05 DIAGNOSIS — E66.01 MORBID OBESITY: ICD-10-CM

## 2021-02-05 DIAGNOSIS — Z79.891 OPIOID CONTRACT EXISTS: ICD-10-CM

## 2021-02-05 DIAGNOSIS — I10 ESSENTIAL HYPERTENSION: ICD-10-CM

## 2021-02-05 DIAGNOSIS — E66.01 MORBID OBESITY WITH BMI OF 70 AND OVER, ADULT: ICD-10-CM

## 2021-02-05 DIAGNOSIS — M47.816 LUMBAR SPONDYLOSIS: ICD-10-CM

## 2021-02-05 DIAGNOSIS — M50.30 DDD (DEGENERATIVE DISC DISEASE), CERVICAL: ICD-10-CM

## 2021-02-05 DIAGNOSIS — I10 HYPERTENSION, UNSPECIFIED TYPE: ICD-10-CM

## 2021-02-05 DIAGNOSIS — M51.36 DDD (DEGENERATIVE DISC DISEASE), LUMBAR: ICD-10-CM

## 2021-02-05 PROCEDURE — 99213 OFFICE O/P EST LOW 20 MIN: CPT | Mod: S$PBB,,, | Performed by: INTERNAL MEDICINE

## 2021-02-05 PROCEDURE — 99215 OFFICE O/P EST HI 40 MIN: CPT | Mod: PBBFAC,25,27,PN | Performed by: PHYSICIAN ASSISTANT

## 2021-02-05 PROCEDURE — 36415 COLL VENOUS BLD VENIPUNCTURE: CPT | Mod: PO

## 2021-02-05 PROCEDURE — 99999 PR PBB SHADOW E&M-EST. PATIENT-LVL V: ICD-10-PCS | Mod: PBBFAC,,, | Performed by: PHYSICIAN ASSISTANT

## 2021-02-05 PROCEDURE — 99215 OFFICE O/P EST HI 40 MIN: CPT | Mod: PBBFAC,PO,25 | Performed by: INTERNAL MEDICINE

## 2021-02-05 PROCEDURE — 99999 PR PBB SHADOW E&M-EST. PATIENT-LVL V: CPT | Mod: PBBFAC,,, | Performed by: INTERNAL MEDICINE

## 2021-02-05 PROCEDURE — 80053 COMPREHEN METABOLIC PANEL: CPT

## 2021-02-05 PROCEDURE — 64615 CHEMODENERV MUSC MIGRAINE: CPT | Mod: S$PBB,,, | Performed by: PSYCHIATRY & NEUROLOGY

## 2021-02-05 PROCEDURE — 99214 OFFICE O/P EST MOD 30 MIN: CPT | Mod: S$PBB,,, | Performed by: PHYSICIAN ASSISTANT

## 2021-02-05 PROCEDURE — 99214 PR OFFICE/OUTPT VISIT, EST, LEVL IV, 30-39 MIN: ICD-10-PCS | Mod: S$PBB,,, | Performed by: PHYSICIAN ASSISTANT

## 2021-02-05 PROCEDURE — 99999 PR PBB SHADOW E&M-EST. PATIENT-LVL V: CPT | Mod: PBBFAC,,, | Performed by: PHYSICIAN ASSISTANT

## 2021-02-05 PROCEDURE — 64615 CHEMODENERV MUSC MIGRAINE: CPT | Mod: PBBFAC,PO | Performed by: PSYCHIATRY & NEUROLOGY

## 2021-02-05 PROCEDURE — 99999 PR PBB SHADOW E&M-EST. PATIENT-LVL V: ICD-10-PCS | Mod: PBBFAC,,, | Performed by: INTERNAL MEDICINE

## 2021-02-05 PROCEDURE — 80061 LIPID PANEL: CPT

## 2021-02-05 PROCEDURE — 64615 PR CHEMODENERVATION OF MUSCLE FOR CHRONIC MIGRAINE: ICD-10-PCS | Mod: S$PBB,,, | Performed by: PSYCHIATRY & NEUROLOGY

## 2021-02-05 PROCEDURE — 99213 PR OFFICE/OUTPT VISIT, EST, LEVL III, 20-29 MIN: ICD-10-PCS | Mod: S$PBB,,, | Performed by: INTERNAL MEDICINE

## 2021-02-05 RX ORDER — HYDROCODONE BITARTRATE AND ACETAMINOPHEN 5; 325 MG/1; MG/1
1 TABLET ORAL EVERY 12 HOURS PRN
Qty: 60 TABLET | Refills: 0 | Status: SHIPPED | OUTPATIENT
Start: 2021-04-22 | End: 2021-05-14 | Stop reason: SDUPTHER

## 2021-02-05 RX ORDER — METOPROLOL SUCCINATE 25 MG/1
25 TABLET, EXTENDED RELEASE ORAL DAILY
Qty: 90 TABLET | Refills: 1 | Status: SHIPPED | OUTPATIENT
Start: 2021-02-05

## 2021-02-05 RX ORDER — HYDROCODONE BITARTRATE AND ACETAMINOPHEN 5; 325 MG/1; MG/1
1 TABLET ORAL EVERY 12 HOURS PRN
Qty: 60 TABLET | Refills: 0 | Status: SHIPPED | OUTPATIENT
Start: 2021-03-23 | End: 2021-04-22

## 2021-02-05 RX ORDER — ROSUVASTATIN CALCIUM 10 MG/1
10 TABLET, COATED ORAL DAILY
Qty: 90 TABLET | Refills: 3 | Status: SHIPPED | OUTPATIENT
Start: 2021-02-05 | End: 2021-02-08

## 2021-02-05 RX ORDER — HYDROCODONE BITARTRATE AND ACETAMINOPHEN 5; 325 MG/1; MG/1
1 TABLET ORAL EVERY 12 HOURS PRN
Qty: 60 TABLET | Refills: 0 | Status: SHIPPED | OUTPATIENT
Start: 2021-02-21 | End: 2021-03-23

## 2021-02-05 RX ADMIN — ONABOTULINUMTOXINA 200 UNITS: 100 INJECTION, POWDER, LYOPHILIZED, FOR SOLUTION INTRADERMAL; INTRAMUSCULAR at 10:02

## 2021-02-06 LAB
ALBUMIN SERPL BCP-MCNC: 3.7 G/DL (ref 3.5–5.2)
ALP SERPL-CCNC: 95 U/L (ref 55–135)
ALT SERPL W/O P-5'-P-CCNC: 22 U/L (ref 10–44)
ANION GAP SERPL CALC-SCNC: 10 MMOL/L (ref 8–16)
AST SERPL-CCNC: 18 U/L (ref 10–40)
BILIRUB SERPL-MCNC: 0.3 MG/DL (ref 0.1–1)
BUN SERPL-MCNC: 12 MG/DL (ref 6–20)
CALCIUM SERPL-MCNC: 8.7 MG/DL (ref 8.7–10.5)
CHLORIDE SERPL-SCNC: 105 MMOL/L (ref 95–110)
CHOLEST SERPL-MCNC: 241 MG/DL (ref 120–199)
CHOLEST/HDLC SERPL: 6.2 {RATIO} (ref 2–5)
CO2 SERPL-SCNC: 25 MMOL/L (ref 23–29)
CREAT SERPL-MCNC: 1 MG/DL (ref 0.5–1.4)
EST. GFR  (AFRICAN AMERICAN): >60 ML/MIN/1.73 M^2
EST. GFR  (NON AFRICAN AMERICAN): >60 ML/MIN/1.73 M^2
GLUCOSE SERPL-MCNC: 80 MG/DL (ref 70–110)
HDLC SERPL-MCNC: 39 MG/DL (ref 40–75)
HDLC SERPL: 16.2 % (ref 20–50)
LDLC SERPL CALC-MCNC: 125.4 MG/DL (ref 63–159)
NONHDLC SERPL-MCNC: 202 MG/DL
POTASSIUM SERPL-SCNC: 4.4 MMOL/L (ref 3.5–5.1)
PROT SERPL-MCNC: 7.6 G/DL (ref 6–8.4)
SODIUM SERPL-SCNC: 140 MMOL/L (ref 136–145)
TRIGL SERPL-MCNC: 383 MG/DL (ref 30–150)

## 2021-02-08 ENCOUNTER — TELEPHONE (OUTPATIENT)
Dept: CARDIOLOGY | Facility: CLINIC | Age: 41
End: 2021-02-08

## 2021-02-08 DIAGNOSIS — I10 ESSENTIAL HYPERTENSION: ICD-10-CM

## 2021-02-08 RX ORDER — ROSUVASTATIN CALCIUM 20 MG/1
20 TABLET, COATED ORAL DAILY
Qty: 90 TABLET | Refills: 3 | Status: SHIPPED | OUTPATIENT
Start: 2021-02-08

## 2021-02-11 ENCOUNTER — PATIENT MESSAGE (OUTPATIENT)
Dept: BARIATRICS | Facility: CLINIC | Age: 41
End: 2021-02-11

## 2021-02-23 ENCOUNTER — PATIENT MESSAGE (OUTPATIENT)
Dept: PHARMACY | Facility: CLINIC | Age: 41
End: 2021-02-23

## 2021-04-19 DIAGNOSIS — G43.719 INTRACTABLE CHRONIC MIGRAINE WITHOUT AURA AND WITHOUT STATUS MIGRAINOSUS: ICD-10-CM

## 2021-04-19 RX ORDER — GALCANEZUMAB 120 MG/ML
120 INJECTION, SOLUTION SUBCUTANEOUS
Qty: 1 ML | Refills: 11 | Status: SHIPPED | OUTPATIENT
Start: 2021-04-19 | End: 2022-04-24 | Stop reason: SDUPTHER

## 2021-04-27 ENCOUNTER — PATIENT MESSAGE (OUTPATIENT)
Dept: PHARMACY | Facility: CLINIC | Age: 41
End: 2021-04-27

## 2021-04-30 ENCOUNTER — TELEPHONE (OUTPATIENT)
Dept: NEUROLOGY | Facility: CLINIC | Age: 41
End: 2021-04-30

## 2021-05-10 ENCOUNTER — PATIENT MESSAGE (OUTPATIENT)
Dept: RESEARCH | Facility: HOSPITAL | Age: 41
End: 2021-05-10

## 2021-05-14 ENCOUNTER — TELEPHONE (OUTPATIENT)
Dept: NEUROLOGY | Facility: CLINIC | Age: 41
End: 2021-05-14

## 2021-05-14 ENCOUNTER — TELEPHONE (OUTPATIENT)
Dept: PAIN MEDICINE | Facility: CLINIC | Age: 41
End: 2021-05-14

## 2021-05-14 RX ORDER — HYDROCODONE BITARTRATE AND ACETAMINOPHEN 5; 325 MG/1; MG/1
1 TABLET ORAL EVERY 12 HOURS PRN
Qty: 60 TABLET | Refills: 0 | Status: SHIPPED | OUTPATIENT
Start: 2021-05-22 | End: 2021-06-01 | Stop reason: SDUPTHER

## 2021-05-21 ENCOUNTER — PATIENT MESSAGE (OUTPATIENT)
Dept: NEUROLOGY | Facility: CLINIC | Age: 41
End: 2021-05-21

## 2021-06-01 ENCOUNTER — OFFICE VISIT (OUTPATIENT)
Dept: PAIN MEDICINE | Facility: CLINIC | Age: 41
End: 2021-06-01
Payer: MEDICARE

## 2021-06-01 ENCOUNTER — PROCEDURE VISIT (OUTPATIENT)
Dept: NEUROLOGY | Facility: CLINIC | Age: 41
End: 2021-06-01
Payer: MEDICARE

## 2021-06-01 VITALS
DIASTOLIC BLOOD PRESSURE: 84 MMHG | HEIGHT: 57 IN | WEIGHT: 293 LBS | HEART RATE: 71 BPM | BODY MASS INDEX: 63.21 KG/M2 | SYSTOLIC BLOOD PRESSURE: 125 MMHG

## 2021-06-01 VITALS
SYSTOLIC BLOOD PRESSURE: 123 MMHG | WEIGHT: 293 LBS | HEART RATE: 71 BPM | BODY MASS INDEX: 70.33 KG/M2 | DIASTOLIC BLOOD PRESSURE: 58 MMHG | RESPIRATION RATE: 18 BRPM

## 2021-06-01 DIAGNOSIS — M54.12 CERVICAL RADICULOPATHY: Primary | ICD-10-CM

## 2021-06-01 DIAGNOSIS — E66.01 MORBID OBESITY WITH BMI OF 70 AND OVER, ADULT: ICD-10-CM

## 2021-06-01 DIAGNOSIS — M51.36 DDD (DEGENERATIVE DISC DISEASE), LUMBAR: ICD-10-CM

## 2021-06-01 DIAGNOSIS — G43.719 INTRACTABLE CHRONIC MIGRAINE WITHOUT AURA AND WITHOUT STATUS MIGRAINOSUS: ICD-10-CM

## 2021-06-01 DIAGNOSIS — M47.816 LUMBAR SPONDYLOSIS: ICD-10-CM

## 2021-06-01 DIAGNOSIS — M50.30 DDD (DEGENERATIVE DISC DISEASE), CERVICAL: ICD-10-CM

## 2021-06-01 DIAGNOSIS — Z79.891 OPIOID CONTRACT EXISTS: ICD-10-CM

## 2021-06-01 DIAGNOSIS — M48.02 CERVICAL STENOSIS OF SPINAL CANAL: ICD-10-CM

## 2021-06-01 PROCEDURE — 64615 CHEMODENERV MUSC MIGRAINE: CPT | Mod: S$PBB,,, | Performed by: PSYCHIATRY & NEUROLOGY

## 2021-06-01 PROCEDURE — 64615 CHEMODENERV MUSC MIGRAINE: CPT | Mod: PBBFAC,PO | Performed by: PSYCHIATRY & NEUROLOGY

## 2021-06-01 PROCEDURE — 99214 OFFICE O/P EST MOD 30 MIN: CPT | Mod: S$PBB,,, | Performed by: PHYSICIAN ASSISTANT

## 2021-06-01 PROCEDURE — 99999 PR PBB SHADOW E&M-EST. PATIENT-LVL IV: ICD-10-PCS | Mod: PBBFAC,,, | Performed by: PHYSICIAN ASSISTANT

## 2021-06-01 PROCEDURE — 99214 PR OFFICE/OUTPT VISIT, EST, LEVL IV, 30-39 MIN: ICD-10-PCS | Mod: S$PBB,,, | Performed by: PHYSICIAN ASSISTANT

## 2021-06-01 PROCEDURE — 64615 PR CHEMODENERVATION OF MUSCLE FOR CHRONIC MIGRAINE: ICD-10-PCS | Mod: S$PBB,,, | Performed by: PSYCHIATRY & NEUROLOGY

## 2021-06-01 PROCEDURE — 99214 OFFICE O/P EST MOD 30 MIN: CPT | Mod: PBBFAC,25,PN | Performed by: PHYSICIAN ASSISTANT

## 2021-06-01 PROCEDURE — 99999 PR PBB SHADOW E&M-EST. PATIENT-LVL IV: CPT | Mod: PBBFAC,,, | Performed by: PHYSICIAN ASSISTANT

## 2021-06-01 RX ORDER — HYDROCODONE BITARTRATE AND ACETAMINOPHEN 5; 325 MG/1; MG/1
1 TABLET ORAL EVERY 12 HOURS PRN
Qty: 60 TABLET | Refills: 0 | Status: SHIPPED | OUTPATIENT
Start: 2021-07-22 | End: 2021-08-21

## 2021-06-01 RX ORDER — CYCLOBENZAPRINE HCL 10 MG
10 TABLET ORAL 3 TIMES DAILY PRN
Qty: 90 TABLET | Refills: 2 | Status: SHIPPED | OUTPATIENT
Start: 2021-06-01 | End: 2022-04-25

## 2021-06-01 RX ORDER — HYDROCODONE BITARTRATE AND ACETAMINOPHEN 5; 325 MG/1; MG/1
1 TABLET ORAL EVERY 12 HOURS PRN
Qty: 60 TABLET | Refills: 0 | Status: SHIPPED | OUTPATIENT
Start: 2021-06-22 | End: 2021-07-22

## 2021-06-01 RX ORDER — MONTELUKAST SODIUM 10 MG/1
10 TABLET ORAL DAILY
COMMUNITY
Start: 2021-05-04

## 2021-06-01 RX ORDER — HYDROCODONE BITARTRATE AND ACETAMINOPHEN 5; 325 MG/1; MG/1
1 TABLET ORAL EVERY 12 HOURS PRN
Qty: 60 TABLET | Refills: 0 | Status: SHIPPED | OUTPATIENT
Start: 2021-06-01 | End: 2021-07-01

## 2021-06-01 RX ORDER — LEVOTHYROXINE SODIUM 300 UG/1
300 TABLET ORAL
COMMUNITY
Start: 2021-05-14

## 2021-06-01 RX ADMIN — ONABOTULINUMTOXINA 200 UNITS: 100 INJECTION, POWDER, LYOPHILIZED, FOR SOLUTION INTRADERMAL; INTRAMUSCULAR at 10:06

## 2021-06-10 ENCOUNTER — TELEPHONE (OUTPATIENT)
Dept: NEUROLOGY | Facility: CLINIC | Age: 41
End: 2021-06-10

## 2021-06-23 DIAGNOSIS — G43.719 INTRACTABLE CHRONIC MIGRAINE WITHOUT AURA AND WITHOUT STATUS MIGRAINOSUS: ICD-10-CM

## 2021-06-23 RX ORDER — AMANTADINE HYDROCHLORIDE 100 MG/1
CAPSULE, GELATIN COATED ORAL
Qty: 28 CAPSULE | Refills: 11 | OUTPATIENT
Start: 2021-06-23

## 2021-06-28 ENCOUNTER — TELEPHONE (OUTPATIENT)
Dept: NEUROSURGERY | Facility: CLINIC | Age: 41
End: 2021-06-28

## 2021-06-29 ENCOUNTER — TELEPHONE (OUTPATIENT)
Dept: NEUROSURGERY | Facility: CLINIC | Age: 41
End: 2021-06-29

## 2021-07-16 ENCOUNTER — OFFICE VISIT (OUTPATIENT)
Dept: NEUROSURGERY | Facility: CLINIC | Age: 41
End: 2021-07-16
Payer: MEDICARE

## 2021-07-16 VITALS
DIASTOLIC BLOOD PRESSURE: 60 MMHG | HEIGHT: 57 IN | SYSTOLIC BLOOD PRESSURE: 99 MMHG | HEART RATE: 68 BPM | WEIGHT: 293 LBS | BODY MASS INDEX: 63.21 KG/M2 | RESPIRATION RATE: 20 BRPM

## 2021-07-16 DIAGNOSIS — M50.20 HNP (HERNIATED NUCLEUS PULPOSUS), CERVICAL: ICD-10-CM

## 2021-07-16 DIAGNOSIS — Z98.2 VP (VENTRICULOPERITONEAL) SHUNT STATUS: Primary | ICD-10-CM

## 2021-07-16 DIAGNOSIS — G93.2 PSEUDOTUMOR CEREBRI: ICD-10-CM

## 2021-07-16 PROCEDURE — 99215 OFFICE O/P EST HI 40 MIN: CPT | Mod: PBBFAC,PN | Performed by: PHYSICIAN ASSISTANT

## 2021-07-16 PROCEDURE — 99999 PR PBB SHADOW E&M-EST. PATIENT-LVL V: CPT | Mod: PBBFAC,,, | Performed by: PHYSICIAN ASSISTANT

## 2021-07-16 PROCEDURE — 99215 PR OFFICE/OUTPT VISIT, EST, LEVL V, 40-54 MIN: ICD-10-PCS | Mod: S$PBB,,, | Performed by: PHYSICIAN ASSISTANT

## 2021-07-16 PROCEDURE — 99999 PR PBB SHADOW E&M-EST. PATIENT-LVL V: ICD-10-PCS | Mod: PBBFAC,,, | Performed by: PHYSICIAN ASSISTANT

## 2021-07-16 PROCEDURE — 99215 OFFICE O/P EST HI 40 MIN: CPT | Mod: S$PBB,,, | Performed by: PHYSICIAN ASSISTANT

## 2021-08-09 DIAGNOSIS — G43.719 INTRACTABLE CHRONIC MIGRAINE WITHOUT AURA AND WITHOUT STATUS MIGRAINOSUS: ICD-10-CM

## 2021-08-09 RX ORDER — TOPIRAMATE 100 MG/1
100 TABLET, FILM COATED ORAL 2 TIMES DAILY
Qty: 180 TABLET | Refills: 3 | Status: SHIPPED | OUTPATIENT
Start: 2021-08-09 | End: 2022-04-26

## 2021-08-24 ENCOUNTER — HOSPITAL ENCOUNTER (OUTPATIENT)
Dept: RADIOLOGY | Facility: HOSPITAL | Age: 41
Discharge: HOME | End: 2021-08-24
Attending: PHYSICIAN ASSISTANT
Payer: MEDICARE

## 2021-08-24 ENCOUNTER — TELEPHONE (OUTPATIENT)
Dept: NEUROSURGERY | Facility: CLINIC | Age: 41
End: 2021-08-24

## 2021-08-24 ENCOUNTER — PROCEDURE VISIT (OUTPATIENT)
Dept: NEUROLOGY | Facility: CLINIC | Age: 41
End: 2021-08-24
Payer: MEDICARE

## 2021-08-24 ENCOUNTER — TELEPHONE (OUTPATIENT)
Dept: PAIN MEDICINE | Facility: CLINIC | Age: 41
End: 2021-08-24

## 2021-08-24 VITALS
WEIGHT: 293 LBS | SYSTOLIC BLOOD PRESSURE: 123 MMHG | TEMPERATURE: 98 F | HEART RATE: 72 BPM | HEIGHT: 57 IN | BODY MASS INDEX: 63.21 KG/M2 | DIASTOLIC BLOOD PRESSURE: 81 MMHG

## 2021-08-24 DIAGNOSIS — G43.719 INTRACTABLE CHRONIC MIGRAINE WITHOUT AURA AND WITHOUT STATUS MIGRAINOSUS: Primary | ICD-10-CM

## 2021-08-24 DIAGNOSIS — G93.2 PSEUDOTUMOR CEREBRI: ICD-10-CM

## 2021-08-24 DIAGNOSIS — Z98.2 VP (VENTRICULOPERITONEAL) SHUNT STATUS: ICD-10-CM

## 2021-08-24 DIAGNOSIS — M50.20 HNP (HERNIATED NUCLEUS PULPOSUS), CERVICAL: ICD-10-CM

## 2021-08-24 PROCEDURE — 76000 FL SHUNT SETTING: ICD-10-PCS | Mod: 26,,, | Performed by: RADIOLOGY

## 2021-08-24 PROCEDURE — 64615 CHEMODENERV MUSC MIGRAINE: CPT | Mod: S$PBB,,, | Performed by: PSYCHIATRY & NEUROLOGY

## 2021-08-24 PROCEDURE — 76000 FLUOROSCOPY <1 HR PHYS/QHP: CPT | Mod: TC,PO

## 2021-08-24 PROCEDURE — 70450 CT HEAD/BRAIN W/O DYE: CPT | Mod: TC,PO

## 2021-08-24 PROCEDURE — 71045 X-RAY EXAM CHEST 1 VIEW: CPT | Mod: 26,,, | Performed by: RADIOLOGY

## 2021-08-24 PROCEDURE — 72141 MRI CERVICAL SPINE WITHOUT CONTRAST: ICD-10-PCS | Mod: 26,,, | Performed by: RADIOLOGY

## 2021-08-24 PROCEDURE — 70250 X-RAY EXAM OF SKULL: CPT | Mod: TC,FY,PO

## 2021-08-24 PROCEDURE — 72141 MRI NECK SPINE W/O DYE: CPT | Mod: 26,,, | Performed by: RADIOLOGY

## 2021-08-24 PROCEDURE — 74018 RADEX ABDOMEN 1 VIEW: CPT | Mod: 26,,, | Performed by: RADIOLOGY

## 2021-08-24 PROCEDURE — 64615 CHEMODENERV MUSC MIGRAINE: CPT | Mod: PBBFAC,PO | Performed by: PSYCHIATRY & NEUROLOGY

## 2021-08-24 PROCEDURE — 70250 XR SHUNT SERIES: ICD-10-PCS | Mod: 26,,, | Performed by: RADIOLOGY

## 2021-08-24 PROCEDURE — 70250 X-RAY EXAM OF SKULL: CPT | Mod: 26,,, | Performed by: RADIOLOGY

## 2021-08-24 PROCEDURE — 72141 MRI NECK SPINE W/O DYE: CPT | Mod: TC,PO

## 2021-08-24 PROCEDURE — 70450 CT HEAD/BRAIN W/O DYE: CPT | Mod: 26,,, | Performed by: RADIOLOGY

## 2021-08-24 PROCEDURE — 76000 FLUOROSCOPY <1 HR PHYS/QHP: CPT | Mod: 26,,, | Performed by: RADIOLOGY

## 2021-08-24 PROCEDURE — 71045 X-RAY EXAM CHEST 1 VIEW: CPT | Mod: TC,FY,PO

## 2021-08-24 PROCEDURE — 70450 CT HEAD WITHOUT CONTRAST: ICD-10-PCS | Mod: 26,,, | Performed by: RADIOLOGY

## 2021-08-24 PROCEDURE — 72020 X-RAY EXAM OF SPINE 1 VIEW: CPT | Mod: 26,,, | Performed by: RADIOLOGY

## 2021-08-24 PROCEDURE — 72020 XR SHUNT SERIES: ICD-10-PCS | Mod: 26,,, | Performed by: RADIOLOGY

## 2021-08-24 PROCEDURE — 74018 XR SHUNT SERIES: ICD-10-PCS | Mod: 26,,, | Performed by: RADIOLOGY

## 2021-08-24 PROCEDURE — 64615 PR CHEMODENERVATION OF MUSCLE FOR CHRONIC MIGRAINE: ICD-10-PCS | Mod: S$PBB,,, | Performed by: PSYCHIATRY & NEUROLOGY

## 2021-08-24 PROCEDURE — 71045 XR SHUNT SERIES: ICD-10-PCS | Mod: 26,,, | Performed by: RADIOLOGY

## 2021-08-24 RX ORDER — HYDROCODONE BITARTRATE AND ACETAMINOPHEN 5; 325 MG/1; MG/1
1 TABLET ORAL EVERY 12 HOURS PRN
Qty: 60 TABLET | Refills: 0 | Status: SHIPPED | OUTPATIENT
Start: 2021-08-24 | End: 2021-09-07 | Stop reason: SDUPTHER

## 2021-08-24 RX ORDER — HYDROXYZINE HYDROCHLORIDE 25 MG/1
25 TABLET, FILM COATED ORAL 3 TIMES DAILY
Qty: 90 TABLET | Refills: 3 | Status: SHIPPED | OUTPATIENT
Start: 2021-08-24

## 2021-08-24 RX ADMIN — ONABOTULINUMTOXINA 200 UNITS: 100 INJECTION, POWDER, LYOPHILIZED, FOR SOLUTION INTRADERMAL; INTRAMUSCULAR at 11:08

## 2021-08-25 ENCOUNTER — TELEPHONE (OUTPATIENT)
Dept: NEUROSURGERY | Facility: CLINIC | Age: 41
End: 2021-08-25

## 2021-08-27 ENCOUNTER — TELEPHONE (OUTPATIENT)
Dept: NEUROSURGERY | Facility: CLINIC | Age: 41
End: 2021-08-27

## 2021-09-07 ENCOUNTER — TELEPHONE (OUTPATIENT)
Dept: NEUROSURGERY | Facility: CLINIC | Age: 41
End: 2021-09-07

## 2021-09-07 ENCOUNTER — OFFICE VISIT (OUTPATIENT)
Dept: PAIN MEDICINE | Facility: CLINIC | Age: 41
End: 2021-09-07
Payer: MEDICARE

## 2021-09-07 VITALS
DIASTOLIC BLOOD PRESSURE: 65 MMHG | RESPIRATION RATE: 20 BRPM | HEART RATE: 66 BPM | TEMPERATURE: 98 F | OXYGEN SATURATION: 98 % | WEIGHT: 293 LBS | SYSTOLIC BLOOD PRESSURE: 117 MMHG | BODY MASS INDEX: 64.4 KG/M2

## 2021-09-07 DIAGNOSIS — M54.12 CERVICAL RADICULOPATHY: Primary | ICD-10-CM

## 2021-09-07 DIAGNOSIS — Z79.891 OPIOID CONTRACT EXISTS: ICD-10-CM

## 2021-09-07 DIAGNOSIS — M47.816 LUMBAR SPONDYLOSIS: ICD-10-CM

## 2021-09-07 DIAGNOSIS — M48.02 CERVICAL STENOSIS OF SPINAL CANAL: ICD-10-CM

## 2021-09-07 PROCEDURE — 99999 PR PBB SHADOW E&M-EST. PATIENT-LVL V: CPT | Mod: PBBFAC,,, | Performed by: PHYSICIAN ASSISTANT

## 2021-09-07 PROCEDURE — 99214 PR OFFICE/OUTPT VISIT, EST, LEVL IV, 30-39 MIN: ICD-10-PCS | Mod: S$PBB,,, | Performed by: PHYSICIAN ASSISTANT

## 2021-09-07 PROCEDURE — 80307 DRUG TEST PRSMV CHEM ANLYZR: CPT | Performed by: PHYSICIAN ASSISTANT

## 2021-09-07 PROCEDURE — 99999 PR PBB SHADOW E&M-EST. PATIENT-LVL V: ICD-10-PCS | Mod: PBBFAC,,, | Performed by: PHYSICIAN ASSISTANT

## 2021-09-07 PROCEDURE — 99214 OFFICE O/P EST MOD 30 MIN: CPT | Mod: S$PBB,,, | Performed by: PHYSICIAN ASSISTANT

## 2021-09-07 PROCEDURE — 99215 OFFICE O/P EST HI 40 MIN: CPT | Mod: PBBFAC,PN | Performed by: PHYSICIAN ASSISTANT

## 2021-09-07 RX ORDER — HYDROCODONE BITARTRATE AND ACETAMINOPHEN 5; 325 MG/1; MG/1
1 TABLET ORAL EVERY 12 HOURS PRN
Qty: 60 TABLET | Refills: 0 | Status: SHIPPED | OUTPATIENT
Start: 2021-10-23 | End: 2021-11-22

## 2021-09-07 RX ORDER — HYDROCODONE BITARTRATE AND ACETAMINOPHEN 5; 325 MG/1; MG/1
1 TABLET ORAL EVERY 12 HOURS PRN
Qty: 60 TABLET | Refills: 0 | Status: SHIPPED | OUTPATIENT
Start: 2021-11-22 | End: 2021-12-17 | Stop reason: SDUPTHER

## 2021-09-07 RX ORDER — HYDROCODONE BITARTRATE AND ACETAMINOPHEN 5; 325 MG/1; MG/1
1 TABLET ORAL EVERY 12 HOURS PRN
Qty: 60 TABLET | Refills: 0 | Status: SHIPPED | OUTPATIENT
Start: 2021-09-23 | End: 2021-10-23

## 2021-09-10 ENCOUNTER — OFFICE VISIT (OUTPATIENT)
Dept: NEUROSURGERY | Facility: CLINIC | Age: 41
End: 2021-09-10
Payer: MEDICARE

## 2021-09-10 ENCOUNTER — HOSPITAL ENCOUNTER (OUTPATIENT)
Dept: RADIOLOGY | Facility: HOSPITAL | Age: 41
Discharge: HOME | End: 2021-09-10
Attending: PHYSICIAN ASSISTANT
Payer: MEDICARE

## 2021-09-10 VITALS
SYSTOLIC BLOOD PRESSURE: 102 MMHG | TEMPERATURE: 98 F | DIASTOLIC BLOOD PRESSURE: 62 MMHG | HEIGHT: 57 IN | WEIGHT: 293 LBS | BODY MASS INDEX: 63.21 KG/M2

## 2021-09-10 DIAGNOSIS — Z98.2 VP (VENTRICULOPERITONEAL) SHUNT STATUS: ICD-10-CM

## 2021-09-10 DIAGNOSIS — Z98.2 VP (VENTRICULOPERITONEAL) SHUNT STATUS: Primary | ICD-10-CM

## 2021-09-10 PROCEDURE — 99999 PR PBB SHADOW E&M-EST. PATIENT-LVL V: CPT | Mod: PBBFAC,,, | Performed by: PHYSICIAN ASSISTANT

## 2021-09-10 PROCEDURE — 76000 FL SHUNT SETTING: ICD-10-PCS | Mod: 26,,, | Performed by: RADIOLOGY

## 2021-09-10 PROCEDURE — 62252 CSF SHUNT REPROGRAM: CPT | Mod: 26,S$PBB,, | Performed by: PHYSICIAN ASSISTANT

## 2021-09-10 PROCEDURE — 99499 NO LOS: ICD-10-PCS | Mod: S$PBB,,, | Performed by: PHYSICIAN ASSISTANT

## 2021-09-10 PROCEDURE — 62252 CSF SHUNT REPROGRAM: CPT | Mod: PBBFAC,PN | Performed by: PHYSICIAN ASSISTANT

## 2021-09-10 PROCEDURE — 76000 FLUOROSCOPY <1 HR PHYS/QHP: CPT | Mod: TC,PO

## 2021-09-10 PROCEDURE — 99499 UNLISTED E&M SERVICE: CPT | Mod: S$PBB,,, | Performed by: PHYSICIAN ASSISTANT

## 2021-09-10 PROCEDURE — 62252 PR REPROGRAMMING,PROGRAMMABLE CSF SHUNT: ICD-10-PCS | Mod: 26,S$PBB,, | Performed by: PHYSICIAN ASSISTANT

## 2021-09-10 PROCEDURE — 76000 FLUOROSCOPY <1 HR PHYS/QHP: CPT | Mod: 26,,, | Performed by: RADIOLOGY

## 2021-09-10 PROCEDURE — 99215 OFFICE O/P EST HI 40 MIN: CPT | Mod: PBBFAC,PN,25 | Performed by: PHYSICIAN ASSISTANT

## 2021-09-10 PROCEDURE — 99999 PR PBB SHADOW E&M-EST. PATIENT-LVL V: ICD-10-PCS | Mod: PBBFAC,,, | Performed by: PHYSICIAN ASSISTANT

## 2021-09-12 LAB
6MAM UR QL: NOT DETECTED
7AMINOCLONAZEPAM UR QL: NOT DETECTED
A-OH ALPRAZ UR QL: NOT DETECTED
ALPHA-OH-MIDAZOLAM: NOT DETECTED
ALPRAZ UR QL: NOT DETECTED
AMPHET UR QL SCN: NOT DETECTED
ANNOTATION COMMENT IMP: NORMAL
ANNOTATION COMMENT IMP: NORMAL
BARBITURATES UR QL: NOT DETECTED
BUPRENORPHINE UR QL: NOT DETECTED
BZE UR QL: NOT DETECTED
CARBOXYTHC UR QL: NOT DETECTED
CARISOPRODOL UR QL: NOT DETECTED
CLONAZEPAM UR QL: NOT DETECTED
CODEINE UR QL: NOT DETECTED
CREAT UR-MCNC: 360 MG/DL (ref 20–400)
DIAZEPAM UR QL: NOT DETECTED
ETHYL GLUCURONIDE UR QL: NOT DETECTED
FENTANYL UR QL: NOT DETECTED
GABAPENTIN: NOT DETECTED
HYDROCODONE UR QL: PRESENT
HYDROMORPHONE UR QL: NOT DETECTED
LORAZEPAM UR QL: NOT DETECTED
MDA UR QL: NOT DETECTED
MDEA UR QL: NOT DETECTED
MDMA UR QL: NOT DETECTED
ME-PHENIDATE UR QL: NOT DETECTED
METHADONE UR QL: NOT DETECTED
METHAMPHET UR QL: NOT DETECTED
MIDAZOLAM UR QL SCN: NOT DETECTED
MORPHINE UR QL: NOT DETECTED
NALOXONE: NOT DETECTED
NORBUPRENORPHINE UR QL CFM: NOT DETECTED
NORDIAZEPAM UR QL: NOT DETECTED
NORFENTANYL UR QL: NOT DETECTED
NORHYDROCODONE UR QL CFM: PRESENT
NORMEPERIDINE UR QL CFM: NOT DETECTED
NOROXYCODONE UR QL CFM: NOT DETECTED
NOROXYMORPHONE UR QL SCN: NOT DETECTED
OXAZEPAM UR QL: NOT DETECTED
OXYCODONE UR QL: NOT DETECTED
OXYMORPHONE UR QL: NOT DETECTED
PATHOLOGY STUDY: NORMAL
PCP UR QL: NOT DETECTED
PHENTERMINE UR QL: NOT DETECTED
PREGABALIN: NOT DETECTED
SERVICE CMNT-IMP: NORMAL
TAPENTADOL UR QL SCN: NOT DETECTED
TAPENTADOL UR QL SCN: NOT DETECTED
TEMAZEPAM UR QL: NOT DETECTED
TRAMADOL UR QL: PRESENT
ZOLPIDEM METABOLITE: NOT DETECTED
ZOLPIDEM UR QL: NOT DETECTED

## 2021-09-13 ENCOUNTER — TELEPHONE (OUTPATIENT)
Dept: PAIN MEDICINE | Facility: CLINIC | Age: 41
End: 2021-09-13

## 2021-09-21 ENCOUNTER — OFFICE VISIT (OUTPATIENT)
Dept: PAIN MEDICINE | Facility: CLINIC | Age: 41
End: 2021-09-21
Payer: MEDICARE

## 2021-09-21 VITALS
TEMPERATURE: 98 F | BODY MASS INDEX: 63.64 KG/M2 | OXYGEN SATURATION: 95 % | WEIGHT: 293 LBS | DIASTOLIC BLOOD PRESSURE: 75 MMHG | HEART RATE: 75 BPM | RESPIRATION RATE: 18 BRPM | SYSTOLIC BLOOD PRESSURE: 126 MMHG

## 2021-09-21 DIAGNOSIS — M48.02 CERVICAL STENOSIS OF SPINAL CANAL: Primary | ICD-10-CM

## 2021-09-21 DIAGNOSIS — M54.12 CERVICAL RADICULOPATHY: ICD-10-CM

## 2021-09-21 DIAGNOSIS — Z02.89 PAIN MANAGEMENT CONTRACT SIGNED: ICD-10-CM

## 2021-09-21 DIAGNOSIS — Z79.891 OPIOID CONTRACT EXISTS: ICD-10-CM

## 2021-09-21 DIAGNOSIS — M47.816 LUMBAR SPONDYLOSIS: ICD-10-CM

## 2021-09-21 PROCEDURE — 99215 OFFICE O/P EST HI 40 MIN: CPT | Mod: PBBFAC,PN | Performed by: PHYSICIAN ASSISTANT

## 2021-09-21 PROCEDURE — 99213 OFFICE O/P EST LOW 20 MIN: CPT | Mod: S$PBB,,, | Performed by: PHYSICIAN ASSISTANT

## 2021-09-21 PROCEDURE — 80307 DRUG TEST PRSMV CHEM ANLYZR: CPT | Performed by: PHYSICIAN ASSISTANT

## 2021-09-21 PROCEDURE — 99999 PR PBB SHADOW E&M-EST. PATIENT-LVL V: CPT | Mod: PBBFAC,,, | Performed by: PHYSICIAN ASSISTANT

## 2021-09-21 PROCEDURE — 99213 PR OFFICE/OUTPT VISIT, EST, LEVL III, 20-29 MIN: ICD-10-PCS | Mod: S$PBB,,, | Performed by: PHYSICIAN ASSISTANT

## 2021-09-21 PROCEDURE — 99999 PR PBB SHADOW E&M-EST. PATIENT-LVL V: ICD-10-PCS | Mod: PBBFAC,,, | Performed by: PHYSICIAN ASSISTANT

## 2021-09-26 LAB
6MAM UR QL: NOT DETECTED
7AMINOCLONAZEPAM UR QL: NOT DETECTED
A-OH ALPRAZ UR QL: NOT DETECTED
ALPHA-OH-MIDAZOLAM: NOT DETECTED
ALPRAZ UR QL: NOT DETECTED
AMPHET UR QL SCN: NOT DETECTED
ANNOTATION COMMENT IMP: NORMAL
ANNOTATION COMMENT IMP: NORMAL
BARBITURATES UR QL: NOT DETECTED
BUPRENORPHINE UR QL: NOT DETECTED
BZE UR QL: NOT DETECTED
CARBOXYTHC UR QL: NOT DETECTED
CARISOPRODOL UR QL: NOT DETECTED
CLONAZEPAM UR QL: NOT DETECTED
CODEINE UR QL: NOT DETECTED
CREAT UR-MCNC: 235.3 MG/DL (ref 20–400)
DIAZEPAM UR QL: NOT DETECTED
ETHYL GLUCURONIDE UR QL: NOT DETECTED
FENTANYL UR QL: NOT DETECTED
GABAPENTIN: NOT DETECTED
HYDROCODONE UR QL: PRESENT
HYDROMORPHONE UR QL: NOT DETECTED
LORAZEPAM UR QL: NOT DETECTED
MDA UR QL: NOT DETECTED
MDEA UR QL: NOT DETECTED
MDMA UR QL: NOT DETECTED
ME-PHENIDATE UR QL: NOT DETECTED
METHADONE UR QL: NOT DETECTED
METHAMPHET UR QL: NOT DETECTED
MIDAZOLAM UR QL SCN: NOT DETECTED
MORPHINE UR QL: NOT DETECTED
NALOXONE: NOT DETECTED
NORBUPRENORPHINE UR QL CFM: NOT DETECTED
NORDIAZEPAM UR QL: NOT DETECTED
NORFENTANYL UR QL: NOT DETECTED
NORHYDROCODONE UR QL CFM: PRESENT
NORMEPERIDINE UR QL CFM: NOT DETECTED
NOROXYCODONE UR QL CFM: NOT DETECTED
NOROXYMORPHONE UR QL SCN: NOT DETECTED
OXAZEPAM UR QL: NOT DETECTED
OXYCODONE UR QL: NOT DETECTED
OXYMORPHONE UR QL: NOT DETECTED
PATHOLOGY STUDY: NORMAL
PCP UR QL: NOT DETECTED
PHENTERMINE UR QL: NOT DETECTED
PREGABALIN: NOT DETECTED
SERVICE CMNT-IMP: NORMAL
TAPENTADOL UR QL SCN: NOT DETECTED
TAPENTADOL UR QL SCN: NOT DETECTED
TEMAZEPAM UR QL: NOT DETECTED
TRAMADOL UR QL: NOT DETECTED
ZOLPIDEM METABOLITE: NOT DETECTED
ZOLPIDEM UR QL: NOT DETECTED

## 2021-10-25 RX ORDER — RIMEGEPANT SULFATE 75 MG/75MG
TABLET, ORALLY DISINTEGRATING ORAL
Qty: 8 TABLET | Refills: 11 | Status: SHIPPED | OUTPATIENT
Start: 2021-10-25 | End: 2022-08-12 | Stop reason: SDUPTHER

## 2021-10-28 NOTE — TELEPHONE ENCOUNTER
----- Message from Ayala Bergeron sent at 1/8/2020  1:23 PM CST -----  Contact: patient  Type: Needs Medical Advice    Who Called:  Patient  Best Call Back Number: [phone] (home)   Additional Information: the patient wants a call back said she missed the call from the Dr office today     Pt is alert and oriented to self, place, and year. Pt answers question in one word sentences. No acute distress is noted. Lungs sounds are clear bilaterally, all anterior. Abd is soft and non-distended, bowel sounds normal active x 4 quads. Pt has bilaterally lower extremity immobilizers. Pt removed the right knee immobilizer and refused to have it placed on again. Pt refused blood glucose level checks, no insulin given this shift. Attending MD notified.  Psychiatric consult ordered, Dr. Mistry notified via Perfect serve. Pt tolerated lunch and dinner meal without N/V. Pt reports that he is in pain but refuses administration of pain medications. Pt is voiding clear jacy urine per urinal. IV fluid infusing without incident.

## 2021-11-17 ENCOUNTER — TELEPHONE (OUTPATIENT)
Dept: NEUROLOGY | Facility: CLINIC | Age: 41
End: 2021-11-17
Payer: MEDICARE

## 2021-11-22 ENCOUNTER — TELEPHONE (OUTPATIENT)
Dept: NEUROLOGY | Facility: CLINIC | Age: 41
End: 2021-11-22
Payer: MEDICARE

## 2021-11-24 ENCOUNTER — TELEPHONE (OUTPATIENT)
Dept: NEUROLOGY | Facility: CLINIC | Age: 41
End: 2021-11-24
Payer: MEDICARE

## 2021-11-26 ENCOUNTER — TELEPHONE (OUTPATIENT)
Dept: NEUROLOGY | Facility: CLINIC | Age: 41
End: 2021-11-26
Payer: MEDICARE

## 2021-11-29 ENCOUNTER — TELEPHONE (OUTPATIENT)
Dept: NEUROLOGY | Facility: CLINIC | Age: 41
End: 2021-11-29
Payer: MEDICARE

## 2021-12-07 ENCOUNTER — TELEPHONE (OUTPATIENT)
Dept: PAIN MEDICINE | Facility: CLINIC | Age: 41
End: 2021-12-07
Payer: MEDICARE

## 2021-12-09 ENCOUNTER — TELEPHONE (OUTPATIENT)
Dept: PAIN MEDICINE | Facility: CLINIC | Age: 41
End: 2021-12-09
Payer: MEDICARE

## 2021-12-13 ENCOUNTER — TELEPHONE (OUTPATIENT)
Dept: PAIN MEDICINE | Facility: CLINIC | Age: 41
End: 2021-12-13

## 2021-12-13 NOTE — TELEPHONE ENCOUNTER
----- Message from Martita Boyce sent at 12/13/2021  9:55 AM CST -----  Contact: pt  Type:  RX Refill Request    Who Called:  PT    Refill or New Rx:  refill  RX Name and Strength:  norco  How is the patient currently taking it? (ex. 1XDay):  As Directed  Is this a 30 day or 90 day RX:  as Directed  Preferred Pharmacy with phone number:    [facility] - [place] [address] [facility] [address] [place] [address]  Phone: [phone] Fax: [phone]  Best Call Back Number:  [phone]    Additional Information:  Please Advise ---Thank you

## 2021-12-13 NOTE — TELEPHONE ENCOUNTER
Spoke with Bakari at [facility] and patient last filled prescription for 60 pills/30 day supply on 11/22/21. Tried calling several different phone numbers to ask patient why she is asking for a refill 9 days early.

## 2021-12-17 ENCOUNTER — PROCEDURE VISIT (OUTPATIENT)
Dept: NEUROLOGY | Facility: CLINIC | Age: 41
End: 2021-12-17
Payer: MEDICARE

## 2021-12-17 VITALS
RESPIRATION RATE: 17 BRPM | TEMPERATURE: 97 F | HEART RATE: 68 BPM | SYSTOLIC BLOOD PRESSURE: 143 MMHG | DIASTOLIC BLOOD PRESSURE: 86 MMHG | WEIGHT: 293 LBS | BODY MASS INDEX: 63.21 KG/M2 | HEIGHT: 57 IN

## 2021-12-17 DIAGNOSIS — G43.719 INTRACTABLE CHRONIC MIGRAINE WITHOUT AURA AND WITHOUT STATUS MIGRAINOSUS: Primary | ICD-10-CM

## 2021-12-17 PROCEDURE — 64615 CHEMODENERV MUSC MIGRAINE: CPT | Mod: PBBFAC,PO | Performed by: PSYCHIATRY & NEUROLOGY

## 2021-12-17 PROCEDURE — 64615 CHEMODENERV MUSC MIGRAINE: CPT | Mod: S$PBB,,, | Performed by: PSYCHIATRY & NEUROLOGY

## 2021-12-17 PROCEDURE — 64615 PR CHEMODENERVATION OF MUSCLE FOR CHRONIC MIGRAINE: ICD-10-PCS | Mod: S$PBB,,, | Performed by: PSYCHIATRY & NEUROLOGY

## 2021-12-17 RX ORDER — HYDROCODONE BITARTRATE AND ACETAMINOPHEN 5; 325 MG/1; MG/1
1 TABLET ORAL EVERY 12 HOURS PRN
Qty: 60 TABLET | Refills: 0 | Status: SHIPPED | OUTPATIENT
Start: 2021-12-22 | End: 2022-01-21

## 2021-12-17 RX ADMIN — ONABOTULINUMTOXINA 200 UNITS: 100 INJECTION, POWDER, LYOPHILIZED, FOR SOLUTION INTRADERMAL; INTRAMUSCULAR at 10:12

## 2021-12-17 NOTE — TELEPHONE ENCOUNTER
Spoke with patient and advised that her prescription is not due until next week. She wanted to make sure that the office was open next week and will call back for the refill. I asked if she was out of medication and she said that she was not out.

## 2021-12-17 NOTE — TELEPHONE ENCOUNTER
----- Message from Fransisca Call sent at 12/17/2021  9:47 AM CST -----  Regarding: Prescription  Please contact Shanna at [phone] regarding a prescription she needs sent to the pharmacy today. Thank you.

## 2021-12-22 ENCOUNTER — TELEPHONE (OUTPATIENT)
Dept: PAIN MEDICINE | Facility: CLINIC | Age: 41
End: 2021-12-22
Payer: MEDICARE

## 2021-12-27 ENCOUNTER — TELEPHONE (OUTPATIENT)
Dept: NEUROLOGY | Facility: CLINIC | Age: 41
End: 2021-12-27
Payer: MEDICARE

## 2021-12-28 RX ORDER — PREDNISONE 10 MG/1
TABLET ORAL
Qty: 20 TABLET | Refills: 0 | Status: SHIPPED | OUTPATIENT
Start: 2021-12-28 | End: 2023-06-01 | Stop reason: CLARIF

## 2022-01-24 ENCOUNTER — TELEPHONE (OUTPATIENT)
Dept: PAIN MEDICINE | Facility: CLINIC | Age: 42
End: 2022-01-24
Payer: MEDICARE

## 2022-01-24 RX ORDER — HYDROCODONE BITARTRATE AND ACETAMINOPHEN 5; 325 MG/1; MG/1
1 TABLET ORAL EVERY 8 HOURS PRN
Qty: 60 TABLET | Refills: 0 | Status: SHIPPED | OUTPATIENT
Start: 2022-01-24 | End: 2022-02-22 | Stop reason: SDUPTHER

## 2022-01-24 NOTE — TELEPHONE ENCOUNTER
----- Message from Simi Silva, Patient Care Assistant sent at 1/24/2022  9:07 AM CST -----  Contact: Pt  Type:  RX Refill Request    Who Called:  Pt  Refill or New Rx:  Refill  RX Name and Strength:  Norco 5mg  How is the patient currently taking it? (ex. 1XDay):  As Directed  Is this a 30 day or 90 day RX:  30  Preferred Pharmacy with phone number:    [facility] - [place], [place] - [address]   [address] [place] [address]  Phone: [phone] Fax: [phone]     Local or Mail Order:  Local  Ordering Provider:  Dr Slade Royal Call Back Number:  [phone]    Additional Information:  Please contact pt upon completion-Thank you~

## 2022-02-10 ENCOUNTER — TELEPHONE (OUTPATIENT)
Dept: PAIN MEDICINE | Facility: CLINIC | Age: 42
End: 2022-02-10
Payer: MEDICARE

## 2022-02-10 NOTE — TELEPHONE ENCOUNTER
----- Message from Leslie Angeles sent at 2/9/2022 10:34 AM CST -----  Contact: pt  Type: Needs Medical Advice    Who Called: pt  Best Call Back Number: [phone]  Requesting a call back regarding  - pt is asking for a call back please needs to [place]. And she needs a refill please call.  Please Advise- Thank you

## 2022-02-22 RX ORDER — HYDROCODONE BITARTRATE AND ACETAMINOPHEN 5; 325 MG/1; MG/1
1 TABLET ORAL EVERY 8 HOURS PRN
Qty: 60 TABLET | Refills: 0 | Status: SHIPPED | OUTPATIENT
Start: 2022-02-23 | End: 2022-05-13 | Stop reason: SDUPTHER

## 2022-03-09 ENCOUNTER — TELEPHONE (OUTPATIENT)
Dept: NEUROLOGY | Facility: CLINIC | Age: 42
End: 2022-03-09
Payer: MEDICARE

## 2022-03-09 NOTE — TELEPHONE ENCOUNTER
Called patient and notified that the authorization has not been obtained from insurance and they had to resubmit and it will be another 10 days before the determination given. Patient stated that she will have to talk to her dad and then call back to reschedule. Patient aware that the appointment is being cancelled. Verbalized understanding.

## 2022-03-10 ENCOUNTER — TELEPHONE (OUTPATIENT)
Dept: NEUROLOGY | Facility: CLINIC | Age: 42
End: 2022-03-10
Payer: MEDICARE

## 2022-03-10 ENCOUNTER — TELEPHONE (OUTPATIENT)
Dept: ADMINISTRATIVE | Facility: OTHER | Age: 42
End: 2022-03-10
Payer: MEDICARE

## 2022-03-10 NOTE — TELEPHONE ENCOUNTER
----- Message from Manuel Pope sent at 3/10/2022  9:25 AM CST -----  Regarding: rescheudle botox  Type:  Sooner Apoointment Request    Caller is requesting a sooner appointment.      Name of Caller:  Shanna Bryan    When is the first available appointment?  3/25/22 according to pt    Symptoms:  botox    Best Call Back Number:  [phone]    Additional Information:  pt was offered 3/25 yesterday .please call to discuss scheduling

## 2022-03-11 ENCOUNTER — TELEPHONE (OUTPATIENT)
Dept: NEUROLOGY | Facility: CLINIC | Age: 42
End: 2022-03-11
Payer: MEDICARE

## 2022-03-11 DIAGNOSIS — G43.719 INTRACTABLE CHRONIC MIGRAINE WITHOUT AURA AND WITHOUT STATUS MIGRAINOSUS: Primary | ICD-10-CM

## 2022-03-11 NOTE — TELEPHONE ENCOUNTER
----- Message from Manuel Pope sent at 3/11/2022  8:57 AM CST -----  Regarding: ret call  Type:  Patient Returning Call    Who Called:  Shanna    Who Left Message for Patient:  Jeni    Does the patient know what this is regarding?:  reschedule botox    Best Call Back Number:  [phone]    Additional Information:

## 2022-04-06 ENCOUNTER — TELEPHONE (OUTPATIENT)
Dept: NEUROLOGY | Facility: CLINIC | Age: 42
End: 2022-04-06
Payer: MEDICARE

## 2022-04-06 NOTE — TELEPHONE ENCOUNTER
----- Message from Manuel Pope sent at 4/6/2022  8:59 AM CDT -----  Regarding: BOTOX reschedule  Type: Needs Medical Advice    Who Called:  Jovita    Lele Call Back Number: [phone]     Additional Information: pt needs to reschedule missed botox appt from this past Monday.

## 2022-04-08 ENCOUNTER — TELEPHONE (OUTPATIENT)
Dept: PAIN MEDICINE | Facility: CLINIC | Age: 42
End: 2022-04-08
Payer: MEDICARE

## 2022-04-08 NOTE — TELEPHONE ENCOUNTER
----- Message from Shreya Giron sent at 4/8/2022  8:19 AM CDT -----  Contact: pt  Type: Needs Medical Advice    Who: Called: pt     Best Call Back Number: [phone]    Inquiry/Question: Pt needs to reschedule her appt for today, she is having some issues and can not make it, when I tried to reschedule the appt Epic would not allow me to do it not sure what was gong on so please mikala pt to get thi resolved Thank you~

## 2022-04-13 ENCOUNTER — TELEPHONE (OUTPATIENT)
Dept: PAIN MEDICINE | Facility: CLINIC | Age: 42
End: 2022-04-13
Payer: MEDICARE

## 2022-04-13 NOTE — TELEPHONE ENCOUNTER
----- Message from Sari Lipscomb sent at 4/13/2022  9:46 AM CDT -----  Contact: Pt [phone]  Type: Needs Medical Advice  Who Called:  Pt      Best Call Back Number: [phone]    Additional Information: Pt is calling to see about getting an appt on 5/13 since she will be in town that day. Pls call back and advise.

## 2022-04-24 DIAGNOSIS — G43.719 INTRACTABLE CHRONIC MIGRAINE WITHOUT AURA AND WITHOUT STATUS MIGRAINOSUS: ICD-10-CM

## 2022-04-25 RX ORDER — GALCANEZUMAB 120 MG/ML
120 INJECTION, SOLUTION SUBCUTANEOUS
Qty: 1 ML | Refills: 11 | Status: SHIPPED | OUTPATIENT
Start: 2022-04-25 | End: 2023-04-26 | Stop reason: SDUPTHER

## 2022-05-13 ENCOUNTER — PROCEDURE VISIT (OUTPATIENT)
Dept: NEUROLOGY | Facility: CLINIC | Age: 42
End: 2022-05-13
Payer: MEDICARE

## 2022-05-13 ENCOUNTER — OFFICE VISIT (OUTPATIENT)
Dept: PAIN MEDICINE | Facility: CLINIC | Age: 42
End: 2022-05-13
Payer: MEDICARE

## 2022-05-13 VITALS
DIASTOLIC BLOOD PRESSURE: 67 MMHG | HEART RATE: 69 BPM | BODY MASS INDEX: 60.41 KG/M2 | HEIGHT: 57 IN | SYSTOLIC BLOOD PRESSURE: 99 MMHG | WEIGHT: 280 LBS

## 2022-05-13 VITALS
HEART RATE: 69 BPM | WEIGHT: 293 LBS | DIASTOLIC BLOOD PRESSURE: 78 MMHG | HEIGHT: 57 IN | BODY MASS INDEX: 63.21 KG/M2 | RESPIRATION RATE: 17 BRPM | SYSTOLIC BLOOD PRESSURE: 127 MMHG | TEMPERATURE: 99 F

## 2022-05-13 DIAGNOSIS — E66.01 MORBID OBESITY WITH BMI OF 70 AND OVER, ADULT: ICD-10-CM

## 2022-05-13 DIAGNOSIS — G93.5 ARNOLD-CHIARI MALFORMATION, TYPE I: ICD-10-CM

## 2022-05-13 DIAGNOSIS — Z02.89 PAIN MANAGEMENT CONTRACT SIGNED: Primary | ICD-10-CM

## 2022-05-13 DIAGNOSIS — G43.719 INTRACTABLE CHRONIC MIGRAINE WITHOUT AURA AND WITHOUT STATUS MIGRAINOSUS: ICD-10-CM

## 2022-05-13 DIAGNOSIS — Q07.00 ARNOLD-CHIARI MALFORMATION: ICD-10-CM

## 2022-05-13 DIAGNOSIS — G43.719 INTRACTABLE CHRONIC MIGRAINE WITHOUT AURA AND WITHOUT STATUS MIGRAINOSUS: Primary | ICD-10-CM

## 2022-05-13 DIAGNOSIS — M48.02 CERVICAL STENOSIS OF SPINAL CANAL: ICD-10-CM

## 2022-05-13 DIAGNOSIS — M54.12 CERVICAL RADICULOPATHY: ICD-10-CM

## 2022-05-13 DIAGNOSIS — E66.01 MORBID OBESITY WITH BMI OF 60.0-69.9, ADULT: ICD-10-CM

## 2022-05-13 PROCEDURE — 64615 CHEMODENERV MUSC MIGRAINE: CPT | Mod: PBBFAC,PO | Performed by: PSYCHIATRY & NEUROLOGY

## 2022-05-13 PROCEDURE — 64615 CHEMODENERV MUSC MIGRAINE: CPT | Mod: S$PBB,,, | Performed by: PSYCHIATRY & NEUROLOGY

## 2022-05-13 PROCEDURE — 99212 OFFICE O/P EST SF 10 MIN: CPT | Mod: PBBFAC,PN,25

## 2022-05-13 PROCEDURE — 99999 PR PBB SHADOW E&M-EST. PATIENT-LVL II: CPT | Mod: PBBFAC,,,

## 2022-05-13 PROCEDURE — 99999 PR PBB SHADOW E&M-EST. PATIENT-LVL II: ICD-10-PCS | Mod: PBBFAC,,,

## 2022-05-13 PROCEDURE — 64615 PR CHEMODENERVATION OF MUSCLE FOR CHRONIC MIGRAINE: ICD-10-PCS | Mod: S$PBB,,, | Performed by: PSYCHIATRY & NEUROLOGY

## 2022-05-13 PROCEDURE — 99214 PR OFFICE/OUTPT VISIT, EST, LEVL IV, 30-39 MIN: ICD-10-PCS | Mod: S$PBB,,,

## 2022-05-13 PROCEDURE — 80307 DRUG TEST PRSMV CHEM ANLYZR: CPT

## 2022-05-13 PROCEDURE — 99214 OFFICE O/P EST MOD 30 MIN: CPT | Mod: S$PBB,,,

## 2022-05-13 RX ORDER — TOPIRAMATE 100 MG/1
100 TABLET, FILM COATED ORAL 2 TIMES DAILY
Qty: 168 TABLET | Refills: 3 | Status: SHIPPED | OUTPATIENT
Start: 2022-05-13 | End: 2023-03-29

## 2022-05-13 RX ORDER — DULAGLUTIDE 3 MG/.5ML
3 INJECTION, SOLUTION SUBCUTANEOUS
COMMUNITY
Start: 2022-05-09

## 2022-05-13 RX ORDER — PANCRELIPASE 60000; 12000; 38000 [USP'U]/1; [USP'U]/1; [USP'U]/1
2 CAPSULE, DELAYED RELEASE PELLETS ORAL
COMMUNITY
End: 2023-06-01 | Stop reason: CLARIF

## 2022-05-13 RX ORDER — HYDROCODONE BITARTRATE AND ACETAMINOPHEN 5; 325 MG/1; MG/1
1 TABLET ORAL EVERY 8 HOURS PRN
Qty: 60 TABLET | Refills: 0 | Status: SHIPPED | OUTPATIENT
Start: 2022-05-13 | End: 2022-06-12

## 2022-05-13 RX ADMIN — ONABOTULINUMTOXINA 200 UNITS: 100 INJECTION, POWDER, LYOPHILIZED, FOR SOLUTION INTRADERMAL; INTRAMUSCULAR at 08:05

## 2022-05-13 NOTE — PROCEDURES
Procedures  Procedures       PROCEDURE PERFORMED: Botulinum toxin injection (97878)    CLINICAL INDICATION: G43.719    A time out was conducted just before the start of the procedure to verify the correct patient and procedure, procedure location, and all relevant critical information.     Conventional methods of treatment such as multiple medications , both on and   off label have been tried including: anti-epileptics (topiramate, gabapentin, diamox), beta blockers (metoprolol),  and antidepressants (paxil - cannot trial additional ones due to being on Paxil). The patient has been unresponsive and refractory.The patient meets criteria for chronic headaches according to the ICHD-II, the patient has more than 15 headaches a month which last for more than 4 hours a day.    Injection shows session number: 12  Percentage relief since last session: >50% of migraine relief but only for 4-6 weeks     DESCRIPTION OF PROCEDURE: After obtaining informed consent and under   aseptic technique, a total of 135 units of botulinum toxin type A were   injected in the following muscles:     -- Procerus 5 units  --  5 units bilaterally  -- Frontalis 20 units  -- Temporalis 20 units bilaterally  -- Occipitalis 15 units bilaterally  -- Trapezius 15 units bilaterally.     -- Upper cervical paraspinals 10 units bilaterally spared due to prior surgery in this area     The patient tolerated the procedure well. There were no complications. The patient was given a prescription for repeat treatment in 12 weeks     Unavoidable waste - 65 units       Cee Campbell M.D  Medical Director, Headache and Facial Pain  [facility]

## 2022-05-13 NOTE — PROGRESS NOTES
This note was completed with dictation software and grammatical errors may exist.    CC: Back pain, Neck pain, headaches    HPI: The patient is a 41-year-old woman with a history of Chiari malformation, Hadley syndrome, pseudotumor cerebri with shunt, morbid obesity who presents in referral from Dr. Velasco for neck pain.  Today she is reporting her pain is at her baseline.  Her pain can wax and wane and is often exacerbated with inclement weather specifically rain.  She continues to get Botox in her neck for her headaches with good results.  She reports she was having weakness in her left arm but has been using weights at home and has been increasing her strength.  She denies any worsening numbness or any other weakness.  She denies any changes to her bowel bladder function.  She continues to take hydrocodone for breakthrough pain with no reported ill side effects or adverse events.  She has been working on weight loss and over the past year she has lost 46 lb.      Pain intervention history: She was seeing Dr. Gerson Renteria, pain management and until recently had been taking hydrocodone 5/325 once a day in addition to Flexeril 3 times a day and gabapentin 600 mg 3 times a day.  She apparently tested positive for Valium and so was dismissed from his practice. She is status post C7-T1 cervical interlaminar epidural steroid injection on 10/4/17 with 100% relief lasting 2 weeks.  She is status post a second cervical TABITHA on 11/14/17 with almost complete relief again but only lasting 2-3 weeks. She is status post bilateral L2, 3 and 4 medial branch radiofrequency ablation on 09/21/2018 with 75% relief.   She is status post C7-T1 interlaminar epidural steroid injection on 10/01/2020 with 100% relief of her neck pain.      ROS: She reports weight loss, headaches, easy bruising and back pain.  Balance of review of systems is negative.    Past Medical History:   Diagnosis Date    Asthma     Bronchitis     Chiari  "malformation type I     Chronic headache     Diabetes mellitus     Graves disease     Graves disease     Hyperlipidemia     Hypertension     Murmur, heart     NPH (normal pressure hydrocephalus)     Obesity     MANUEL on CPAP     Pseudotumor cerebri     Thyroid disease     Hadley syndrome        Past Surgical History:   Procedure Laterality Date    CARPAL TUNNEL RELEASE      CERVICAL SPINE SURGERY      CHOLECYSTECTOMY      EAR TUBE REMOVAL Bilateral     EPIDURAL STEROID INJECTION INTO CERVICAL SPINE N/A 10/1/2020    Procedure: Injection-steroid-epidural-cervical C7- T1 interlaminar;  Surgeon: Santana Pak MD;  Location: [facility] OR;  Service: Pain Management;  Laterality: N/A;    EYE SURGERY      strabismus    INJECTION OF FACET JOINT Bilateral 5/23/2018    Procedure: INJECTION-FACET L3/4 and L4/5;  Surgeon: Santana Pak MD;  Location: [facility] OR;  Service: Pain Management;  Laterality: Bilateral;  sacralization of L5 and a rudimentary disc space    MYRINGOTOMY W/ TUBES      RADIOFREQUENCY ABLATION OF LUMBAR MEDIAL BRANCH NERVE AT SINGLE LEVEL Bilateral 9/21/2018    Procedure: RADIOFREQUENCY ABLATION, NERVE, SPINAL, LUMBAR, MEDIAL BRANCH, L2, L3, L4;  Surgeon: Santana Pak MD;  Location: [facility] OR;  Service: Pain Management;  Laterality: Bilateral;    TONSILLECTOMY      VENTRICULOPERITONEAL SHUNT         Social History     Socioeconomic History    Marital status: Single   Tobacco Use    Smoking status: Never Smoker    Smokeless tobacco: Never Used   Substance and Sexual Activity    Alcohol use: No    Drug use: No         Medications/Allergies: See med card    Vitals:    05/13/22 0914   BP: 99/67   Pulse: 69   Weight: 127 kg (279 lb 15.8 oz)   Height: 4' 9" (1.448 m)   PainSc:   6   PainLoc: Neck     Body mass index is 60.59 kg/m².    Physical exam:  Gen: A and O x3, pleasant, obese  Skin: No rashes or obvious lesions  HEENT: PERRLA, no obvious deformities on ears or in " canals.Trachea midline.  CVS: Regular rate and rhythm, normal palpable pulses.  Resp: Clear to auscultation bilaterally, no wheezes or rales.  Abdomen: Soft, NT/ND.  Musculoskeletal:  Wide-based gait.     Neuro:  Upper extremities: 5/5 strength bilaterally   Lower extremities: 5/5 strength bilaterally  Reflexes: Brachioradialis 2+, Bicep 2+, Tricep 2+. Patellar 2+, Achilles 2+ bilaterally.  Sensory: Intact and symmetrical to light touch and pinprick in C2-T1 dermatomes bilaterally.  Intact and symmetrical to light touch and pinprick in L2-S1 dermatomes bilaterally.     Cervical Spine:  Cervical spine: Range of motion is mildly reduced with flexion , extension and lateral rotation without pain.  Spurling's maneuver is negative bilaterally.  Myofascial exam:   no tenderness to palpation of the cervical paraspinous muscles.    Lumbar spine:  Lumbar spine: ROM is moderately limited with flexion and extension with increased low back pain during each maneuver.  Derrick's test causes no increased pain on either side.    Supine straight leg raise is negative bilaterally.    Internal and external rotation of the hip causes no increased pain on either side.  Myofascial exam: No tenderness to palpation across lumbar paraspinous muscles.      Imaging:  MRI from 2/9/17 cervical spine demonstrates congenitally narrowed canal with disc bulging most prominently at C4/5 to the right side causing anterior cord compression but no signal changes.  There is narrowing of the canal at C3/4 to a lesser degree.    Assessment:   The patient is a 41-year-old woman with a history of Chiari malformation, pseudotumor cerebri with shunt, morbid obesity who presents in referral from Dr. Velasco for neck pain.    1. Pain management contract signed  [facility] Drug Screen   2. Cervical radiculopathy     3. Cervical stenosis of spinal canal     4. Morbid obesity with BMI of 60.0-69.9, adult         Plan:  1. Today we discussed her pain and symptoms.   Overall she is doing well reports since losing weight and getting Botox under neck she needs to take the hydrocodone less often.  She denies any ill side effects or adverse events.  She reports she is no longer taking it every day as her pain is getting better.  Her last refill was in March  2. Refill for hydrocodone 5-325 mg #60 tablets sent to Dr. Pak today  3. I have reviewed the [place] Board of Pharmacy website and there are no abberancies.    4. UDS collected today    The total time spent for evaluation and management on 05/13/2022 including reviewing separately obtained history, performing a medically appropriate exam and evaluation, documenting clinical information in the health record, independently interpreting results and communicating them to the patient/family/caregiver, and ordering medications/tests/procedures was between 30-39 minutes.

## 2022-05-13 NOTE — TELEPHONE ENCOUNTER
I have reviewed the [place] Board of Pharmacy website and there are no abberancies.        Her last refill was in March 2022. She reports taking the medication less frequently recently due to improvement with her pain.  She reports increased weight loss which she feels like his contributing to some of her relief.

## 2022-05-19 LAB
6MAM UR QL: NOT DETECTED
7AMINOCLONAZEPAM UR QL: NOT DETECTED
A-OH ALPRAZ UR QL: NOT DETECTED
ALPHA-OH-MIDAZOLAM: NOT DETECTED
ALPRAZ UR QL: NOT DETECTED
AMPHET UR QL SCN: NOT DETECTED
ANNOTATION COMMENT IMP: NORMAL
ANNOTATION COMMENT IMP: NORMAL
BARBITURATES UR QL: NOT DETECTED
BUPRENORPHINE UR QL: NOT DETECTED
BZE UR QL: NOT DETECTED
CARBOXYTHC UR QL: PRESENT
CARISOPRODOL UR QL: NOT DETECTED
CLONAZEPAM UR QL: NOT DETECTED
CODEINE UR QL: NOT DETECTED
CREAT UR-MCNC: 198.8 MG/DL (ref 20–400)
DIAZEPAM UR QL: NOT DETECTED
ETHYL GLUCURONIDE UR QL: NOT DETECTED
FENTANYL UR QL: NOT DETECTED
GABAPENTIN: NOT DETECTED
HYDROCODONE UR QL: PRESENT
HYDROMORPHONE UR QL: NOT DETECTED
LORAZEPAM UR QL: NOT DETECTED
MDA UR QL: NOT DETECTED
MDEA UR QL: NOT DETECTED
MDMA UR QL: NOT DETECTED
ME-PHENIDATE UR QL: NOT DETECTED
METHADONE UR QL: NOT DETECTED
METHAMPHET UR QL: NOT DETECTED
MIDAZOLAM UR QL SCN: NOT DETECTED
MORPHINE UR QL: NOT DETECTED
NALOXONE: NOT DETECTED
NORBUPRENORPHINE UR QL CFM: NOT DETECTED
NORDIAZEPAM UR QL: NOT DETECTED
NORFENTANYL UR QL: NOT DETECTED
NORHYDROCODONE UR QL CFM: PRESENT
NORMEPERIDINE UR QL CFM: NOT DETECTED
NOROXYCODONE UR QL CFM: NOT DETECTED
NOROXYMORPHONE UR QL SCN: NOT DETECTED
OXAZEPAM UR QL: NOT DETECTED
OXYCODONE UR QL: NOT DETECTED
OXYMORPHONE UR QL: NOT DETECTED
PATHOLOGY STUDY: NORMAL
PCP UR QL: NOT DETECTED
PHENTERMINE UR QL: NOT DETECTED
PREGABALIN: NOT DETECTED
SERVICE CMNT-IMP: NORMAL
TAPENTADOL UR QL SCN: NOT DETECTED
TAPENTADOL UR QL SCN: NOT DETECTED
TEMAZEPAM UR QL: NOT DETECTED
TRAMADOL UR QL: NOT DETECTED
ZOLPIDEM METABOLITE: NOT DETECTED
ZOLPIDEM UR QL: NOT DETECTED

## 2022-05-31 ENCOUNTER — TELEPHONE (OUTPATIENT)
Dept: NEUROLOGY | Facility: CLINIC | Age: 42
End: 2022-05-31
Payer: MEDICARE

## 2022-05-31 NOTE — TELEPHONE ENCOUNTER
----- Message from Lauren Erickson sent at 5/31/2022 12:18 PM CDT -----  Regarding: advice  Contact: patient  Type: Needs Medical Advice  Who Called:  patient  Symptoms (please be specific):  bad headaches  How long has patient had these symptoms:  since 05/05/22 when she had the Botox  Pharmacy name and phone #:    [facility] - [place], [place] - [address]  [address]  [place] [address]  Phone: [phone] Fax: [phone]  Best Call Back Number: [phone]  Additional Information: Patient states the headaches have been worst. Please call patient to advise.Thanks!

## 2022-06-28 ENCOUNTER — PATIENT MESSAGE (OUTPATIENT)
Dept: PAIN MEDICINE | Facility: CLINIC | Age: 42
End: 2022-06-28
Payer: MEDICARE

## 2022-06-28 ENCOUNTER — TELEPHONE (OUTPATIENT)
Dept: PAIN MEDICINE | Facility: CLINIC | Age: 42
End: 2022-06-28
Payer: MEDICARE

## 2022-06-28 RX ORDER — HYDROCODONE BITARTRATE AND ACETAMINOPHEN 5; 325 MG/1; MG/1
1 TABLET ORAL EVERY 12 HOURS PRN
Qty: 60 TABLET | Refills: 0 | Status: SHIPPED | OUTPATIENT
Start: 2022-06-28 | End: 2022-08-05 | Stop reason: SDUPTHER

## 2022-06-28 NOTE — TELEPHONE ENCOUNTER
----- Message from Ligia Bourne, Patient Care Assistant sent at 6/28/2022  9:30 AM CDT -----  Regarding: medication  Contact: pt  Type: Needs Medical Advice  Who Called:  pt   Pharmacy name and phone #:    [facility] - [place], [place] - [address]  [address]  [place] [address]  Phone: [phone] Fax: [phone]    Best Call Back Number: [phone] (home)     Additional Information: pt states she would like a callback regarding refilling an unknown medication. Please call to advise. Thanks!

## 2022-06-28 NOTE — TELEPHONE ENCOUNTER
Refill request for Norco approved and sent to pharmacy of choice.Unable to leave voicemail. Pt notified through portal.

## 2022-07-15 ENCOUNTER — TELEPHONE (OUTPATIENT)
Dept: NEUROLOGY | Facility: CLINIC | Age: 42
End: 2022-07-15
Payer: MEDICARE

## 2022-07-15 NOTE — TELEPHONE ENCOUNTER
Spoke with pt and scheduled appointment also let pt know if headaches get worse go to the ER. Pt understood, verbalized understanding.----- Message from Manuel Pope sent at 7/15/2022 10:30 AM CDT -----  Regarding: med question  Type: Needs Medical Advice    Who Called:  Shanna    Symptoms (please be specific):  headache    How long has patient had these symptoms:  2wk now    Pharmacy name and phone #:    [facility] - [place], [place] - [address]  [address] [facility] [address]  Phone: [phone] Fax: [phone]    Best Call Back Number: [phone]     Additional Information: please call to discuss how pt can get relief today.

## 2022-08-05 ENCOUNTER — PROCEDURE VISIT (OUTPATIENT)
Dept: NEUROLOGY | Facility: CLINIC | Age: 42
End: 2022-08-05
Payer: MEDICARE

## 2022-08-05 ENCOUNTER — OFFICE VISIT (OUTPATIENT)
Dept: CARDIOLOGY | Facility: CLINIC | Age: 42
End: 2022-08-05
Payer: MEDICARE

## 2022-08-05 ENCOUNTER — OFFICE VISIT (OUTPATIENT)
Dept: PAIN MEDICINE | Facility: CLINIC | Age: 42
End: 2022-08-05
Payer: MEDICARE

## 2022-08-05 VITALS
WEIGHT: 282.19 LBS | SYSTOLIC BLOOD PRESSURE: 110 MMHG | BODY MASS INDEX: 60.88 KG/M2 | HEIGHT: 57 IN | OXYGEN SATURATION: 99 % | DIASTOLIC BLOOD PRESSURE: 51 MMHG | HEART RATE: 72 BPM

## 2022-08-05 VITALS
BODY MASS INDEX: 62.4 KG/M2 | WEIGHT: 289.25 LBS | DIASTOLIC BLOOD PRESSURE: 71 MMHG | HEART RATE: 71 BPM | HEIGHT: 57 IN | SYSTOLIC BLOOD PRESSURE: 117 MMHG

## 2022-08-05 VITALS
BODY MASS INDEX: 60.84 KG/M2 | SYSTOLIC BLOOD PRESSURE: 124 MMHG | HEIGHT: 57 IN | WEIGHT: 282 LBS | RESPIRATION RATE: 17 BRPM | DIASTOLIC BLOOD PRESSURE: 79 MMHG | HEART RATE: 70 BPM

## 2022-08-05 DIAGNOSIS — M54.16 LUMBAR RADICULOPATHY: Primary | ICD-10-CM

## 2022-08-05 DIAGNOSIS — E78.5 HYPERLIPIDEMIA, UNSPECIFIED HYPERLIPIDEMIA TYPE: ICD-10-CM

## 2022-08-05 DIAGNOSIS — Z02.89 PAIN MANAGEMENT CONTRACT SIGNED: ICD-10-CM

## 2022-08-05 DIAGNOSIS — G43.719 INTRACTABLE CHRONIC MIGRAINE WITHOUT AURA AND WITHOUT STATUS MIGRAINOSUS: Primary | ICD-10-CM

## 2022-08-05 DIAGNOSIS — R01.1 HEART MURMUR: ICD-10-CM

## 2022-08-05 DIAGNOSIS — E66.01 MORBID OBESITY WITH BMI OF 60.0-69.9, ADULT: ICD-10-CM

## 2022-08-05 DIAGNOSIS — I10 PRIMARY HYPERTENSION: Primary | ICD-10-CM

## 2022-08-05 DIAGNOSIS — M54.12 CERVICAL RADICULOPATHY: ICD-10-CM

## 2022-08-05 PROCEDURE — 64615 CHEMODENERV MUSC MIGRAINE: CPT | Mod: S$PBB,,, | Performed by: PSYCHIATRY & NEUROLOGY

## 2022-08-05 PROCEDURE — 99999 PR PBB SHADOW E&M-EST. PATIENT-LVL III: CPT | Mod: PBBFAC,,, | Performed by: INTERNAL MEDICINE

## 2022-08-05 PROCEDURE — 99214 PR OFFICE/OUTPT VISIT, EST, LEVL IV, 30-39 MIN: ICD-10-PCS | Mod: S$PBB,,, | Performed by: INTERNAL MEDICINE

## 2022-08-05 PROCEDURE — 64615 PR CHEMODENERVATION OF MUSCLE FOR CHRONIC MIGRAINE: ICD-10-PCS | Mod: S$PBB,,, | Performed by: PSYCHIATRY & NEUROLOGY

## 2022-08-05 PROCEDURE — 99999 PR PBB SHADOW E&M-EST. PATIENT-LVL V: CPT | Mod: PBBFAC,,,

## 2022-08-05 PROCEDURE — 99214 PR OFFICE/OUTPT VISIT, EST, LEVL IV, 30-39 MIN: ICD-10-PCS | Mod: S$PBB,,,

## 2022-08-05 PROCEDURE — 99215 OFFICE O/P EST HI 40 MIN: CPT | Mod: PBBFAC,PN

## 2022-08-05 PROCEDURE — 99213 OFFICE O/P EST LOW 20 MIN: CPT | Mod: PBBFAC,27,PO | Performed by: INTERNAL MEDICINE

## 2022-08-05 PROCEDURE — 64615 CHEMODENERV MUSC MIGRAINE: CPT | Mod: PBBFAC,PO | Performed by: PSYCHIATRY & NEUROLOGY

## 2022-08-05 PROCEDURE — 99999 PR PBB SHADOW E&M-EST. PATIENT-LVL V: ICD-10-PCS | Mod: PBBFAC,,,

## 2022-08-05 PROCEDURE — 99999 PR PBB SHADOW E&M-EST. PATIENT-LVL III: ICD-10-PCS | Mod: PBBFAC,,, | Performed by: INTERNAL MEDICINE

## 2022-08-05 PROCEDURE — 99214 OFFICE O/P EST MOD 30 MIN: CPT | Mod: S$PBB,,,

## 2022-08-05 PROCEDURE — 99214 OFFICE O/P EST MOD 30 MIN: CPT | Mod: S$PBB,,, | Performed by: INTERNAL MEDICINE

## 2022-08-05 RX ORDER — LEVOTHYROXINE SODIUM 125 UG/1
TABLET ORAL
COMMUNITY
Start: 2022-07-30 | End: 2023-06-01 | Stop reason: CLARIF

## 2022-08-05 RX ORDER — PANCRELIPASE 36000; 180000; 114000 [USP'U]/1; [USP'U]/1; [USP'U]/1
1 CAPSULE, DELAYED RELEASE PELLETS ORAL
COMMUNITY
Start: 2022-08-03 | End: 2023-06-01 | Stop reason: CLARIF

## 2022-08-05 RX ORDER — POTASSIUM CHLORIDE 750 MG/1
10 TABLET, EXTENDED RELEASE ORAL DAILY
COMMUNITY
Start: 2022-07-22

## 2022-08-05 RX ORDER — METHYLPREDNISOLONE 4 MG/1
TABLET ORAL
Qty: 21 EACH | Refills: 0 | Status: SHIPPED | OUTPATIENT
Start: 2022-08-05 | End: 2022-08-26

## 2022-08-05 RX ORDER — METFORMIN HYDROCHLORIDE 500 MG/1
2 TABLET, EXTENDED RELEASE ORAL 2 TIMES DAILY
COMMUNITY
Start: 2022-05-25

## 2022-08-05 RX ORDER — HYDROCODONE BITARTRATE AND ACETAMINOPHEN 5; 325 MG/1; MG/1
1 TABLET ORAL EVERY 12 HOURS PRN
Qty: 60 TABLET | Refills: 0 | Status: SHIPPED | OUTPATIENT
Start: 2022-08-05 | End: 2022-08-31 | Stop reason: SDUPTHER

## 2022-08-05 RX ORDER — FLUCONAZOLE 150 MG/1
150 TABLET ORAL
COMMUNITY
Start: 2022-06-16 | End: 2023-06-01 | Stop reason: CLARIF

## 2022-08-05 RX ORDER — SULFAMETHOXAZOLE AND TRIMETHOPRIM 800; 160 MG/1; MG/1
1 TABLET ORAL 2 TIMES DAILY
COMMUNITY
Start: 2022-06-29 | End: 2023-06-01 | Stop reason: CLARIF

## 2022-08-05 RX ORDER — AMOXICILLIN AND CLAVULANATE POTASSIUM 875; 125 MG/1; MG/1
1 TABLET, FILM COATED ORAL 2 TIMES DAILY
COMMUNITY
Start: 2022-04-27 | End: 2023-06-01 | Stop reason: CLARIF

## 2022-08-05 RX ORDER — OMEPRAZOLE 40 MG/1
40 CAPSULE, DELAYED RELEASE ORAL DAILY
COMMUNITY
Start: 2022-07-22 | End: 2023-06-01 | Stop reason: CLARIF

## 2022-08-05 RX ORDER — PANCRELIPASE 36000; 180000; 114000 [USP'U]/1; [USP'U]/1; [USP'U]/1
2 CAPSULE, DELAYED RELEASE PELLETS ORAL
COMMUNITY
Start: 2022-05-19

## 2022-08-05 RX ORDER — PAROXETINE HYDROCHLORIDE 40 MG/1
40 TABLET, FILM COATED ORAL DAILY
COMMUNITY
Start: 2022-07-22

## 2022-08-05 RX ORDER — PROMETHAZINE HYDROCHLORIDE 25 MG/1
25 TABLET ORAL EVERY 6 HOURS PRN
COMMUNITY
Start: 2022-04-25

## 2022-08-05 RX ORDER — FAMOTIDINE 20 MG/1
20 TABLET, FILM COATED ORAL DAILY
COMMUNITY
Start: 2022-07-22

## 2022-08-05 RX ADMIN — ONABOTULINUMTOXINA 200 UNITS: 100 INJECTION, POWDER, LYOPHILIZED, FOR SOLUTION INTRADERMAL; INTRAMUSCULAR at 09:08

## 2022-08-05 NOTE — PROGRESS NOTES
"Subjective:    Patient ID:  Shanna Douglass is a 42 y.o. female who presents for follow-up of Hypertension      HPI42 yo WF with obesity and hx of heart murmur who has had echos every few years for mild MR and AI. No change in symptoms. Denies CP or MORIN.     Review of Systems   Cardiovascular: Negative for chest pain, claudication, cyanosis, dyspnea on exertion, irregular heartbeat, leg swelling, near-syncope, orthopnea, palpitations, paroxysmal nocturnal dyspnea and syncope.        Objective:    Physical Exam  Vitals and nursing note reviewed.   Constitutional:       Appearance: She is well-developed.      Comments: /71 (BP Location: Right arm, Patient Position: Sitting, BP Method: Medium (Automatic))   Pulse 71   Ht 4' 9" (1.448 m)   Wt 131.2 kg (289 lb 3.9 oz)   BMI 62.59 kg/m²      HENT:      Head: Normocephalic and atraumatic.      Right Ear: External ear normal.      Left Ear: External ear normal.      Nose: Nose normal.   Eyes:      General: Lids are normal. No scleral icterus.        Right eye: No discharge.         Left eye: No discharge.      Conjunctiva/sclera: Conjunctivae normal.      Right eye: No hemorrhage.     Pupils: Pupils are equal, round, and reactive to light.   Neck:      Thyroid: No thyromegaly.      Vascular: No JVD.      Trachea: No tracheal deviation.   Cardiovascular:      Rate and Rhythm: Normal rate and regular rhythm.      Pulses: Intact distal pulses.      Heart sounds: Murmur heard.   High-pitched blowing holosystolic murmur is present with a grade of 1/6 at the apex.    No friction rub. No gallop.   Pulmonary:      Effort: Pulmonary effort is normal. No respiratory distress.      Breath sounds: Normal breath sounds. No wheezing or rales.   Chest:      Chest wall: No tenderness.   Breasts: Breasts are symmetrical.       Abdominal:      General: Bowel sounds are normal. There is no distension.      Palpations: Abdomen is soft. There is no hepatomegaly or mass.      " Tenderness: There is no abdominal tenderness. There is no guarding or rebound.   Musculoskeletal:         General: No tenderness. Normal range of motion.      Cervical back: Normal range of motion and neck supple.   Lymphadenopathy:      Cervical: No cervical adenopathy.   Skin:     General: Skin is warm and dry.      Coloration: Skin is not pale.      Findings: No erythema or rash.   Neurological:      Mental Status: She is alert and oriented to person, place, and time.      Cranial Nerves: No cranial nerve deficit.      Coordination: Coordination normal.      Deep Tendon Reflexes: Reflexes normal.   Psychiatric:         Behavior: Behavior normal.         Thought Content: Thought content normal.         Judgment: Judgment normal.           Assessment:       1. Primary hypertension    2. Hyperlipidemia, unspecified hyperlipidemia type    3. Heart murmur         Plan:     She has lost weight    Cardiac symptoms stable    Patient advised to modify risk factors such as weight, exercise, diet,  tobacco and alcohol exposure  Orders Placed This Encounter   Procedures    Echo     Follow up in about 1 year (around 8/5/2023).

## 2022-08-05 NOTE — PROGRESS NOTES
"This note was completed with dictation software and grammatical errors may exist.    CC: Back pain, Neck pain, headaches    HPI: The patient is a 41-year-old woman with a history of Chiari malformation, Hadley syndrome, pseudotumor cerebri with shunt, morbid obesity who presents in referral from Dr. Velasco for neck pain.  Today she is reporting continued neck pain with radiation down her left arm.  Additionally she is reporting worsening low back pain with radiation down the back of her right leg into her foot.  She reports this pain started after doing some "spring cleaning" and was lifting heavy totes.  She denies any associated numbness, weakness or changes with her bowel bladder function since the onset of her pain.  Additionally she denies any new or worsening numbness, weakness in her upper extremities.  She continues to take hydrocodone for breakthrough pain with no ill side effects or adverse events.      Pain intervention history: She was seeing Dr. Gerson Renteria, pain management and until recently had been taking hydrocodone 5/325 once a day in addition to Flexeril 3 times a day and gabapentin 600 mg 3 times a day.  She apparently tested positive for Valium and so was dismissed from his practice. She is status post C7-T1 cervical interlaminar epidural steroid injection on 10/4/17 with 100% relief lasting 2 weeks.  She is status post a second cervical TABITHA on 11/14/17 with almost complete relief again but only lasting 2-3 weeks. She is status post bilateral L2, 3 and 4 medial branch radiofrequency ablation on 09/21/2018 with 75% relief.   She is status post C7-T1 interlaminar epidural steroid injection on 10/01/2020 with 100% relief of her neck pain.      ROS: She reports weight loss, headaches, easy bruising and back pain.  Balance of review of systems is negative.    Past Medical History:   Diagnosis Date    Asthma     Bronchitis     Chiari malformation type I     Chronic headache     Diabetes " "mellitus     Graves disease     Graves disease     Hyperlipidemia     Hypertension     Murmur, heart     NPH (normal pressure hydrocephalus)     Obesity     MANUEL on CPAP     Pseudotumor cerebri     Thyroid disease     Hadley syndrome        Past Surgical History:   Procedure Laterality Date    CARPAL TUNNEL RELEASE      CERVICAL SPINE SURGERY      CHOLECYSTECTOMY      EAR TUBE REMOVAL Bilateral     EPIDURAL STEROID INJECTION INTO CERVICAL SPINE N/A 10/1/2020    Procedure: Injection-steroid-epidural-cervical C7- T1 interlaminar;  Surgeon: Santana Pak MD;  Location: [facility] OR;  Service: Pain Management;  Laterality: N/A;    EYE SURGERY      strabismus    INJECTION OF FACET JOINT Bilateral 5/23/2018    Procedure: INJECTION-FACET L3/4 and L4/5;  Surgeon: Santana Pak MD;  Location: [facility] OR;  Service: Pain Management;  Laterality: Bilateral;  sacralization of L5 and a rudimentary disc space    MYRINGOTOMY W/ TUBES      RADIOFREQUENCY ABLATION OF LUMBAR MEDIAL BRANCH NERVE AT SINGLE LEVEL Bilateral 9/21/2018    Procedure: RADIOFREQUENCY ABLATION, NERVE, SPINAL, LUMBAR, MEDIAL BRANCH, L2, L3, L4;  Surgeon: Santana Pak MD;  Location: [facility] OR;  Service: Pain Management;  Laterality: Bilateral;    TONSILLECTOMY      VENTRICULOPERITONEAL SHUNT         Social History     Socioeconomic History    Marital status: Single   Tobacco Use    Smoking status: Never Smoker    Smokeless tobacco: Never Used   Substance and Sexual Activity    Alcohol use: No    Drug use: No         Medications/Allergies: See med card    Vitals:    08/05/22 0851   BP: (!) 110/51   Pulse: 72   SpO2: 99%   Weight: 128 kg (282 lb 3 oz)   Height: 4' 9" (1.448 m)   PainSc:   8   PainLoc: Back     Body mass index is 61.07 kg/m².    Physical exam:  Gen: A and O x3, pleasant, obese  Skin: No rashes or obvious lesions  HEENT: PERRLA, no obvious deformities on ears or in canals.Trachea midline.  CVS: Regular rate and " rhythm, normal palpable pulses.  Resp: Clear to auscultation bilaterally, no wheezes or rales.  Abdomen: Soft, NT/ND.  Musculoskeletal:  Wide-based gait.     Neuro:  Upper extremities: 5/5 strength bilaterally   Lower extremities: 5/5 strength bilaterally  Reflexes: Brachioradialis 2+, Bicep 2+, Tricep 2+. Patellar 2+, Achilles 2+ bilaterally.  Sensory: Intact and symmetrical to light touch and pinprick in C2-T1 dermatomes bilaterally.  Intact and symmetrical to light touch and pinprick in L2-S1 dermatomes bilaterally.     Cervical Spine:  Cervical spine: Range of motion is mildly reduced with flexion , extension and lateral rotation without pain.  Spurling's maneuver is negative bilaterally.  Myofascial exam:   no tenderness to palpation of the cervical paraspinous muscles.    Lumbar spine:  Lumbar spine: ROM is moderately limited with flexion and extension with increased low back pain during each maneuver.  Derrick's test causes no increased pain on either side.    Supine straight leg raise is negative bilaterally.    Internal and external rotation of the hip causes no increased pain on either side.  Myofascial exam: No tenderness to palpation across lumbar paraspinous muscles.      Imaging:  MRI from 2/9/17 cervical spine demonstrates congenitally narrowed canal with disc bulging most prominently at C4/5 to the right side causing anterior cord compression but no signal changes.  There is narrowing of the canal at C3/4 to a lesser degree.    Assessment:   The patient is a 41-year-old woman with a history of Chiari malformation, pseudotumor cerebri with shunt, morbid obesity who presents in referral from Dr. Velasco for neck pain.    1. Lumbar radiculopathy  methylPREDNISolone (MEDROL DOSEPACK) 4 mg tablet    Ambulatory referral/consult to Physical/Occupational Therapy   2. Pain management contract signed     3. Cervical radiculopathy     4. Morbid obesity with BMI of 60.0-69.9, adult         Plan:  1. Today we  discussed her pain symptoms.  For her low back pain I provided her a Medrol Dosepak.  Her most recent A1c was 5.5.  I offered her formal physical therapy for her low back however at this time she would like to think about it prior to proceeding.  She will call us if she would like to attend formal physical therapy.  Orders were placed today.  2. Since her worsening low back pain she has been taking hydrocodone on average once daily for her pain with moderate relief.  Her last refill was 06/28/2022.  She is currently out of medication.  3. Refill for hydrocodone 5-325 mg #60 tablets sent to Dr. Pak today.  I have reviewed the [place] Board of Pharmacy website and there are no abberancies.

## 2022-08-05 NOTE — PROCEDURES
Procedures  Procedures       PROCEDURE PERFORMED: Botulinum toxin injection (69210)    CLINICAL INDICATION: G43.719    A time out was conducted just before the start of the procedure to verify the correct patient and procedure, procedure location, and all relevant critical information.     Conventional methods of treatment such as multiple medications , both on and   off label have been tried including: anti-epileptics (topiramate, gabapentin, diamox), beta blockers (metoprolol),  and antidepressants (paxil - cannot trial additional ones due to being on Paxil). The patient has been unresponsive and refractory.The patient meets criteria for chronic headaches according to the ICHD-II, the patient has more than 15 headaches a month which last for more than 4 hours a day.    Injection shows session number: 12  Percentage relief since last session: >50% of migraine relief but only for 4-6 weeks     DESCRIPTION OF PROCEDURE: After obtaining informed consent and under   aseptic technique, a total of 135 units of botulinum toxin type A were   injected in the following muscles:     -- Procerus 5 units  --  5 units bilaterally  -- Frontalis 20 units  -- Temporalis 20 units bilaterally  -- Occipitalis 15 units bilaterally  -- Trapezius 15 units bilaterally.     -- Upper cervical paraspinals 10 units bilaterally spared due to prior surgery in this area     The patient tolerated the procedure well. There were no complications. The patient was given a prescription for repeat treatment in 12 weeks     Unavoidable waste - 65 units       Cee Campbell M.D  Medical Director, Headache and Facial Pain  [facility]

## 2022-08-12 RX ORDER — RIMEGEPANT SULFATE 75 MG/75MG
TABLET, ORALLY DISINTEGRATING ORAL
Qty: 8 TABLET | Refills: 11 | Status: SHIPPED | OUTPATIENT
Start: 2022-08-12 | End: 2023-01-25 | Stop reason: SDUPTHER

## 2022-08-31 RX ORDER — HYDROCODONE BITARTRATE AND ACETAMINOPHEN 5; 325 MG/1; MG/1
1 TABLET ORAL EVERY 12 HOURS PRN
Qty: 60 TABLET | Refills: 0 | Status: SHIPPED | OUTPATIENT
Start: 2022-09-05 | End: 2022-08-31 | Stop reason: SDUPTHER

## 2022-08-31 RX ORDER — HYDROCODONE BITARTRATE AND ACETAMINOPHEN 5; 325 MG/1; MG/1
1 TABLET ORAL EVERY 12 HOURS PRN
Qty: 60 TABLET | Refills: 0 | Status: SHIPPED | OUTPATIENT
Start: 2022-09-05 | End: 2022-09-02 | Stop reason: SDUPTHER

## 2022-08-31 RX ORDER — HYDROCODONE BITARTRATE AND ACETAMINOPHEN 5; 325 MG/1; MG/1
1 TABLET ORAL EVERY 12 HOURS PRN
Qty: 60 TABLET | Refills: 0 | Status: SHIPPED | OUTPATIENT
Start: 2022-10-05 | End: 2022-08-31 | Stop reason: SDUPTHER

## 2022-08-31 RX ORDER — HYDROCODONE BITARTRATE AND ACETAMINOPHEN 5; 325 MG/1; MG/1
1 TABLET ORAL EVERY 12 HOURS PRN
Qty: 60 TABLET | Refills: 0 | Status: SHIPPED | OUTPATIENT
Start: 2022-10-05 | End: 2022-09-02 | Stop reason: SDUPTHER

## 2022-08-31 NOTE — TELEPHONE ENCOUNTER
----- Message from Javi Rizzo sent at 8/31/2022  8:37 AM CDT -----  Contact: pt at [phone]  Type:  RX Refill Request    Who Called:  pt  Refill or New Rx:  REFILL  RX Name and Strength:  HYDROcodone-acetaminophen (NORCO) 5-325 mg per tablet  How is the patient currently taking it? (ex. 1XDay):  as directed  Is this a 30 day or 90 day RX:  90  Preferred Pharmacy with phone number:    [facility] - [place], [place] - [address]   [address] [place] [address]  Phone: [phone] Fax: [phone]  Local or Mail Order:  local  Ordering Provider:  Pasha Royal Call Back Number:  [phone]  Additional Information:  Please call back to advise.

## 2022-08-31 NOTE — TELEPHONE ENCOUNTER
Rx's failed transmission to pharmacy. Confirmed with [facility]. Please resend. Both Rxs pended.

## 2022-08-31 NOTE — TELEPHONE ENCOUNTER
For patients that we regularly fill opioid rx, please send 3 months or prescriptions if having them return in 3 months.

## 2022-08-31 NOTE — TELEPHONE ENCOUNTER
Understood, I will unsure I send 3 months at a time in the future.    I believe with her she mentioned at the last office visit she is only taking it once a day, so must have felt that at the time it was appropriate to only send one-month supply.

## 2022-09-02 RX ORDER — HYDROCODONE BITARTRATE AND ACETAMINOPHEN 5; 325 MG/1; MG/1
1 TABLET ORAL EVERY 12 HOURS PRN
Qty: 60 TABLET | Refills: 0 | Status: SHIPPED | OUTPATIENT
Start: 2022-09-05 | End: 2022-10-05

## 2022-09-02 RX ORDER — HYDROCODONE BITARTRATE AND ACETAMINOPHEN 5; 325 MG/1; MG/1
1 TABLET ORAL EVERY 12 HOURS PRN
Qty: 60 TABLET | Refills: 0 | Status: SHIPPED | OUTPATIENT
Start: 2022-10-05 | End: 2022-10-18 | Stop reason: SDUPTHER

## 2022-10-04 RX ORDER — HYDROCODONE BITARTRATE AND ACETAMINOPHEN 5; 325 MG/1; MG/1
1 TABLET ORAL EVERY 12 HOURS PRN
Qty: 60 TABLET | Refills: 0 | Status: CANCELLED | OUTPATIENT
Start: 2022-10-04 | End: 2022-11-03

## 2022-10-04 NOTE — TELEPHONE ENCOUNTER
Refills have been requested for the following medications:    HYDROcodone-acetaminophen (NORCO) 5-325 mg per tablet.   Request forwarded to Dr. Pak for review.  LOV 8-5-22

## 2022-10-04 NOTE — TELEPHONE ENCOUNTER
----- Message from Nohemy Manuel sent at 10/4/2022  9:19 AM CDT -----  Contact: Patient  Type:  RX Refill Request    Who Called: Patient     Refill or New Rx: Refill     RX Name and Strength: HYDROcodone-acetaminophen (NORCO) 5-325 mg per tablet      How is the patient currently taking it? (ex. 1XDay): every 12 hours     Is this a 30 day or 90 day RX: 30    Preferred Pharmacy with phone number:       [facility] [address]   [address] [place] [address]  Phone: [phone] Fax: [phone]       Local or Mail Order:local     Ordering Provider: Santana Pak     Would the patient rather a call back or a response via [facility]? Call     Best Call Back Number: [phone] (home)      Additional Information:

## 2022-10-05 ENCOUNTER — TELEPHONE (OUTPATIENT)
Dept: CARDIOLOGY | Facility: CLINIC | Age: 42
End: 2022-10-05
Payer: MEDICARE

## 2022-10-05 NOTE — TELEPHONE ENCOUNTER
----- Message from Simón Clayton sent at 10/5/2022  1:25 PM CDT -----  Regarding: unable to reach patient  Contact: Patient  There is no voice mail set up and patient does not answer.  Please reschedule the patient for the echo .  ----- Message -----  From: Nohemy Jackson  Sent: 10/4/2022   9:19 AM CDT  To: [facility]    Type:  Apoointment Request      Name of Caller: Patient     When is the first available appointment? N/a    Symptoms: ECHO     Would the patient rather a call back or a response via [facility]? Call     Best Call Back Number:[phone] (home)      Additional Information:

## 2022-10-18 ENCOUNTER — TELEPHONE (OUTPATIENT)
Dept: PAIN MEDICINE | Facility: CLINIC | Age: 42
End: 2022-10-18
Payer: MEDICARE

## 2022-10-18 RX ORDER — HYDROCODONE BITARTRATE AND ACETAMINOPHEN 5; 325 MG/1; MG/1
1 TABLET ORAL EVERY 12 HOURS PRN
Qty: 60 TABLET | Refills: 0 | Status: SHIPPED | OUTPATIENT
Start: 2022-10-19 | End: 2022-11-07 | Stop reason: SDUPTHER

## 2022-10-18 NOTE — TELEPHONE ENCOUNTER
Call placed to Pt to advise her Rx for Hydrocodone has been sent to her pharm of  choice by Dr. Pak an will be ready to dispense tomorrow. Pt verbalized understanding.

## 2022-10-18 NOTE — TELEPHONE ENCOUNTER
----- Message from Miguelangel Gonzales sent at 10/18/2022 10:14 AM CDT -----  Type:  RX Refill Request    Who Called:  pt  Refill or New Rx:  refill  RX Name and Strength:  HYDROcodone-acetaminophen (NORCO) 5-325 mg per tablet  How is the patient currently taking it? (ex. 1XDay):  as directed  Is this a 30 day or 90 day RX:  30  Preferred Pharmacy with phone number:    [facility] [address]   [address] [facility] [address]  Phone: [phone] Fax: [phone]    Local or Mail Order:  local  Ordering Provider:  Pasha  Best Call Back Number:  [phone] (home)     Additional Information:  pt said she need a refill--said the cold weather have her hurting--please call and advisee--thank you

## 2022-11-07 ENCOUNTER — OFFICE VISIT (OUTPATIENT)
Dept: PAIN MEDICINE | Facility: CLINIC | Age: 42
End: 2022-11-07
Payer: MEDICARE

## 2022-11-07 ENCOUNTER — PROCEDURE VISIT (OUTPATIENT)
Dept: NEUROLOGY | Facility: CLINIC | Age: 42
End: 2022-11-07
Payer: MEDICARE

## 2022-11-07 VITALS
DIASTOLIC BLOOD PRESSURE: 90 MMHG | WEIGHT: 289 LBS | HEART RATE: 73 BPM | SYSTOLIC BLOOD PRESSURE: 135 MMHG | RESPIRATION RATE: 17 BRPM | HEIGHT: 57 IN | BODY MASS INDEX: 62.35 KG/M2

## 2022-11-07 VITALS
HEART RATE: 74 BPM | SYSTOLIC BLOOD PRESSURE: 127 MMHG | OXYGEN SATURATION: 100 % | HEIGHT: 57 IN | BODY MASS INDEX: 59.62 KG/M2 | DIASTOLIC BLOOD PRESSURE: 82 MMHG | WEIGHT: 276.38 LBS

## 2022-11-07 DIAGNOSIS — M54.16 LUMBAR RADICULOPATHY: Primary | ICD-10-CM

## 2022-11-07 DIAGNOSIS — G43.719 INTRACTABLE CHRONIC MIGRAINE WITHOUT AURA AND WITHOUT STATUS MIGRAINOSUS: Primary | ICD-10-CM

## 2022-11-07 DIAGNOSIS — M54.12 CERVICAL RADICULOPATHY: ICD-10-CM

## 2022-11-07 DIAGNOSIS — E66.01 MORBID OBESITY WITH BMI OF 60.0-69.9, ADULT: ICD-10-CM

## 2022-11-07 DIAGNOSIS — Z02.89 PAIN MANAGEMENT CONTRACT SIGNED: ICD-10-CM

## 2022-11-07 PROCEDURE — 99213 OFFICE O/P EST LOW 20 MIN: CPT | Mod: PBBFAC,PN

## 2022-11-07 PROCEDURE — 99214 OFFICE O/P EST MOD 30 MIN: CPT | Mod: S$PBB,,,

## 2022-11-07 PROCEDURE — 99214 PR OFFICE/OUTPT VISIT, EST, LEVL IV, 30-39 MIN: ICD-10-PCS | Mod: S$PBB,,,

## 2022-11-07 PROCEDURE — 99999 PR PBB SHADOW E&M-EST. PATIENT-LVL III: ICD-10-PCS | Mod: PBBFAC,,,

## 2022-11-07 PROCEDURE — 64615 CHEMODENERV MUSC MIGRAINE: CPT | Mod: PBBFAC,PO | Performed by: PSYCHIATRY & NEUROLOGY

## 2022-11-07 PROCEDURE — 99999 PR PBB SHADOW E&M-EST. PATIENT-LVL III: CPT | Mod: PBBFAC,,,

## 2022-11-07 PROCEDURE — 64615 PR CHEMODENERVATION OF MUSCLE FOR CHRONIC MIGRAINE: ICD-10-PCS | Mod: S$PBB,,, | Performed by: PSYCHIATRY & NEUROLOGY

## 2022-11-07 PROCEDURE — 64615 CHEMODENERV MUSC MIGRAINE: CPT | Mod: S$PBB,,, | Performed by: PSYCHIATRY & NEUROLOGY

## 2022-11-07 RX ORDER — HYDROCODONE BITARTRATE AND ACETAMINOPHEN 5; 325 MG/1; MG/1
1 TABLET ORAL EVERY 12 HOURS PRN
Qty: 60 TABLET | Refills: 0 | Status: SHIPPED | OUTPATIENT
Start: 2022-12-18 | End: 2023-01-17

## 2022-11-07 RX ORDER — HYDROCODONE BITARTRATE AND ACETAMINOPHEN 5; 325 MG/1; MG/1
1 TABLET ORAL EVERY 12 HOURS PRN
Qty: 60 TABLET | Refills: 0 | Status: SHIPPED | OUTPATIENT
Start: 2023-01-17 | End: 2023-02-14 | Stop reason: SDUPTHER

## 2022-11-07 RX ORDER — HYDROCODONE BITARTRATE AND ACETAMINOPHEN 5; 325 MG/1; MG/1
1 TABLET ORAL EVERY 12 HOURS PRN
Qty: 60 TABLET | Refills: 0 | Status: SHIPPED | OUTPATIENT
Start: 2022-11-18 | End: 2022-12-18

## 2022-11-07 RX ORDER — CEPHALEXIN 500 MG/1
500 CAPSULE ORAL 3 TIMES DAILY
COMMUNITY
Start: 2022-08-31 | End: 2023-06-01 | Stop reason: CLARIF

## 2022-11-07 RX ORDER — ALBUTEROL SULFATE 90 UG/1
1 AEROSOL, METERED RESPIRATORY (INHALATION) EVERY 4 HOURS PRN
COMMUNITY
Start: 2022-10-04

## 2022-11-07 RX ORDER — CEFDINIR 300 MG/1
300 CAPSULE ORAL 2 TIMES DAILY
COMMUNITY
Start: 2022-10-04 | End: 2023-06-01 | Stop reason: CLARIF

## 2022-11-07 RX ADMIN — ONABOTULINUMTOXINA 200 UNITS: 100 INJECTION, POWDER, LYOPHILIZED, FOR SOLUTION INTRADERMAL; INTRAMUSCULAR at 09:11

## 2022-11-07 NOTE — PROCEDURES
ProceduresProcedures       PROCEDURE PERFORMED: Botulinum toxin injection (76882)    CLINICAL INDICATION: G43.719    A time out was conducted just before the start of the procedure to verify the correct patient and procedure, procedure location, and all relevant critical information.     Conventional methods of treatment such as multiple medications , both on and   off label have been tried including: anti-epileptics (topiramate, gabapentin, diamox), beta blockers (metoprolol),  and antidepressants (paxil - cannot trial additional ones due to being on Paxil). The patient has been unresponsive and refractory.The patient meets criteria for chronic headaches according to the ICHD-II, the patient has more than 15 headaches a month which last for more than 4 hours a day.    Injection shows session number: 13  Percentage relief since last session: >50% of migraine relief but only for 4-6 weeks     DESCRIPTION OF PROCEDURE: After obtaining informed consent and under   aseptic technique, a total of 135 units of botulinum toxin type A were   injected in the following muscles:     -- Procerus 5 units  --  5 units bilaterally  -- Frontalis 20 units  -- Temporalis 20 units bilaterally  -- Occipitalis 15 units bilaterally  -- Trapezius 15 units bilaterally.     -- Upper cervical paraspinals 10 units bilaterally spared due to prior surgery in this area     The patient tolerated the procedure well. There were no complications. The patient was given a prescription for repeat treatment in 12 weeks     Unavoidable waste - 65 units       Cee Campbell M.D  Medical Director, Headache and Facial Pain  [facility]

## 2022-11-07 NOTE — PROGRESS NOTES
This note was completed with dictation software and grammatical errors may exist.    CC: Back pain, Neck pain, headaches    HPI: The patient is a 41-year-old woman with a history of Chiari malformation, Hadley syndrome, pseudotumor cerebri with shunt, morbid obesity who presents in referral from Dr. Velasco for neck pain.  She is reporting continued neck pain, 6/10, constant, worse with inclement weather and improved with rest, good weather and medications.  She denies any current radiating pain or any new numbness, weakness or any new changes with her bowel bladder function.  Her previous low back pain has improved in it to waxes and wanes with changes in weather.  She continues to take hydrocodone for severe pain and denies any ill side effects or adverse events with the medication.      Pain intervention history: She was seeing Dr. Gerson Renteria, pain management and until recently had been taking hydrocodone 5/325 once a day in addition to Flexeril 3 times a day and gabapentin 600 mg 3 times a day.  She apparently tested positive for Valium and so was dismissed from his practice. She is status post C7-T1 cervical interlaminar epidural steroid injection on 10/4/17 with 100% relief lasting 2 weeks.  She is status post a second cervical TABITHA on 11/14/17 with almost complete relief again but only lasting 2-3 weeks. She is status post bilateral L2, 3 and 4 medial branch radiofrequency ablation on 09/21/2018 with 75% relief.   She is status post C7-T1 interlaminar epidural steroid injection on 10/01/2020 with 100% relief of her neck pain.      ROS: She reports weight loss, headaches, easy bruising and back pain.  Balance of review of systems is negative.    Past Medical History:   Diagnosis Date    Asthma     Bronchitis     Chiari malformation type I     Chronic headache     Diabetes mellitus     Graves disease     Graves disease     Hyperlipidemia     Hypertension     Murmur, heart     NPH (normal pressure  "hydrocephalus)     Obesity     MANUEL on CPAP     Pseudotumor cerebri     Thyroid disease     Hadley syndrome        Past Surgical History:   Procedure Laterality Date    CARPAL TUNNEL RELEASE      CERVICAL SPINE SURGERY      CHOLECYSTECTOMY      EAR TUBE REMOVAL Bilateral     EPIDURAL STEROID INJECTION INTO CERVICAL SPINE N/A 10/1/2020    Procedure: Injection-steroid-epidural-cervical C7- T1 interlaminar;  Surgeon: Santana Pak MD;  [place] [facility];  Service: Pain Management;  Laterality: N/A;    EYE SURGERY      strabismus    INJECTION OF FACET JOINT Bilateral 5/23/2018    Procedure: INJECTION-FACET L3/4 and L4/5;  Surgeon: Santana Pak MD;  Location: [facility] [place] [facility];  Service: Pain Management;  Laterality: Bilateral;  sacralization of L5 and a rudimentary disc space    MYRINGOTOMY W/ TUBES      RADIOFREQUENCY ABLATION OF LUMBAR MEDIAL BRANCH NERVE AT SINGLE LEVEL Bilateral 9/21/2018    Procedure: RADIOFREQUENCY ABLATION, NERVE, SPINAL, LUMBAR, MEDIAL BRANCH, L2, L3, L4;  Surgeon: Santana Pak MD;  Location[place] [facility] [place] [facility];  Service: Pain Management;  Laterality: Bilateral;    TONSILLECTOMY      VENTRICULOPERITONEAL SHUNT         Social History     Socioeconomic History    Marital status: Single   Tobacco Use    Smoking status: Never    Smokeless tobacco: Never   Substance and Sexual Activity    Alcohol use: No    Drug use: No         Medications/Allergies: See med card    Vitals:    11/07/22 0926   BP: 127/82   Pulse: 74   SpO2: 100%   Weight: 125.4 kg (276 lb 5.6 oz)   Height: 4' 9" (1.448 m)   PainSc:   6   PainLoc: Back     Body mass index is 59.8 kg/m².    Physical exam:  Gen: A and O x3, pleasant, obese  Skin: No rashes or obvious lesions  HEENT: PERRLA, no obvious deformities on ears or in canals.Trachea midline.  CVS: Regular rate and rhythm, normal palpable pulses.  Resp: Clear to auscultation bilaterally, no wheezes or rales.  Abdomen: Soft, NT/ND.  Musculoskeletal:  Wide-based gait.   "   Neuro:  Upper extremities: 5/5 strength bilaterally   Lower extremities: 5/5 strength bilaterally  Reflexes: Brachioradialis 2+, Bicep 2+, Tricep 2+. Patellar 2+, Achilles 2+ bilaterally.  Sensory: Intact and symmetrical to light touch and pinprick in C2-T1 dermatomes bilaterally.  Intact and symmetrical to light touch and pinprick in L2-S1 dermatomes bilaterally.     Cervical Spine:  Cervical spine: Range of motion is mildly reduced with flexion , extension and lateral rotation without pain.  Spurling's maneuver is negative bilaterally.  Myofascial exam:   no tenderness to palpation of the cervical paraspinous muscles.    Lumbar spine:  Lumbar spine: ROM is moderately limited with flexion and extension with increased low back pain during each maneuver.  Derrick's test causes no increased pain on either side.    Supine straight leg raise is negative bilaterally.    Internal and external rotation of the hip causes no increased pain on either side.  Myofascial exam: No tenderness to palpation across lumbar paraspinous muscles.      Imaging:  MRI from 2/9/17 cervical spine demonstrates congenitally narrowed canal with disc bulging most prominently at C4/5 to the right side causing anterior cord compression but no signal changes.  There is narrowing of the canal at C3/4 to a lesser degree.    Assessment:   The patient is a 41-year-old woman with a history of Chiari malformation, pseudotumor cerebri with shunt, morbid obesity who presents in referral from Dr. Velasco for neck pain.    1. Lumbar radiculopathy        2. Cervical radiculopathy        3. Pain management contract signed        4. Morbid obesity with BMI of 60.0-69.9, adult              Plan:  1. Today we discussed her pain and symptoms.  Overall she is doing well and is denying any significant changes to her pain.  Her neck and back pain are currently more bothersome right now due to the changes in weather but overall her pain is stable.  She continues to work  on exercising and losing weight.  She has lost 6 lb since last office visit.  2. Refills for hydrocodone 5/325 mg #60 tablets sent to Dr. Pak today.  I have reviewed the [place] Board of Pharmacy website and there are no abberancies.   3. Follow-up in 3 months or sooner if needed.

## 2022-11-11 ENCOUNTER — TELEPHONE (OUTPATIENT)
Dept: NEUROSURGERY | Facility: CLINIC | Age: 42
End: 2022-11-11
Payer: MEDICARE

## 2022-11-11 NOTE — TELEPHONE ENCOUNTER
----- Message from Manuel Pope sent at 11/8/2022  9:33 AM CST -----  Regarding: f/u  Type: Needs Medical Advice    Who Called:  Shanna Royal Call Back Number: [phone]    Additional Information: abby f/u visit on Feb 7th because she will already be in town

## 2022-11-28 ENCOUNTER — TELEPHONE (OUTPATIENT)
Dept: NEUROSURGERY | Facility: CLINIC | Age: 42
End: 2022-11-28
Payer: MEDICARE

## 2022-11-28 NOTE — TELEPHONE ENCOUNTER
----- Message from Cayla Vasquez LPN sent at 11/25/2022  4:11 PM CST -----  Regarding: FW: sooner appt    ----- Message -----  From: Manuel Pope  Sent: 11/22/2022  11:06 AM CST  To: Brandon Gonsalves Staff  Subject: sooner appt                                      Type:  Sooner Appointment Request    Caller is requesting a sooner appointment.  Caller declined first available appointment listed below.  Caller will not accept being placed on the waitlist and is requesting a message be sent to doctor.    Name of Caller:  Shanna Bryan    When is the first available appointment?  Dept book    Symptoms:  shunt check    Best Call Back Number:  [phone]     Additional Information:  pt trying to get scheduled on 1/31 around dr brown's appt or 2/7 around dr mcguire appt. Please call to discuss.

## 2022-12-02 ENCOUNTER — TELEPHONE (OUTPATIENT)
Dept: NEUROSURGERY | Facility: CLINIC | Age: 42
End: 2022-12-02
Payer: MEDICARE

## 2022-12-02 NOTE — TELEPHONE ENCOUNTER
Called t discuss. No answer. Unable to leave voicemail due to voicemail not being set up.   Patient tolerated procedure well.

## 2022-12-02 NOTE — TELEPHONE ENCOUNTER
----- Message from Manuel Pope sent at 11/30/2022  1:04 PM CST -----  Regarding: sooner appt SECOND MSG  Subject: sooner appt                                       Type:  Sooner Appointment Request     Caller is requesting a sooner appointment.      Name of Caller:  Shanna Bryan     When is the first available appointment?  Dept book     Symptoms:  shunt check     Best Call Back Number:  [phone] or call [phone]     Additional Information:  pt trying to get scheduled on 1/31 around dr brown's appt or 2/7 around dr mcguire appt. Please call to discuss.

## 2023-01-25 RX ORDER — RIMEGEPANT SULFATE 75 MG/75MG
TABLET, ORALLY DISINTEGRATING ORAL
Qty: 8 TABLET | Refills: 11 | Status: SHIPPED | OUTPATIENT
Start: 2023-01-25 | End: 2023-07-21 | Stop reason: SDUPTHER

## 2023-01-30 ENCOUNTER — TELEPHONE (OUTPATIENT)
Dept: NEUROLOGY | Facility: CLINIC | Age: 43
End: 2023-01-30
Payer: MEDICARE

## 2023-01-30 ENCOUNTER — TELEPHONE (OUTPATIENT)
Dept: PAIN MEDICINE | Facility: CLINIC | Age: 43
End: 2023-01-30
Payer: MEDICARE

## 2023-01-30 NOTE — TELEPHONE ENCOUNTER
----- Message from Deepali Kunzrison sent at 1/30/2023  2:42 PM CST -----  Contact: self  Type:  Sooner Apoointment Request    Caller is requesting to reschedule appointment.   Name of Caller:Pt  When is the first available appointment?N/A     Would the patient rather a call back or a response via [facility]? Call  Best Call Back Number:Home [phone]  Cell [phone]    Additional Information: Please call pt to reschedule appt....      Thank you...

## 2023-01-30 NOTE — TELEPHONE ENCOUNTER
Called patient and rescheduled Botox appointment. Date/Time confirmed. Verbalized understanding. Referral attached. Sent to be scheduled.

## 2023-01-30 NOTE — TELEPHONE ENCOUNTER
----- Message from Deepali Butleron sent at 1/30/2023  2:46 PM CST -----  Contact: self  Type:  Sooner Apoointment Request    Caller is requesting to reschedule appointment.   Name of Caller:Pt  When is the first available appointment?N/A     Would the patient rather a call back or a response via [facility]? Call  Best Call Back Number:Home [phone]  Cell [phone]    Additional Information: Please call pt to reschedule appt....      Thank you...

## 2023-02-06 ENCOUNTER — TELEPHONE (OUTPATIENT)
Dept: NEUROLOGY | Facility: CLINIC | Age: 43
End: 2023-02-06
Payer: MEDICARE

## 2023-02-06 ENCOUNTER — TELEPHONE (OUTPATIENT)
Dept: NEUROSURGERY | Facility: CLINIC | Age: 43
End: 2023-02-06
Payer: MEDICARE

## 2023-02-06 DIAGNOSIS — Z98.2 VP (VENTRICULOPERITONEAL) SHUNT STATUS: Primary | ICD-10-CM

## 2023-02-06 NOTE — TELEPHONE ENCOUNTER
Xray orders placed and scheduled before appointment. Attempted to reach patient. No answer. No voicemail set up.

## 2023-02-06 NOTE — TELEPHONE ENCOUNTER
----- Message from Nohemy Ames PA-C sent at 2/6/2023 11:34 AM CST -----  Regarding: RE: Orders  Contact: DELFINA ANGEL [[id number]]  Brina is out today. Get valve shot and shunt series   ----- Message -----  From: Cayla Vasquez LPN  Sent: 2/6/2023  10:57 AM CST  To: Brina Taylor PA-C  Subject: FW: Orders                                       Patient has appointment tomorrow for shunt check.. Do you want xrays prior?  ----- Message -----  From: Wei Mc  Sent: 2/6/2023   8:43 AM CST  To: Shari OCHOA Staff  Subject: Orders                                           Type: Needs Medical Advice    Who Called:  Delfina    Symptoms (please be specific):  na    How long has patient had these symptoms:  na    Pharmacy name and phone #:  na    Best Call Back Number: [phone]     Additional Information: Orders are not put in for X Ray. Please call to advise

## 2023-02-06 NOTE — TELEPHONE ENCOUNTER
Please see prev telephone note----- Message from Kylah Beauchamp MA sent at 2/6/2023  8:27 AM CST -----  Contact: patient  Type: Needs Medical Advice  Who Called:  patient    Best Call Back Number: [phone]    Additional Information: Patient would like to check and see if her insurance is approving the Botox injections. Please call her back at number listed above. Thanks!

## 2023-02-07 ENCOUNTER — HOSPITAL ENCOUNTER (OUTPATIENT)
Dept: RADIOLOGY | Facility: HOSPITAL | Age: 43
Discharge: HOME | End: 2023-02-07
Attending: PHYSICIAN ASSISTANT
Payer: MEDICARE

## 2023-02-07 ENCOUNTER — OFFICE VISIT (OUTPATIENT)
Dept: NEUROSURGERY | Facility: CLINIC | Age: 43
End: 2023-02-07
Payer: MEDICARE

## 2023-02-07 VITALS
BODY MASS INDEX: 59.64 KG/M2 | SYSTOLIC BLOOD PRESSURE: 111 MMHG | RESPIRATION RATE: 18 BRPM | HEART RATE: 66 BPM | DIASTOLIC BLOOD PRESSURE: 74 MMHG | WEIGHT: 276.44 LBS | HEIGHT: 57 IN

## 2023-02-07 DIAGNOSIS — Z98.2 VP (VENTRICULOPERITONEAL) SHUNT STATUS: ICD-10-CM

## 2023-02-07 DIAGNOSIS — M50.20 HNP (HERNIATED NUCLEUS PULPOSUS), CERVICAL: Primary | ICD-10-CM

## 2023-02-07 PROCEDURE — 70250 X-RAY EXAM OF SKULL: CPT | Mod: 26,,, | Performed by: RADIOLOGY

## 2023-02-07 PROCEDURE — 72020 XR SHUNT SERIES: ICD-10-PCS | Mod: 26,,, | Performed by: RADIOLOGY

## 2023-02-07 PROCEDURE — 99215 OFFICE O/P EST HI 40 MIN: CPT | Mod: S$PBB,,, | Performed by: NEUROLOGICAL SURGERY

## 2023-02-07 PROCEDURE — 76000 FL SHUNT SETTING: ICD-10-PCS | Mod: 26,,, | Performed by: RADIOLOGY

## 2023-02-07 PROCEDURE — 71045 XR SHUNT SERIES: ICD-10-PCS | Mod: 26,,, | Performed by: RADIOLOGY

## 2023-02-07 PROCEDURE — 99215 PR OFFICE/OUTPT VISIT, EST, LEVL V, 40-54 MIN: ICD-10-PCS | Mod: S$PBB,,, | Performed by: NEUROLOGICAL SURGERY

## 2023-02-07 PROCEDURE — 70250 XR SHUNT SERIES: ICD-10-PCS | Mod: 26,,, | Performed by: RADIOLOGY

## 2023-02-07 PROCEDURE — 74018 RADEX ABDOMEN 1 VIEW: CPT | Mod: TC,FY,PO

## 2023-02-07 PROCEDURE — 72020 X-RAY EXAM OF SPINE 1 VIEW: CPT | Mod: 26,,, | Performed by: RADIOLOGY

## 2023-02-07 PROCEDURE — 74018 RADEX ABDOMEN 1 VIEW: CPT | Mod: 26,,, | Performed by: RADIOLOGY

## 2023-02-07 PROCEDURE — 71045 X-RAY EXAM CHEST 1 VIEW: CPT | Mod: 26,,, | Performed by: RADIOLOGY

## 2023-02-07 PROCEDURE — 76000 FLUOROSCOPY <1 HR PHYS/QHP: CPT | Mod: TC,PO

## 2023-02-07 PROCEDURE — 71045 X-RAY EXAM CHEST 1 VIEW: CPT | Mod: TC,FY,PO

## 2023-02-07 PROCEDURE — 76000 FLUOROSCOPY <1 HR PHYS/QHP: CPT | Mod: 26,,, | Performed by: RADIOLOGY

## 2023-02-07 PROCEDURE — 74018 XR SHUNT SERIES: ICD-10-PCS | Mod: 26,,, | Performed by: RADIOLOGY

## 2023-02-07 NOTE — PROGRESS NOTES
"Neurosurgery History and Physical    Patient ID: Shanna Douglass is a 42 y.o. female.    Chief Complaint   Patient presents with    Shunt Problem     Patient presents to clinic for a shunt check with imaging. Reports R eye "twitching" since 12/2022.  Reports R arm tingling with weather changes.  Reports LBP, x years, on and off, with radiation into the BLE with numbness and tingling.          Review of Systems   Constitutional: Negative.    HENT: Negative.     Cardiovascular: Negative.    Gastrointestinal: Negative.    Endocrine: Negative.    Genitourinary: Negative.    Musculoskeletal:  Positive for gait problem and neck pain.   Skin: Negative.    Allergic/Immunologic: Negative.    Neurological:  Positive for weakness, numbness and headaches.   Hematological: Negative.    Psychiatric/Behavioral: Negative.       Past Medical History:   Diagnosis Date    Asthma     Bronchitis     Chiari malformation type I     Chronic headache     Diabetes mellitus     Graves disease     Graves disease     Hyperlipidemia     Hypertension     Murmur, heart     NPH (normal pressure hydrocephalus)     Obesity     MANUEL on CPAP     Pseudotumor cerebri     Thyroid disease     Hadley syndrome      Social History     Socioeconomic History    Marital status: Single   Tobacco Use    Smoking status: Never    Smokeless tobacco: Never   Substance and Sexual Activity    Alcohol use: No    Drug use: No     Family History   Problem Relation Age of Onset    Diabetes Mother     Hypertension Father     Heart disease Father     Heart disease Maternal Grandfather      Review of patient's allergies indicates:   Allergen Reactions    Diamox [acetazolamide] Hives    Iodine and iodide containing products     Relpax [eletriptan hbr] Hives    Dye Swelling and Rash     Iodine    Maxalt [rizatriptan] Palpitations    Sumatriptan Palpitations       Current Outpatient Medications:     albuterol (PROVENTIL/VENTOLIN HFA) 90 mcg/actuation inhaler, Inhale into the " lungs., Disp: , Rfl:     amantadine HCL (SYMMETREL) 100 mg capsule, TAKE 1 TABLET DAILY, Disp: 28 capsule, Rfl: 11    amoxicillin-clavulanate 875-125mg (AUGMENTIN) 875-125 mg per tablet, Take 1 tablet by mouth 2 (two) times daily., Disp: , Rfl:     aspirin 81 MG Chew, Take 81 mg by mouth once daily., Disp: , Rfl:     bepotastine besilate (BEPREVE) 1.5 % Drop, Apply to eye., Disp: , Rfl:     cefdinir (OMNICEF) 300 MG capsule, Take 300 mg by mouth 2 (two) times daily., Disp: , Rfl:     celecoxib (CELEBREX) 100 MG capsule, Take 2 capsules (200 mg total) by mouth 2 (two) times daily as needed (headache and sharp pain). No more than 3 days per week., Disp: 30 capsule, Rfl: 11    cephALEXin (KEFLEX) 500 MG capsule, Take 500 mg by mouth 3 (three) times daily., Disp: , Rfl:     CREON 36,000-114,000- 180,000 unit CpDR, Take by mouth., Disp: , Rfl:     cyclobenzaprine (FLEXERIL) 10 MG tablet, TAKE 1 TABLET 3 TIMES DAILY AS NEEDED FOR MUSCLE SPASM *THANK YOU*, Disp: 90 tablet, Rfl: 2    famotidine (PEPCID) 20 MG tablet, Take 20 mg by mouth once daily., Disp: , Rfl:     fluconazole (DIFLUCAN) 150 MG Tab, Take by mouth., Disp: , Rfl:     furosemide (LASIX) 80 MG tablet, Take 80 mg by mouth once daily. , Disp: , Rfl:     galcanezumab-gnlm (EMGALITY PEN) 120 mg/mL PnIj, Inject 1 ml (120 mg) into the skin every 28 days., Disp: 1 mL, Rfl: 11    HYDROcodone-acetaminophen (NORCO) 5-325 mg per tablet, Take 1 tablet by mouth every 12 (twelve) hours as needed for Pain., Disp: 60 tablet, Rfl: 0    hydrOXYzine HCL (ATARAX) 25 MG tablet, Take 1 tablet (25 mg total) by mouth 3 (three) times daily., Disp: 90 tablet, Rfl: 3    levothyroxine (SYNTHROID) 125 MCG tablet, Take by mouth., Disp: , Rfl:     levothyroxine (SYNTHROID) 300 MCG Tab, Take 300 mcg by mouth., Disp: , Rfl:     lipase-protease-amylase (CREON) 36,000-114,000- 180,000 unit CpDR, , Disp: , Rfl:     lipase-protease-amylase 12,000-38,000-60,000 units (CREON) CpDR, Take 2 capsules  by mouth 3 (three) times daily with meals., Disp: , Rfl:     meloxicam (MOBIC) 7.5 MG tablet, , Disp: , Rfl:     metFORMIN (GLUCOPHAGE-XR) 500 MG ER 24hr tablet, Take 2 tablets by mouth 2 (two) times daily., Disp: , Rfl:     metFORMIN (GLUCOPHAGE-XR) 500 MG XR 24hr tablet, Take 1,000 mg by mouth., Disp: , Rfl:     methIMAzole (TAPAZOLE) 10 MG Tab, Take 10 mg by mouth 2 (two) times daily., Disp: , Rfl:     metoprolol succinate (TOPROL-XL) 25 MG 24 hr tablet, Take 1 tablet (25 mg total) by mouth once daily., Disp: 90 tablet, Rfl: 1    montelukast (SINGULAIR) 10 mg tablet, , Disp: , Rfl:     multivit-minerals/folic acid (ONE-A-DAY WOMEN VITACRAVES ORAL), , Disp: , Rfl:     omeprazole (PRILOSEC) 20 MG capsule, Take 40 mg by mouth once daily. , Disp: , Rfl:     omeprazole (PRILOSEC) 40 MG capsule, Take 40 mg by mouth once daily., Disp: , Rfl:     ondansetron (ZOFRAN-ODT) 4 MG TbDL, Take 8 mg by mouth every 12 (twelve) hours., Disp: , Rfl:     paroxetine (PAXIL) 20 MG tablet, 40 mg. , Disp: , Rfl:     paroxetine (PAXIL) 40 MG tablet, Take 40 mg by mouth once daily., Disp: , Rfl:     potassium chloride (KLOR-CON) 10 MEQ TbSR, Take 10 mEq by mouth once daily., Disp: , Rfl:     potassium chloride (MICRO-K) 10 MEQ CpSR, Take 10 mEq by mouth once., Disp: , Rfl:     predniSONE (DELTASONE) 10 MG tablet, 4 tab PO in AM  X 2 days, 3 tab in AM x 2 days, 2 tab in AM x 2 days, 1 tab in AM x 2 days then stop., Disp: 20 tablet, Rfl: 0    prochlorperazine (COMPAZINE) 10 MG tablet, Take 1 tablet (10 mg total) by mouth every 6 (six) hours as needed (migraine or nausea)., Disp: 60 tablet, Rfl: 11    promethazine (PHENERGAN) 25 MG tablet, Take 25 mg by mouth every 6 (six) hours as needed., Disp: , Rfl:     rimegepant (NURTEC) 75 mg odt, Place one dissolving tablet on the tongue daily as needed for migraine. No more than once per day., Disp: 8 tablet, Rfl: 11    rosuvastatin (CRESTOR) 20 MG tablet, Take 1 tablet (20 mg total) by mouth once  "daily., Disp: 90 tablet, Rfl: 3    selenium 200 mcg Cap, Take 200 mcg by mouth once daily., Disp: , Rfl:     sulfamethoxazole-trimethoprim 800-160mg (BACTRIM DS) 800-160 mg Tab, Take 1 tablet by mouth 2 (two) times daily., Disp: , Rfl:     topiramate (TOPAMAX) 100 MG tablet, Take 1 tablet (100 mg total) by mouth 2 (two) times daily., Disp: 168 tablet, Rfl: 3    TRULICITY 3 mg/0.5 mL pen injector, Inject 3 mg into the skin., Disp: , Rfl:     Current Facility-Administered Medications:     onabotulinumtoxina injection 200 Units, 200 Units, Intramuscular, Q90 Days, Swetha Campbell MD, 200 Units at 05/13/22 0842    onabotulinumtoxina injection 200 Units, 200 Units, Intramuscular, Q90 Days, Swetha Campbell MD, 200 Units at 08/05/22 0940    onabotulinumtoxina injection 200 Units, 200 Units, Intramuscular, Q90 Days, Swetha Campbell MD, 200 Units at 11/07/22 0953    perflutren protein-A microsphr 0.22 mg/mL IV susp, 2 mL, Intravenous, 1 time in Clinic/HOD, Red Ward MD  Blood pressure 111/74, pulse 66, resp. rate 18, height 4' 9" (1.448 m), weight 125.4 kg (276 lb 7.3 oz).      Neurologic Exam     Mental Status   Oriented to person, place, and time.   Attention: normal. Concentration: normal.   Speech: speech is normal   Level of consciousness: alert  Knowledge: good.     Cranial Nerves     CN II   Visual acuity: normal    CN III, IV, VI   Pupils are equal, round, and reactive to light.  Extraocular motions are normal.     CN V   Facial sensation intact.     CN VII   Facial expression full, symmetric.     CN VIII   Hearing: intact    CN IX, X   Palate: symmetric    CN XI   CN XI normal.     CN XII   CN XII normal.     Motor Exam   Muscle bulk: normal  Overall muscle tone: normal  Right arm pronator drift: absent  Left arm pronator drift: absent    Strength   Right deltoid: 5/5  Left deltoid: 5/5  Right biceps: 5/5  Left biceps: 5/5  Right triceps: 5/5  Left triceps: 5/5  Right wrist flexion: 5/5  Left " wrist flexion: 5/5  Right wrist extension: 5/5  Left wrist extension: 5/5  Right interossei: 5/5  Left interossei: 5/5  Right iliopsoas: 5/5  Left iliopsoas: 5/5  Right quadriceps: 5/5  Left quadriceps: 5/5  Right hamstrin/5  Left hamstrin/5  Right anterior tibial: 5/5  Left anterior tibial: 5/5  Right posterior tibial: 5/5  Left posterior tibial: 5/5  Right peroneal: 5/5  Left peroneal: 5/5  Right gastroc: 5/5  Left gastroc: 5/5    Sensory Exam   Right arm light touch: normal  Left arm light touch: normal  Right leg light touch: normal  Left leg light touch: normal    Gait, Coordination, and Reflexes     Gait  Gait: normal    Coordination   Romberg: negative  Finger to nose coordination: normal    Tremor   Resting tremor: absent    Reflexes   Right brachioradialis: 3+  Left brachioradialis: 2+  Right biceps: 3+  Left biceps: 2+  Right triceps: 3+  Left triceps: 2+  Right patellar: 3+  Left patellar: 2+  Right achilles: 2+  Left achilles: 2+  Right plantar: normal  Left plantar: equivocal  Right Cardenas: present  Left Cardenas: absent  Right ankle clonus: absent  Left ankle clonus: absent    Physical Exam  Vitals and nursing note reviewed.   Constitutional:       Appearance: She is well-developed.   HENT:      Head: Normocephalic and atraumatic.   Eyes:      Extraocular Movements: EOM normal.      Pupils: Pupils are equal, round, and reactive to light.   Cardiovascular:      Rate and Rhythm: Normal rate and regular rhythm.   Pulmonary:      Effort: Pulmonary effort is normal.   Abdominal:      Palpations: Abdomen is soft.   Musculoskeletal:         General: Normal range of motion.      Cervical back: Normal range of motion and neck supple.   Skin:     General: Skin is warm and dry.   Neurological:      Mental Status: She is alert and oriented to person, place, and time.      Coordination: Finger-Nose-Finger Test and Romberg Test normal.      Gait: Gait is intact.      Deep Tendon Reflexes:      Reflex  "Scores:       Tricep reflexes are 3+ on the right side and 2+ on the left side.       Bicep reflexes are 3+ on the right side and 2+ on the left side.       Brachioradialis reflexes are 3+ on the right side and 2+ on the left side.       Patellar reflexes are 3+ on the right side and 2+ on the left side.       Achilles reflexes are 2+ on the right side and 2+ on the left side.  Psychiatric:         Speech: Speech normal.         Behavior: Behavior normal.         Thought Content: Thought content normal.         Judgment: Judgment normal.       Vital Signs  Pulse: 66  Resp: 18  BP: 111/74  BP Location: Left arm  Patient Position: Sitting  Pain Score: 10-Worst pain ever  Pain Loc: Back  Height and Weight  Height: 4' 9" (144.8 cm)  Weight: 125.4 kg (276 lb 7.3 oz)  BSA (Calculated - sq m): 2.25 sq meters  BMI (Calculated): 59.8  Weight in (lb) to have BMI = 25: 115.3]    Provider dictation:  I reviewed the shunt XR imaging. No acute findings and shunt valve setting stable at 150.    Presents with several complaints. She has noted intermittent twitching of her right eyelid for some time. She admits to having some fatigue and poor sleep. She also has noted right arm "Hubert horses" and paresthesias for 2 months. She continues to endorse poor balance and falls. Headaches have been stable. No fever or infections.    Exam is significant for RUE hyperreflexia and Cardenas's sign. Shunt reservoir refills adequately. No TTP. Mild non-specific diffuse abdominal tenderness.     Given her known cervical stenosis worse on the right, the right arm symptoms and the return of her myelopathic reflexes are concerning for worsening spinal cord compression and we will order an updated MRI C spine without contrast. She should have her MRI on the same day as her follow up neurosurgery appointment so her valve can be re-adjusted.    I do not suspect any shunt issues. Regarding the eyelid twitches, this is usually completely benign.    45 " minutes were spent on this encounter. This included counseling, coordinating care, discussion regarding the prognosis, differential diagnosis, risks and benefits of treatment, instructions, compliance risk reduction, review of imaging, medical records review, imaging and lab interpretation, documentation or discussion with another health care provider.      Visit Diagnosis:  1. HNP (herniated nucleus pulposus), cervical        2.  (ventriculoperitoneal) shunt status

## 2023-02-09 ENCOUNTER — PATIENT MESSAGE (OUTPATIENT)
Dept: NEUROSURGERY | Facility: CLINIC | Age: 43
End: 2023-02-09
Payer: MEDICARE

## 2023-02-09 DIAGNOSIS — M50.20 HNP (HERNIATED NUCLEUS PULPOSUS), CERVICAL: Primary | ICD-10-CM

## 2023-02-14 RX ORDER — HYDROCODONE BITARTRATE AND ACETAMINOPHEN 5; 325 MG/1; MG/1
1 TABLET ORAL EVERY 12 HOURS PRN
Qty: 60 TABLET | Refills: 0 | Status: SHIPPED | OUTPATIENT
Start: 2023-02-16 | End: 2023-03-08 | Stop reason: SDUPTHER

## 2023-02-14 NOTE — TELEPHONE ENCOUNTER
Pt would also like Norco refill. Other message      Pended medication changed fill date to 02/16, also pt has upcoming appt with Flako Trent 03/08 do you want to see her instead? Thank you

## 2023-02-14 NOTE — TELEPHONE ENCOUNTER
----- Message from Lisa Rawls sent at 2/14/2023  9:36 AM CST -----  Type:  Sooner Appointment Request    Caller is requesting a sooner appointment.  Caller declined first available appointment listed below.  Caller will not accept being placed on the waitlist and is requesting a message be sent to doctor.    Name of Caller:  pt   When is the first available appointment?  2/20   Symptoms:  rx questions   Best Call Back Number:  [phone] (home)     Additional Information:  wants a march appt  please advise thank you

## 2023-02-14 NOTE — TELEPHONE ENCOUNTER
----- Message from Paloma Myers LPN sent at 2/13/2023  1:34 PM CST -----    ----- Message -----  From: Kylah Beauchamp MA  Sent: 2/13/2023   1:12 PM CST  To: [facility] Staff    Type: Needs Medical Advice  Who Called:  Patient    Pharmacy name and phone #:    [facility] - [place], [place] - [address]  [address] [facility] [place] [address]  Phone: [phone] Fax: [phone]      Best Call Back Number: [phone]    Additional Information: Patient is in need of a refill on her Norco 5mg, 30 day supply. She would like this called into her pharmacy listed above. Please contact patient with any additional information. Thanks!

## 2023-02-15 ENCOUNTER — PROCEDURE VISIT (OUTPATIENT)
Dept: NEUROLOGY | Facility: CLINIC | Age: 43
End: 2023-02-15
Payer: MEDICARE

## 2023-02-15 VITALS
RESPIRATION RATE: 17 BRPM | SYSTOLIC BLOOD PRESSURE: 129 MMHG | WEIGHT: 276 LBS | DIASTOLIC BLOOD PRESSURE: 83 MMHG | HEART RATE: 71 BPM | BODY MASS INDEX: 59.54 KG/M2 | HEIGHT: 57 IN

## 2023-02-15 DIAGNOSIS — G43.719 INTRACTABLE CHRONIC MIGRAINE WITHOUT AURA AND WITHOUT STATUS MIGRAINOSUS: Primary | ICD-10-CM

## 2023-02-15 PROCEDURE — 64615 CHEMODENERV MUSC MIGRAINE: CPT | Mod: S$PBB,,, | Performed by: PSYCHIATRY & NEUROLOGY

## 2023-02-15 PROCEDURE — 64615 CHEMODENERV MUSC MIGRAINE: CPT | Mod: PBBFAC,PO | Performed by: PSYCHIATRY & NEUROLOGY

## 2023-02-15 PROCEDURE — 64615 PR CHEMODENERVATION OF MUSCLE FOR CHRONIC MIGRAINE: ICD-10-PCS | Mod: S$PBB,,, | Performed by: PSYCHIATRY & NEUROLOGY

## 2023-02-15 RX ADMIN — ONABOTULINUMTOXINA 200 UNITS: 100 INJECTION, POWDER, LYOPHILIZED, FOR SOLUTION INTRADERMAL; INTRAMUSCULAR at 11:02

## 2023-02-15 NOTE — PROCEDURES
ProceduresProcedures       PROCEDURE PERFORMED: Botulinum toxin injection (26990)    CLINICAL INDICATION: G43.719    A time out was conducted just before the start of the procedure to verify the correct patient and procedure, procedure location, and all relevant critical information.     Conventional methods of treatment such as multiple medications , both on and   off label have been tried including: anti-epileptics (topiramate, gabapentin, diamox), beta blockers (metoprolol),  and antidepressants (paxil - cannot trial additional ones due to being on Paxil). The patient has been unresponsive and refractory.The patient meets criteria for chronic headaches according to the ICHD-II, the patient has more than 15 headaches a month which last for more than 4 hours a day.    Injection shows session number: 14  Percentage relief since last session: >50% of migraine relief but only for 4-6 weeks     DESCRIPTION OF PROCEDURE: After obtaining informed consent and under   aseptic technique, a total of 135 units of botulinum toxin type A were   injected in the following muscles:     -- Procerus 5 units  --  5 units bilaterally  -- Frontalis 20 units  -- Temporalis 20 units bilaterally  -- Occipitalis 15 units bilaterally  -- Trapezius 15 units bilaterally.     -- Upper cervical paraspinals 10 units bilaterally spared due to prior surgery in this area     The patient tolerated the procedure well. There were no complications. The patient was given a prescription for repeat treatment in 12 weeks     Unavoidable waste - 65 units       Cee Campbell M.D  Medical Director, Headache and Facial Pain  [facility]

## 2023-02-23 ENCOUNTER — TELEPHONE (OUTPATIENT)
Dept: NEUROSURGERY | Facility: CLINIC | Age: 43
End: 2023-02-23
Payer: MEDICARE

## 2023-02-23 NOTE — TELEPHONE ENCOUNTER
----- Message from Manuel Pope sent at 2/23/2023  9:27 AM CST -----  Regarding: reschedule appt  Type: Needs Medical Advice    Who Called:  Shanna Bryan    [facility] Call Back Number: [phone]    Additional Information: pt has appt / MRI on 2/27. Would like to move appt to 3/8 so can see on same day as pain mgmt and only make one trip to [place].

## 2023-02-24 ENCOUNTER — TELEPHONE (OUTPATIENT)
Dept: CARDIOLOGY | Facility: CLINIC | Age: 43
End: 2023-02-24
Payer: MEDICARE

## 2023-03-08 ENCOUNTER — OFFICE VISIT (OUTPATIENT)
Dept: PAIN MEDICINE | Facility: CLINIC | Age: 43
End: 2023-03-08
Payer: MEDICARE

## 2023-03-08 ENCOUNTER — HOSPITAL ENCOUNTER (OUTPATIENT)
Dept: RADIOLOGY | Facility: HOSPITAL | Age: 43
Discharge: HOME | End: 2023-03-08
Attending: NEUROLOGICAL SURGERY
Payer: MEDICARE

## 2023-03-08 VITALS
SYSTOLIC BLOOD PRESSURE: 147 MMHG | WEIGHT: 276 LBS | HEIGHT: 57 IN | BODY MASS INDEX: 59.54 KG/M2 | DIASTOLIC BLOOD PRESSURE: 84 MMHG | HEART RATE: 63 BPM

## 2023-03-08 DIAGNOSIS — Z02.89 PAIN MANAGEMENT CONTRACT SIGNED: ICD-10-CM

## 2023-03-08 DIAGNOSIS — M47.816 LUMBAR SPONDYLOSIS: ICD-10-CM

## 2023-03-08 DIAGNOSIS — G89.4 CHRONIC PAIN DISORDER: ICD-10-CM

## 2023-03-08 DIAGNOSIS — M54.16 LUMBAR RADICULOPATHY: Primary | ICD-10-CM

## 2023-03-08 DIAGNOSIS — M50.20 HNP (HERNIATED NUCLEUS PULPOSUS), CERVICAL: ICD-10-CM

## 2023-03-08 DIAGNOSIS — E66.01 MORBID OBESITY WITH BMI OF 60.0-69.9, ADULT: ICD-10-CM

## 2023-03-08 DIAGNOSIS — M48.02 CERVICAL STENOSIS OF SPINAL CANAL: ICD-10-CM

## 2023-03-08 DIAGNOSIS — M54.12 CERVICAL RADICULOPATHY: ICD-10-CM

## 2023-03-08 PROCEDURE — 99215 PR OFFICE/OUTPT VISIT, EST, LEVL V, 40-54 MIN: ICD-10-PCS | Mod: S$PBB,,,

## 2023-03-08 PROCEDURE — 72141 MRI NECK SPINE W/O DYE: CPT | Mod: TC,PO

## 2023-03-08 PROCEDURE — 99999 PR PBB SHADOW E&M-EST. PATIENT-LVL III: CPT | Mod: PBBFAC,,,

## 2023-03-08 PROCEDURE — 99999 PR PBB SHADOW E&M-EST. PATIENT-LVL III: ICD-10-PCS | Mod: PBBFAC,,,

## 2023-03-08 PROCEDURE — 99215 OFFICE O/P EST HI 40 MIN: CPT | Mod: S$PBB,,,

## 2023-03-08 PROCEDURE — 72141 MRI CERVICAL SPINE WITHOUT CONTRAST: ICD-10-PCS | Mod: 26,,, | Performed by: RADIOLOGY

## 2023-03-08 PROCEDURE — 72141 MRI NECK SPINE W/O DYE: CPT | Mod: 26,,, | Performed by: RADIOLOGY

## 2023-03-08 PROCEDURE — 99213 OFFICE O/P EST LOW 20 MIN: CPT | Mod: PBBFAC,25,PN

## 2023-03-08 RX ORDER — CYCLOBENZAPRINE HCL 10 MG
TABLET ORAL
Qty: 90 TABLET | Refills: 2 | Status: ON HOLD | OUTPATIENT
Start: 2023-03-08 | End: 2023-06-09

## 2023-03-08 RX ORDER — HYDROCODONE BITARTRATE AND ACETAMINOPHEN 5; 325 MG/1; MG/1
1 TABLET ORAL EVERY 12 HOURS PRN
Qty: 60 TABLET | Refills: 0 | Status: SHIPPED | OUTPATIENT
Start: 2023-03-18 | End: 2023-04-17

## 2023-03-08 NOTE — PROGRESS NOTES
This note was completed with dictation software and grammatical errors may exist.    CC: Back pain, Neck pain, headaches    HPI: The patient is a 41-year-old woman with a history of Chiari malformation, Hadley syndrome, pseudotumor cerebri with shunt, morbid obesity who presents in referral from Dr. Velasco for neck pain.  Today she returns for follow-up with continued neck and low back pain, 7/10, constant, worsening with changes in weather improved with rest, good weather and medications.  She denies any current radiating pain down her arms or neck.  She is recently followed up with Neurosurgery and they have ordered updated imaging of her cervical spine.  She states her lower back pain has been worsening lately due to the bad weather and reports pain radiating down bilateral legs.  She is not been doing any at-home PT dark of exercises.  She denies any new numbness, weakness or any new changes to her bowel or bladder function.  She reports due to her worsening pain she has been taking 2 tablets of hydrocodone 5/325 mg at a time twice daily.  She understands she is taking this medication not as prescribed.  As a result of her doubling her medication she is currently out of tablets.  She is requesting an increase in her medication due to her worsening pain.      Pain intervention history: She was seeing Dr. Gerson Renteria, pain management and until recently had been taking hydrocodone 5/325 once a day in addition to Flexeril 3 times a day and gabapentin 600 mg 3 times a day.  She apparently tested positive for Valium and so was dismissed from his practice. She is status post C7-T1 cervical interlaminar epidural steroid injection on 10/4/17 with 100% relief lasting 2 weeks.  She is status post a second cervical TABITHA on 11/14/17 with almost complete relief again but only lasting 2-3 weeks. She is status post bilateral L2, 3 and 4 medial branch radiofrequency ablation on 09/21/2018 with 75% relief.   She is status post  C7-T1 interlaminar epidural steroid injection on 10/01/2020 with 100% relief of her neck pain.      ROS: She reports weight loss, headaches, easy bruising and back pain.  Balance of review of systems is negative.    Past Medical History:   Diagnosis Date    Asthma     Bronchitis     Chiari malformation type I     Chronic headache     Diabetes mellitus     Graves disease     Graves disease     Hyperlipidemia     Hypertension     Murmur, heart     NPH (normal pressure hydrocephalus)     Obesity     MANUEL on CPAP     Pseudotumor cerebri     Thyroid disease     Hadley syndrome        Past Surgical History:   Procedure Laterality Date    CARPAL TUNNEL RELEASE      CERVICAL SPINE SURGERY      CHOLECYSTECTOMY      EAR TUBE REMOVAL Bilateral     EPIDURAL STEROID INJECTION INTO CERVICAL SPINE N/A 10/1/2020    Procedure: Injection-steroid-epidural-cervical C7- T1 interlaminar;  Surgeon: Santana Pak MD;  Location: [facility] OR;  Service: Pain Management;  Laterality: N/A;    EYE SURGERY      strabismus    INJECTION OF FACET JOINT Bilateral 5/23/2018    Procedure: INJECTION-FACET L3/4 and L4/5;  Surgeon: Santana Pak MD;  Location: [facility] OR;  Service: Pain Management;  Laterality: Bilateral;  sacralization of L5 and a rudimentary disc space    MYRINGOTOMY W/ TUBES      RADIOFREQUENCY ABLATION OF LUMBAR MEDIAL BRANCH NERVE AT SINGLE LEVEL Bilateral 9/21/2018    Procedure: RADIOFREQUENCY ABLATION, NERVE, SPINAL, LUMBAR, MEDIAL BRANCH, L2, L3, L4;  Surgeon: Santana Pak MD;  Location: [facility] OR;  Service: Pain Management;  Laterality: Bilateral;    TONSILLECTOMY      VENTRICULOPERITONEAL SHUNT         Social History     Socioeconomic History    Marital status: Single   Tobacco Use    Smoking status: Never    Smokeless tobacco: Never   Substance and Sexual Activity    Alcohol use: No    Drug use: No         Medications/Allergies: See med card    Vitals:    03/08/23 0914   BP: (!) 147/84   Pulse: 63   Weight: 125.2 kg  "(276 lb)   Height: 4' 9" (1.448 m)   PainSc:   7   PainLoc: Back     Body mass index is 59.73 kg/m².    Physical exam:  Gen: A and O x3, pleasant, obese  Skin: No rashes or obvious lesions  HEENT: PERRLA, no obvious deformities on ears or in canals.Trachea midline.  CVS: Regular rate and rhythm, normal palpable pulses.  Resp: Clear to auscultation bilaterally, no wheezes or rales.  Abdomen: Soft, NT/ND.  Musculoskeletal:  Wide-based gait.     Neuro:  Upper extremities: 5/5 strength bilaterally   Lower extremities: 5/5 strength bilaterally  Reflexes: Brachioradialis 2+, Bicep 2+, Tricep 2+. Patellar 2+, Achilles 2+ bilaterally.  Sensory: Intact and symmetrical to light touch and pinprick in C2-T1 dermatomes bilaterally.  Intact and symmetrical to light touch and pinprick in L2-S1 dermatomes bilaterally.     Cervical Spine:  Cervical spine: Range of motion is mildly reduced with flexion , extension and lateral rotation without pain.  Spurling's maneuver is negative bilaterally.  Myofascial exam:   no tenderness to palpation of the cervical paraspinous muscles.    Lumbar spine:  Lumbar spine: ROM is moderately limited with flexion and extension with increased low back pain during each maneuver.  Derrick's test causes no increased pain on either side.    Supine straight leg raise is negative bilaterally.    Internal and external rotation of the hip causes no increased pain on either side.  Myofascial exam: No tenderness to palpation across lumbar paraspinous muscles.      Imaging:  MRI from 2/9/17 cervical spine demonstrates congenitally narrowed canal with disc bulging most prominently at C4/5 to the right side causing anterior cord compression but no signal changes.  There is narrowing of the canal at C3/4 to a lesser degree.    03/08/2023 MRI cervical spine  C2-C3: Developmental fusion.  The spinal canal and foramina are patent..  C3-C4: Shallow broad-based disc osteophyte complex and mild facet arthrosis. No " substantial degenerative spinal canal or foraminal narrowing.  C4-C5: Shallow broad-based disc osteophyte complex and mild bilateral facet arthrosis more pronounced on the left but no substantial degenerative spinal canal or foraminal narrowing..  C5-C6: Redemonstrated mild degenerative disc height loss and broad-based disc osteophyte complex lateralizing to the right contributing to moderate right central spinal canal narrowing. Similar mild flattening of the ventral cord surface. Uncovertebral spurring and facet arthrosis contribute to mild bilateral foraminal narrowing..  C6-C7: Shallow broad-based disc osteophyte complex.  No substantial degenerative spinal canal or foraminal narrowing..  C7-T1: Within normal limits.  The spinal canal and foramina are patent..     Soft tissues: The visualized paraspinal soft tissues are within normal limits.  Note is made of medialized internal carotid arteries at the C2 level to C5.    Assessment:   The patient is a 41-year-old woman with a history of Chiari malformation, pseudotumor cerebri with shunt, morbid obesity who presents in referral from Dr. Velasco for neck pain.    1. Lumbar radiculopathy  Ambulatory referral/consult to Physical/Occupational Therapy    cyclobenzaprine (FLEXERIL) 10 MG tablet      2. Cervical radiculopathy  Ambulatory referral/consult to Physical/Occupational Therapy    cyclobenzaprine (FLEXERIL) 10 MG tablet      3. Cervical stenosis of spinal canal        4. Morbid obesity with BMI of 60.0-69.9, adult        5. Pain management contract signed        6. Lumbar spondylosis        7. Chronic pain disorder                Plan:  1. Today we discussed her pain symptoms.  I discussed with her she is not allowed to increase her pain medication even though her pain is worsening.  She verbalized understanding.  2. Discussed with her we do not recommend increasing her pain medication in either dose or frequency.   3. Refill for hydrocodone 5/325 mg #60 tablets  sent to Dr. Pak today.  I have reviewed the [place] Board of Pharmacy website and there are no abberancies. We will provide her with one month prescription at this time.  4. For her worsening pain I have provided her with orders for formal physical therapy at [facility] in [place].  Discussed with her that I highly recommend she attend this.  Refill for Flexeril provided today.  5. Follow up in 3 months or sooner if needed.      The total time spent for evaluation and management on 03/08/2023 including reviewing separately obtained history, performing a medically appropriate exam and evaluation, documenting clinical information in the health record, independently interpreting results and communicating them to the patient/family/caregiver, and ordering medications/tests/procedures was between 40-54 minutes.                178

## 2023-03-08 NOTE — TELEPHONE ENCOUNTER
I have reviewed the [place] Board of Pharmacy website and there are no abberancies.         She is currently out of her medication.  This prescription is dated for appropriate interval.    One-month supply, as she previously did not take her other medication as prescribed.

## 2023-03-14 ENCOUNTER — OFFICE VISIT (OUTPATIENT)
Dept: NEUROSURGERY | Facility: CLINIC | Age: 43
End: 2023-03-14
Payer: MEDICARE

## 2023-03-14 VITALS
HEIGHT: 57 IN | WEIGHT: 276 LBS | SYSTOLIC BLOOD PRESSURE: 123 MMHG | HEART RATE: 72 BPM | RESPIRATION RATE: 19 BRPM | BODY MASS INDEX: 59.54 KG/M2 | DIASTOLIC BLOOD PRESSURE: 84 MMHG

## 2023-03-14 DIAGNOSIS — M48.02 CERVICAL SPINAL STENOSIS: Primary | ICD-10-CM

## 2023-03-14 DIAGNOSIS — Z98.2 VP (VENTRICULOPERITONEAL) SHUNT STATUS: ICD-10-CM

## 2023-03-14 PROCEDURE — 99215 PR OFFICE/OUTPT VISIT, EST, LEVL V, 40-54 MIN: ICD-10-PCS | Mod: S$PBB,,, | Performed by: NEUROLOGICAL SURGERY

## 2023-03-14 PROCEDURE — 99215 OFFICE O/P EST HI 40 MIN: CPT | Mod: S$PBB,,, | Performed by: NEUROLOGICAL SURGERY

## 2023-03-14 PROCEDURE — 62252 CSF SHUNT REPROGRAM: CPT | Mod: PBBFAC,PN | Performed by: NEUROLOGICAL SURGERY

## 2023-03-14 PROCEDURE — 62252 PR REPROGRAMMING,PROGRAMMABLE CSF SHUNT: ICD-10-PCS | Mod: 26,S$PBB,, | Performed by: NEUROLOGICAL SURGERY

## 2023-03-14 PROCEDURE — 62252 CSF SHUNT REPROGRAM: CPT | Mod: 26,S$PBB,, | Performed by: NEUROLOGICAL SURGERY

## 2023-03-14 NOTE — PROGRESS NOTES
No change in symptoms.  Continues to endorse right upper extremity pain, cramping and dysfunction.    MRI of the cervical spine is relatively stable and again reveals significant multilevel central stenosis secondary to a congenitally narrowed spinal canal with superimposed spondylosis resulting in spinal cord compression at C4-C5 and C5-C6 and severe stenosis at C3-C4 and C6-C7.  The cord compression is worse on the right, corresponding to the laterality of her symptoms.    I discussed extensively her condition and different treatment options with the patient and her father.  I explained that she has signs, symptoms and radiographic evidence of cervical compressive myelopathy and that surgery is indicated.  I discussed anterior versus posterior approaches.  Given the patient's congenitally narrow spinal canal, involvement of dorsal ligamentous thickening in the stenosis and her seemingly well-preserved lordosis, I offered her a motion preserving operation.  I offered her a C3-C7 decompression via C4, C5, C6 laminoplasty and laminotomies at the inferior C3 and superior C7 levels.  I will order dynamic x-rays to ensure that there is no dynamic instability.  We have also adjusted her shunt back to 150 today and we will obtain confirmation with an x-ray.      I have discussed the risks, benefits and alternatives to the proposed operation in detail. All questions were answered. Risks discussed include but are not limited to: bleeding, infection, spinal fluid leak, injury to the nerves, weakness, numbness, paralysis, loss of bowel or bladder function, injury to the blood vessels, stroke, heart attack, blood clots, destabilization, hardware failure, no improvement or worsening of symptoms, re-herniation, need for more surgery including fusion, death.  The patient's father, who is her medical power of  stated he would like to call us back with his decision regarding surgery.  I explained that delay in definitive  care can result in progressive, irreversible neurologic injury.    45 minutes were spent on this encounter. This included counseling, coordinating care, discussion regarding the prognosis, differential diagnosis, risks and benefits of treatment, instructions, compliance risk reduction, review of imaging, medical records review, imaging and lab interpretation, documentation or discussion with another health care provider.

## 2023-04-10 ENCOUNTER — HOSPITAL ENCOUNTER (OUTPATIENT)
Dept: RADIOLOGY | Facility: HOSPITAL | Age: 43
Discharge: HOME | End: 2023-04-10
Attending: PHYSICIAN ASSISTANT
Payer: MEDICARE

## 2023-04-10 DIAGNOSIS — Z98.2 VP (VENTRICULOPERITONEAL) SHUNT STATUS: ICD-10-CM

## 2023-04-10 DIAGNOSIS — M48.02 CERVICAL SPINAL STENOSIS: ICD-10-CM

## 2023-04-10 PROCEDURE — 72052 XR CERVICAL SPINE 5 VIEW WITH FLEX AND EXT: ICD-10-PCS | Mod: 26,,, | Performed by: RADIOLOGY

## 2023-04-10 PROCEDURE — 76000 FL SHUNT SETTING: ICD-10-PCS | Mod: 26,,, | Performed by: RADIOLOGY

## 2023-04-10 PROCEDURE — 76000 FLUOROSCOPY <1 HR PHYS/QHP: CPT | Mod: 26,,, | Performed by: RADIOLOGY

## 2023-04-10 PROCEDURE — 72052 X-RAY EXAM NECK SPINE 6/>VWS: CPT | Mod: TC,FY,PO

## 2023-04-10 PROCEDURE — 76000 FLUOROSCOPY <1 HR PHYS/QHP: CPT | Mod: TC,PO

## 2023-04-10 PROCEDURE — 72052 X-RAY EXAM NECK SPINE 6/>VWS: CPT | Mod: 26,,, | Performed by: RADIOLOGY

## 2023-04-20 ENCOUNTER — TELEPHONE (OUTPATIENT)
Dept: CARDIOLOGY | Facility: CLINIC | Age: 43
End: 2023-04-20
Payer: MEDICARE

## 2023-04-20 NOTE — TELEPHONE ENCOUNTER
Appointment was already scheduled called patient to confirm. Patient verbalized understanding of date, time and location of visit.

## 2023-04-20 NOTE — TELEPHONE ENCOUNTER
----- Message from Kulwant Gonzalez sent at 4/20/2023  9:27 AM CDT -----  Type: Needs Medical Advice  Who Called:  Pt  Best Call Back Number: [phone]  Additional Information: Pt needs a sooner appt with , she is having a up coming procedure and she has to get her heart  murmur check before the procedure Thanks

## 2023-04-21 ENCOUNTER — TELEPHONE (OUTPATIENT)
Dept: PAIN MEDICINE | Facility: CLINIC | Age: 43
End: 2023-04-21
Payer: MEDICARE

## 2023-04-21 RX ORDER — HYDROCODONE BITARTRATE AND ACETAMINOPHEN 5; 325 MG/1; MG/1
1 TABLET ORAL EVERY 8 HOURS PRN
Qty: 60 TABLET | Refills: 0 | Status: SHIPPED | OUTPATIENT
Start: 2023-04-21 | End: 2023-05-24

## 2023-04-21 NOTE — TELEPHONE ENCOUNTER
Addended by: CHRISTINE THOMPSON on: 7/26/2022 09:18 AM     Modules accepted: Level of Service     Patient is requesting a refill on hydrocodone

## 2023-04-26 DIAGNOSIS — G43.719 INTRACTABLE CHRONIC MIGRAINE WITHOUT AURA AND WITHOUT STATUS MIGRAINOSUS: ICD-10-CM

## 2023-04-26 RX ORDER — GALCANEZUMAB 120 MG/ML
120 INJECTION, SOLUTION SUBCUTANEOUS
Qty: 1 ML | Refills: 1 | Status: SHIPPED | OUTPATIENT
Start: 2023-04-26 | End: 2023-06-27 | Stop reason: SDUPTHER

## 2023-05-01 ENCOUNTER — OFFICE VISIT (OUTPATIENT)
Dept: NEUROSURGERY | Facility: CLINIC | Age: 43
End: 2023-05-01
Payer: MEDICARE

## 2023-05-01 VITALS
WEIGHT: 276 LBS | BODY MASS INDEX: 59.54 KG/M2 | RESPIRATION RATE: 18 BRPM | HEART RATE: 74 BPM | SYSTOLIC BLOOD PRESSURE: 132 MMHG | DIASTOLIC BLOOD PRESSURE: 92 MMHG | HEIGHT: 57 IN

## 2023-05-01 DIAGNOSIS — M48.02 CERVICAL SPINAL STENOSIS: Primary | ICD-10-CM

## 2023-05-01 DIAGNOSIS — Z01.818 PRE-OP EVALUATION: Primary | ICD-10-CM

## 2023-05-01 PROCEDURE — 99215 PR OFFICE/OUTPT VISIT, EST, LEVL V, 40-54 MIN: ICD-10-PCS | Mod: S$PBB,,, | Performed by: NEUROLOGICAL SURGERY

## 2023-05-01 PROCEDURE — 99214 OFFICE O/P EST MOD 30 MIN: CPT | Mod: PBBFAC,PN | Performed by: NEUROLOGICAL SURGERY

## 2023-05-01 PROCEDURE — 99999 PR PBB SHADOW E&M-EST. PATIENT-LVL IV: ICD-10-PCS | Mod: PBBFAC,,, | Performed by: NEUROLOGICAL SURGERY

## 2023-05-01 PROCEDURE — 99215 OFFICE O/P EST HI 40 MIN: CPT | Mod: S$PBB,,, | Performed by: NEUROLOGICAL SURGERY

## 2023-05-01 PROCEDURE — 99999 PR PBB SHADOW E&M-EST. PATIENT-LVL IV: CPT | Mod: PBBFAC,,, | Performed by: NEUROLOGICAL SURGERY

## 2023-05-01 NOTE — PROGRESS NOTES
No change in symptoms.  Continues to endorse right upper extremity pain, cramping and dysfunction.    Dynamic Xr show no instability.    She presents today with several family members wanting to discuss the planned operation.     I discussed extensively her condition and different treatment options with the patient and her father, aunt and uncle.  I explained that she has signs, symptoms and radiographic evidence of cervical compressive myelopathy and that surgery is indicated.  I discussed anterior versus posterior approaches.  Given the patient's congenitally narrow spinal canal, involvement of dorsal ligamentous thickening in the stenosis and her seemingly well-preserved lordosis, I offered her a motion preserving operation.  I offered her a C3-C7 decompression via C4, C5, C6 laminoplasty and laminotomies at the inferior C3 and superior C7 levels.      I have discussed the risks, benefits and alternatives to the proposed operation in detail. All questions were answered. Risks discussed include but are not limited to: bleeding, infection, spinal fluid leak, injury to the nerves, weakness, numbness, paralysis, loss of bowel or bladder function, injury to the blood vessels, stroke, heart attack, blood clots, destabilization, hardware failure, no improvement or worsening of symptoms, re-herniation, need for more surgery including fusion, death.  The patient's father, who is her medical power of  wants to schedule surgery.     The patient is scheduled to see Cardiology tomorrow and we will follow up on that clearance.     45 minutes were spent on this encounter. This included counseling, coordinating care, discussion regarding the prognosis, differential diagnosis, risks and benefits of treatment, instructions, compliance risk reduction, review of imaging, medical records review, imaging and lab interpretation, documentation or discussion with another health care provider.

## 2023-05-02 DIAGNOSIS — M48.02 CERVICAL SPINAL STENOSIS: Primary | ICD-10-CM

## 2023-05-02 DIAGNOSIS — R79.1 ABNORMAL COAGULATION PROFILE: ICD-10-CM

## 2023-05-02 RX ORDER — LIDOCAINE HYDROCHLORIDE 10 MG/ML
1 INJECTION, SOLUTION EPIDURAL; INFILTRATION; INTRACAUDAL; PERINEURAL ONCE
Status: CANCELLED | OUTPATIENT
Start: 2023-05-02 | End: 2023-05-02

## 2023-05-02 RX ORDER — SODIUM CHLORIDE, SODIUM LACTATE, POTASSIUM CHLORIDE, CALCIUM CHLORIDE 600; 310; 30; 20 MG/100ML; MG/100ML; MG/100ML; MG/100ML
INJECTION, SOLUTION INTRAVENOUS CONTINUOUS
Status: CANCELLED | OUTPATIENT
Start: 2023-05-02

## 2023-05-03 ENCOUNTER — TELEPHONE (OUTPATIENT)
Dept: NEUROSURGERY | Facility: CLINIC | Age: 43
End: 2023-05-03
Payer: MEDICARE

## 2023-05-03 NOTE — TELEPHONE ENCOUNTER
Shanna Douglass will be having surgery on 5/18/23 with Dr. Mccarthy, Neurosurgeon, at [facility]. We are reaching out to obtain a surgical clearance from Dr. Magallon to ensure the safety of our patient. The surgery is C3-7 laminoplasty and will be under general anesthesia. This procedure typically lasts approximately 3 hours. We request that the patient hold all anticoagulant/antiinflammatory medications for 5-7 days prior to surgery. Please let us know if our office can be of further assistance.

## 2023-05-04 ENCOUNTER — TELEPHONE (OUTPATIENT)
Dept: NEUROSURGERY | Facility: CLINIC | Age: 43
End: 2023-05-04
Payer: MEDICARE

## 2023-05-04 NOTE — TELEPHONE ENCOUNTER
Called patient regarding upcoming surgery on 5/18/23 with Dr. Mccarthy. Pre and post-operative appointments reviewed. Patient aware of stopping all anti-coagulant, anti-inflammatory, anti-platelet medications for 1 week prior to surgery. Address confirmed and appointment reminder letter and post-operative instructions mailed to patient. Informed patient to call with any further questions. Patient verbalized understanding.

## 2023-05-08 DIAGNOSIS — Z01.810 PREOP CARDIOVASCULAR EXAM: Primary | ICD-10-CM

## 2023-05-09 ENCOUNTER — HOSPITAL ENCOUNTER (OUTPATIENT)
Dept: CARDIOLOGY | Facility: HOSPITAL | Age: 43
Discharge: HOME | End: 2023-05-09
Attending: INTERNAL MEDICINE
Payer: MEDICARE

## 2023-05-09 ENCOUNTER — OFFICE VISIT (OUTPATIENT)
Dept: CARDIOLOGY | Facility: CLINIC | Age: 43
End: 2023-05-09
Payer: MEDICARE

## 2023-05-09 ENCOUNTER — TELEPHONE (OUTPATIENT)
Dept: NEUROSURGERY | Facility: CLINIC | Age: 43
End: 2023-05-09
Payer: MEDICARE

## 2023-05-09 VITALS
OXYGEN SATURATION: 98 % | WEIGHT: 270.31 LBS | BODY MASS INDEX: 58.32 KG/M2 | HEART RATE: 98 BPM | SYSTOLIC BLOOD PRESSURE: 116 MMHG | DIASTOLIC BLOOD PRESSURE: 70 MMHG | HEIGHT: 57 IN

## 2023-05-09 DIAGNOSIS — Z01.810 PREOP CARDIOVASCULAR EXAM: ICD-10-CM

## 2023-05-09 DIAGNOSIS — I25.9: ICD-10-CM

## 2023-05-09 DIAGNOSIS — E78.5 HYPERLIPIDEMIA, UNSPECIFIED HYPERLIPIDEMIA TYPE: ICD-10-CM

## 2023-05-09 DIAGNOSIS — Z98.2 S/P VP SHUNT: ICD-10-CM

## 2023-05-09 DIAGNOSIS — I10 PRIMARY HYPERTENSION: Primary | ICD-10-CM

## 2023-05-09 DIAGNOSIS — R94.31 NONSPECIFIC ABNORMAL ELECTROCARDIOGRAM (ECG) (EKG): ICD-10-CM

## 2023-05-09 DIAGNOSIS — E66.01 MORBID OBESITY: ICD-10-CM

## 2023-05-09 PROCEDURE — 99999 PR PBB SHADOW E&M-EST. PATIENT-LVL V: CPT | Mod: PBBFAC,,, | Performed by: INTERNAL MEDICINE

## 2023-05-09 PROCEDURE — 99215 OFFICE O/P EST HI 40 MIN: CPT | Mod: PBBFAC,PO | Performed by: INTERNAL MEDICINE

## 2023-05-09 PROCEDURE — 99999 PR PBB SHADOW E&M-EST. PATIENT-LVL V: ICD-10-PCS | Mod: PBBFAC,,, | Performed by: INTERNAL MEDICINE

## 2023-05-09 PROCEDURE — 99214 PR OFFICE/OUTPT VISIT, EST, LEVL IV, 30-39 MIN: ICD-10-PCS | Mod: S$PBB,25,, | Performed by: INTERNAL MEDICINE

## 2023-05-09 PROCEDURE — 93010 EKG 12-LEAD: ICD-10-PCS | Mod: ,,, | Performed by: INTERNAL MEDICINE

## 2023-05-09 PROCEDURE — 93005 ELECTROCARDIOGRAM TRACING: CPT | Mod: PO

## 2023-05-09 PROCEDURE — 93010 ELECTROCARDIOGRAM REPORT: CPT | Mod: ,,, | Performed by: INTERNAL MEDICINE

## 2023-05-09 PROCEDURE — 99214 OFFICE O/P EST MOD 30 MIN: CPT | Mod: S$PBB,25,, | Performed by: INTERNAL MEDICINE

## 2023-05-09 NOTE — PROGRESS NOTES
Subjective:   Patient ID:  Shanna Douglass is a 42 y.o. female who presents for follow-up of surgery clearance (Pt is having spinal surgery), Hypertension, and Heart Murmur      HPI42 yo WF with multiple medical issues and obesity here for cardiac clearance. Denies CP or SOB but EKG today shows T wave changes consistent with ischemia not present on past EKG's.     Review of Systems   Constitutional: Negative for decreased appetite, fever, malaise/fatigue, weight gain and weight loss.   HENT:  Negative for hearing loss and nosebleeds.    Eyes:  Negative for visual disturbance.   Cardiovascular:  Negative for chest pain, claudication, cyanosis, dyspnea on exertion, irregular heartbeat, leg swelling, near-syncope, orthopnea, palpitations, paroxysmal nocturnal dyspnea and syncope.   Respiratory:  Negative for cough, hemoptysis, shortness of breath, sleep disturbances due to breathing, snoring and wheezing.    Endocrine: Negative for cold intolerance, heat intolerance, polydipsia and polyuria.   Hematologic/Lymphatic: Negative for adenopathy and bleeding problem. Does not bruise/bleed easily.   Skin:  Negative for color change, itching, poor wound healing, rash and suspicious lesions.   Musculoskeletal:  Positive for back pain. Negative for arthritis, falls, joint pain, joint swelling, muscle cramps, muscle weakness and myalgias.   Gastrointestinal:  Negative for bloating, abdominal pain, change in bowel habit, constipation, flatus, heartburn, hematemesis, hematochezia, hemorrhoids, jaundice, melena, nausea and vomiting.   Genitourinary:  Negative for bladder incontinence, decreased libido, frequency, hematuria, hesitancy and urgency.   Neurological:  Negative for brief paralysis, difficulty with concentration, excessive daytime sleepiness, dizziness, focal weakness, headaches, light-headedness, loss of balance, numbness, vertigo and weakness.   Psychiatric/Behavioral:  Negative for altered mental status, depression and  "memory loss. The patient does not have insomnia and is not nervous/anxious.    Allergic/Immunologic: Negative for environmental allergies, hives and persistent infections.     Objective:   Physical Exam  Vitals and nursing note reviewed.   Constitutional:       Appearance: She is well-developed. She is obese.      Comments: /70   Pulse 98   Ht 4' 9" (1.448 m)   Wt 122.6 kg (270 lb 4.8 oz)   SpO2 98%   BMI 58.49 kg/m²      HENT:      Head: Normocephalic and atraumatic.      Comments: Has  shunt     Right Ear: External ear normal.      Left Ear: External ear normal.      Nose: Nose normal.   Eyes:      General: Lids are normal. No scleral icterus.        Right eye: No discharge.         Left eye: No discharge.      Conjunctiva/sclera: Conjunctivae normal.      Right eye: No hemorrhage.     Pupils: Pupils are equal, round, and reactive to light.   Neck:      Thyroid: No thyromegaly.      Vascular: No JVD.      Trachea: No tracheal deviation.   Cardiovascular:      Rate and Rhythm: Normal rate and regular rhythm.      Pulses: Intact distal pulses.      Heart sounds: Normal heart sounds. No murmur heard.    No friction rub. No gallop.   Pulmonary:      Effort: Pulmonary effort is normal. No respiratory distress.      Breath sounds: Normal breath sounds. No wheezing or rales.   Chest:      Chest wall: No tenderness.   Breasts:     Breasts are symmetrical.   Abdominal:      General: Bowel sounds are normal. There is no distension.      Palpations: Abdomen is soft. There is no hepatomegaly or mass.      Tenderness: There is no abdominal tenderness. There is no guarding or rebound.   Musculoskeletal:         General: No tenderness. Normal range of motion.      Cervical back: Normal range of motion and neck supple.   Lymphadenopathy:      Cervical: No cervical adenopathy.   Skin:     General: Skin is warm and dry.      Coloration: Skin is not pale.      Findings: No erythema or rash.   Neurological:      Mental " Status: She is alert and oriented to person, place, and time.      Cranial Nerves: No cranial nerve deficit.      Coordination: Coordination normal.      Deep Tendon Reflexes: Reflexes are normal and symmetric. Reflexes normal.   Psychiatric:         Behavior: Behavior normal.         Thought Content: Thought content normal.         Judgment: Judgment normal.       Assessment:      1. Primary hypertension    2. Hyperlipidemia, unspecified hyperlipidemia type    3. Morbid obesity    4. S/P  shunt    5. Preop cardiovascular exam    6. Nonspecific abnormal electrocardiogram (ECG) (EKG)    7. Ischemia, myocardial, chronic- on EKG        Plan:   Because of new changes on EKG she will need nuclear stress prior to surgery. Also has numerous medical issues and obesity that increase risk     Orders Placed This Encounter   Procedures    Nuclear Stress - 3rd Party     Follow up for Will call with results.

## 2023-05-09 NOTE — TELEPHONE ENCOUNTER
----- Message from Manuel Pope sent at 5/9/2023  1:57 PM CDT -----  Regarding: question  Contact: jess at [phone]  Type: Needs Medical Advice    Who Called:  jess    Best Call Back Number: [phone]    Additional Information: pt needs to discuss another surgery date. Please call.

## 2023-05-12 ENCOUNTER — TELEPHONE (OUTPATIENT)
Dept: NEUROLOGY | Facility: CLINIC | Age: 43
End: 2023-05-12

## 2023-05-12 ENCOUNTER — PROCEDURE VISIT (OUTPATIENT)
Dept: NEUROLOGY | Facility: CLINIC | Age: 43
End: 2023-05-12
Payer: MEDICARE

## 2023-05-12 ENCOUNTER — TELEPHONE (OUTPATIENT)
Dept: NEUROSURGERY | Facility: CLINIC | Age: 43
End: 2023-05-12
Payer: MEDICARE

## 2023-05-12 VITALS
BODY MASS INDEX: 58.25 KG/M2 | HEIGHT: 57 IN | WEIGHT: 270 LBS | HEART RATE: 76 BPM | DIASTOLIC BLOOD PRESSURE: 71 MMHG | SYSTOLIC BLOOD PRESSURE: 107 MMHG | RESPIRATION RATE: 17 BRPM

## 2023-05-12 DIAGNOSIS — G43.719 INTRACTABLE CHRONIC MIGRAINE WITHOUT AURA AND WITHOUT STATUS MIGRAINOSUS: Primary | ICD-10-CM

## 2023-05-12 PROCEDURE — 64615 CHEMODENERV MUSC MIGRAINE: CPT | Mod: S$PBB,,, | Performed by: PSYCHIATRY & NEUROLOGY

## 2023-05-12 PROCEDURE — 96372 THER/PROPH/DIAG INJ SC/IM: CPT | Mod: PBBFAC,PO | Performed by: PSYCHIATRY & NEUROLOGY

## 2023-05-12 PROCEDURE — 64615 PR CHEMODENERVATION OF MUSCLE FOR CHRONIC MIGRAINE: ICD-10-PCS | Mod: S$PBB,,, | Performed by: PSYCHIATRY & NEUROLOGY

## 2023-05-12 PROCEDURE — 64615 CHEMODENERV MUSC MIGRAINE: CPT | Mod: PBBFAC,PO | Performed by: PSYCHIATRY & NEUROLOGY

## 2023-05-12 RX ADMIN — ONABOTULINUMTOXINA 200 UNITS: 100 INJECTION, POWDER, LYOPHILIZED, FOR SOLUTION INTRADERMAL; INTRAMUSCULAR at 01:05

## 2023-05-12 NOTE — PROCEDURES
ProceduresProcedures       PROCEDURE PERFORMED: Botulinum toxin injection (79202)    CLINICAL INDICATION: G43.719    A time out was conducted just before the start of the procedure to verify the correct patient and procedure, procedure location, and all relevant critical information.     Conventional methods of treatment such as multiple medications , both on and   off label have been tried including: anti-epileptics (topiramate, gabapentin, diamox), beta blockers (metoprolol),  and antidepressants (paxil - cannot trial additional ones due to being on Paxil). The patient has been unresponsive and refractory.The patient meets criteria for chronic headaches according to the ICHD-II, the patient has more than 15 headaches a month which last for more than 4 hours a day.    Injection shows session number: 15  Percentage relief since last session: >50% of migraine relief but only for 4-6 weeks     DESCRIPTION OF PROCEDURE: After obtaining informed consent and under   aseptic technique, a total of 135 units of botulinum toxin type A were   injected in the following muscles:     -- Procerus 5 units  --  5 units bilaterally  -- Frontalis 20 units  -- Temporalis 20 units bilaterally  -- Occipitalis 15 units bilaterally  -- Trapezius 15 units bilaterally.     -- Upper cervical paraspinals 10 units bilaterally spared due to prior surgery in this area     The patient tolerated the procedure well. There were no complications. The patient was given a prescription for repeat treatment in 12 weeks     Unavoidable waste - 65 units       Cee Campbell M.D  Medical Director, Headache and Facial Pain  [facility]

## 2023-05-12 NOTE — TELEPHONE ENCOUNTER
----- Message from Simi Silva, Patient Care Assistant sent at 5/12/2023 12:46 PM CDT -----  Contact: Pt  Type: Return Call    Who Called: Pt  Who Left Message for Pt: Nurse  Does the patient know what this is regarding: Unsure  Best Call Back Number: [phone]  Thank you~

## 2023-05-12 NOTE — TELEPHONE ENCOUNTER
----- Message from Ryder Blackwell sent at 5/12/2023 12:33 PM CDT -----  Contact: self  Type: Needs Medical Advice  Who Called:  Patient    Best Call Back Number: [phone]    Additional Information: Pt states she would like to speak with office.Please call back

## 2023-05-22 ENCOUNTER — TELEPHONE (OUTPATIENT)
Dept: CARDIOLOGY | Facility: HOSPITAL | Age: 43
End: 2023-05-22
Payer: MEDICARE

## 2023-05-22 NOTE — TELEPHONE ENCOUNTER
Spoke with patient and reviewed nuc stress instructions.The patient stated she could not walk on the treadmill so lexiscan instructions reviewed.

## 2023-05-23 ENCOUNTER — HOSPITAL ENCOUNTER (OUTPATIENT)
Dept: CARDIOLOGY | Facility: HOSPITAL | Age: 43
Discharge: HOME | End: 2023-05-23
Attending: INTERNAL MEDICINE
Payer: MEDICARE

## 2023-05-23 VITALS
HEIGHT: 57 IN | WEIGHT: 270 LBS | BODY MASS INDEX: 58.25 KG/M2 | HEART RATE: 67 BPM | DIASTOLIC BLOOD PRESSURE: 57 MMHG | SYSTOLIC BLOOD PRESSURE: 116 MMHG

## 2023-05-23 DIAGNOSIS — Z01.810 PREOP CARDIOVASCULAR EXAM: ICD-10-CM

## 2023-05-23 DIAGNOSIS — R94.31 NONSPECIFIC ABNORMAL ELECTROCARDIOGRAM (ECG) (EKG): ICD-10-CM

## 2023-05-23 DIAGNOSIS — I25.9: ICD-10-CM

## 2023-05-23 PROCEDURE — A4216 STERILE WATER/SALINE, 10 ML: HCPCS | Mod: PO | Performed by: INTERNAL MEDICINE

## 2023-05-23 PROCEDURE — 93017 CV STRESS TEST TRACING ONLY: CPT | Mod: PO

## 2023-05-23 PROCEDURE — 78452 NUCLEAR STRESS - 3RD PARTY (CUPID ONLY): ICD-10-PCS | Mod: 26,,, | Performed by: INTERNAL MEDICINE

## 2023-05-23 PROCEDURE — 93018 CV STRESS TEST I&R ONLY: CPT | Mod: ,,, | Performed by: INTERNAL MEDICINE

## 2023-05-23 PROCEDURE — 93016 NUCLEAR STRESS - 3RD PARTY (CUPID ONLY): ICD-10-PCS | Mod: ,,, | Performed by: INTERNAL MEDICINE

## 2023-05-23 PROCEDURE — 93016 CV STRESS TEST SUPVJ ONLY: CPT | Mod: ,,, | Performed by: INTERNAL MEDICINE

## 2023-05-23 PROCEDURE — 78452 HT MUSCLE IMAGE SPECT MULT: CPT | Mod: 26,,, | Performed by: INTERNAL MEDICINE

## 2023-05-23 PROCEDURE — 93018 NUCLEAR STRESS - 3RD PARTY (CUPID ONLY): ICD-10-PCS | Mod: ,,, | Performed by: INTERNAL MEDICINE

## 2023-05-23 PROCEDURE — A9500 TC99M SESTAMIBI: HCPCS | Mod: PO

## 2023-05-23 PROCEDURE — 63600175 PHARM REV CODE 636 W HCPCS: Mod: PO | Performed by: INTERNAL MEDICINE

## 2023-05-23 PROCEDURE — 25000003 PHARM REV CODE 250: Mod: PO | Performed by: INTERNAL MEDICINE

## 2023-05-23 RX ORDER — SODIUM CHLORIDE 0.9 % (FLUSH) 0.9 %
10 SYRINGE (ML) INJECTION
Status: DISCONTINUED | OUTPATIENT
Start: 2023-05-23 | End: 2023-06-01 | Stop reason: CLARIF

## 2023-05-23 RX ORDER — REGADENOSON 0.08 MG/ML
0.4 INJECTION, SOLUTION INTRAVENOUS ONCE
Status: COMPLETED | OUTPATIENT
Start: 2023-05-23 | End: 2023-05-23

## 2023-05-23 RX ADMIN — REGADENOSON 0.4 MG: 0.08 INJECTION, SOLUTION INTRAVENOUS at 11:05

## 2023-05-23 RX ADMIN — Medication 10 ML: at 11:05

## 2023-05-24 ENCOUNTER — TELEPHONE (OUTPATIENT)
Dept: CARDIOLOGY | Facility: CLINIC | Age: 43
End: 2023-05-24
Payer: MEDICARE

## 2023-05-24 LAB
CV PHARM DOSE: 0.4 MG
CV STRESS BASE HR: 67 BPM
DIASTOLIC BLOOD PRESSURE: 57 MMHG
NUC REST EJECTION FRACTION: 47
NUC STRESS EJECTION FRACTION: 47 %
OHS CV CPX 1 MINUTE RECOVERY HEART RATE: 84 BPM
OHS CV CPX 85 PERCENT MAX PREDICTED HEART RATE MALE: 144
OHS CV CPX ESTIMATED METS: 1
OHS CV CPX MAX PREDICTED HEART RATE: 170
OHS CV CPX PATIENT IS FEMALE: 1
OHS CV CPX PATIENT IS MALE: 0
OHS CV CPX PEAK DIASTOLIC BLOOD PRESSURE: 49 MMHG
OHS CV CPX PEAK HEAR RATE: 85 BPM
OHS CV CPX PEAK RATE PRESSURE PRODUCT: NORMAL
OHS CV CPX PEAK SYSTOLIC BLOOD PRESSURE: 141 MMHG
OHS CV CPX PERCENT MAX PREDICTED HEART RATE ACHIEVED: 50
OHS CV CPX RATE PRESSURE PRODUCT PRESENTING: 7772
STRESS ECHO POST EXERCISE DUR MIN: 1 MINUTES
STRESS ECHO POST EXERCISE DUR SEC: 30 SECONDS
SYSTOLIC BLOOD PRESSURE: 116 MMHG

## 2023-05-24 NOTE — TELEPHONE ENCOUNTER
Nuclear stress test negative.There are no absolute contraindications to planned surgery from a cardiac standpoint. Will defer non-cardiac medical issues to patient's other physicians. Cardiac and hemodynamic monitoring during surgery and post operative period consistent with regions standard of care should be initiated. Patient should be aware that all surgeries carry risk and unpredictable cardiac events may still occur.

## 2023-06-02 ENCOUNTER — TELEPHONE (OUTPATIENT)
Dept: NEUROSURGERY | Facility: CLINIC | Age: 43
End: 2023-06-02
Payer: MEDICARE

## 2023-06-02 ENCOUNTER — TELEPHONE (OUTPATIENT)
Dept: CARDIOLOGY | Facility: CLINIC | Age: 43
End: 2023-06-02
Payer: MEDICARE

## 2023-06-02 NOTE — TELEPHONE ENCOUNTER
----- Message from Lavern Franco LPN sent at 6/2/2023  9:16 AM CDT -----  Contact: [phone]  Please advise.  ----- Message -----  From: Maura Merino  Sent: 6/2/2023   8:41 AM CDT  To: Sherita MCLAUGHLIN  Staff    Pt is requesting a call in regards to questions concerning baby aspirin. She is needing to know if she can stop taking It the week of her surgery.  Please call her back at [phone].      Thanks KB

## 2023-06-02 NOTE — TELEPHONE ENCOUNTER
Attempted without success to contact pt to discuss ASA hold. Left voicemail for pt to call back to discuss.

## 2023-06-05 ENCOUNTER — TELEPHONE (OUTPATIENT)
Dept: NEUROSURGERY | Facility: CLINIC | Age: 43
End: 2023-06-05
Payer: MEDICARE

## 2023-06-05 NOTE — TELEPHONE ENCOUNTER
Attempted to reach patient in regards to PCP clearance. No answer. Left Voicemail to return call. Also left message at Dr. Freda Keene's office regarding clearance as well.

## 2023-06-09 PROBLEM — E11.65 HYPERGLYCEMIA DUE TO TYPE 2 DIABETES MELLITUS: Chronic | Status: ACTIVE | Noted: 2023-06-09

## 2023-06-11 PROCEDURE — G0180 PR HOME HEALTH MD CERTIFICATION: ICD-10-PCS | Mod: ,,, | Performed by: NEUROLOGICAL SURGERY

## 2023-06-11 PROCEDURE — G0180 MD CERTIFICATION HHA PATIENT: HCPCS | Mod: ,,, | Performed by: NEUROLOGICAL SURGERY

## 2023-06-20 ENCOUNTER — CLINICAL SUPPORT (OUTPATIENT)
Dept: NEUROSURGERY | Facility: CLINIC | Age: 43
End: 2023-06-20
Payer: MEDICARE

## 2023-06-20 RX ORDER — SULFAMETHOXAZOLE AND TRIMETHOPRIM 800; 160 MG/1; MG/1
1 TABLET ORAL 2 TIMES DAILY
Qty: 14 TABLET | Refills: 0 | Status: SHIPPED | OUTPATIENT
Start: 2023-06-20 | End: 2023-06-27

## 2023-06-20 NOTE — PROGRESS NOTES
Pt states she has a sore throat but no fever to report. No numbness, weakness, tingling. Expressed some improvements,one of left arm movement. Staples were removed at this visit, no complications or drainage. Incision appears red and tender, reported to Dr. Mccarthy. He examined the wound at this visit and prescribed antibiotics.

## 2023-06-22 ENCOUNTER — EXTERNAL HOME HEALTH (OUTPATIENT)
Dept: HOME HEALTH SERVICES | Facility: HOSPITAL | Age: 43
End: 2023-06-22
Payer: MEDICARE

## 2023-06-27 DIAGNOSIS — G43.719 INTRACTABLE CHRONIC MIGRAINE WITHOUT AURA AND WITHOUT STATUS MIGRAINOSUS: ICD-10-CM

## 2023-06-27 RX ORDER — GALCANEZUMAB 120 MG/ML
120 INJECTION, SOLUTION SUBCUTANEOUS
Qty: 1 ML | Refills: 1 | Status: SHIPPED | OUTPATIENT
Start: 2023-06-27 | End: 2023-08-29 | Stop reason: SDUPTHER

## 2023-07-03 ENCOUNTER — TELEPHONE (OUTPATIENT)
Dept: NEUROSURGERY | Facility: CLINIC | Age: 43
End: 2023-07-03
Payer: MEDICARE

## 2023-07-03 NOTE — TELEPHONE ENCOUNTER
----- Message from Manuel Pope sent at 7/3/2023 10:00 AM CDT -----  Regarding: question  Contact: jess [phone]  Type: Needs Medical Advice    Who Called:  jess Royal Call Back Number: [phone]    Additional Information: pt needs callback did not want to put in message.

## 2023-07-05 ENCOUNTER — TELEPHONE (OUTPATIENT)
Dept: PAIN MEDICINE | Facility: CLINIC | Age: 43
End: 2023-07-05
Payer: MEDICARE

## 2023-07-05 RX ORDER — HYDROCODONE BITARTRATE AND ACETAMINOPHEN 5; 325 MG/1; MG/1
1 TABLET ORAL EVERY 12 HOURS PRN
Qty: 60 TABLET | Refills: 0 | Status: SHIPPED | OUTPATIENT
Start: 2023-07-05 | End: 2023-08-01

## 2023-07-05 NOTE — TELEPHONE ENCOUNTER
----- Message from Simi Silva, Patient Care Assistant sent at 7/5/2023  8:30 AM CDT -----  Contact: Pt  Type:  RX Refill Request    Who Called:  Pt  Refill or New Rx:  Refill  RX Name and Strength:  Norco 5-325 mg  How is the patient currently taking it? (ex. 1XDay):  As Directed  Is this a 30 day or 90 day RX:  30  Preferred Pharmacy with phone number:    [facility] - [place], [place] - [address]   [address] [place] [address]  Phone: [phone] Fax: [phone]  Local or Mail Order:  Local  Ordering Provider:  Twan Royal Call Back Number:  [phone]  Additional Information:  Please contact pt upon completion-Thank you~

## 2023-07-06 ENCOUNTER — DOCUMENT SCAN (OUTPATIENT)
Dept: HOME HEALTH SERVICES | Facility: HOSPITAL | Age: 43
End: 2023-07-06
Payer: MEDICARE

## 2023-07-10 ENCOUNTER — OFFICE VISIT (OUTPATIENT)
Dept: NEUROSURGERY | Facility: CLINIC | Age: 43
End: 2023-07-10
Payer: MEDICARE

## 2023-07-10 VITALS
WEIGHT: 265 LBS | SYSTOLIC BLOOD PRESSURE: 119 MMHG | HEART RATE: 81 BPM | HEIGHT: 57 IN | RESPIRATION RATE: 18 BRPM | DIASTOLIC BLOOD PRESSURE: 77 MMHG | BODY MASS INDEX: 57.17 KG/M2

## 2023-07-10 DIAGNOSIS — M48.02 CERVICAL SPINAL STENOSIS: Primary | ICD-10-CM

## 2023-07-10 PROCEDURE — 99024 POSTOP FOLLOW-UP VISIT: CPT | Mod: POP,,, | Performed by: NEUROLOGICAL SURGERY

## 2023-07-10 PROCEDURE — 99024 PR POST-OP FOLLOW-UP VISIT: ICD-10-PCS | Mod: POP,,, | Performed by: NEUROLOGICAL SURGERY

## 2023-07-10 NOTE — PROGRESS NOTES
Neurosurgery History and Physical    Patient ID: Shanna Douglass is a 43 y.o. female.    Chief Complaint   Patient presents with    Post-op Evaluation     Patient present to clinic today as 4 week POV. States of no new symptoms.      Patient is 4 weeks status post C3-7 laminoplasty doing well. She feels like she is improving and feels stronger. She was having home health PT which stopped last week. She feels like she would like some more therapy to help with her balance. She has been wearing her soft collar.     Review of Systems    Past Medical History:   Diagnosis Date    Anticoagulant long-term use     Asthma     Bronchitis     Chiari malformation type I     Chronic headache     Diabetes mellitus     Graves disease     Graves disease     Hyperlipidemia     Hypertension     Murmur, heart     NPH (normal pressure hydrocephalus)     Obesity     MANUEL on CPAP     Pseudotumor cerebri     Thyroid disease     Hadley syndrome      Social History     Socioeconomic History    Marital status: Single   Tobacco Use    Smoking status: Never     Passive exposure: Current    Smokeless tobacco: Never   Substance and Sexual Activity    Alcohol use: No    Drug use: Yes     Types: Hydrocodone     Family History   Problem Relation Age of Onset    Diabetes Mother     Hypertension Father     Heart disease Father     Heart disease Maternal Grandfather      Review of patient's allergies indicates:   Allergen Reactions    Diamox [acetazolamide] Hives    Iodine and iodide containing products     Relpax [eletriptan hbr] Hives    Dye Swelling and Rash     Iodine    Maxalt [rizatriptan] Palpitations    Sumatriptan Palpitations       Current Outpatient Medications:     albuterol (PROVENTIL/VENTOLIN HFA) 90 mcg/actuation inhaler, Inhale 1 puff into the lungs every 4 (four) hours as needed., Disp: , Rfl:     amantadine HCL (SYMMETREL) 100 mg capsule, TAKE 1 TABLET EVERY MORNING (Patient taking differently: Take 100 mg by mouth once daily.),  Disp: 28 capsule, Rfl: 11    aspirin 81 MG Chew, Take 81 mg by mouth once daily., Disp: , Rfl:     bepotastine besilate (BEPREVE) 1.5 % Drop, Apply to eye as needed., Disp: , Rfl:     famotidine (PEPCID) 20 MG tablet, Take 20 mg by mouth once daily., Disp: , Rfl:     furosemide (LASIX) 80 MG tablet, Take 80 mg by mouth once daily. , Disp: , Rfl:     galcanezumab-gnlm (EMGALITY PEN) 120 mg/mL PnIj, Inject 1 ml (120 mg) into the skin every 28 days., Disp: 1 mL, Rfl: 1    HYDROcodone-acetaminophen (NORCO) 5-325 mg per tablet, Take 1 tablet by mouth every 12 (twelve) hours as needed for Pain., Disp: 60 tablet, Rfl: 0    hydrOXYzine HCL (ATARAX) 25 MG tablet, Take 1 tablet (25 mg total) by mouth 3 (three) times daily., Disp: 90 tablet, Rfl: 3    levothyroxine (SYNTHROID) 300 MCG Tab, Take 300 mcg by mouth before breakfast., Disp: , Rfl:     lipase-protease-amylase (CREON) 36,000-114,000- 180,000 unit CpDR, Take 2 capsules by mouth 3 (three) times daily with meals., Disp: , Rfl:     metFORMIN (GLUCOPHAGE-XR) 500 MG ER 24hr tablet, Take 2 tablets by mouth 2 (two) times daily., Disp: , Rfl:     methIMAzole (TAPAZOLE) 10 MG Tab, Take 10 mg by mouth once daily., Disp: , Rfl:     metoprolol succinate (TOPROL-XL) 25 MG 24 hr tablet, Take 1 tablet (25 mg total) by mouth once daily., Disp: 90 tablet, Rfl: 1    montelukast (SINGULAIR) 10 mg tablet, Take 10 mg by mouth once daily., Disp: , Rfl:     multivit-minerals/folic acid (ONE-A-DAY WOMEN VITACRAVES ORAL), Take 1 capsule by mouth once daily., Disp: , Rfl:     omeprazole (PRILOSEC) 20 MG capsule, Take 20 mg by mouth once daily., Disp: , Rfl:     ondansetron (ZOFRAN-ODT) 4 MG TbDL, Take 8 mg by mouth every 12 (twelve) hours., Disp: , Rfl:     paroxetine (PAXIL) 40 MG tablet, Take 40 mg by mouth once daily., Disp: , Rfl:     potassium chloride (KLOR-CON) 10 MEQ TbSR, Take 10 mEq by mouth once daily., Disp: , Rfl:     prochlorperazine (COMPAZINE) 10 MG tablet, Take 1 tablet (10 mg  "total) by mouth every 6 (six) hours as needed (migraine or nausea)., Disp: 60 tablet, Rfl: 11    promethazine (PHENERGAN) 25 MG tablet, Take 25 mg by mouth every 6 (six) hours as needed., Disp: , Rfl:     rimegepant (NURTEC) 75 mg odt, Place one dissolving tablet on the tongue daily as needed for migraine. No more than once per day. (Patient taking differently: Take 75 mg by mouth as needed. Place one dissolving tablet on the tongue daily as needed for migraine. No more than once per day.), Disp: 8 tablet, Rfl: 11    rosuvastatin (CRESTOR) 20 MG tablet, Take 1 tablet (20 mg total) by mouth once daily. (Patient taking differently: Take 20 mg by mouth every evening.), Disp: 90 tablet, Rfl: 3    selenium 200 mcg Cap, Take 200 mcg by mouth once daily., Disp: , Rfl:     topiramate (TOPAMAX) 100 MG tablet, TAKE 1 TABLET TWICE DAILY (Patient taking differently: Take 100 mg by mouth 2 (two) times daily.), Disp: 168 tablet, Rfl: 3    TRULICITY 3 mg/0.5 mL pen injector, Inject 3 mg into the skin Every Friday., Disp: , Rfl:   Blood pressure 119/77, pulse 81, resp. rate 18, height 4' 8.5" (1.435 m), weight 120.2 kg (265 lb).      Neurologic Exam     Mental Status   Oriented to person, place, and time.   Attention: normal. Concentration: normal.   Speech: speech is normal   Level of consciousness: alert  Knowledge: good.     Cranial Nerves     CN II   Visual acuity: normal    CN III, IV, VI   Pupils are equal, round, and reactive to light.  Extraocular motions are normal.     CN V   Facial sensation intact.     CN VII   Facial expression full, symmetric.     CN VIII   Hearing: intact    CN IX, X   Palate: symmetric    CN XI   CN XI normal.     CN XII   CN XII normal.     Motor Exam   Muscle bulk: normal  Overall muscle tone: normal    Strength   Right deltoid: 5/5  Left deltoid: 5/5  Right biceps: 5/5  Left biceps: 5/5  Right triceps: 5/5  Left triceps: 5/5  Right wrist flexion: 5/5  Left wrist flexion: 5/5  Right wrist " extension: 5/5  Left wrist extension: 5/5  Right interossei: 5/5  Left interossei: 5/5  Right iliopsoas: 5/5  Left iliopsoas: 5/5  Right quadriceps: 5/5  Left quadriceps: 5/5  Right hamstrin/5  Left hamstrin/5  Right anterior tibial: 5/5  Left anterior tibial: 5/5  Right posterior tibial: 5/5  Left posterior tibial: 5/5  Right peroneal: 5/5  Left peroneal: 5/5  Right gastroc: 5/5  Left gastroc: 5/5    Sensory Exam   Right arm light touch: normal  Left arm light touch: normal  Right leg light touch: normal  Left leg light touch: normal    Gait, Coordination, and Reflexes     Gait  Gait: normal (with walker)    Tremor   Resting tremor: absent  Intention tremor: absent    Reflexes   Right brachioradialis: 3+  Left brachioradialis: 3+  Right biceps: 3+  Left biceps: 3+  Right triceps: 3+  Left triceps: 3+  Right patellar: 2+  Left patellar: 2+  Right achilles: 2+  Left achilles: 2+  Right plantar: normal  Left plantar: equivocal  Right Cardenas: present  Left Cardenas: present  Right ankle clonus: absent  Left ankle clonus: absentRapid hand movement normal     Physical Exam  Eyes:      Extraocular Movements: EOM normal.      Pupils: Pupils are equal, round, and reactive to light.   Neurological:      Mental Status: She is oriented to person, place, and time.      Gait: Gait is intact.      Deep Tendon Reflexes:      Reflex Scores:       Tricep reflexes are 3+ on the right side and 3+ on the left side.       Bicep reflexes are 3+ on the right side and 3+ on the left side.       Brachioradialis reflexes are 3+ on the right side and 3+ on the left side.       Patellar reflexes are 2+ on the right side and 2+ on the left side.       Achilles reflexes are 2+ on the right side and 2+ on the left side.  Psychiatric:         Speech: Speech normal.     Incision is well healed.     Vital Signs  Pulse: 81  Resp: 18  BP: 119/77  BP Location: Left arm  Patient Position: Sitting  Pain Score:   6  Pain Loc: Neck  Height and  "Weight  Height: 4' 8.5" (143.5 cm)  Weight: 120.2 kg (265 lb)  BSA (Calculated - sq m): 2.19 sq meters  BMI (Calculated): 58.4  Weight in (lb) to have BMI = 25: 113.3]    Provider dictation:  She should avoid lifting greater than 20 lbs, but okay to progress her overall physical activity. We will order outpatient PT for her. She will follow up for her 3 month post op visit with X-rays. She may discontinue her soft collar.     Visit Diagnosis:  Cervical spinal stenosis      "

## 2023-07-14 ENCOUNTER — DOCUMENT SCAN (OUTPATIENT)
Dept: HOME HEALTH SERVICES | Facility: HOSPITAL | Age: 43
End: 2023-07-14
Payer: MEDICARE

## 2023-07-21 RX ORDER — RIMEGEPANT SULFATE 75 MG/75MG
TABLET, ORALLY DISINTEGRATING ORAL
Qty: 8 TABLET | Refills: 11 | Status: SHIPPED | OUTPATIENT
Start: 2023-07-21 | End: 2024-03-19 | Stop reason: SDUPTHER

## 2023-08-01 ENCOUNTER — OFFICE VISIT (OUTPATIENT)
Dept: PAIN MEDICINE | Facility: CLINIC | Age: 43
End: 2023-08-01
Payer: MEDICARE

## 2023-08-01 ENCOUNTER — TELEPHONE (OUTPATIENT)
Dept: NEUROLOGY | Facility: CLINIC | Age: 43
End: 2023-08-01
Payer: MEDICARE

## 2023-08-01 VITALS
HEART RATE: 77 BPM | HEIGHT: 57 IN | SYSTOLIC BLOOD PRESSURE: 118 MMHG | WEIGHT: 270.5 LBS | BODY MASS INDEX: 58.36 KG/M2 | DIASTOLIC BLOOD PRESSURE: 73 MMHG

## 2023-08-01 DIAGNOSIS — M48.02 CERVICAL STENOSIS OF SPINAL CANAL: Primary | ICD-10-CM

## 2023-08-01 DIAGNOSIS — M54.12 CERVICAL RADICULOPATHY: ICD-10-CM

## 2023-08-01 DIAGNOSIS — G89.4 CHRONIC PAIN DISORDER: Primary | ICD-10-CM

## 2023-08-01 DIAGNOSIS — E66.01 MORBID OBESITY WITH BMI OF 60.0-69.9, ADULT: ICD-10-CM

## 2023-08-01 DIAGNOSIS — G89.4 CHRONIC PAIN DISORDER: ICD-10-CM

## 2023-08-01 DIAGNOSIS — M54.16 LUMBAR RADICULOPATHY: ICD-10-CM

## 2023-08-01 DIAGNOSIS — Z02.89 PAIN MANAGEMENT CONTRACT SIGNED: ICD-10-CM

## 2023-08-01 PROCEDURE — 99214 OFFICE O/P EST MOD 30 MIN: CPT | Mod: S$PBB,,,

## 2023-08-01 PROCEDURE — 99214 OFFICE O/P EST MOD 30 MIN: CPT | Mod: PBBFAC,PN

## 2023-08-01 PROCEDURE — 99999 PR PBB SHADOW E&M-EST. PATIENT-LVL IV: ICD-10-PCS | Mod: PBBFAC,,,

## 2023-08-01 PROCEDURE — 99214 PR OFFICE/OUTPT VISIT, EST, LEVL IV, 30-39 MIN: ICD-10-PCS | Mod: S$PBB,,,

## 2023-08-01 PROCEDURE — 99999 PR PBB SHADOW E&M-EST. PATIENT-LVL IV: CPT | Mod: PBBFAC,,,

## 2023-08-01 RX ORDER — CYCLOBENZAPRINE HCL 10 MG
TABLET ORAL
Qty: 90 TABLET | Refills: 2 | Status: SHIPPED | OUTPATIENT
Start: 2023-08-01 | End: 2023-11-09 | Stop reason: SDUPTHER

## 2023-08-01 RX ORDER — HYDROCODONE BITARTRATE AND ACETAMINOPHEN 5; 325 MG/1; MG/1
1 TABLET ORAL
Qty: 30 TABLET | Refills: 0 | Status: SHIPPED | OUTPATIENT
Start: 2023-10-03 | End: 2023-11-02

## 2023-08-01 RX ORDER — HYDROCODONE BITARTRATE AND ACETAMINOPHEN 5; 325 MG/1; MG/1
1 TABLET ORAL
Qty: 30 TABLET | Refills: 0 | Status: SHIPPED | OUTPATIENT
Start: 2023-09-03 | End: 2023-10-03

## 2023-08-01 RX ORDER — HYDROCODONE BITARTRATE AND ACETAMINOPHEN 5; 325 MG/1; MG/1
1 TABLET ORAL
Qty: 30 TABLET | Refills: 0 | Status: SHIPPED | OUTPATIENT
Start: 2023-08-04 | End: 2023-09-03

## 2023-08-01 NOTE — TELEPHONE ENCOUNTER
----- Message from Anne Gaines sent at 7/31/2023  7:24 AM CDT -----  Contact: Patient  Type: Needs Medical Advice    Who Called:  Patient  What is this regarding?:  Pt wants to verify that her insurance approved her botox next Tuesday.  Best Call Back Number:  [phone]  Additional Information:  Please call the patient back at the phone number listed above to advise. Thank you!

## 2023-08-01 NOTE — PROGRESS NOTES
This note was completed with dictation software and grammatical errors may exist.    CC: Back pain, Neck pain, headaches    HPI: The patient is a 41-year-old woman with a history of Chiari malformation, Hadley syndrome, pseudotumor cerebri with shunt, morbid obesity who presents in referral from Dr. Velasco for neck pain.  She is status post cervical C3-C7 posterior approach laminectomy with Dr. Shari MD on 6/08/23.  She reports improvement with her neck pain following the surgery.  She has increased strength in her upper extremities especially in her left arm since the surgery.  She reports some remaining numbness across her back but denies any other new or worsening numbness, weakness or any new changes to her bowel or bladder function.  She continues to take Flexeril without relief.  Additionally, she has reduced her hydrocodone intake to once nightly with good relief of her pain.  She has been attending home health PT and is restarting outpatient formal physical therapy.    Pain intervention history: She was seeing Dr. Gerson Renteria, pain management and until recently had been taking hydrocodone 5/325 once a day in addition to Flexeril 3 times a day and gabapentin 600 mg 3 times a day.  She apparently tested positive for Valium and so was dismissed from his practice. She is status post C7-T1 cervical interlaminar epidural steroid injection on 10/4/17 with 100% relief lasting 2 weeks.  She is status post a second cervical TABITHA on 11/14/17 with almost complete relief again but only lasting 2-3 weeks. She is status post bilateral L2, 3 and 4 medial branch radiofrequency ablation on 09/21/2018 with 75% relief.   She is status post C7-T1 interlaminar epidural steroid injection on 10/01/2020 with 100% relief of her neck pain.   She is status post cervical C3-C7 posterior approach laminectomy with Dr. Shari MD on 6/08/23    ROS: She reports weight loss, headaches, easy bruising and back pain.  Balance  of review of systems is negative.    Past Medical History:   Diagnosis Date    Anticoagulant long-term use     Asthma     Bronchitis     Chiari malformation type I     Chronic headache     Diabetes mellitus     Graves disease     Graves disease     Hyperlipidemia     Hypertension     Murmur, heart     NPH (normal pressure hydrocephalus)     Obesity     MANUEL on CPAP     Pseudotumor cerebri     Thyroid disease     Hadley syndrome        Past Surgical History:   Procedure Laterality Date    CARPAL TUNNEL RELEASE      CERVICAL SPINE SURGERY      CHOLECYSTECTOMY      EAR TUBE REMOVAL Bilateral     EPIDURAL STEROID INJECTION INTO CERVICAL SPINE N/A 10/1/2020    Procedure: Injection-steroid-epidural-cervical C7- T1 interlaminar;  Surgeon: Santana Pak MD;  Location: [facility] OR;  Service: Pain Management;  Laterality: N/A;    EYE SURGERY      strabismus    INJECTION OF FACET JOINT Bilateral 5/23/2018    Procedure: INJECTION-FACET L3/4 and L4/5;  Surgeon: Santana Pak MD;  Location: [facility] OR;  Service: Pain Management;  Laterality: Bilateral;  sacralization of L5 and a rudimentary disc space    MYRINGOTOMY W/ TUBES      POSTERIOR CERVICAL LAMINECTOMY N/A 6/8/2023    Procedure: LAMINECTOMY, SPINE, CERVICAL, POSTERIOR APPROACH    C3-C7 LAMINOPLASTY;  Surgeon: Sunita Mccarthy MD;  Location: [facility] OR;  Service: Neurosurgery;  Laterality: N/A;    RADIOFREQUENCY ABLATION OF LUMBAR MEDIAL BRANCH NERVE AT SINGLE LEVEL Bilateral 9/21/2018    Procedure: RADIOFREQUENCY ABLATION, NERVE, SPINAL, LUMBAR, MEDIAL BRANCH, L2, L3, L4;  Surgeon: Santana Pak MD;  Location: [facility] OR;  Service: Pain Management;  Laterality: Bilateral;    TONSILLECTOMY      VENTRICULOPERITONEAL SHUNT         Social History     Socioeconomic History    Marital status: Single   Tobacco Use    Smoking status: Never     Passive exposure: Current    Smokeless tobacco: Never   Substance and Sexual Activity    Alcohol use: No    Drug use: Yes      "Types: Hydrocodone         Medications/Allergies: See med card    Vitals:    08/01/23 1111   BP: 118/73   Pulse: 77   Weight: 122.7 kg (270 lb 8.1 oz)   Height: 4' 8.5" (1.435 m)   PainSc:   6   PainLoc: Neck     Body mass index is 59.58 kg/m².    Physical exam:  Gen: A and O x3, pleasant, obese  Skin: No rashes or obvious lesions  HEENT: PERRLA, no obvious deformities on ears or in canals.Trachea midline.  CVS: Regular rate and rhythm, normal palpable pulses.  Resp: Clear to auscultation bilaterally, no wheezes or rales.  Abdomen: Soft, NT/ND.  Musculoskeletal:  Wide-based gait, using Rollator     Neuro:  Upper extremities: 5/5 strength bilaterally   Reflexes: Brachioradialis 2+, Bicep 2+, Tricep 2+. Patellar 2+, Achilles 2+ bilaterally.  Sensory: Intact and symmetrical to light touch and pinprick in C2-T1 dermatomes bilaterally.      Cervical Spine:  Cervical spine: Range of motion is mildly reduced with flexion , extension and lateral rotation without pain.  Spurling's maneuver is negative bilaterally.  Myofascial exam:   no tenderness to palpation of the cervical paraspinous muscles.        Imaging:  MRI from 2/9/17 cervical spine demonstrates congenitally narrowed canal with disc bulging most prominently at C4/5 to the right side causing anterior cord compression but no signal changes.  There is narrowing of the canal at C3/4 to a lesser degree.    03/08/2023 MRI cervical spine  C2-C3: Developmental fusion.  The spinal canal and foramina are patent..  C3-C4: Shallow broad-based disc osteophyte complex and mild facet arthrosis. No substantial degenerative spinal canal or foraminal narrowing.  C4-C5: Shallow broad-based disc osteophyte complex and mild bilateral facet arthrosis more pronounced on the left but no substantial degenerative spinal canal or foraminal narrowing..  C5-C6: Redemonstrated mild degenerative disc height loss and broad-based disc osteophyte complex lateralizing to the right contributing to " moderate right central spinal canal narrowing. Similar mild flattening of the ventral cord surface. Uncovertebral spurring and facet arthrosis contribute to mild bilateral foraminal narrowing..  C6-C7: Shallow broad-based disc osteophyte complex.  No substantial degenerative spinal canal or foraminal narrowing..  C7-T1: Within normal limits.  The spinal canal and foramina are patent..     Soft tissues: The visualized paraspinal soft tissues are within normal limits.  Note is made of medialized internal carotid arteries at the C2 level to C5.    Assessment:   The patient is a 41-year-old woman with a history of Chiari malformation, pseudotumor cerebri with shunt, morbid obesity who presents in referral from Dr. Velasco for neck pain.    1. Cervical stenosis of spinal canal        2. Lumbar radiculopathy  cyclobenzaprine (FLEXERIL) 10 MG tablet      3. Cervical radiculopathy  cyclobenzaprine (FLEXERIL) 10 MG tablet      4. Morbid obesity with BMI of 60.0-69.9, adult        5. Pain management contract signed        6. Chronic pain disorder                Plan:  1. Overall, since her cervical surgery she seems to be doing much better.  She is decreased her pain medication intake and is now taking the hydrocodone 5/325 mg once daily at night.  2. Refill for Flexeril provided today.  3. Refill for hydrocodone 5/325 mg #30 tablets sent to Dr. Edge today.  I have reviewed the [place] Board of Pharmacy website and there are no abberancies. We will provide her with one month prescription at this time.  4. At this time we will continue to maximize conservative therapy with rest, medications and formal PT.  5. Follow-up in 3 months or sooner if needed.

## 2023-08-14 ENCOUNTER — TELEPHONE (OUTPATIENT)
Dept: NEUROLOGY | Facility: CLINIC | Age: 43
End: 2023-08-14
Payer: MEDICARE

## 2023-08-14 NOTE — TELEPHONE ENCOUNTER
----- Message from Willem Geronimo sent at 8/14/2023  8:28 AM CDT -----  Contact: Self  Type:  Sooner Appointment Request    Caller is requesting a sooner appointment.  Caller declined first available appointment listed below.  Caller will not accept being placed on the waitlist and is requesting a message be sent to doctor.    Name of Caller:  Patient  When is the first available appointment?  N/a  Symptoms:  BOTOX  Best Call Back Number:  [phone]   Additional Information:

## 2023-08-14 NOTE — TELEPHONE ENCOUNTER
Called and spoke to patient, rescheduled missed appointments. Confirmed date and time.  Informed of importance of allowing a 24 hour notice when unable to attend scheduled appointments.

## 2023-08-28 DIAGNOSIS — Z98.890 H/O EXCISION OF LAMINA OF CERVICAL VERTEBRA FOR DECOMPRESSION OF SPINAL CORD: Primary | ICD-10-CM

## 2023-08-29 DIAGNOSIS — G43.719 INTRACTABLE CHRONIC MIGRAINE WITHOUT AURA AND WITHOUT STATUS MIGRAINOSUS: ICD-10-CM

## 2023-08-29 RX ORDER — GALCANEZUMAB 120 MG/ML
120 INJECTION, SOLUTION SUBCUTANEOUS
Qty: 1 ML | Refills: 1 | Status: SHIPPED | OUTPATIENT
Start: 2023-08-29 | End: 2023-10-10

## 2023-09-01 DIAGNOSIS — G43.719 INTRACTABLE CHRONIC MIGRAINE WITHOUT AURA AND WITHOUT STATUS MIGRAINOSUS: ICD-10-CM

## 2023-09-01 RX ORDER — GALCANEZUMAB 120 MG/ML
120 INJECTION, SOLUTION SUBCUTANEOUS
Qty: 1 ML | Refills: 1 | Status: SHIPPED | OUTPATIENT
Start: 2023-09-01 | End: 2024-02-29 | Stop reason: SDUPTHER

## 2023-09-11 ENCOUNTER — HOSPITAL ENCOUNTER (OUTPATIENT)
Dept: RADIOLOGY | Facility: HOSPITAL | Age: 43
Discharge: HOME | End: 2023-09-11
Attending: NEUROLOGICAL SURGERY
Payer: MEDICARE

## 2023-09-11 ENCOUNTER — OFFICE VISIT (OUTPATIENT)
Dept: NEUROSURGERY | Facility: CLINIC | Age: 43
End: 2023-09-11
Payer: MEDICARE

## 2023-09-11 VITALS
HEIGHT: 57 IN | WEIGHT: 270.5 LBS | HEART RATE: 94 BPM | BODY MASS INDEX: 58.36 KG/M2 | SYSTOLIC BLOOD PRESSURE: 109 MMHG | TEMPERATURE: 99 F | DIASTOLIC BLOOD PRESSURE: 77 MMHG | RESPIRATION RATE: 12 BRPM

## 2023-09-11 DIAGNOSIS — M48.02 CERVICAL SPINAL STENOSIS: Primary | ICD-10-CM

## 2023-09-11 DIAGNOSIS — M48.02 CERVICAL SPINAL STENOSIS: ICD-10-CM

## 2023-09-11 PROCEDURE — 72050 XR CERVICAL SPINE AP LAT WITH FLEX EXTEN: ICD-10-PCS | Mod: 26,,, | Performed by: RADIOLOGY

## 2023-09-11 PROCEDURE — 99214 PR OFFICE/OUTPT VISIT, EST, LEVL IV, 30-39 MIN: ICD-10-PCS | Mod: S$PBB,,, | Performed by: NEUROLOGICAL SURGERY

## 2023-09-11 PROCEDURE — 72050 X-RAY EXAM NECK SPINE 4/5VWS: CPT | Mod: 26,,, | Performed by: RADIOLOGY

## 2023-09-11 PROCEDURE — 72050 X-RAY EXAM NECK SPINE 4/5VWS: CPT | Mod: TC,FY,PO

## 2023-09-11 PROCEDURE — 72125 CT NECK SPINE W/O DYE: CPT | Mod: 26,,, | Performed by: RADIOLOGY

## 2023-09-11 PROCEDURE — 72125 CT NECK SPINE W/O DYE: CPT | Mod: TC,PO

## 2023-09-11 PROCEDURE — 99214 OFFICE O/P EST MOD 30 MIN: CPT | Mod: S$PBB,,, | Performed by: NEUROLOGICAL SURGERY

## 2023-09-11 PROCEDURE — 72125 CT CERVICAL SPINE WITHOUT CONTRAST: ICD-10-PCS | Mod: 26,,, | Performed by: RADIOLOGY

## 2023-09-11 NOTE — PROGRESS NOTES
Neurosurgery History and Physical    Patient ID: Shanna Douglass is a 43 y.o. female.    Chief Complaint   Patient presents with    Post-op Evaluation     Patient presents to clinic for 3 month post op eval C3-C7 laminoplasty- pt has pain level of 5 in neck and describes it as burning. Pt is taking norco and muscle relaxer to control pain.        Review of Systems   Musculoskeletal:  Positive for neck pain.   Neurological:  Negative for weakness.       Past Medical History:   Diagnosis Date    Anticoagulant long-term use     Asthma     Bronchitis     Chiari malformation type I     Chronic headache     Diabetes mellitus     Graves disease     Graves disease     Hyperlipidemia     Hypertension     Murmur, heart     NPH (normal pressure hydrocephalus)     Obesity     MANUEL on CPAP     Pseudotumor cerebri     Thyroid disease     Hadley syndrome      Social History     Socioeconomic History    Marital status: Single   Tobacco Use    Smoking status: Never     Passive exposure: Current    Smokeless tobacco: Never   Substance and Sexual Activity    Alcohol use: No    Drug use: Yes     Types: Hydrocodone     Family History   Problem Relation Age of Onset    Diabetes Mother     Hypertension Father     Heart disease Father     Heart disease Maternal Grandfather      Review of patient's allergies indicates:   Allergen Reactions    Diamox [acetazolamide] Hives    Iodine and iodide containing products     Relpax [eletriptan hbr] Hives    Dye Swelling and Rash     Iodine    Maxalt [rizatriptan] Palpitations    Sumatriptan Palpitations       Current Outpatient Medications:     albuterol (PROVENTIL/VENTOLIN HFA) 90 mcg/actuation inhaler, Inhale 1 puff into the lungs every 4 (four) hours as needed., Disp: , Rfl:     amantadine HCL (SYMMETREL) 100 mg capsule, TAKE 1 TABLET EVERY MORNING (Patient taking differently: Take 100 mg by mouth once daily.), Disp: 28 capsule, Rfl: 11    aspirin 81 MG Chew, Take 81 mg by mouth once daily.,  Disp: , Rfl:     bepotastine besilate (BEPREVE) 1.5 % Drop, Apply to eye as needed., Disp: , Rfl:     cyclobenzaprine (FLEXERIL) 10 MG tablet, TAKE 1 TABLET 3 TIMES DAILY AS NEEDED FOR MUSCLE SPASM *THANK YOU*, Disp: 90 tablet, Rfl: 2    famotidine (PEPCID) 20 MG tablet, Take 20 mg by mouth once daily., Disp: , Rfl:     furosemide (LASIX) 80 MG tablet, Take 80 mg by mouth once daily. , Disp: , Rfl:     galcanezumab-gnlm (EMGALITY PEN) 120 mg/mL PnIj, Inject 1 ml (120 mg) into the skin every 28 days., Disp: 1 mL, Rfl: 1    HYDROcodone-acetaminophen (NORCO) 5-325 mg per tablet, Take 1 tablet by mouth every 24 hours as needed for Pain., Disp: 30 tablet, Rfl: 0    [START ON 10/3/2023] HYDROcodone-acetaminophen (NORCO) 5-325 mg per tablet, Take 1 tablet by mouth every 24 hours as needed for Pain., Disp: 30 tablet, Rfl: 0    hydrOXYzine HCL (ATARAX) 25 MG tablet, Take 1 tablet (25 mg total) by mouth 3 (three) times daily., Disp: 90 tablet, Rfl: 3    levothyroxine (SYNTHROID) 300 MCG Tab, Take 300 mcg by mouth before breakfast., Disp: , Rfl:     lipase-protease-amylase (CREON) 36,000-114,000- 180,000 unit CpDR, Take 2 capsules by mouth 3 (three) times daily with meals., Disp: , Rfl:     metFORMIN (GLUCOPHAGE-XR) 500 MG ER 24hr tablet, Take 2 tablets by mouth 2 (two) times daily., Disp: , Rfl:     methIMAzole (TAPAZOLE) 10 MG Tab, Take 10 mg by mouth once daily., Disp: , Rfl:     montelukast (SINGULAIR) 10 mg tablet, Take 10 mg by mouth once daily., Disp: , Rfl:     multivit-minerals/folic acid (ONE-A-DAY WOMEN VITACRAVES ORAL), Take 1 capsule by mouth once daily., Disp: , Rfl:     omeprazole (PRILOSEC) 20 MG capsule, Take 20 mg by mouth once daily., Disp: , Rfl:     ondansetron (ZOFRAN-ODT) 4 MG TbDL, Take 8 mg by mouth every 12 (twelve) hours., Disp: , Rfl:     paroxetine (PAXIL) 40 MG tablet, Take 40 mg by mouth once daily., Disp: , Rfl:     potassium chloride (KLOR-CON) 10 MEQ TbSR, Take 10 mEq by mouth once daily.,  "Disp: , Rfl:     prochlorperazine (COMPAZINE) 10 MG tablet, Take 1 tablet (10 mg total) by mouth every 6 (six) hours as needed (migraine or nausea)., Disp: 60 tablet, Rfl: 11    promethazine (PHENERGAN) 25 MG tablet, Take 25 mg by mouth every 6 (six) hours as needed., Disp: , Rfl:     rimegepant (NURTEC) 75 mg odt, Place one dissolving tablet on the tongue daily as needed for migraine. No more than once per day., Disp: 8 tablet, Rfl: 11    rosuvastatin (CRESTOR) 20 MG tablet, Take 1 tablet (20 mg total) by mouth once daily. (Patient taking differently: Take 20 mg by mouth every evening.), Disp: 90 tablet, Rfl: 3    selenium 200 mcg Cap, Take 200 mcg by mouth once daily., Disp: , Rfl:     topiramate (TOPAMAX) 100 MG tablet, TAKE 1 TABLET TWICE DAILY (Patient taking differently: Take 100 mg by mouth 2 (two) times daily.), Disp: 168 tablet, Rfl: 3    TRULICITY 3 mg/0.5 mL pen injector, Inject 3 mg into the skin Every Friday., Disp: , Rfl:     galcanezumab-gnlm (EMGALITY PEN) 120 mg/mL PnIj, Inject 1 ml (120 mg) into the skin every 28 days. (Patient not taking: Reported on 9/11/2023), Disp: 1 mL, Rfl: 1    metoprolol succinate (TOPROL-XL) 25 MG 24 hr tablet, Take 1 tablet (25 mg total) by mouth once daily. (Patient not taking: Reported on 9/11/2023), Disp: 90 tablet, Rfl: 1  Blood pressure 109/77, pulse 94, temperature 98.5 °F (36.9 °C), temperature source Oral, resp. rate 12, height 4' 8.5" (1.435 m), weight 122.7 kg (270 lb 8.1 oz).      Neurologic Exam     Mental Status   Oriented to person, place, and time.   Attention: normal. Concentration: normal.   Speech: speech is normal   Level of consciousness: alert  Knowledge: good.     Cranial Nerves     CN II   Visual acuity: normal    CN III, IV, VI   Pupils are equal, round, and reactive to light.  Extraocular motions are normal.     CN V   Facial sensation intact.     CN VII   Facial expression full, symmetric.     CN VIII   Hearing: intact    CN IX, X   Palate: " symmetric    CN XI   CN XI normal.     CN XII   CN XII normal.     Motor Exam   Muscle bulk: normal  Overall muscle tone: normal    Strength   Right deltoid: 5/5  Left deltoid: 5/5  Right biceps: 5/5  Left biceps: 5/5  Right triceps: 5/5  Left triceps: 5/5  Right wrist flexion: 5/5  Left wrist flexion: 5/5  Right wrist extension: 5/5  Left wrist extension: 5/5  Right interossei: 5/5  Left interossei: 5/5  Right iliopsoas: 5/5  Left iliopsoas: 5/5  Right quadriceps: 5/5  Left quadriceps: 5/5  Right hamstrin/5  Left hamstrin/5  Right anterior tibial: 5/5  Left anterior tibial: 5/5  Right posterior tibial: 5/5  Left posterior tibial: 5/5  Right peroneal: 5/5  Left peroneal: 5/5  Right gastroc: 5/5  Left gastroc: 5/5    Sensory Exam   Right arm light touch: normal  Left arm light touch: normal  Right leg light touch: normal  Left leg light touch: normal    Gait, Coordination, and Reflexes     Gait  Gait: normal (with walker)    Tremor   Resting tremor: absent  Intention tremor: absent    Reflexes   Right brachioradialis: 2+  Left brachioradialis: 2+  Right biceps: 2+  Left biceps: 2+  Right triceps: 2+  Left triceps: 2+  Right patellar: 2+  Left patellar: 2+  Right achilles: 2+  Left achilles: 2+  Right Cardenas: absent  Left Cardenas: absent  Right ankle clonus: absent  Left ankle clonus: absentRapid hand movement normal       Physical Exam  Eyes:      Extraocular Movements: EOM normal.      Pupils: Pupils are equal, round, and reactive to light.   Neurological:      Mental Status: She is oriented to person, place, and time.      Gait: Gait is intact.      Deep Tendon Reflexes:      Reflex Scores:       Tricep reflexes are 2+ on the right side and 2+ on the left side.       Bicep reflexes are 2+ on the right side and 2+ on the left side.       Brachioradialis reflexes are 2+ on the right side and 2+ on the left side.       Patellar reflexes are 2+ on the right side and 2+ on the left side.       Achilles reflexes  "are 2+ on the right side and 2+ on the left side.  Psychiatric:         Speech: Speech normal.       Incision is well healed.     Vital Signs  Temp: 98.5 °F (36.9 °C)  Temp Source: Oral  Pulse: 94  Resp: 12  BP: 109/77  BP Location: Left arm  Patient Position: Sitting  Pain Score:   5  Pain Loc: Neck  Height and Weight  Height: 4' 8.5" (143.5 cm)  Weight: 122.7 kg (270 lb 8.1 oz)  BSA (Calculated - sq m): 2.21 sq meters  BMI (Calculated): 59.6  Weight in (lb) to have BMI = 25: 113.3]    Provider dictation:  Ongoing resolution of her upper extremity weakness and numbness with the exception of mild intermittent right hand numbness.  She no longer requires a walker and is now ambulating with a cane.  She is participating in physical therapy.  She complains of some axial burning like neck pain with range of motion but this is not severe.    Dynamic x-rays show normal range of motion and alignment with no new kyphotic deformity.  CT shows good hardware placement, good bony decompression of the spinal canal and healing of the laminar greenstick fractures.    Full strength on exam with normalization of her pathologic reflexes.    Very good neurologic and radiographic outcome.  Patient may follow-up with me as needed at this point.    Visit Diagnosis:  Cervical spinal stenosis          "

## 2023-10-10 ENCOUNTER — PATIENT MESSAGE (OUTPATIENT)
Dept: NEUROLOGY | Facility: CLINIC | Age: 43
End: 2023-10-10
Payer: MEDICARE

## 2023-10-10 DIAGNOSIS — G43.719 INTRACTABLE CHRONIC MIGRAINE WITHOUT AURA AND WITHOUT STATUS MIGRAINOSUS: ICD-10-CM

## 2023-10-10 RX ORDER — GALCANEZUMAB 120 MG/ML
INJECTION, SOLUTION SUBCUTANEOUS
Qty: 1 EACH | Refills: 11 | Status: SHIPPED | OUTPATIENT
Start: 2023-10-10

## 2023-10-16 ENCOUNTER — TELEPHONE (OUTPATIENT)
Dept: HEMATOLOGY/ONCOLOGY | Facility: CLINIC | Age: 43
End: 2023-10-16
Payer: MEDICARE

## 2023-10-16 NOTE — TELEPHONE ENCOUNTER
----- Message from Lauren Erickson sent at 10/16/2023  8:13 AM CDT -----  Regarding: reschedule appointments  Contact: patient  Type:  Sooner Appointment Request    Caller is requesting a sooner appointment.  Caller declined first available appointment listed below.  Caller will not accept being placed on the waitlist and is requesting a message be sent to doctor.    Name of Caller:  patient  When is the first available appointment?  10/16/23  Symptoms:  6 week follow up  Would the patient rather a call back or a response via [facility]? Call back  Best Call Back Number:  [phone] (home)   Additional Information:  Patient woke up sick today. She also wants to know if she had to reschedule the Botox on 10/18/23 also. Please call patient to advise.Thanks!

## 2023-10-16 NOTE — TELEPHONE ENCOUNTER
Routed back to sender.  This is not an Oncology/Hematology pt.    ----- Message from Lauren Erickson sent at 10/16/2023  8:13 AM CDT -----  Regarding: reschedule appointments  Contact: patient  Type:  Sooner Appointment Request    Caller is requesting a sooner appointment.  Caller declined first available appointment listed below.  Caller will not accept being placed on the waitlist and is requesting a message be sent to doctor.    Name of Caller:  patient  When is the first available appointment?  10/16/23  Symptoms:  6 week follow up  Would the patient rather a call back or a response via [facility]? Call back  Best Call Back Number:  [phone] (home)   Additional Information:  Patient woke up sick today. She also wants to know if she had to reschedule the Botox on 10/18/23 also. Please call patient to advise.Thanks!

## 2023-10-18 ENCOUNTER — PROCEDURE VISIT (OUTPATIENT)
Dept: NEUROLOGY | Facility: CLINIC | Age: 43
End: 2023-10-18
Payer: MEDICARE

## 2023-10-18 VITALS
HEIGHT: 58 IN | TEMPERATURE: 98 F | SYSTOLIC BLOOD PRESSURE: 127 MMHG | HEART RATE: 79 BPM | DIASTOLIC BLOOD PRESSURE: 83 MMHG | RESPIRATION RATE: 17 BRPM | WEIGHT: 283.5 LBS | BODY MASS INDEX: 59.51 KG/M2

## 2023-10-18 DIAGNOSIS — G43.719 INTRACTABLE CHRONIC MIGRAINE WITHOUT AURA AND WITHOUT STATUS MIGRAINOSUS: Primary | ICD-10-CM

## 2023-10-18 PROCEDURE — 99999PBSHW PR PBB SHADOW TECHNICAL ONLY FILED TO HB: Mod: PBBFAC,,,

## 2023-10-18 PROCEDURE — 64615 CHEMODENERV MUSC MIGRAINE: CPT | Mod: PBBFAC,PO | Performed by: PSYCHIATRY & NEUROLOGY

## 2023-10-18 PROCEDURE — 64615 PR CHEMODENERVATION OF MUSCLE FOR CHRONIC MIGRAINE: ICD-10-PCS | Mod: S$PBB,,, | Performed by: PSYCHIATRY & NEUROLOGY

## 2023-10-18 PROCEDURE — 99999PBSHW PR PBB SHADOW TECHNICAL ONLY FILED TO HB: ICD-10-PCS | Mod: PBBFAC,,,

## 2023-10-18 PROCEDURE — 64615 CHEMODENERV MUSC MIGRAINE: CPT | Mod: S$PBB,,, | Performed by: PSYCHIATRY & NEUROLOGY

## 2023-10-18 RX ADMIN — ONABOTULINUMTOXINA 200 UNITS: 100 INJECTION, POWDER, LYOPHILIZED, FOR SOLUTION INTRADERMAL; INTRAMUSCULAR at 11:10

## 2023-10-18 NOTE — PROCEDURES
ProceduresProcedures       PROCEDURE PERFORMED: Botulinum toxin injection (86645)    CLINICAL INDICATION: G43.719    A time out was conducted just before the start of the procedure to verify the correct patient and procedure, procedure location, and all relevant critical information.     Conventional methods of treatment such as multiple medications , both on and   off label have been tried including: anti-epileptics (topiramate, gabapentin, diamox), beta blockers (metoprolol),  and antidepressants (paxil - cannot trial additional ones due to being on Paxil). The patient has been unresponsive and refractory.The patient meets criteria for chronic headaches according to the ICHD-II, the patient has more than 15 headaches a month which last for more than 4 hours a day.    Injection shows session number: 15  Percentage relief since last session: >50% of migraine relief but only for 4-6 weeks     DESCRIPTION OF PROCEDURE: After obtaining informed consent and under   aseptic technique, a total of 135 units of botulinum toxin type A were   injected in the following muscles:     -- Procerus 5 units  --  5 units bilaterally  -- Frontalis 20 units  -- Temporalis 20 units bilaterally  -- Occipitalis 15 units bilaterally  -- Trapezius 15 units bilaterally.     -- Upper cervical paraspinals 10 units bilaterally spared due to prior surgery in this area     The patient tolerated the procedure well. There were no complications. The patient was given a prescription for repeat treatment in 12 weeks     Unavoidable waste - 65 units       Cee Campbell M.D  Medical Director, Headache and Facial Pain  [facility]

## 2023-11-09 DIAGNOSIS — M54.16 LUMBAR RADICULOPATHY: ICD-10-CM

## 2023-11-09 DIAGNOSIS — M54.12 CERVICAL RADICULOPATHY: ICD-10-CM

## 2023-11-09 RX ORDER — CYCLOBENZAPRINE HCL 10 MG
TABLET ORAL
Qty: 90 TABLET | Refills: 2 | Status: SHIPPED | OUTPATIENT
Start: 2023-11-09 | End: 2024-02-19 | Stop reason: SDUPTHER

## 2023-11-09 RX ORDER — HYDROCODONE BITARTRATE AND ACETAMINOPHEN 5; 325 MG/1; MG/1
1 TABLET ORAL
Qty: 30 TABLET | Refills: 0 | Status: SHIPPED | OUTPATIENT
Start: 2023-11-09 | End: 2023-12-09

## 2023-11-09 NOTE — TELEPHONE ENCOUNTER
----- Message from Anne Gaines sent at 11/3/2023  7:40 AM CDT -----  Contact: Self  Type: Needs Medical Advice    Who Called:  Patient  What is this regarding?:  She is needing a refill of her Suboxone until her next appt on 11/16/23.  [facility] - [place], [place] - [address]  [address] [facility] [address]  Phone: [phone] Fax: [phone]  Best Call Back Number:  [phone]  Additional Information:  Please call the patient back at the phone number listed above to advise. Thank you!

## 2023-11-09 NOTE — TELEPHONE ENCOUNTER
----- Message from Rani Chavarria sent at 11/7/2023  8:46 AM CST -----  Contact: Patient  Type:  RX Refill Request    Who Called: Patient     Refill or New Rx:refill    RX Name and Strength:    Norco 5 mg    cyclobenzaprine (FLEXERIL) 10 MG tablet        How is the patient currently taking it? (ex. 1XDay):1 x day     Is this a 30 day or 90 day RX:30    Preferred Pharmacy with phone number:  [facility] - [facility] [address]   [address] [place] [id number]  Phone: [phone] Fax: [phone]      Local or Mail Order:Local    Ordering Provider:Pasha    Would the patient rather a call back or a response via [facility]? Call    Best Call Back Number:[phone] (home)     Additional Information: Please refill

## 2023-11-16 ENCOUNTER — OFFICE VISIT (OUTPATIENT)
Dept: PAIN MEDICINE | Facility: CLINIC | Age: 43
End: 2023-11-16
Payer: MEDICARE

## 2023-11-16 VITALS
SYSTOLIC BLOOD PRESSURE: 105 MMHG | DIASTOLIC BLOOD PRESSURE: 66 MMHG | HEART RATE: 73 BPM | BODY MASS INDEX: 57.62 KG/M2 | WEIGHT: 274.5 LBS | HEIGHT: 58 IN

## 2023-11-16 DIAGNOSIS — M47.816 LUMBAR SPONDYLOSIS: Primary | ICD-10-CM

## 2023-11-16 DIAGNOSIS — M54.16 LUMBAR RADICULOPATHY: ICD-10-CM

## 2023-11-16 DIAGNOSIS — M54.12 CERVICAL RADICULOPATHY: ICD-10-CM

## 2023-11-16 PROCEDURE — 99999 PR PBB SHADOW E&M-EST. PATIENT-LVL V: ICD-10-PCS | Mod: PBBFAC,,, | Performed by: ANESTHESIOLOGY

## 2023-11-16 PROCEDURE — 99999 PR PBB SHADOW E&M-EST. PATIENT-LVL V: CPT | Mod: PBBFAC,,, | Performed by: ANESTHESIOLOGY

## 2023-11-16 PROCEDURE — 99215 OFFICE O/P EST HI 40 MIN: CPT | Mod: PBBFAC,PO | Performed by: ANESTHESIOLOGY

## 2023-11-16 PROCEDURE — 99214 OFFICE O/P EST MOD 30 MIN: CPT | Mod: S$PBB,,, | Performed by: ANESTHESIOLOGY

## 2023-11-16 PROCEDURE — 99214 PR OFFICE/OUTPT VISIT, EST, LEVL IV, 30-39 MIN: ICD-10-PCS | Mod: S$PBB,,, | Performed by: ANESTHESIOLOGY

## 2023-11-16 RX ORDER — HYDROCODONE BITARTRATE AND ACETAMINOPHEN 5; 325 MG/1; MG/1
1 TABLET ORAL
Qty: 30 TABLET | Refills: 0 | Status: SHIPPED | OUTPATIENT
Start: 2024-01-08 | End: 2024-02-07

## 2023-11-16 RX ORDER — HYDROCODONE BITARTRATE AND ACETAMINOPHEN 5; 325 MG/1; MG/1
1 TABLET ORAL
Qty: 30 TABLET | Refills: 0 | Status: SHIPPED | OUTPATIENT
Start: 2024-02-07 | End: 2024-02-19 | Stop reason: SDUPTHER

## 2023-11-16 RX ORDER — HYDROCODONE BITARTRATE AND ACETAMINOPHEN 5; 325 MG/1; MG/1
1 TABLET ORAL
Qty: 30 TABLET | Refills: 0 | Status: SHIPPED | OUTPATIENT
Start: 2023-12-09 | End: 2024-01-08

## 2023-11-16 NOTE — PROGRESS NOTES
This note was completed with dictation software and grammatical errors may exist.    CC: Back pain, Neck pain, headaches    HPI: The patient is a 43-year-old woman with a history of Chiari malformation, Hadley syndrome, pseudotumor cerebri with shunt, morbid obesity who presents in referral from Dr. Velasco for neck pain.  She returns in follow-up today for chronic neck and back pain.  She states that she is still continued to improve from her cervical surgery.  She still has some right arm radicular pain at times and some cramping in her right arm but it is greatly improved and she feels that her strength and balance are better.  Otherwise what bothers him most right now is back pain radiating into the posterior left thigh.  This has been occurring for the last 1 month.  She denies any weakness, denies any bowel or bladder incontinence.  It is worse with standing and walking, improved with sitting down.          Pain intervention history: She was seeing Dr. Gerson Renteria, pain management and until recently had been taking hydrocodone 5/325 once a day in addition to Flexeril 3 times a day and gabapentin 600 mg 3 times a day.  She apparently tested positive for Valium and so was dismissed from his practice. She is status post C7-T1 cervical interlaminar epidural steroid injection on 10/4/17 with 100% relief lasting 2 weeks.  She is status post a second cervical TABITHA on 11/14/17 with almost complete relief again but only lasting 2-3 weeks. She is status post bilateral L2, 3 and 4 medial branch radiofrequency ablation on 09/21/2018 with 75% relief.   She is status post C7-T1 interlaminar epidural steroid injection on 10/01/2020 with 100% relief of her neck pain.   She is status post cervical C3-C7 posterior approach laminectomy with Dr. Shari MD on 6/08/23    Spine surgeries:She is status post cervical C3-C7 posterior approach laminectomy with Dr. Shari MD on 6/08/23.      Antineuropathics:  NSAIDs:  Physical therapy:  Antidepressants:  Muscle relaxers:  Flexeril 10 mg up to 3 times daily  Opioids:  Hydrocodone 5/325 once daily  Antiplatelets/Anticoagulants:            ROS: She reports weight loss, headaches, easy bruising and back pain.  Balance of review of systems is negative.    Past Medical History:   Diagnosis Date    Anticoagulant long-term use     Asthma     Bronchitis     Chiari malformation type I     Chronic headache     Diabetes mellitus     Graves disease     Graves disease     Hyperlipidemia     Hypertension     Murmur, heart     NPH (normal pressure hydrocephalus)     Obesity     MANUEL on CPAP     Pseudotumor cerebri     Thyroid disease     Hadley syndrome        Past Surgical History:   Procedure Laterality Date    CARPAL TUNNEL RELEASE      CERVICAL SPINE SURGERY      CHOLECYSTECTOMY      EAR TUBE REMOVAL Bilateral     EPIDURAL STEROID INJECTION INTO CERVICAL SPINE N/A 10/1/2020    Procedure: Injection-steroid-epidural-cervical C7- T1 interlaminar;  Surgeon: Santana Pak MD;  Location: [facility] OR;  Service: Pain Management;  Laterality: N/A;    EYE SURGERY      strabismus    INJECTION OF FACET JOINT Bilateral 5/23/2018    Procedure: INJECTION-FACET L3/4 and L4/5;  Surgeon: Santana Pak MD;  Location: [facility] OR;  Service: Pain Management;  Laterality: Bilateral;  sacralization of L5 and a rudimentary disc space    MYRINGOTOMY W/ TUBES      POSTERIOR CERVICAL LAMINECTOMY N/A 6/8/2023    Procedure: LAMINECTOMY, SPINE, CERVICAL, POSTERIOR APPROACH    C3-C7 LAMINOPLASTY;  Surgeon: Sunita Mccarthy MD;  Location: [facility] OR;  Service: Neurosurgery;  Laterality: N/A;    RADIOFREQUENCY ABLATION OF LUMBAR MEDIAL BRANCH NERVE AT SINGLE LEVEL Bilateral 9/21/2018    Procedure: RADIOFREQUENCY ABLATION, NERVE, SPINAL, LUMBAR, MEDIAL BRANCH, L2, L3, L4;  Surgeon: Santana Pak MD;  Location: [facility] OR;  Service: Pain Management;  Laterality: Bilateral;     "TONSILLECTOMY      VENTRICULOPERITONEAL SHUNT         Social History     Socioeconomic History    Marital status: Single   Tobacco Use    Smoking status: Never     Passive exposure: Current    Smokeless tobacco: Never   Substance and Sexual Activity    Alcohol use: No    Drug use: Yes     Types: Hydrocodone         Medications/Allergies: See med card    Vitals:    11/16/23 1050   BP: 105/66   Pulse: 73   Weight: 124.5 kg (274 lb 7.6 oz)   Height: 4' 10" (1.473 m)   PainSc:   8   PainLoc: Back     Body mass index is 57.36 kg/m².  Physical exam:    Gen: A and O x3, pleasant, well-groomed  Skin: No rashes or obvious lesions  HEENT: PERRLA, no obvious deformities on ears or in canals. Trachea midline.  CVS: Regular rate and rhythm, normal palpable pulses.  Resp:No increased work of breathing, symmetrical chest rise.  Abdomen: Soft, NT/ND.  Musculoskeletal:  No antalgic gait.       Intact and symmetrical to light touch and pinprick in L1-S1 dermatomes bilaterally.    Lumbar spine:  Lumbar spine:  Range of motion is mildly reduced with both flexion and extension with increased pain in the midline low back and left buttock.  Derrick's test causes no increased pain on either side.    Supine straight leg raise is negative bilaterally.    Internal and external rotation of the hip causes no increased pain on either side.  Myofascial exam: No tenderness to palpation across lumbar paraspinous muscles.          Imaging:  MRI from 2/9/17 cervical spine demonstrates congenitally narrowed canal with disc bulging most prominently at C4/5 to the right side causing anterior cord compression but no signal changes.  There is narrowing of the canal at C3/4 to a lesser degree.    03/08/2023 MRI cervical spine  C2-C3: Developmental fusion.  The spinal canal and foramina are patent..  C3-C4: Shallow broad-based disc osteophyte complex and mild facet arthrosis. No substantial degenerative spinal canal or foraminal narrowing.  C4-C5: Shallow " broad-based disc osteophyte complex and mild bilateral facet arthrosis more pronounced on the left but no substantial degenerative spinal canal or foraminal narrowing..  C5-C6: Redemonstrated mild degenerative disc height loss and broad-based disc osteophyte complex lateralizing to the right contributing to moderate right central spinal canal narrowing. Similar mild flattening of the ventral cord surface. Uncovertebral spurring and facet arthrosis contribute to mild bilateral foraminal narrowing..  C6-C7: Shallow broad-based disc osteophyte complex.  No substantial degenerative spinal canal or foraminal narrowing..  C7-T1: Within normal limits.  The spinal canal and foramina are patent..     Soft tissues: The visualized paraspinal soft tissues are within normal limits.  Note is made of medialized internal carotid arteries at the C2 level to C5.    3/26/18 MRI L-spine:  T11-T12: Unremarkable.  There is no spinal canal or significant foraminal stenosis.   T12-L1: Unremarkable.  There is no spinal canal or significant foraminal stenosis.   L1-2: There is mild facet joint arthropathy.  There is no spinal canal or significant foraminal stenosis.   L2-3: There is mild facet joint arthropathy.  There is no spinal canal or significant foraminal stenosis.   L3-4: There is minimal bulging of the annulus.  There is mild-to-moderate facet joint arthropathy with ligamentum flavum thickening and trace left facet joint effusion.  There is mildly prominent dorsal epidural fat.  There is no spinal canal or significant foraminal stenosis.   L4-5: There is mild disc space narrowing and disc desiccation.  There is moderate-to-marked right and mild-to-moderate left facet joint arthropathy with ligamentum flavum thickening.  There is a small left facet joint effusion.  There is minimal bulging of the annulus.  There is no spinal canal stenosis.  There is mild right superior foraminal recess stenosis.   L5-S1: This is a rudimentary disc  space due to sacralization of L5.  There is no spinal canal or significant foraminal stenosis.    Assessment:   The patient is a 43-year-old woman with a history of Chiari malformation, pseudotumor cerebri with shunt, morbid obesity who presents in referral from Dr. Velasco for neck pain.    1. Lumbar spondylosis  Ambulatory referral/consult to Physical/Occupational Therapy      2. Lumbar radiculopathy  Ambulatory referral/consult to Physical/Occupational Therapy      3. Cervical radiculopathy            Plan:  1. We discussed her symptoms, reviewed her last MRI from 2018 of her lumbar spine.  We discussed that she would benefit from physical therapy.  [facility] [place].  2.  I will have her follow up in 3 months or sooner as needed.  I have refilled the Flexeril and hydrocodone for the next 3 months.    3.  We discussed if the back and leg pain worsening we would get an MRI of the lumbar spine.  It does look like her lowest disc was desiccated, otherwise all the other levels look fairly patent with normal disc hydration.  She likely has some facet arthropathy at the lowest 2 levels as well.

## 2023-12-12 ENCOUNTER — TELEPHONE (OUTPATIENT)
Dept: RESEARCH | Facility: OTHER | Age: 43
End: 2023-12-12
Payer: MEDICARE

## 2023-12-12 NOTE — TELEPHONE ENCOUNTER
Study Title: Transcending COVID-[place] barriers to [place] care in [place]: Pragmatic comparative effectiveness trial of evidence-based, on-demand, digital behavioral treatments for chronic pain.  Sponsor:  [facility]   Study: [facility] Digital Pain Treatment Study - Transcending COVID-19 barriers to pain care in [place]: Pragmatic comparative effectiveness trial of evidence-based, on-demand, digital behavioral treatments for chronic pain.   IRB/Protocol #: 2021.177 - Phase 2?  Study#([facility]):  [id number]  Principle Investigator - Corona Prince   Sub-Investigator - Martin Morfin   Sponsor:  [facility] Screening Interest in Study Call    Attempt #: 1, 2, 3+: 1      1. Contact Made: []Yes [x]No   1A. If yes, contact date:   1B. If no, date of final contact attempt:   1C. If no, reason(s) contact not made:  []Wrong number []No response [x]Other   1D. If other, please specify: left voicemail    2. Verbal commitment: []Yes  [x]No  2A. If yes, verbal commitment date:   2B. If no, reason: []Exclusion Criteria Met  []Desire not to participate []No reason provided []Other  2B1. If Type of Exclusion Criteria Met or other reason, specify: left voicemail     Left a detailed message for the subject to call the office back at [phone]. This is my first attempt at reaching the subject.

## 2023-12-18 ENCOUNTER — TELEPHONE (OUTPATIENT)
Dept: RESEARCH | Facility: OTHER | Age: 43
End: 2023-12-18
Payer: MEDICARE

## 2023-12-18 NOTE — TELEPHONE ENCOUNTER
Study Title: Transcending COVID-19 barriers to [place] care in [place]: Pragmatic comparative effectiveness trial of evidence-based, on-demand, digital behavioral treatments for chronic pain.  Sponsor:  [facility]   Study: [facility] Digital Pain Treatment Study - Transcending COVID-19 barriers to pain care in [place]: Pragmatic comparative effectiveness trial of evidence-based, on-demand, digital behavioral treatments for chronic pain.   IRB/Protocol #: 2021.177 - Phase 2?  Study#([facility]):  [id number]  Principle Investigator - Corona Prince   Sub-Investigator - Martin Morfin   Sponsor:  [facility] Screening Interest in Study Call    Attempt #: 1, 2, 3+: 2      1. Contact Made: []Yes [x]No   1A. If yes, contact date:   1B. If no, date of final contact attempt:   1C. If no, reason(s) contact not made:  []Wrong number []No response [x]Other   1D. If other, please specify: left voicemail    2. Verbal commitment: []Yes  [x]No  2A. If yes, verbal commitment date:   2B. If no, reason: []Exclusion Criteria Met  []Desire not to participate []No reason provided [x]Other  2B1. If Type of Exclusion Criteria Met or other reason, specify: left voicemail    I left a detailed message for the subject to call the office back at [phone]. This is my second attempt to reach the subject.

## 2023-12-27 ENCOUNTER — TELEPHONE (OUTPATIENT)
Dept: RESEARCH | Facility: OTHER | Age: 43
End: 2023-12-27
Payer: MEDICARE

## 2023-12-27 NOTE — TELEPHONE ENCOUNTER
Study Title: Transcending COVID-19 barriers to [place] care [place]: Pragmatic comparative effectiveness trial of evidence-based, on-demand, digital behavioral treatments for chronic pain.  Sponsor:  [facility]   Study: [facility] Digital Pain Treatment Study - Transcending COVID-19 barriers to pain care in [place]: Pragmatic comparative effectiveness trial of evidence-based, on-demand, digital behavioral treatments for chronic pain.   IRB/Protocol #: 2021.177 - Phase 2?  Study#([facility]):  [id number]  Principle Investigator - Corona Prince   Sub-Investigator - Martin Morfin   [facility] Interest in Study Call    Attempt #: 1, 2, 3+: 3+      1. Contact Made: []Yes [x]No   1A. If yes, contact date:   1B. If no, date of final contact attempt: [id number]  1C. If no, reason(s) contact not made:  []Wrong number [x]No response [x]Other   1D. If other, please specify: left voicemail    2. Verbal commitment: []Yes  [x]No  2A. If yes, verbal commitment date:   2B. If no, reason: []Exclusion Criteria Met  []Desire not to participate []No reason provided [x]Other  2B1. If Type of Exclusion Criteria Met or other reason, specify: left final voicemail/no response    I left a detailed message for the subject to call the office back at [phone]. This was my third and final attempt to reach the subject.

## 2024-01-03 DIAGNOSIS — G43.719 INTRACTABLE CHRONIC MIGRAINE WITHOUT AURA AND WITHOUT STATUS MIGRAINOSUS: ICD-10-CM

## 2024-01-03 RX ORDER — TOPIRAMATE 100 MG/1
100 TABLET, FILM COATED ORAL 2 TIMES DAILY
Qty: 60 TABLET | Refills: 11 | Status: SHIPPED | OUTPATIENT
Start: 2024-01-03

## 2024-01-03 RX ORDER — AMANTADINE HYDROCHLORIDE 100 MG/1
CAPSULE, GELATIN COATED ORAL
Qty: 28 CAPSULE | Refills: 11 | Status: SHIPPED | OUTPATIENT
Start: 2024-01-03

## 2024-01-05 ENCOUNTER — TELEPHONE (OUTPATIENT)
Dept: NEUROLOGY | Facility: CLINIC | Age: 44
End: 2024-01-05
Payer: MEDICARE

## 2024-01-05 NOTE — TELEPHONE ENCOUNTER
Called patient and left message that Botox will not be authorized by the time her appointment is scheduled on 1/11 so it will have to be reschedule. Requested call back as soon as possible to reschedule.

## 2024-01-05 NOTE — TELEPHONE ENCOUNTER
----- Message from Simi Silva, Patient Care Assistant sent at 1/5/2024 12:07 PM CST -----  Contact: Pt  Type: Return Call    Who Called: Pt  Who Left Message for Pt: Josiane  Does the patient know what this is regarding: Yes  Best Call Back Number: [phone]  Thank you~

## 2024-01-08 ENCOUNTER — TELEPHONE (OUTPATIENT)
Dept: NEUROLOGY | Facility: CLINIC | Age: 44
End: 2024-01-08
Payer: MEDICARE

## 2024-01-08 NOTE — TELEPHONE ENCOUNTER
Called patient and rescheduled Botox appointment due to pre authorization pending. Date/Time of 2/2 at 10:00 confirmed. Verbalized understanding. Information given to  to place in the system.

## 2024-02-14 ENCOUNTER — TELEPHONE (OUTPATIENT)
Dept: NEUROLOGY | Facility: CLINIC | Age: 44
End: 2024-02-14
Payer: MEDICARE

## 2024-02-14 NOTE — TELEPHONE ENCOUNTER
Left voicemail to call back to get scheduled.    ----- Message from Simi Silva, Patient Care Assistant sent at 2/14/2024 10:32 AM CST -----  Contact: Pt  Type: Needs Medical Advice    Who Called: Pt  Best Call Back Number: [phone]  Inquiry/Question: Pt is calling to reschedule her Botox appt. Please advise Thank you~

## 2024-02-16 ENCOUNTER — TELEPHONE (OUTPATIENT)
Dept: NEUROLOGY | Facility: CLINIC | Age: 44
End: 2024-02-16
Payer: MEDICARE

## 2024-02-16 NOTE — TELEPHONE ENCOUNTER
Called patient and scheduled Botox appointment. Date/Time confirmed. Verbalized understanding. Referral attached.

## 2024-02-16 NOTE — TELEPHONE ENCOUNTER
----- Message from Deepali Stratton sent at 2/16/2024 10:40 AM CST -----  Contact: self  Type:  Sooner Appointment Request    Name of Caller: Pt   When is the first available appointment? N/A  Symptoms: Need Botox Inj  Would the patient rather a call back or a response via [facility]?  Call   Best Call Back Number: [phone]  Please call to advise... Thank you...

## 2024-02-19 ENCOUNTER — OFFICE VISIT (OUTPATIENT)
Dept: PAIN MEDICINE | Facility: CLINIC | Age: 44
End: 2024-02-19
Payer: MEDICARE

## 2024-02-19 VITALS
HEART RATE: 73 BPM | HEIGHT: 58 IN | DIASTOLIC BLOOD PRESSURE: 73 MMHG | WEIGHT: 271.63 LBS | SYSTOLIC BLOOD PRESSURE: 123 MMHG | BODY MASS INDEX: 57.02 KG/M2

## 2024-02-19 DIAGNOSIS — M47.816 LUMBAR SPONDYLOSIS: Primary | ICD-10-CM

## 2024-02-19 DIAGNOSIS — M54.12 CERVICAL RADICULOPATHY: ICD-10-CM

## 2024-02-19 DIAGNOSIS — M54.16 LUMBAR RADICULOPATHY: ICD-10-CM

## 2024-02-19 PROCEDURE — 99213 OFFICE O/P EST LOW 20 MIN: CPT | Mod: PBBFAC,PO | Performed by: ANESTHESIOLOGY

## 2024-02-19 PROCEDURE — 99999 PR PBB SHADOW E&M-EST. PATIENT-LVL III: CPT | Mod: PBBFAC,,, | Performed by: ANESTHESIOLOGY

## 2024-02-19 PROCEDURE — 99213 OFFICE O/P EST LOW 20 MIN: CPT | Mod: S$PBB,,, | Performed by: ANESTHESIOLOGY

## 2024-02-19 RX ORDER — HYDROCODONE BITARTRATE AND ACETAMINOPHEN 5; 325 MG/1; MG/1
1 TABLET ORAL
Qty: 30 TABLET | Refills: 0 | Status: SHIPPED | OUTPATIENT
Start: 2024-04-19 | End: 2024-05-19

## 2024-02-19 RX ORDER — HYDROCODONE BITARTRATE AND ACETAMINOPHEN 5; 325 MG/1; MG/1
1 TABLET ORAL
Qty: 30 TABLET | Refills: 0 | Status: SHIPPED | OUTPATIENT
Start: 2024-02-19 | End: 2024-03-20

## 2024-02-19 RX ORDER — HYDROCODONE BITARTRATE AND ACETAMINOPHEN 5; 325 MG/1; MG/1
1 TABLET ORAL
Qty: 30 TABLET | Refills: 0 | Status: SHIPPED | OUTPATIENT
Start: 2024-03-20 | End: 2024-04-12 | Stop reason: SDUPTHER

## 2024-02-19 RX ORDER — CYCLOBENZAPRINE HCL 10 MG
TABLET ORAL
Qty: 90 TABLET | Refills: 2 | Status: SHIPPED | OUTPATIENT
Start: 2024-02-19 | End: 2024-04-12 | Stop reason: SDUPTHER

## 2024-02-19 NOTE — PROGRESS NOTES
This note was completed with dictation software and grammatical errors may exist.    CC: Back pain, Neck pain, headaches    HPI: The patient is a 43-year-old woman with a history of Chiari malformation, Hadley syndrome, pseudotumor cerebri with shunt, morbid obesity who presents in referral from Dr. Velasco for neck pain.  She returns in follow-up today for back and neck pain.  States that her back pain is tolerable.  For the last several days she has had some increasing right shoulder and right arm pain which is similar to what she had before her ACDF.  She states that she had been doing well until recently and feels like it is the cold weather that has caused this.  She has had this several times over the past few months.  Otherwise she denies any weakness, denies any change in balance.  She has been taking pain medication, hydrocodone usually just once a day.    Pain intervention history: She was seeing Dr. Gerson Renteria, pain management and until recently had been taking hydrocodone 5/325 once a day in addition to Flexeril 3 times a day and gabapentin 600 mg 3 times a day.  She apparently tested positive for Valium and so was dismissed from his practice. She is status post C7-T1 cervical interlaminar epidural steroid injection on 10/4/17 with 100% relief lasting 2 weeks.  She is status post a second cervical TABITHA on 11/14/17 with almost complete relief again but only lasting 2-3 weeks. She is status post bilateral L2, 3 and 4 medial branch radiofrequency ablation on 09/21/2018 with 75% relief.   She is status post C7-T1 interlaminar epidural steroid injection on 10/01/2020 with 100% relief of her neck pain.   She is status post cervical C3-C7 posterior approach laminectomy with Dr. Shari MD on 6/08/23    Spine surgeries:She is status post cervical C3-C7 posterior approach laminectomy with Dr. Shari MD on 6/08/23.     Antineuropathics:  NSAIDs:  Physical therapy:  Antidepressants:  Muscle  relaxers:  Flexeril 10 mg up to 3 times daily  Opioids:  Hydrocodone 5/325 once daily  Antiplatelets/Anticoagulants:            ROS: She reports weight loss, headaches, easy bruising and back pain.  Balance of review of systems is negative.    Past Medical History:   Diagnosis Date    Anticoagulant long-term use     Asthma     Bronchitis     Chiari malformation type I     Chronic headache     Diabetes mellitus     Graves disease     Graves disease     Hyperlipidemia     Hypertension     Murmur, heart     NPH (normal pressure hydrocephalus)     Obesity     MANUEL on CPAP     Pseudotumor cerebri     Thyroid disease     Hadley syndrome        Past Surgical History:   Procedure Laterality Date    CARPAL TUNNEL RELEASE      CERVICAL SPINE SURGERY      CHOLECYSTECTOMY      EAR TUBE REMOVAL Bilateral     EPIDURAL STEROID INJECTION INTO CERVICAL SPINE N/A 10/1/2020    Procedure: Injection-steroid-epidural-cervical C7- T1 interlaminar;  Surgeon: Santana Pak MD;  Location: [facility] OR;  Service: Pain Management;  Laterality: N/A;    EYE SURGERY      strabismus    INJECTION OF FACET JOINT Bilateral 5/23/2018    Procedure: INJECTION-FACET L3/4 and L4/5;  Surgeon: Santana Pak MD;  Location: [facility] OR;  Service: Pain Management;  Laterality: Bilateral;  sacralization of L5 and a rudimentary disc space    MYRINGOTOMY W/ TUBES      POSTERIOR CERVICAL LAMINECTOMY N/A 6/8/2023    Procedure: LAMINECTOMY, SPINE, CERVICAL, POSTERIOR APPROACH    C3-C7 LAMINOPLASTY;  Surgeon: Sunita Mccarthy MD;  Location: [facility] OR;  Service: Neurosurgery;  Laterality: N/A;    RADIOFREQUENCY ABLATION OF LUMBAR MEDIAL BRANCH NERVE AT SINGLE LEVEL Bilateral 9/21/2018    Procedure: RADIOFREQUENCY ABLATION, NERVE, SPINAL, LUMBAR, MEDIAL BRANCH, L2, L3, L4;  Surgeon: Santana Pak MD;  Location: [facility] OR;  Service: Pain Management;  Laterality: Bilateral;    TONSILLECTOMY      VENTRICULOPERITONEAL SHUNT         Social History  "    Socioeconomic History    Marital status: Single   Tobacco Use    Smoking status: Never     Passive exposure: Current    Smokeless tobacco: Never   Substance and Sexual Activity    Alcohol use: No    Drug use: Yes     Types: Hydrocodone         Medications/Allergies: See med card    Vitals:    02/19/24 1002   BP: 123/73   Pulse: 73   Weight: 123.2 kg (271 lb 9.7 oz)   Height: 4' 10" (1.473 m)   PainSc: 10-Worst pain ever   PainLoc: Neck     Body mass index is 56.77 kg/m².        2/19/2024    10:03 AM 11/16/2023    10:47 AM 5/13/2022     9:14 AM   Last 3 PDI Scores   Pain Disability Index (PDI) 31 33 30         Physical exam:    Gen: A and O x3, pleasant, well-groomed  Skin: No rashes or obvious lesions  HEENT: PERRLA, no obvious deformities on ears or in canals. Trachea midline.  CVS: Regular rate and rhythm, normal palpable pulses.  Resp:No increased work of breathing, symmetrical chest rise.  Abdomen: Soft, NT/ND.  Musculoskeletal:  No antalgic gait.   Cervical range of motion moderately reduced with lateral rotation, flexion and extension with slight increased pain on extension.  Normal strengthen upper extremities bilaterally        Imaging:  MRI from 2/9/17 cervical spine demonstrates congenitally narrowed canal with disc bulging most prominently at C4/5 to the right side causing anterior cord compression but no signal changes.  There is narrowing of the canal at C3/4 to a lesser degree.    03/08/2023 MRI cervical spine  C2-C3: Developmental fusion.  The spinal canal and foramina are patent..  C3-C4: Shallow broad-based disc osteophyte complex and mild facet arthrosis. No substantial degenerative spinal canal or foraminal narrowing.  C4-C5: Shallow broad-based disc osteophyte complex and mild bilateral facet arthrosis more pronounced on the left but no substantial degenerative spinal canal or foraminal narrowing..  C5-C6: Redemonstrated mild degenerative disc height loss and broad-based disc osteophyte " complex lateralizing to the right contributing to moderate right central spinal canal narrowing. Similar mild flattening of the ventral cord surface. Uncovertebral spurring and facet arthrosis contribute to mild bilateral foraminal narrowing..  C6-C7: Shallow broad-based disc osteophyte complex.  No substantial degenerative spinal canal or foraminal narrowing..  C7-T1: Within normal limits.  The spinal canal and foramina are patent..     Soft tissues: The visualized paraspinal soft tissues are within normal limits.  Note is made of medialized internal carotid arteries at the C2 level to C5.    3/26/18 MRI L-spine:  T11-T12: Unremarkable.  There is no spinal canal or significant foraminal stenosis.   T12-L1: Unremarkable.  There is no spinal canal or significant foraminal stenosis.   L1-2: There is mild facet joint arthropathy.  There is no spinal canal or significant foraminal stenosis.   L2-3: There is mild facet joint arthropathy.  There is no spinal canal or significant foraminal stenosis.   L3-4: There is minimal bulging of the annulus.  There is mild-to-moderate facet joint arthropathy with ligamentum flavum thickening and trace left facet joint effusion.  There is mildly prominent dorsal epidural fat.  There is no spinal canal or significant foraminal stenosis.   L4-5: There is mild disc space narrowing and disc desiccation.  There is moderate-to-marked right and mild-to-moderate left facet joint arthropathy with ligamentum flavum thickening.  There is a small left facet joint effusion.  There is minimal bulging of the annulus.  There is no spinal canal stenosis.  There is mild right superior foraminal recess stenosis.   L5-S1: This is a rudimentary disc space due to sacralization of L5.  There is no spinal canal or significant foraminal stenosis.    Assessment:   The patient is a 43-year-old woman with a history of Chiari malformation, pseudotumor cerebri with shunt, morbid obesity who presents in referral  from Dr. Velasco for neck pain.    1. Lumbar spondylosis        2. Lumbar radiculopathy  cyclobenzaprine (FLEXERIL) 10 MG tablet      3. Cervical radiculopathy  cyclobenzaprine (FLEXERIL) 10 MG tablet          Plan:  1. We discussed that she may just have a flare up due to the weather but if her right arm pain continues, we may consider an epidural steroid injection.    2. I have encouraged her to work on some type of exercise, walking would be best for her.  3.  I have refilled her Flexeril and hydrocodone for the next 3 months and I will have her follow up in 3 months or sooner as needed. [place] Board of Pharmacy website checked and no aberrant patterns noted.

## 2024-02-29 DIAGNOSIS — G43.719 INTRACTABLE CHRONIC MIGRAINE WITHOUT AURA AND WITHOUT STATUS MIGRAINOSUS: ICD-10-CM

## 2024-02-29 RX ORDER — GALCANEZUMAB 120 MG/ML
120 INJECTION, SOLUTION SUBCUTANEOUS
Qty: 1 ML | Refills: 1 | Status: SHIPPED | OUTPATIENT
Start: 2024-02-29 | End: 2024-04-12 | Stop reason: SDUPTHER

## 2024-03-19 RX ORDER — RIMEGEPANT SULFATE 75 MG/75MG
TABLET, ORALLY DISINTEGRATING ORAL
Qty: 8 TABLET | Refills: 11 | Status: SHIPPED | OUTPATIENT
Start: 2024-03-19

## 2024-04-05 ENCOUNTER — TELEPHONE (OUTPATIENT)
Dept: NEUROLOGY | Facility: CLINIC | Age: 44
End: 2024-04-05
Payer: MEDICARE

## 2024-04-05 NOTE — TELEPHONE ENCOUNTER
----- Message from Bebeto Gonzalez sent at 4/5/2024 10:21 AM CDT -----  Regarding: advice  Contact: patient  Type: Needs Medical Advice  Who Called:  Patient   Symptoms (please be specific):    How long has patient had these symptoms:    Pharmacy name and phone #:    Best Call Back Number: [phone]  Additional Information: Pt would like to know if her insurance is paying for her visit. Please call to advice. Thanks

## 2024-04-05 NOTE — TELEPHONE ENCOUNTER
Called patient and notified at this time it is showing authorized for the Botox. Patient confirmed appointment Date/Time.

## 2024-04-12 ENCOUNTER — OFFICE VISIT (OUTPATIENT)
Dept: PAIN MEDICINE | Facility: CLINIC | Age: 44
End: 2024-04-12
Payer: MEDICARE

## 2024-04-12 ENCOUNTER — PROCEDURE VISIT (OUTPATIENT)
Dept: NEUROLOGY | Facility: CLINIC | Age: 44
End: 2024-04-12
Payer: MEDICARE

## 2024-04-12 VITALS
RESPIRATION RATE: 17 BRPM | HEIGHT: 58 IN | WEIGHT: 275.13 LBS | BODY MASS INDEX: 57.75 KG/M2 | HEART RATE: 76 BPM | BODY MASS INDEX: 57.75 KG/M2 | DIASTOLIC BLOOD PRESSURE: 86 MMHG | SYSTOLIC BLOOD PRESSURE: 119 MMHG | SYSTOLIC BLOOD PRESSURE: 116 MMHG | HEIGHT: 58 IN | HEART RATE: 76 BPM | DIASTOLIC BLOOD PRESSURE: 77 MMHG | TEMPERATURE: 97 F | WEIGHT: 275.13 LBS

## 2024-04-12 DIAGNOSIS — G89.4 CHRONIC PAIN DISORDER: ICD-10-CM

## 2024-04-12 DIAGNOSIS — M54.12 CERVICAL RADICULOPATHY: ICD-10-CM

## 2024-04-12 DIAGNOSIS — G43.719 INTRACTABLE CHRONIC MIGRAINE WITHOUT AURA AND WITHOUT STATUS MIGRAINOSUS: ICD-10-CM

## 2024-04-12 DIAGNOSIS — M47.816 LUMBAR SPONDYLOSIS: ICD-10-CM

## 2024-04-12 DIAGNOSIS — Z02.89 PAIN MANAGEMENT CONTRACT SIGNED: Primary | ICD-10-CM

## 2024-04-12 DIAGNOSIS — E66.01 MORBID OBESITY WITH BMI OF 60.0-69.9, ADULT: ICD-10-CM

## 2024-04-12 DIAGNOSIS — M54.16 LUMBAR RADICULOPATHY: ICD-10-CM

## 2024-04-12 PROCEDURE — 99214 OFFICE O/P EST MOD 30 MIN: CPT | Mod: PBBFAC,PO

## 2024-04-12 PROCEDURE — 99999PBSHW PR PBB SHADOW TECHNICAL ONLY FILED TO HB: Mod: JW,PBBFAC,,

## 2024-04-12 PROCEDURE — 64615 CHEMODENERV MUSC MIGRAINE: CPT | Mod: PBBFAC,PO | Performed by: PSYCHIATRY & NEUROLOGY

## 2024-04-12 PROCEDURE — 99214 OFFICE O/P EST MOD 30 MIN: CPT | Mod: S$PBB,,,

## 2024-04-12 PROCEDURE — 99999 PR PBB SHADOW E&M-EST. PATIENT-LVL IV: CPT | Mod: PBBFAC,,,

## 2024-04-12 PROCEDURE — 64615 CHEMODENERV MUSC MIGRAINE: CPT | Mod: S$PBB,,, | Performed by: PSYCHIATRY & NEUROLOGY

## 2024-04-12 RX ORDER — GALCANEZUMAB 120 MG/ML
120 INJECTION, SOLUTION SUBCUTANEOUS
Qty: 1 ML | Refills: 11 | Status: SHIPPED | OUTPATIENT
Start: 2024-04-12

## 2024-04-12 RX ORDER — HYDROCODONE BITARTRATE AND ACETAMINOPHEN 5; 325 MG/1; MG/1
1 TABLET ORAL
Qty: 30 TABLET | Refills: 0 | Status: SHIPPED | OUTPATIENT
Start: 2024-06-18 | End: 2024-07-18

## 2024-04-12 RX ORDER — CYCLOBENZAPRINE HCL 10 MG
TABLET ORAL
Qty: 90 TABLET | Refills: 2 | Status: SHIPPED | OUTPATIENT
Start: 2024-04-12

## 2024-04-12 RX ORDER — HYDROCODONE BITARTRATE AND ACETAMINOPHEN 5; 325 MG/1; MG/1
1 TABLET ORAL
Qty: 30 TABLET | Refills: 0 | Status: SHIPPED | OUTPATIENT
Start: 2024-05-19 | End: 2024-06-18

## 2024-04-12 RX ADMIN — ONABOTULINUMTOXINA 200 UNITS: 100 INJECTION, POWDER, LYOPHILIZED, FOR SOLUTION INTRADERMAL; INTRAMUSCULAR at 09:04

## 2024-04-12 NOTE — PROCEDURES
ProceduresProcedures       PROCEDURE PERFORMED: Botulinum toxin injection (82348)    CLINICAL INDICATION: G43.719    A time out was conducted just before the start of the procedure to verify the correct patient and procedure, procedure location, and all relevant critical information.     Conventional methods of treatment such as multiple medications , both on and   off label have been tried including: anti-epileptics (topiramate, gabapentin, diamox), beta blockers (metoprolol),  and antidepressants (paxil - cannot trial additional ones due to being on Paxil). The patient has been unresponsive and refractory.The patient meets criteria for chronic headaches according to the ICHD-II, the patient has more than 15 headaches a month which last for more than 4 hours a day.    The Botox injections have achieved well over 50%  improvement in the patient's symptoms. Migraines have been reduced at least 7 days per month and the number of cumulative hours suffering with headaches has been reduced at least 100 total hours per month. Today she does have a headache indicating that the Botox has worn off. Frequency of treatment is every 3 months unless no response to the treatments, at which time we will discontinue the injections.      DESCRIPTION OF PROCEDURE: After obtaining informed consent and under   aseptic technique, a total of 135 units of botulinum toxin type A were   injected in the following muscles:     -- Procerus 5 units  --  5 units bilaterally  -- Frontalis 20 units  -- Temporalis 20 units bilaterally  -- Occipitalis 15 units bilaterally  -- Trapezius 15 units bilaterally.     -- Upper cervical paraspinals 10 units bilaterally spared due to prior surgery in this area     The patient tolerated the procedure well. There were no complications. The patient was given a prescription for repeat treatment in 12 weeks     Unavoidable waste - 65 units       Cee Campbell M.D  Medical Director, Headache and  Facial Pain  [facility]

## 2024-04-12 NOTE — TELEPHONE ENCOUNTER
I have reviewed the [place] Board of Pharmacy website and there are no abberancies.        She currently has prescription to fill in April.  These will be for the following 2 months.

## 2024-04-12 NOTE — PROGRESS NOTES
Dayana Banks was admitted to [facility] from ED via cart accompanied by significant other and daughter.   Reason for hospitalization is asthma.   Upon arrival, patient is stable. Patient has history significant for asthma, leukemia.  Patient oriented to bed, call light, , room and unit.  Patient provided with the following educational materials upon admission:safety, advanced directives, infection control and pain.   Level of understanding patient verbalized understanding.   Admission orders received at this time.   Dr. Kim notified of patient arrival.   See Epic documentation for patient individualized nursing care plan.   This note was completed with dictation software and grammatical errors may exist.    CC: Back pain, Neck pain, headaches    HPI: The patient is a 43-year-old woman with a history of Chiari malformation, Hadley syndrome, pseudotumor cerebri with shunt, morbid obesity who presents in referral from Dr. Velasco for neck pain.  She returns for follow-up with some continued neck and lower back pain, 5/10, mainly located in her neck and her lower back.  She denies any radiating pain, numbness, weakness or any new changes to her bowel bladder function.  Overall, doing well.  She reports walking around her neighborhood for exercise.  She continues to take Flexeril and hydrocodone once daily usually at night.  She denies any ill side effects or adverse events with these medications.      Pain intervention history: She was seeing Dr. Gerson Renteria, pain management and until recently had been taking hydrocodone 5/325 once a day in addition to Flexeril 3 times a day and gabapentin 600 mg 3 times a day.  She apparently tested positive for Valium and so was dismissed from his practice. She is status post C7-T1 cervical interlaminar epidural steroid injection on 10/4/17 with 100% relief lasting 2 weeks.  She is status post a second cervical TABITHA on 11/14/17 with almost complete relief again but only lasting 2-3 weeks. She is status post bilateral L2, 3 and 4 medial branch radiofrequency ablation on 09/21/2018 with 75% relief.   She is status post C7-T1 interlaminar epidural steroid injection on 10/01/2020 with 100% relief of her neck pain.   She is status post cervical C3-C7 posterior approach laminectomy with Dr. Shari MD on 6/08/23    Spine surgeries:She is status post cervical C3-C7 posterior approach laminectomy with Dr. Shari MD on 6/08/23.     Antineuropathics:  NSAIDs:  Physical therapy:  Antidepressants:  Muscle relaxers:  Flexeril 10 mg up to 3 times daily  Opioids:  Hydrocodone 5/325 once  daily  Antiplatelets/Anticoagulants:            ROS: She reports weight loss, headaches, easy bruising and back pain.  Balance of review of systems is negative.    Past Medical History:   Diagnosis Date    Anticoagulant long-term use     Asthma     Bronchitis     Chiari malformation type I     Chronic headache     Diabetes mellitus     Graves disease     Graves disease     Hyperlipidemia     Hypertension     Murmur, heart     NPH (normal pressure hydrocephalus)     Obesity     MANUEL on CPAP     Pseudotumor cerebri     Thyroid disease     Hadley syndrome        Past Surgical History:   Procedure Laterality Date    CARPAL TUNNEL RELEASE      CERVICAL SPINE SURGERY      CHOLECYSTECTOMY      EAR TUBE REMOVAL Bilateral     EPIDURAL STEROID INJECTION INTO CERVICAL SPINE N/A 10/1/2020    Procedure: Injection-steroid-epidural-cervical C7- T1 interlaminar;  Surgeon: Santana Pak MD;  Location: [facility] OR;  Service: Pain Management;  Laterality: N/A;    EYE SURGERY      strabismus    INJECTION OF FACET JOINT Bilateral 5/23/2018    Procedure: INJECTION-FACET L3/4 and L4/5;  Surgeon: Santana Pak MD;  Location: [facility] OR;  Service: Pain Management;  Laterality: Bilateral;  sacralization of L5 and a rudimentary disc space    MYRINGOTOMY W/ TUBES      POSTERIOR CERVICAL LAMINECTOMY N/A 6/8/2023    Procedure: LAMINECTOMY, SPINE, CERVICAL, POSTERIOR APPROACH    C3-C7 LAMINOPLASTY;  Surgeon: Sunita Mccarthy MD;  Location: [facility] OR;  Service: Neurosurgery;  Laterality: N/A;    RADIOFREQUENCY ABLATION OF LUMBAR MEDIAL BRANCH NERVE AT SINGLE LEVEL Bilateral 9/21/2018    Procedure: RADIOFREQUENCY ABLATION, NERVE, SPINAL, LUMBAR, MEDIAL BRANCH, L2, L3, L4;  Surgeon: Santana Pak MD;  Location: [facility] OR;  Service: Pain Management;  Laterality: Bilateral;    TONSILLECTOMY      VENTRICULOPERITONEAL SHUNT         Social History     Socioeconomic History    Marital status: Single   Tobacco Use    Smoking status: Never      "Passive exposure: Current    Smokeless tobacco: Never   Substance and Sexual Activity    Alcohol use: No    Drug use: Yes     Types: Hydrocodone         Medications/Allergies: See med card    Vitals:    04/12/24 0900   BP: 116/77   Pulse: 76   Weight: 124.8 kg (275 lb 2.2 oz)   Height: 4' 10" (1.473 m)   PainSc:   8   PainLoc: Back     Body mass index is 57.5 kg/m².        4/12/2024     9:03 AM 2/19/2024    10:03 AM 11/16/2023    10:47 AM   Last 3 PDI Scores   Pain Disability Index (PDI) 27 31 33         Physical exam:    Gen: A and O x3, pleasant, well-groomed  Skin: No rashes or obvious lesions  HEENT: PERRLA, no obvious deformities on ears or in canals. Trachea midline.  CVS: Regular rate and rhythm, normal palpable pulses.  Resp:No increased work of breathing, symmetrical chest rise.  Abdomen: Soft, NT/ND.  Musculoskeletal:  No antalgic gait.   Cervical range of motion moderately reduced with lateral rotation, flexion and extension with slight increased pain on extension.    Upper extremities:  5/5 strength bilaterally  Lower extremities:  5/5 strength bilaterally        Imaging:  MRI from 2/9/17 cervical spine demonstrates congenitally narrowed canal with disc bulging most prominently at C4/5 to the right side causing anterior cord compression but no signal changes.  There is narrowing of the canal at C3/4 to a lesser degree.    03/08/2023 MRI cervical spine  C2-C3: Developmental fusion.  The spinal canal and foramina are patent..  C3-C4: Shallow broad-based disc osteophyte complex and mild facet arthrosis. No substantial degenerative spinal canal or foraminal narrowing.  C4-C5: Shallow broad-based disc osteophyte complex and mild bilateral facet arthrosis more pronounced on the left but no substantial degenerative spinal canal or foraminal narrowing..  C5-C6: Redemonstrated mild degenerative disc height loss and broad-based disc osteophyte complex lateralizing to the right contributing to moderate right " central spinal canal narrowing. Similar mild flattening of the ventral cord surface. Uncovertebral spurring and facet arthrosis contribute to mild bilateral foraminal narrowing..  C6-C7: Shallow broad-based disc osteophyte complex.  No substantial degenerative spinal canal or foraminal narrowing..  C7-T1: Within normal limits.  The spinal canal and foramina are patent..     Soft tissues: The visualized paraspinal soft tissues are within normal limits.  Note is made of medialized internal carotid arteries at the C2 level to C5.    3/26/18 MRI L-spine:  T11-T12: Unremarkable.  There is no spinal canal or significant foraminal stenosis.   T12-L1: Unremarkable.  There is no spinal canal or significant foraminal stenosis.   L1-2: There is mild facet joint arthropathy.  There is no spinal canal or significant foraminal stenosis.   L2-3: There is mild facet joint arthropathy.  There is no spinal canal or significant foraminal stenosis.   L3-4: There is minimal bulging of the annulus.  There is mild-to-moderate facet joint arthropathy with ligamentum flavum thickening and trace left facet joint effusion.  There is mildly prominent dorsal epidural fat.  There is no spinal canal or significant foraminal stenosis.   L4-5: There is mild disc space narrowing and disc desiccation.  There is moderate-to-marked right and mild-to-moderate left facet joint arthropathy with ligamentum flavum thickening.  There is a small left facet joint effusion.  There is minimal bulging of the annulus.  There is no spinal canal stenosis.  There is mild right superior foraminal recess stenosis.   L5-S1: This is a rudimentary disc space due to sacralization of L5.  There is no spinal canal or significant foraminal stenosis.    Assessment:   The patient is a 43-year-old woman with a history of Chiari malformation, pseudotumor cerebri with shunt, morbid obesity who presents in referral from Dr. Velasco for neck pain.    1. Pain management contract signed         2. Lumbar radiculopathy  cyclobenzaprine (FLEXERIL) 10 MG tablet      3. Cervical radiculopathy  cyclobenzaprine (FLEXERIL) 10 MG tablet      4. Lumbar spondylosis        5. Morbid obesity with BMI of 60.0-69.9, adult        6. Chronic pain disorder              Plan:  Overall, she is doing very well.  Taking hydrocodone once daily at night with added relief.  Refill for Flexeril provided today  Discussed continuing with exercising walking stretching.  Prescriptions for hydrocodone 5/325 mg 30 tablets to take once daily sent to Dr. Pak today for approval. I have reviewed the [place] Board of Pharmacy website and there are no abberancies.    Follow up in 3 months or sooner if needed

## 2024-05-28 ENCOUNTER — TELEPHONE (OUTPATIENT)
Dept: NEUROLOGY | Facility: CLINIC | Age: 44
End: 2024-05-28
Payer: MEDICARE

## 2024-05-28 NOTE — TELEPHONE ENCOUNTER
----- Message from Kasie Platt sent at 5/28/2024  8:22 AM CDT -----  Contact: Patient  Type:  RX Refill Request    Who Called:   Patient  Refill or New Rx:  Refill    RX Name and Strength:  HYDROcodone-acetaminophen (NORCO) 5-325 mg per tablet     Preferred Pharmacy with phone number:      [facility] - [place], [place] - [address]  [address] [facility] [address]  Phone: [phone] Fax: [phone]    Local or Mail Order:  Local  Ordering Provider:  Dr Pak    Would the patient rather a call back or a response via [facility]?   Call back  Best Call Back Number:  [phone]    Additional Information:   States she is completely out of this Norco - states for her insurance to pay for the whole prescription, they need prior authorization - please call - thank you

## 2024-05-28 NOTE — TELEPHONE ENCOUNTER
----- Message from Kasie Platt sent at 5/28/2024  8:20 AM CDT -----  Contact: Patient  Type:  Needs Medical Advice    Who Called:   Patient    Symptoms (please be specific):   Whole head is sore and cannot touch it   How long has patient had these symptoms:   A week    Pharmacy name and phone #:        [facility] - [place], [place] - [address]  [address] [facility] [place] [address]  Phone: [phone] Fax: [phone]    Would the patient rather a call back or a response via [facility]?  Call back  Best Call Back Number:   [phone]    Additional Information:   States she would like to speak with someone - please call - thank you

## 2024-05-29 RX ORDER — HYDROCODONE BITARTRATE AND ACETAMINOPHEN 5; 325 MG/1; MG/1
1 TABLET ORAL
Qty: 30 TABLET | Refills: 0 | OUTPATIENT
Start: 2024-05-29 | End: 2024-06-28

## 2024-06-19 ENCOUNTER — TELEPHONE (OUTPATIENT)
Dept: PAIN MEDICINE | Facility: CLINIC | Age: 44
End: 2024-06-19
Payer: MEDICARE

## 2024-06-25 ENCOUNTER — OFFICE VISIT (OUTPATIENT)
Dept: NEUROLOGY | Facility: CLINIC | Age: 44
End: 2024-06-25
Payer: MEDICARE

## 2024-06-25 VITALS
TEMPERATURE: 97 F | HEART RATE: 91 BPM | HEIGHT: 58 IN | WEIGHT: 267.63 LBS | RESPIRATION RATE: 18 BRPM | SYSTOLIC BLOOD PRESSURE: 126 MMHG | DIASTOLIC BLOOD PRESSURE: 86 MMHG | BODY MASS INDEX: 56.18 KG/M2

## 2024-06-25 DIAGNOSIS — G99.2 MYELOPATHY IN DISEASES CLASSIFIED ELSEWHERE: ICD-10-CM

## 2024-06-25 DIAGNOSIS — G43.719 INTRACTABLE CHRONIC MIGRAINE WITHOUT AURA AND WITHOUT STATUS MIGRAINOSUS: ICD-10-CM

## 2024-06-25 DIAGNOSIS — Z79.4 TYPE 2 DIABETES MELLITUS WITH HYPERGLYCEMIA, WITH LONG-TERM CURRENT USE OF INSULIN: ICD-10-CM

## 2024-06-25 DIAGNOSIS — G43.711 CHRONIC MIGRAINE WITHOUT AURA, WITH INTRACTABLE MIGRAINE, SO STATED, WITH STATUS MIGRAINOSUS: Primary | ICD-10-CM

## 2024-06-25 DIAGNOSIS — E11.65 TYPE 2 DIABETES MELLITUS WITH HYPERGLYCEMIA, WITH LONG-TERM CURRENT USE OF INSULIN: ICD-10-CM

## 2024-06-25 PROCEDURE — 99214 OFFICE O/P EST MOD 30 MIN: CPT | Mod: 25,S$PBB,, | Performed by: PSYCHIATRY & NEUROLOGY

## 2024-06-25 PROCEDURE — 99215 OFFICE O/P EST HI 40 MIN: CPT | Mod: PBBFAC,PO | Performed by: PSYCHIATRY & NEUROLOGY

## 2024-06-25 PROCEDURE — 99999 PR PBB SHADOW E&M-EST. PATIENT-LVL V: CPT | Mod: PBBFAC,,, | Performed by: PSYCHIATRY & NEUROLOGY

## 2024-06-25 PROCEDURE — 99999PBSHW PR PBB SHADOW TECHNICAL ONLY FILED TO HB: Mod: PBBFAC,,,

## 2024-06-25 RX ORDER — KETOROLAC TROMETHAMINE 30 MG/ML
30 INJECTION, SOLUTION INTRAMUSCULAR; INTRAVENOUS
Status: COMPLETED | OUTPATIENT
Start: 2024-06-25 | End: 2024-06-25

## 2024-06-25 RX ORDER — TIRZEPATIDE 5 MG/.5ML
INJECTION, SOLUTION SUBCUTANEOUS
COMMUNITY

## 2024-06-25 RX ORDER — TOPIRAMATE 100 MG/1
100 TABLET, FILM COATED ORAL 2 TIMES DAILY
Qty: 60 TABLET | Refills: 11 | Status: SHIPPED | OUTPATIENT
Start: 2024-06-25

## 2024-06-25 RX ADMIN — KETOROLAC TROMETHAMINE 30 MG: 30 INJECTION, SOLUTION INTRAMUSCULAR; INTRAVENOUS at 01:06

## 2024-06-26 NOTE — PROGRESS NOTES
"Date of service: 6/25/2024      Subjective:      Chief complaint: Migraine       Patient ID: Shanna Douglass is a 44 y.o. lady with Hadley syndrome, Chiari type 1 malformation, s/p posterior fossa decompression June 2016, morbid obesity, pseudotumor cerebri s/p shunt, obstructive sleep apnea on CPAP, presenting for headache follow up    History of Present Illness    INTERVAL HISTORY 06/25/2024  The patient presents for follow up. Ever since she was late getting her Botox, her migraines are out of control. She is also on Topamax and Aimovig for prevention. Nurtec for acute attacks. She presents for acute treatment and to discuss options.    INTERVAL HISTORY 7/31/2020  At last visit almost a year ago with Dr. Chamorro, patient was doing a little worse. She was on Botox and Aimovig and Topamax. She was referred for weight loss surgery and for eye exam. Her last Botox session was 5/11/2020. In May, her Topamax was increased to 100 mg BID. She has taken one month of Emgality (loading dose).     Today she reports she is a little worse. She reports after last Botox wshe hurt worse and more pain where her shunt is. That pain is described as burning. Current pain 6 with range of 2-10. She has daily headaches. she takes tylenol rarely. She states tylenol and Relpax are "somewhat" helping the headaches. She has not had any headache-free days since last Botox. She is concerned as she has never had this happen where she begins with daily headaches two days after Botox injections.     She reports some memory issues since increasing Topamax. She has not noticed any improvement on the increased dose. She cannot recall last eye appointment. She has not seen bariatric surgery. She is trying to lose it on her own. Chart indicates she has gained ten pounds in past eleven months.     She also had an EMG with Dr. Gonzalez on 7/21/2020 which showed some cervical radiculopathy.     INTERVAL HISTORY - 8/20/19     Today she reports she is a " little worse.  There are times when it feels like someone is striking her under her shunt.  Her pain is associated around her shunt.  Her current pain score is 10 with a range of 3-10.     She has had 6 Botox sessions so far, most recent 06/14/2019.  She remains on Aimovig started at last visit.  She things Botox, Topamax, Aimovig are helping    She reports some changes to her vision, feels like it is blurred, needs to blink a few times. Optic discs last visit 3/7/19 were normal.    She is interested in pursuing weight loss surgery.      INTERVAL HISTORY-03/07/2019    Her 5th Botox will be 03/22/2019.  At last visit we reduce topiramate to help with nausea.  Started amantadine for energy, started Compazine and rizatriptan for migraine since Celebrex was ineffective.    Today she reports that the same.  She still has headaches and sharp pain every once in a while.  Her current pain score six learned to 4-10.  She thinks her body is used to the Topamax. She thinks the headaches have escalated since then. maxalt is very effective, compazine less so but still effective.     She had a recent visual field test which was good.     INTERVAL HISTORY - 1/17/19     She is status post 4 botox injections most recent 12/21/18.  Today she reports she is much worse.  She hurts where her shunt valve is with some focal tenderness. She hurts with the shunt valve is an overhead.  Her pain score is 10 with a range of 5-11.  She also reports she is sick to her stomach when she eats and after she eats that she has no energy.  She thinks his symptoms started worsening after her grandmother passed away in November 2018 from advanced COPD.    She reports the Botox continues to be effective.  The Celebrex that she tried was completely ineffective.  She is using her Norco for to treat headaches.    INTERVAL HISTORY - 9/12/18    She started Botox for 13 18 and reported 100% improvement with that session.  Her 2nd session was 07/06/2018, she  "reports this did help initially but about 3 weeks ago which was close to this 6-7 week daniel after Botox her headache started to return.  She reports she is much worse.  She has sharp pains in the neck and all over her head.  Her current pain score is 8 with range of 5-8.  The sumatriptan was making her heart race so she asked for an alternative.  Neurosurgery is working her up for potential shunt infection she will have labs done today.  She also reports that in the interim she was diagnosed with Graves disease and she is undergoing blood work for this.    ORIGINAL HEADACHE HISTORY - 3/26/18   Age at onset and course over time: headaches began in 1999. During that time had an eye exam and was found to have papilledema, had an Lp, and a brain MRI initial workup done at [facility]. In 2007 was treated at [facility] with a  shunt. Has not had any revisions since then. She reports she has never lost vision. Dr. Blackwood is her eye doctor in [place], [place]. 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     Codman shunt set to 150 - she reports last reprogram had a positive effect on her headaches     Location: frontotemporal, sometimes occipial  Quality: stabbing, pressure, throbbing  Severity: current 5, range 3 to 10   Duration: hours  Frequency: daily x 1 year, previous to that every other day. Wakes up without headache and it escalates as day goes on. Rarely, will wake up with headache   Alleviated by: darkness, heat, menses  Exacerbated by: fatigue, light, noise, smells, cough, sneezing, menses, change in weather   Associated with: Photophobia, phonophobia, osmophobia, blurred vision, " double vision, vomiting, dizziness, vertigo, tinnitus, congestion, problems concentrating, neck tightness   Sleep habits:  Caffeine intake: 2 per day     Current acute treatment:  -- maxalt - effective  -- compazine - effective   -- norco 5mg BID - for headaches and back pain   -- tylenol     Current prevention:  -- Aimovig started 03/2019  -- botox - initiated 04/13/2018  -- metoprolol XL 25mg   -- topiramate 100mg qHS -  At BID makes her mouth dry, sleepy, has helped somewhat   (paxil)  (lasix 80mg)     Previously tried/failed acute treatment:  -- fioricet  -- excedrin   -- BC powder   -- a sumatriptan - palpitations  -- celebrex - not helping     Previously tried/failed preventative treatment:    -- gabapentin   -- diamox - hives       Review of patient's allergies indicates:   Allergen Reactions    Diamox [acetazolamide] Hives    Dye Swelling and Rash     Current Outpatient Medications   Medication Sig Dispense Refill    amantadine HCl (SYMMETREL) 100 mg capsule  TAKE ONE CAPSULE EVERY DAY FOR ENERGY *THANK YOU* 30 capsule 11    aspirin 81 MG Chew Take 81 mg by mouth once daily.      bepotastine besilate (BEPREVE) 1.5 % Drop Apply to eye.      celecoxib (CELEBREX) 100 MG capsule Take 2 capsules (200 mg total) by mouth 2 (two) times daily as needed (headache and sharp pain). No more than 3 days per week. 30 capsule 11    cyclobenzaprine (FLEXERIL) 10 MG tablet Take 1 tablet (10 mg total) by mouth 3 (three) times daily as needed for Muscle spasms. 90 tablet 2    furosemide (LASIX) 80 MG tablet Take 80 mg by mouth once daily.       galcanezumab-gnlm (EMGALITY PEN) 120 mg/mL PnIj Inject 1 ml (120 mg) into the skin every 28 days. 1 mL 11    HYDROcodone-acetaminophen (NORCO) 5-325 mg per tablet Take 1 tablet by mouth every 12 (twelve) hours as needed for Pain. 60 tablet 0    hydrOXYzine HCl (ATARAX) 25 MG tablet Take 25 mg by mouth 3 (three) times daily.      meloxicam (MOBIC) 7.5 MG tablet       metFORMIN  (GLUCOPHAGE-XR) 500 MG XR 24hr tablet Take 1,000 mg by mouth.      methIMAzole (TAPAZOLE) 10 MG Tab Take 10 mg by mouth 2 (two) times daily.      metoprolol succinate (TOPROL-XL) 25 MG 24 hr tablet Take 1 tablet (25 mg total) by mouth once daily. 90 tablet 1    metoprolol succinate (TOPROL-XL) 25 MG 24 hr tablet Take 25 mg by mouth once daily.      montelukast (SINGULAIR) 10 mg tablet Take 10 mg by mouth every evening.      multivit-minerals/folic acid (ONE-A-DAY WOMEN VITACRAVES ORAL)       omeprazole (PRILOSEC) 20 MG capsule Take 40 mg by mouth once daily.       ondansetron (ZOFRAN) 4 MG tablet Take 8 mg by mouth 2 (two) times daily.      ondansetron (ZOFRAN-ODT) 4 MG TbDL Take 8 mg by mouth every 12 (twelve) hours.      paroxetine (PAXIL) 20 MG tablet 40 mg.       potassium chloride (MICRO-K) 10 MEQ CpSR Take 10 mEq by mouth once.      potassium chloride SA (K-DUR,KLOR-CON) 10 MEQ tablet       predniSONE (DELTASONE) 10 MG tablet 4 tab PO in AM  X 2 days, 3 tab in AM x 2 days, 2 tab in AM x 2 days, 1 tab in AM x 2 days then stop. 20 tablet 0    predniSONE (DELTASONE) 10 MG tablet 4 tab PO in AM  X 2 days, 3 tab in AM x 2 days, 2 tab in AM x 2 days, 1 tab in AM x 2 days then stop. 20 tablet 0    prochlorperazine (COMPAZINE) 10 MG tablet Take 1 tablet (10 mg total) by mouth every 6 (six) hours as needed (migraine or nausea). 60 tablet 11    ranitidine (ZANTAC) 300 MG capsule Take 300 mg by mouth every evening.      rosuvastatin (CRESTOR) 10 MG tablet TAKE 1 TABLET DAILY FOR CHOLESTEROL *THANK YOU* 90 tablet 3    selenium 200 mcg Cap Take 200 mcg by mouth once daily.      topiramate (TOPAMAX) 100 MG tablet Take 1 tablet (100 mg total) by mouth 2 (two) times daily. 180 tablet 3     Current Facility-Administered Medications   Medication Dose Route Frequency Provider Last Rate Last Dose    onabotulinumtoxina injection 200 Units  200 Units Intramuscular Q90 Days Flory Chamorro MD   200 Units at 07/06/18 2033     onabotulinumtoxina injection 200 Units  200 Units Intramuscular Q90 Days Flory Chamorro MD   200 Units at 11/19/19 1312    onabotulinumtoxina injection 200 Units  200 Units Intramuscular Q90 Days Flory Chamorro MD   200 Units at 05/11/20 1705    perflutren protein-A microsphr 0.22 mg/mL IV susp  2 mL Intravenous 1 time in Clinic/HOD Red Ward MD           Past Medical History  Past Medical History:   Diagnosis Date    Asthma     Bronchitis     Chiari malformation type I     Chronic headache     Graves disease     Graves disease     Hyperlipidemia     Hypertension     Murmur, heart     NPH (normal pressure hydrocephalus)     Obesity     MANUEL on CPAP     Pseudotumor cerebri     Thyroid disease     Hadley syndrome        Past Surgical History  Past Surgical History:   Procedure Laterality Date    CARPAL TUNNEL RELEASE      CERVICAL SPINE SURGERY      CHOLECYSTECTOMY      EAR TUBE REMOVAL Bilateral     EYE SURGERY      strabismus    INJECTION OF FACET JOINT Bilateral 5/23/2018    Procedure: INJECTION-FACET L3/4 and L4/5;  Surgeon: Santana Pak MD;  Location: [facility] OR;  Service: Pain Management;  Laterality: Bilateral;  sacralization of L5 and a rudimentary disc space    MYRINGOTOMY W/ TUBES      RADIOFREQUENCY ABLATION OF LUMBAR MEDIAL BRANCH NERVE AT SINGLE LEVEL Bilateral 9/21/2018    Procedure: RADIOFREQUENCY ABLATION, NERVE, SPINAL, LUMBAR, MEDIAL BRANCH, L2, L3, L4;  Surgeon: Santana Pak MD;  Location: [facility] OR;  Service: Pain Management;  Laterality: Bilateral;    TONSILLECTOMY      VENTRICULOPERITONEAL SHUNT         Family History  Family History   Problem Relation Age of Onset    Diabetes Mother     Hypertension Father     Heart disease Father     Heart disease Maternal Grandfather        Social History  Social History     Socioeconomic History    Marital status: Single     Spouse name: Not on file    Number of children: Not on file    Years of education: Not on file    Highest education  level: Not on file   Occupational History    Not on file   Social Needs    Financial resource strain: Not on file    Food insecurity     Worry: Not on file     Inability: Not on file    Transportation needs     Medical: Not on file     Non-medical: Not on file   Tobacco Use    Smoking status: Never Smoker    Smokeless tobacco: Never Used   Substance and Sexual Activity    Alcohol use: No    Drug use: No    Sexual activity: Not on file   Lifestyle    Physical activity     Days per week: Not on file     Minutes per session: Not on file    Stress: Not on file   Relationships    Social connections     Talks on phone: Not on file     Gets together: Not on file     Attends [employer] service: Not on file     Active member of club or organization: Not on file     Attends meetings of clubs or organizations: Not on file     Relationship status: Not on file   Other Topics Concern    Not on file   Social History Narrative    Not on file        Review of Systems  14-point review of systems as follows:   No check daniel indicates NEGATIVE response   Constitutional: [] weight loss, [] change to appetite   Eyes: [x] change in vision, [] double vision   Ears, nose, mouth, throat: [] frequent nose bleeds, [] ringing in the ears   Respiratory: [x] cough, [] wheezing   Cardiovascular: [x] chest pain, [] palpitations   Gastrointestinal: [] jaundice, [] nausea/vomiting   Genitourinary: [] incontinence, [] burning with urination   Hematologic/lymphatic: [x] easy bruising/bleeding, [x] night sweats   Neurological: [x] numbness, [x] weakness   Endocrine: [] fatigue, [x] heat/cold intolerance   Allergy/Immunologic: [] fevers, [] chills   Musculoskeletal: [x] muscle pain, [x] joint pain   Psychiatric: [] thoughts of harming self/others, [] depression   Integumentary: [x] rashes, [x] sores that do not heal       Objective:        Vitals:    06/25/24 1309   BP: 126/86   Pulse: 91   Resp: 18   Temp: 97.4 °F (36.3 °C)     Body mass index is 55.94  kg/m².      PREVIOUS:  Constitutional: appears in no acute distress, well-developed, well-nourished. Father provides a significant amount of history due to patient's intellectual status.     Eyes: normal conjunctiva, PERRLA, optic discs not visualized well - inability to hold gaze.   Confrontational fields - full in all quadrants bilaterally  Color vision (HRR plates 5-10): 4.5/6 bilaterally (1/2 point lost on plate 6, and full point lost on plate 7 bilaterally)    Ears, nose, mouth, throat: face appears syndromic. Right eye appears esotropic at rest, but ductions full. Hearing grossly intact     Cardiovascular: regular rate and rhythm, no murmurs appreciated    Respiratory: unlabored respirations, breath sounds normal bilaterally    Gastrointestinal: no visible abdominal masses, no guarding, no visible hernia    Musculoskeletal: normal tone in all four extremities. No atrophy. No abnormal movements. Strength in all muscles groups of the upper and lower extremities is 5/5. Normal gait and station. Digits and nails normal.      Spine:   CERVICAL SPINE:  Inspection: midline healed surgical scar in upper cervical spine   ROM: normal   MUSCLE SPASM: mild throughout   FACET LOADING: + bilateral   SPURLING: no  JERSON / ARIANA tender: + bilateral     Psychiatric: normal judgment and insight. Oriented to situation.     Neurologic:   Cortical functions: recent and remote memory intact, normal attention span and concentration, speech fluent, adequate fund of knowledge   Cranial nerves: visual fields full, PERRLA, EOMI, facial sensation intact in V1-V3, symmetric facial strength, hearing intact to finger rub, palate elevates symmetrically, shoulder shrug 5/5, tongue protrudes midline   Reflexes: 2+ in the upper and lower extremities, no Cardenas  Sensation: intact to temperature   Coordination: normal finger to nose    Data Review:     I have personally reviewed the referring provider's notes, labs, & imaging made available to me  today.      RADIOLOGY STUDIES:  I have personally reviewed the pertinent images performed.       Results for orders placed or performed during the hospital encounter of 01/04/18   MRI Brain Without Contrast    Narrative    EXAM: Brain MRI without contrast.    INDICATION: Preoperative planning for posterior fossa decompression for Chiari malformation    TECHNIQUE: Multiplanar, multisequence brain MRI was performed. DWI was performed. ADC map was generated. CSF flow/CINE sequencing was performed.    COMPARISON: The cervical spine MRI dated 02/09/2017, head CT dated 06/08/2016, brain MRI dated 05/23/2016      FINDINGS:     Intracranial contents: There are postsurgical changes of posterior fossa decompression since the prior MRI dated 05/23/2016.  These postoperative changes are clearly demonstrated on head CT dated 06/08/2016.  There is, however, is still crowding at the foramen magnum with diminished CSF flow demonstrated on this any sequences.  There is flattening of the ventral surface at the cervical medullary junction in significant decrease in CSF at the foramen magnum surrounding the lower brainstem and cerebellar tonsils.  There are no regions of restricted diffusion to suggest acute infarction.  Demonstrated is a right frontal approach  shunt catheter with the tip near the 3rd ventricle.  The ventricles remain completely decompressed, nearly slitlike.  Correlate clinically.  This is unchanged.  There is minimal flair and T2 hyperintense signal traversing the right frontal lobe along the catheter course likely representing minimal stable gliosis.  There is no hemorrhage or mass effect.  There is no acute infarction.  The orbits are grossly normal.  There is no definite flattening of the posterior optic disc.  There is no abnormal extra-axial fluid collection.  Flow voids indicating patency are present in the major vessels at the base of the brain but there is crowding of the basilar cistern.  Small caliber of  the basilar artery may represent developmental variation with fetal origin of the posterior cerebral arteries.    Extracranial contents: Marrow signal intensity is grossly normal paracolic posterior nasopharyngeal soft tissue is nonspecific but likely reflects reactive lymphoid tissue.  The paranasal sinuses and mastoid air cells are clear.    Impression         1. There are postsurgical changes of prior posterior fossa decompression but there is still crowding at the foramen magnum with diminished CSF flow seen on this any sequences.    2.  No change in position of the ventriculoperitoneal shunt catheter from right frontal approach with complete decompression of the lateral and 3rd ventricles.  The 4th ventricle is normal in position.  There is no hydrocephalus.  These findings are unchanged.    3.  There is no acute hemorrhage or acute infarction.      Electronically signed by: Dr. Jn Calvin  Date:     01/04/18  Time:    12:19    Results for orders placed or performed during the hospital encounter of 06/07/16   CT Head Without Contrast    Narrative    CT HEAD WITHOUT CONTRAST:    CPT 36537    Total DLP: 803 mGy-cm  AEC (Automated Exposure Control) was utilized to reduce the radiation dose to the patient.    HISTORY:  35-year-old status post suboccipital craniotomy for Chiari I malformation and history of prior ventriculostomy shunt catheter placement for intracranial hypertension.  Comparison outside of the brain from 05/23/2016 and CT scan of the head from 12/10/2014.    TECHNIQUE: Multiple contiguous axial images were acquired from the base of the skull and the vertex without contrast administration.    FINDINGS:  There is minimal right greater than left suboccipital craniotomy.  The cerebellar tonsils still project caudally below the level of what would be an intact foramen magnum.  There is some pneumocephalus inferiorly and extending cephalad above the cerebellum in the region of the tectal plate and  view of interpositum area.  There is also some and both inferior middle cranial fossa and overlying the right frontal lobe superiorly.  There is unchanged appearance of a right frontal ventriculostomy shunt catheter with the catheter tip unchanged in position as it extends through the right lateral meniscal frontal horn and into versus adjacent the right foramen of Monro and into the third ventricle.  The right lateral ventricle is slitlike with the left unchanged in position and nearly slitlike.  The third ventricle and fourth ventricles also remain slitlike.  No cerebral or cerebellar parenchymal abnormality is identified. There is no hemorrhage, midline shift,   significant mass-effect, or extra-axial fluid collection. The partially visualized paranasal sinuses and mastoid air cells are clear. The calvarium is intact.    Impression    1. Interval suboccipital craniotomy for decompression of a Chiari I malformation.  There is a small amount of pneumocephalus present.    2.  Unchanged appearance of right frontal ventriculostomy shunt catheter, as above, with tip in the third ventricle and would be ventricles unchanged in their slitlike appearance.  ______________________________________     Electronically signed by: KATERINE GARCIA MD  Date:     06/08/16  Time:    08:43    Results for orders placed or performed during the hospital encounter of 05/23/16   MRI Brain W WO Contrast    Narrative    Exam: MRI of the brain without and with contrast    Comparison: None.    Technique: Multiplanar MR imaging of the brain was performed both before and following the intravenous administration of 10 cc of Gadavist contrast material.    Findings:     There is a ventriculostomy shunt catheter entering the brain via a right frontal approach which terminates in the vicinity of the 3rd ventricle.  There is gliosis along the course of the catheter.  The lateral ventricles and 3rd ventricle have a slitlike appearance, and over shunting  cannot be excluded based on the provided images.  The 4th ventricle is normal in size.    The brain appears normally formed with preserved gray-white matter junction differentiation.  No evidence of acute/recent major vascular territory cerebral infarction, enhancing intra-axial mass, or parenchymal hemorrhage.  No vascular malformations appreciated.    There is a Chiari type I malformation present at the craniocervical junction with approximately 6-7 mm of inferior extension of the cerebellar tonsils through the foramen magnum.  The tip of the cerebellar tonsils have a somewhat pointed appearance.  There is foreshortening/rounding of the clivus.  There is diminished CSF pulsation visible at the craniocervical junction on the cine images.  No associated cervical cord syrinx within the imaged portion of the cervical spinal cord.    No hydrocephalus.  No extra-axial fluid collections or blood products.  No abnormal leptomeningeal enhancement or enhancing extra-axial mass.  The skull base flow voids are unremarkable.  There is mucoperiosteal thickening observed at the floor of the left maxillary sinus.    The visualized orbits are unremarkable.  There is fatty replacement of the parotid glands.  There is an 11 mm left submandibular region probable intraparotid lymph node (series 9 image 25).    Impression    1.  Chiari type I malformation with approximately 6-7 mm of inferior extension of the cerebellar tonsils through the foramen magnum.  Additional details are provided above.  No associated cervical cord syrinx.    2.  Ventriculostomy shunt catheter entering the brain via a right frontal approach with probable gliosis along the shunt catheter tract.  The 3rd ventricle and lateral ventricles have a somewhat slit like appearance, and over-shunting cannot be excluded.    3.  Mild maxillary sinus disease.  ______________________________________     Electronically signed by: Michael Montiel MD  Date:  "    05/23/16  Time:    11:19      Lab Results   Component Value Date     (L) 06/10/2023    K 3.8 06/10/2023     06/10/2023    CO2 26 06/10/2023    BUN 10 06/10/2023    CREATININE 0.95 06/10/2023     (H) 06/10/2023    HGBA1C 5.5 05/06/2024    HGBA1C 5.1 06/01/2023     (H) 06/10/2023    ALT 96 (H) 06/10/2023    ALBUMIN 4.0 06/10/2023    PROT 7.8 06/10/2023    BILITOT 0.8 06/10/2023    CHOL 241 (H) 02/05/2021    HDL 39 (L) 02/05/2021    LDLCALC 125.4 02/05/2021    TRIG 383 (H) 02/05/2021       Lab Results   Component Value Date    WBC 10.66 06/10/2023    HGB 13.1 06/10/2023    HCT 40.9 06/10/2023    MCV 93 06/10/2023     06/10/2023       Lab Results   Component Value Date    TSH 3.25 11/30/2012           Assessment & Plan:       Problem List Items Addressed This Visit          Neuro    Intractable chronic migraine without aura and without status migrainosus    Overview     As is common in this population, patient with pseudotumor in remission as evidenced by stable fundoscopic exam / stable visual fields but with ongoing chronic headaches of the migrainous type. Explained to patient and family that once pressure is controlled, I treat the remaining headaches according to phenotype in this case chronic migraine. Patient has failed several "good' preventatives, must be on a daily preventative due to daily frequency of headaches, and must have a weight neutral option due to morbid obesity / PTC / sleep apnea. Discussed the most effective weight neutral option going forward is Botox. Patient agreed to proceed.    The patient has chronic migraines (G43.719) and suffers from headaches more than 15 days a month lasting more than 4 hours a day with no relief of symptoms despite trying multiple medications including but not limited to anti-epileptics (topiramate, gabapentin, diamox), beta blockers (metoprolol),  and antidepressants (paxil - cannot trial additional ones due to being on Paxil). " Botox treatment was approved for chronic migraines in October 2010. The patient will be an ideal candidate for Botox. We are planning for 3 treatments 3 months apart and aiming for at least 50% improvement in the symptoms. If we see no improvement after 3 treatments, we will discontinue the injections.    In addition, I discussed that the negative role that chronic opiate therapy plays in migraine chronification and worsening of migraines as dose was reduced being supportive of this.     ------------    Botox was initially at least 50% effective but then there was worsening of headaches in the setting of her grandmother's death and her headaches never recovered.  We reduced her topiramate which was causing some nausea and headaches have suffered.  Aimovig has been helpful.  It seems that we do have the migraines under control with her current regimen, and remaining pain is largely allodynia around the shunt site.    Sumatriptan was not tolerated due to palpitations, Celebrex ineffective, changed to Maxalt and compazine, effective, continue    --------------    Patient having breakthrough migraines at the end of Botox cycles. Limited medication options given pseudotumor cerebri and allergies to triptans. Plan to switch Aimovig 140 to Emgality.     The patient has chronic migraines (G43.719) and suffers from headaches more than 15 days a month lasting more than 4 hours a day with no relief of symptoms despite trying multiple medications for at least 3 months if tolerated, including but not limited to the follwing:   anti-epileptics - topiramate, acetazolamide, gabapentin   beta blockers - metoprolol  calcium channel blockers - unable to trial patient on metoprolol   Antidepressants - unable to trial patient stable on paroxetine     Galcanezumab (Emgality) treatment was approved for the prevention of acute and chronic migraine on 9/27/2018. The patient will be an ideal candidate for Emgality. We are planning for 3  treatments 28 days apart. If we see no improvement after 3 treatments, we will discontinue the injections.            Current Assessment & Plan     Continue Emgality for a fair trial of 3-6 months. Botox has previously done well to help prevent her migraines. No new deficits. SPG block with viscous lidocaine done in clinic today, bilateral, and patient reported improvement in headache pain. Will send for lidocaine NS at [facility].    Will follow up on referral to Dr. Montano. Encouraged continued weight loss.                  The patient reported a severe headache. I injected 30 mg of IV Toradol very slow push.      TESTING:  -- none     REFERRAL:  -- refer to the eye doctor to check for recurrent swelling in the back of your eye (Dr. Montano)     PREVENTION (use daily regardless of headache):  -- continue Botox treatments for chronic migraine   -- stay on topiramate 100mg twice daily. Once Emgality takes effect, we can try to wean this back to once daily   -- continue Aimovig     ACUTE TREATMENT (use total no more than 12 days per month unless otherwise stated):  --Continue Nurtec  -- on a daily basis may use prochlorperazine (Compazine) as needed for nausea and headache       Cee Campbell M.D   of Neurology  ACGME Board Certified, Neurology  [facility], Board certified, headache Medicine  Medical Director, Headache and Facial Pain  [facility]

## 2024-06-28 ENCOUNTER — TELEPHONE (OUTPATIENT)
Dept: NEUROSURGERY | Facility: CLINIC | Age: 44
End: 2024-06-28
Payer: MEDICARE

## 2024-06-28 NOTE — TELEPHONE ENCOUNTER
Called patient for additional information and request for imaging. No answer, no VM available. Not active on portal since 2021.

## 2024-06-28 NOTE — TELEPHONE ENCOUNTER
----- Message from Emiliana Joya LPN sent at 6/27/2024  2:07 PM CDT -----  Regarding: FW: fall  Contact: shanna olguin at [phone]    ----- Message -----  From: Manuel Pope  Sent: 6/27/2024   8:14 AM CDT  To: Shari OCHOA Staff  Subject: fall                                             Type: Needs Medical Advice    Who Called:  Shanna Olguin    Symptoms (please be specific):  pain in back and shoulder    How long has patient had these symptoms:  since fell into bar in home on 6/24     Pharmacy name and phone #:    [facility] - [place], [place] - [address]  [address]  [place] [address]  Phone: [phone] Fax: [phone]    Best Call Back Number: [phone]    Additional Information: pt would like to get CT or Xray if thinks needed b/c is on same side as surgery.

## 2024-07-02 ENCOUNTER — TELEPHONE (OUTPATIENT)
Dept: NEUROLOGY | Facility: CLINIC | Age: 44
End: 2024-07-02
Payer: MEDICARE

## 2024-07-02 DIAGNOSIS — G95.9 CERVICAL MYELOPATHY: Primary | ICD-10-CM

## 2024-07-02 NOTE — TELEPHONE ENCOUNTER
Patient fell over her dog on 6/24. Right shoulder and neck are hurting. Can still move and use the right arm. No new numbness or weakness in the right arm, but worried about the left with possible new numbness.     Will obtain XR and see her in clinic for updated exam.

## 2024-07-02 NOTE — TELEPHONE ENCOUNTER
----- Message from Kasie Platt sent at 7/2/2024  9:11 AM CDT -----  Contact: Patient  Type:  Patient Returning Call    Who Called:  Patient  Who Left Message for Patient:  Radha Jim  Does the patient know what this is regarding?:   X-ray possibly    Would the patient rather a call back or a response via [facility]?   Call back  Best Call Back Number:  [phone]    Additional Information:   States she is returning a missed call - please call back - thank you

## 2024-07-08 ENCOUNTER — TELEPHONE (OUTPATIENT)
Dept: NEUROSURGERY | Facility: CLINIC | Age: 44
End: 2024-07-08
Payer: MEDICARE

## 2024-07-08 NOTE — TELEPHONE ENCOUNTER
----- Message from Charles Trimble sent at 7/8/2024  8:16 AM CDT -----  Contact: Self  Type:  Sooner Appointment Request    Caller is requesting a sooner appointment.  Caller declined first available appointment listed below.  Caller will not accept being placed on the waitlist and is requesting a message be sent to doctor.    Name of Caller:  Patient    Would the patient rather a call back or a response via [facility]? Call Back  Best Call Back Number:  [phone]  Additional Information:  Patient needs to resschedule her appt today due to illness. She would like to move her appt to the 25th

## 2024-07-15 ENCOUNTER — TELEPHONE (OUTPATIENT)
Dept: NEUROLOGY | Facility: CLINIC | Age: 44
End: 2024-07-15
Payer: MEDICARE

## 2024-07-15 NOTE — TELEPHONE ENCOUNTER
----- Message from Nate Montesinos sent at 7/15/2024  8:31 AM CDT -----  Type: Needs Medical Advice  Who Called:  pt  Best Call Back Number: [phone]    Additional Information: Pt is calling the office needs to reschedule botox appt.Please call back and advise.

## 2024-07-25 ENCOUNTER — OFFICE VISIT (OUTPATIENT)
Dept: PAIN MEDICINE | Facility: CLINIC | Age: 44
End: 2024-07-25
Payer: MEDICARE

## 2024-07-25 ENCOUNTER — HOSPITAL ENCOUNTER (OUTPATIENT)
Dept: RADIOLOGY | Facility: HOSPITAL | Age: 44
Discharge: HOME | End: 2024-07-25
Attending: PHYSICIAN ASSISTANT
Payer: MEDICARE

## 2024-07-25 VITALS
HEART RATE: 70 BPM | SYSTOLIC BLOOD PRESSURE: 121 MMHG | BODY MASS INDEX: 53.78 KG/M2 | HEIGHT: 59 IN | WEIGHT: 266.75 LBS | DIASTOLIC BLOOD PRESSURE: 72 MMHG

## 2024-07-25 DIAGNOSIS — M54.12 CERVICAL RADICULOPATHY: ICD-10-CM

## 2024-07-25 DIAGNOSIS — M54.16 LUMBAR RADICULOPATHY: ICD-10-CM

## 2024-07-25 DIAGNOSIS — G95.9 CERVICAL MYELOPATHY: ICD-10-CM

## 2024-07-25 PROCEDURE — 99212 OFFICE O/P EST SF 10 MIN: CPT | Mod: PBBFAC,25,PO | Performed by: ANESTHESIOLOGY

## 2024-07-25 PROCEDURE — 72050 X-RAY EXAM NECK SPINE 4/5VWS: CPT | Mod: 26,,, | Performed by: RADIOLOGY

## 2024-07-25 PROCEDURE — 72050 X-RAY EXAM NECK SPINE 4/5VWS: CPT | Mod: TC,PO

## 2024-07-25 PROCEDURE — 99999 PR PBB SHADOW E&M-EST. PATIENT-LVL II: CPT | Mod: PBBFAC,,, | Performed by: ANESTHESIOLOGY

## 2024-07-25 PROCEDURE — 99214 OFFICE O/P EST MOD 30 MIN: CPT | Mod: S$PBB,,, | Performed by: ANESTHESIOLOGY

## 2024-07-25 RX ORDER — HYDROCODONE BITARTRATE AND ACETAMINOPHEN 5; 325 MG/1; MG/1
1 TABLET ORAL
Qty: 30 TABLET | Refills: 0 | Status: SHIPPED | OUTPATIENT
Start: 2024-08-24 | End: 2024-09-23

## 2024-07-25 RX ORDER — HYDROCODONE BITARTRATE AND ACETAMINOPHEN 5; 325 MG/1; MG/1
1 TABLET ORAL
Qty: 30 TABLET | Refills: 0 | Status: SHIPPED | OUTPATIENT
Start: 2024-09-23 | End: 2024-10-23

## 2024-07-25 RX ORDER — HYDROCODONE BITARTRATE AND ACETAMINOPHEN 5; 325 MG/1; MG/1
1 TABLET ORAL
Qty: 30 TABLET | Refills: 0 | Status: SHIPPED | OUTPATIENT
Start: 2024-07-25 | End: 2024-08-24

## 2024-07-25 RX ORDER — CYCLOBENZAPRINE HCL 10 MG
TABLET ORAL
Qty: 90 TABLET | Refills: 2 | Status: SHIPPED | OUTPATIENT
Start: 2024-07-25

## 2024-07-25 NOTE — PROGRESS NOTES
This note was completed with dictation software and grammatical errors may exist.    CC: Back pain, Neck pain, headaches    HPI: The patient is a 44-year-old woman with a history of Chiari malformation, Hadley syndrome, pseudotumor cerebri with shunt, morbid obesity who presents in referral from Dr. Velasco for neck pain.  The patient returns in follow-up today for regularly scheduled visit but states that she has had increased pain.  She points to her right neck, trapezius and right shoulder as the source of pain.  She states that she fell when she tripped over her dog about a month ago.  The pain was severe at 1st but has eased up slightly.  She has pain worse with lateral rotation worse with touching the area.  Otherwise she denies any pain radiating further down the arm, no new weakness or numbness.  She has an appointment with Neurosurgery next week and has orders for x-rays of her cervical spine.  She continues to take hydrocodone once a day and Flexeril.          Pain intervention history: She was seeing Dr. Gerson Renteria, pain management and until recently had been taking hydrocodone 5/325 once a day in addition to Flexeril 3 times a day and gabapentin 600 mg 3 times a day.  She apparently tested positive for Valium and so was dismissed from his practice. She is status post C7-T1 cervical interlaminar epidural steroid injection on 10/4/17 with 100% relief lasting 2 weeks.  She is status post a second cervical TABITHA on 11/14/17 with almost complete relief again but only lasting 2-3 weeks. She is status post bilateral L2, 3 and 4 medial branch radiofrequency ablation on 09/21/2018 with 75% relief.   She is status post C7-T1 interlaminar epidural steroid injection on 10/01/2020 with 100% relief of her neck pain.   She is status post cervical C3-C7 posterior approach laminectomy with Dr. Shari MD on 6/08/23    Spine surgeries:She is status post cervical C3-C7 posterior approach laminectomy with   MD Shari on 6/08/23.     Antineuropathics:  NSAIDs:  Physical therapy:  Antidepressants:  Muscle relaxers:  Flexeril 10 mg up to 3 times daily  Opioids:  Hydrocodone 5/325 once daily  Antiplatelets/Anticoagulants:            ROS: She reports weight loss, headaches, easy bruising and back pain.  Balance of review of systems is negative.    Past Medical History:   Diagnosis Date    Anticoagulant long-term use     Asthma     Bronchitis     Chiari malformation type I     Chronic headache     Diabetes mellitus     Graves disease     Graves disease     Hyperlipidemia     Hypertension     Murmur, heart     NPH (normal pressure hydrocephalus)     Obesity     MANUEL on CPAP     Pseudotumor cerebri     Thyroid disease     Hadley syndrome        Past Surgical History:   Procedure Laterality Date    CARPAL TUNNEL RELEASE      CERVICAL SPINE SURGERY      CHOLECYSTECTOMY      EAR TUBE REMOVAL Bilateral     EPIDURAL STEROID INJECTION INTO CERVICAL SPINE N/A 10/1/2020    Procedure: Injection-steroid-epidural-cervical C7- T1 interlaminar;  Surgeon: Santana Pak MD;  Location: [facility] OR;  Service: Pain Management;  Laterality: N/A;    EYE SURGERY      strabismus    INJECTION OF FACET JOINT Bilateral 5/23/2018    Procedure: INJECTION-FACET L3/4 and L4/5;  Surgeon: Santana Pak MD;  Location: [facility] OR;  Service: Pain Management;  Laterality: Bilateral;  sacralization of L5 and a rudimentary disc space    MYRINGOTOMY W/ TUBES      POSTERIOR CERVICAL LAMINECTOMY N/A 6/8/2023    Procedure: LAMINECTOMY, SPINE, CERVICAL, POSTERIOR APPROACH    C3-C7 LAMINOPLASTY;  Surgeon: Sunita Mccarthy MD;  Location: [facility] OR;  Service: Neurosurgery;  Laterality: N/A;    RADIOFREQUENCY ABLATION OF LUMBAR MEDIAL BRANCH NERVE AT SINGLE LEVEL Bilateral 9/21/2018    Procedure: RADIOFREQUENCY ABLATION, NERVE, SPINAL, LUMBAR, MEDIAL BRANCH, L2, L3, L4;  Surgeon: Santana Pak MD;  Location: [facility] OR;  Service: Pain Management;   "Laterality: Bilateral;    TONSILLECTOMY      VENTRICULOPERITONEAL SHUNT         Social History     Socioeconomic History    Marital status: Single   Tobacco Use    Smoking status: Never     Passive exposure: Current    Smokeless tobacco: Never   Substance and Sexual Activity    Alcohol use: No    Drug use: Yes     Types: Hydrocodone         Medications/Allergies: See med card    Vitals:    07/25/24 0923   BP: 121/72   Pulse: 70   Weight: 121 kg (266 lb 12.1 oz)   Height: 4' 11" (1.499 m)   PainSc:   8   PainLoc: Neck       Body mass index is 53.88 kg/m².        7/25/2024     9:21 AM 4/12/2024     9:03 AM 2/19/2024    10:03 AM   Last 3 PDI Scores   Pain Disability Index (PDI) 27 27 31         Physical exam:    Gen: A and O x3, pleasant, well-groomed  Skin: No rashes or obvious lesions  Abdomen: Soft, NT/ND.  Musculoskeletal:  No antalgic gait.       Upper extremities:  5/5 strength bilaterally  Lower extremities:  5/5 strength bilaterally    She has good range of motion of her right shoulder, negative empty can test or Neer's test.  She does have tenderness palpation over the right trapezius, increased pain with lateral rotation to the right.        Imaging:  MRI from 2/9/17 cervical spine demonstrates congenitally narrowed canal with disc bulging most prominently at C4/5 to the right side causing anterior cord compression but no signal changes.  There is narrowing of the canal at C3/4 to a lesser degree.    03/08/2023 MRI cervical spine  C2-C3: Developmental fusion.  The spinal canal and foramina are patent..  C3-C4: Shallow broad-based disc osteophyte complex and mild facet arthrosis. No substantial degenerative spinal canal or foraminal narrowing.  C4-C5: Shallow broad-based disc osteophyte complex and mild bilateral facet arthrosis more pronounced on the left but no substantial degenerative spinal canal or foraminal narrowing..  C5-C6: Redemonstrated mild degenerative disc height loss and broad-based disc " osteophyte complex lateralizing to the right contributing to moderate right central spinal canal narrowing. Similar mild flattening of the ventral cord surface. Uncovertebral spurring and facet arthrosis contribute to mild bilateral foraminal narrowing..  C6-C7: Shallow broad-based disc osteophyte complex.  No substantial degenerative spinal canal or foraminal narrowing..  C7-T1: Within normal limits.  The spinal canal and foramina are patent..     Soft tissues: The visualized paraspinal soft tissues are within normal limits.  Note is made of medialized internal carotid arteries at the C2 level to C5.    3/26/18 MRI L-spine:  T11-T12: Unremarkable.  There is no spinal canal or significant foraminal stenosis.   T12-L1: Unremarkable.  There is no spinal canal or significant foraminal stenosis.   L1-2: There is mild facet joint arthropathy.  There is no spinal canal or significant foraminal stenosis.   L2-3: There is mild facet joint arthropathy.  There is no spinal canal or significant foraminal stenosis.   L3-4: There is minimal bulging of the annulus.  There is mild-to-moderate facet joint arthropathy with ligamentum flavum thickening and trace left facet joint effusion.  There is mildly prominent dorsal epidural fat.  There is no spinal canal or significant foraminal stenosis.   L4-5: There is mild disc space narrowing and disc desiccation.  There is moderate-to-marked right and mild-to-moderate left facet joint arthropathy with ligamentum flavum thickening.  There is a small left facet joint effusion.  There is minimal bulging of the annulus.  There is no spinal canal stenosis.  There is mild right superior foraminal recess stenosis.   L5-S1: This is a rudimentary disc space due to sacralization of L5.  There is no spinal canal or significant foraminal stenosis.    Assessment:   The patient is a 44-year-old woman with a history of Chiari malformation, pseudotumor cerebri with shunt, morbid obesity who presents in  referral from Dr. Velasco for neck pain.    1. Lumbar radiculopathy  cyclobenzaprine (FLEXERIL) 10 MG tablet      2. Cervical radiculopathy  cyclobenzaprine (FLEXERIL) 10 MG tablet              Plan:  1.  We discussed that she aggravated her neck pain but she does not have any concerning signs on exam, she is going to be getting an x-ray and following up with Neurosurgery.  I suggested that if x-rays look okay, we could get her set up with physical therapy and I can call her with the results.  2.  I have refilled her hydrocodone and Flexeril, her insurance was not paying for her full prescription so we are going to look into this for her.  3.  I will have her follow up in 3 months or sooner as needed.

## 2024-07-29 ENCOUNTER — TELEPHONE (OUTPATIENT)
Dept: NEUROSURGERY | Facility: CLINIC | Age: 44
End: 2024-07-29
Payer: MEDICARE

## 2024-07-29 ENCOUNTER — TELEPHONE (OUTPATIENT)
Dept: NEUROLOGY | Facility: CLINIC | Age: 44
End: 2024-07-29
Payer: MEDICARE

## 2024-07-29 NOTE — TELEPHONE ENCOUNTER
----- Message from Gilda Lew sent at 7/29/2024  8:08 AM CDT -----  Type : Patient Call        Who Called ; Patient      Does the patient know what this is regarding?: Pt is needing to reschedule apt that was scheduled for today 7/29 ; pt states her truck is down and she's unable to make apt ; attempted to reschedule pt, no available time slot ; please advise        Would the patient rather a call back or a response via [facility]? Call          Best Call Back Number: [phone]            Additional Information:

## 2024-07-29 NOTE — TELEPHONE ENCOUNTER
----- Message from Gilda Lew sent at 7/29/2024  8:05 AM CDT -----  Type : Patient Call      Who Called : Patient      Does the patient know what this is regarding?: Pt is needing to reschedule her Botox apt scheduled for today 7/29 ; pt states her truck is down ; please advise          Would the patient rather a call back or a response via [facility]? [phone]        Best Call Back Number: [phone]            Additional Information:

## 2024-07-29 NOTE — TELEPHONE ENCOUNTER
Spoke with patient regarding rescheduling botox due to truck breaking down. Patient r/s to 08-13-24 at 0915 with Dr. Campbell.

## 2024-07-30 ENCOUNTER — TELEPHONE (OUTPATIENT)
Dept: NEUROLOGY | Facility: CLINIC | Age: 44
End: 2024-07-30
Payer: MEDICARE

## 2024-08-05 ENCOUNTER — OFFICE VISIT (OUTPATIENT)
Dept: NEUROSURGERY | Facility: CLINIC | Age: 44
End: 2024-08-05
Payer: MEDICARE

## 2024-08-05 VITALS
SYSTOLIC BLOOD PRESSURE: 105 MMHG | WEIGHT: 266.75 LBS | BODY MASS INDEX: 53.78 KG/M2 | RESPIRATION RATE: 18 BRPM | DIASTOLIC BLOOD PRESSURE: 69 MMHG | HEART RATE: 78 BPM | HEIGHT: 59 IN

## 2024-08-05 DIAGNOSIS — M79.18 MUSCLE PAIN, MYOFASCIAL: Primary | ICD-10-CM

## 2024-08-05 PROCEDURE — 99215 OFFICE O/P EST HI 40 MIN: CPT | Mod: PBBFAC,PN,25 | Performed by: NURSE PRACTITIONER

## 2024-08-05 PROCEDURE — 96372 THER/PROPH/DIAG INJ SC/IM: CPT | Mod: PBBFAC,PN | Performed by: NURSE PRACTITIONER

## 2024-08-05 PROCEDURE — 99999PBSHW PR PBB SHADOW TECHNICAL ONLY FILED TO HB: Mod: PBBFAC,,,

## 2024-08-05 PROCEDURE — 99999 PR PBB SHADOW E&M-EST. PATIENT-LVL V: CPT | Mod: PBBFAC,,, | Performed by: NURSE PRACTITIONER

## 2024-08-05 PROCEDURE — 99214 OFFICE O/P EST MOD 30 MIN: CPT | Mod: S$PBB,,, | Performed by: NURSE PRACTITIONER

## 2024-08-05 RX ORDER — BUDESONIDE, GLYCOPYRROLATE, AND FORMOTEROL FUMARATE 160; 9; 4.8 UG/1; UG/1; UG/1
AEROSOL, METERED RESPIRATORY (INHALATION)
COMMUNITY
Start: 2024-07-15

## 2024-08-05 RX ORDER — KETOROLAC TROMETHAMINE 30 MG/ML
30 INJECTION, SOLUTION INTRAMUSCULAR; INTRAVENOUS
Status: COMPLETED | OUTPATIENT
Start: 2024-08-05 | End: 2024-08-05

## 2024-08-05 RX ADMIN — KETOROLAC TROMETHAMINE 30 MG: 30 INJECTION, SOLUTION INTRAMUSCULAR; INTRAVENOUS at 10:08

## 2024-08-07 ENCOUNTER — TELEPHONE (OUTPATIENT)
Dept: NEUROSURGERY | Facility: CLINIC | Age: 44
End: 2024-08-07
Payer: MEDICARE

## 2024-08-13 ENCOUNTER — LAB VISIT (OUTPATIENT)
Dept: LAB | Facility: HOSPITAL | Age: 44
End: 2024-08-13
Payer: MEDICARE

## 2024-08-13 ENCOUNTER — PROCEDURE VISIT (OUTPATIENT)
Dept: NEUROLOGY | Facility: CLINIC | Age: 44
End: 2024-08-13
Payer: MEDICARE

## 2024-08-13 ENCOUNTER — OFFICE VISIT (OUTPATIENT)
Dept: GASTROENTEROLOGY | Facility: CLINIC | Age: 44
End: 2024-08-13
Payer: MEDICARE

## 2024-08-13 VITALS — BODY MASS INDEX: 57.17 KG/M2 | HEIGHT: 57 IN | WEIGHT: 265 LBS

## 2024-08-13 VITALS
HEART RATE: 73 BPM | BODY MASS INDEX: 57.17 KG/M2 | WEIGHT: 265 LBS | HEIGHT: 57 IN | RESPIRATION RATE: 17 BRPM | DIASTOLIC BLOOD PRESSURE: 85 MMHG | SYSTOLIC BLOOD PRESSURE: 125 MMHG | TEMPERATURE: 98 F

## 2024-08-13 DIAGNOSIS — R10.9 RIGHT SIDED ABDOMINAL PAIN: ICD-10-CM

## 2024-08-13 DIAGNOSIS — R19.7 DIARRHEA, UNSPECIFIED TYPE: Primary | ICD-10-CM

## 2024-08-13 DIAGNOSIS — R19.7 DIARRHEA, UNSPECIFIED TYPE: ICD-10-CM

## 2024-08-13 DIAGNOSIS — G43.719 INTRACTABLE CHRONIC MIGRAINE WITHOUT AURA AND WITHOUT STATUS MIGRAINOSUS: Primary | ICD-10-CM

## 2024-08-13 DIAGNOSIS — Z80.0 FAMILY HISTORY OF COLON CANCER: ICD-10-CM

## 2024-08-13 DIAGNOSIS — Z90.49 S/P CHOLECYSTECTOMY: ICD-10-CM

## 2024-08-13 DIAGNOSIS — R11.0 NAUSEA: ICD-10-CM

## 2024-08-13 DIAGNOSIS — Z87.19 HISTORY OF FATTY INFILTRATION OF LIVER: ICD-10-CM

## 2024-08-13 DIAGNOSIS — R19.4 CHANGE IN BOWEL HABITS: ICD-10-CM

## 2024-08-13 DIAGNOSIS — R12 HEARTBURN: ICD-10-CM

## 2024-08-13 DIAGNOSIS — K86.89 PANCREATIC INSUFFICIENCY: ICD-10-CM

## 2024-08-13 LAB
ALBUMIN SERPL BCP-MCNC: 3.8 G/DL (ref 3.5–5.2)
ALP SERPL-CCNC: 113 U/L (ref 55–135)
ALT SERPL W/O P-5'-P-CCNC: 11 U/L (ref 10–44)
AST SERPL-CCNC: 11 U/L (ref 10–40)
BILIRUB DIRECT SERPL-MCNC: 0.1 MG/DL (ref 0.1–0.3)
BILIRUB SERPL-MCNC: 0.4 MG/DL (ref 0.1–1)
ERYTHROCYTE [DISTWIDTH] IN BLOOD BY AUTOMATED COUNT: 15.6 % (ref 11.5–14.5)
HCT VFR BLD AUTO: 42.1 % (ref 37–48.5)
HGB BLD-MCNC: 13.7 G/DL (ref 12–16)
MCH RBC QN AUTO: 29.7 PG (ref 27–31)
MCHC RBC AUTO-ENTMCNC: 32.5 G/DL (ref 32–36)
MCV RBC AUTO: 91 FL (ref 82–98)
PLATELET # BLD AUTO: 552 K/UL (ref 150–450)
PMV BLD AUTO: 9.5 FL (ref 9.2–12.9)
PROT SERPL-MCNC: 8.2 G/DL (ref 6–8.4)
RBC # BLD AUTO: 4.61 M/UL (ref 4–5.4)
WBC # BLD AUTO: 11.9 K/UL (ref 3.9–12.7)

## 2024-08-13 PROCEDURE — 99215 OFFICE O/P EST HI 40 MIN: CPT | Mod: PBBFAC,PO

## 2024-08-13 PROCEDURE — 85027 COMPLETE CBC AUTOMATED: CPT

## 2024-08-13 PROCEDURE — 36415 COLL VENOUS BLD VENIPUNCTURE: CPT | Mod: PO

## 2024-08-13 PROCEDURE — 80076 HEPATIC FUNCTION PANEL: CPT

## 2024-08-13 PROCEDURE — 99204 OFFICE O/P NEW MOD 45 MIN: CPT | Mod: S$PBB,,,

## 2024-08-13 PROCEDURE — 64615 CHEMODENERV MUSC MIGRAINE: CPT | Mod: PBBFAC,PO | Performed by: PSYCHIATRY & NEUROLOGY

## 2024-08-13 PROCEDURE — 99999PBSHW PR PBB SHADOW TECHNICAL ONLY FILED TO HB: Mod: JW,PBBFAC,,

## 2024-08-13 PROCEDURE — 64615 CHEMODENERV MUSC MIGRAINE: CPT | Mod: S$PBB,,, | Performed by: PSYCHIATRY & NEUROLOGY

## 2024-08-13 PROCEDURE — 99999 PR PBB SHADOW E&M-EST. PATIENT-LVL V: CPT | Mod: PBBFAC,,,

## 2024-08-13 RX ORDER — OMEPRAZOLE 40 MG/1
40 CAPSULE, DELAYED RELEASE ORAL EVERY MORNING
Qty: 90 CAPSULE | Refills: 0 | Status: SHIPPED | OUTPATIENT
Start: 2024-08-13 | End: 2024-11-11

## 2024-08-13 RX ORDER — FAMOTIDINE 40 MG/1
40 TABLET, FILM COATED ORAL NIGHTLY PRN
Qty: 90 TABLET | Refills: 0 | Status: SHIPPED | OUTPATIENT
Start: 2024-08-13 | End: 2024-11-11

## 2024-08-13 RX ORDER — CHOLESTYRAMINE 4 G/9G
4 POWDER, FOR SUSPENSION ORAL DAILY
Qty: 90 PACKET | Refills: 0 | Status: SHIPPED | OUTPATIENT
Start: 2024-08-13 | End: 2024-11-11

## 2024-08-13 NOTE — PROGRESS NOTES
Subjective:       Patient ID: Shanna Douglass is a 44 y.o. female Body mass index is 57.34 kg/m².    Chief Complaint: Abdominal Pain, Diarrhea, Heartburn, and Nausea    This patient is new to me.     GI Problem  The primary symptoms include abdominal pain, nausea and diarrhea. Primary symptoms do not include fever, weight loss, fatigue, vomiting, melena, hematemesis, jaundice, hematochezia or dysuria.   The abdominal pain began more than 2 days ago (started 2 weeks ago). The abdominal pain has been unchanged since its onset. The abdominal pain is located in the RUQ and RLQ. The abdominal pain does not radiate. The severity of the abdominal pain is 0/10 (Denies pain currently; triggers: Eating). The abdominal pain is relieved by certain positions (Bending over couch).   Nausea began more than 1 week ago (Occurs every other day; improves taking Zofran 4 mg p.r.n.). Associated with: Denies any specific trigger.   The diarrhea began more than 1 week ago (Chronic issue that reoccurred a month ago; history of EPI currently taking Creon daily with meals). The diarrhea is watery (Rated stool 7 on [place] scale). The diarrhea occurs more than 10 times per day (Eating and drinking coffee triggers BMs). Risk factors for illness producing diarrhea include recent antibiotic use (Patient reports recent antibiotic use for ear infection last week; denies suspect food, recent foreign travel).   The illness does not include chills, dysphagia, odynophagia, bloating or constipation. Significant associated medical issues include GERD (Currently having heartburn daily taking omeprazole 20 mg once daily and famotidine 20 mg p.r.n.), liver disease (hx of fatty liver) and hemorrhoids. Associated medical issues do not include inflammatory bowel disease, alcohol abuse, PUD, gastric bypass, bowel resection, irritable bowel syndrome or diverticulitis. Associated medical issues comments: Family history of colon cancer in maternal grandfather..      Review of Systems   Constitutional:  Positive for appetite change. Negative for activity change, chills, diaphoresis, fatigue, fever, unexpected weight change and weight loss.   HENT:  Negative for sore throat and trouble swallowing.    Respiratory:  Negative for cough, choking and shortness of breath.    Cardiovascular:  Negative for chest pain.   Gastrointestinal:  Positive for abdominal pain, diarrhea and nausea. Negative for abdominal distention, anal bleeding, bloating, blood in stool, constipation, dysphagia, hematemesis, hematochezia, jaundice, melena, rectal pain and vomiting.   Genitourinary:  Negative for dysuria.       Patient's last menstrual period was 08/05/2024.  Past Medical History:   Diagnosis Date    Anticoagulant long-term use     Asthma     Bronchitis     Chiari malformation type I     Chronic headache     Diabetes mellitus     GERD (gastroesophageal reflux disease)     Graves disease     Graves disease     Hyperlipidemia     Hypertension     Murmur, heart     NPH (normal pressure hydrocephalus)     Obesity     MANUEL on CPAP     Pseudotumor cerebri     Thyroid disease     Hadley syndrome      Past Surgical History:   Procedure Laterality Date    CARPAL TUNNEL RELEASE      CERVICAL SPINE SURGERY      CHOLECYSTECTOMY      COLONOSCOPY  2022    2 years ago; Dr. Boyle - normal    EAR TUBE REMOVAL Bilateral     EPIDURAL STEROID INJECTION INTO CERVICAL SPINE N/A 10/01/2020    Procedure: Injection-steroid-epidural-cervical C7- T1 interlaminar;  Surgeon: Santana Pak MD;  Location: [facility] OR;  Service: Pain Management;  Laterality: N/A;    EYE SURGERY      strabismus    INJECTION OF FACET JOINT Bilateral 05/23/2018    Procedure: INJECTION-FACET L3/4 and L4/5;  Surgeon: Santana Pak MD;  Location: [facility] OR;  Service: Pain Management;  Laterality: Bilateral;  sacralization of L5 and a rudimentary disc space    MYRINGOTOMY W/ TUBES      POSTERIOR CERVICAL LAMINECTOMY N/A 06/08/2023     Procedure: LAMINECTOMY, SPINE, CERVICAL, POSTERIOR APPROACH    C3-C7 LAMINOPLASTY;  Surgeon: Sunita Mccarthy MD;  Location: [facility] OR;  Service: Neurosurgery;  Laterality: N/A;    RADIOFREQUENCY ABLATION OF LUMBAR MEDIAL BRANCH NERVE AT SINGLE LEVEL Bilateral 09/21/2018    Procedure: RADIOFREQUENCY ABLATION, NERVE, SPINAL, LUMBAR, MEDIAL BRANCH, L2, L3, L4;  Surgeon: Santana Pak MD;  Location: [facility] OR;  Service: Pain Management;  Laterality: Bilateral;    TONSILLECTOMY      UPPER GASTROINTESTINAL ENDOSCOPY  2021    normal    VENTRICULOPERITONEAL SHUNT       Family History   Problem Relation Name Age of Onset    Diabetes Mother      Hypertension Father      Heart disease Father      Colon cancer Maternal Grandfather      Heart disease Maternal Grandfather      Crohn's disease Neg Hx      Ulcerative colitis Neg Hx      Stomach cancer Neg Hx      Esophageal cancer Neg Hx       Social History     Tobacco Use    Smoking status: Never     Passive exposure: Current    Smokeless tobacco: Never   Substance Use Topics    Alcohol use: No    Drug use: Yes     Types: Hydrocodone     Wt Readings from Last 10 Encounters:   08/13/24 120.2 kg (264 lb 15.9 oz)   08/13/24 120.2 kg (264 lb 15.9 oz)   08/05/24 121 kg (266 lb 12.1 oz)   07/25/24 121 kg (266 lb 12.1 oz)   06/25/24 121.4 kg (267 lb 10.2 oz)   04/12/24 124.8 kg (275 lb 2.2 oz)   04/12/24 124.8 kg (275 lb 2.2 oz)   02/19/24 123.2 kg (271 lb 9.7 oz)   11/16/23 124.5 kg (274 lb 7.6 oz)   10/18/23 128.6 kg (283 lb 8.2 oz)     Lab Results   Component Value Date    WBC 10.66 06/10/2023    HGB 13.1 06/10/2023    HCT 40.9 06/10/2023    MCV 93 06/10/2023     06/10/2023     CMP  Sodium   Date Value Ref Range Status   06/10/2023 135 (L) 136 - 145 mmol/L Final     Potassium   Date Value Ref Range Status   06/10/2023 3.8 3.5 - 5.1 mmol/L Final     Comment:     Anion Gap reference range revised on 4/28/2023     Chloride   Date Value Ref Range Status   06/10/2023 102 95  - 110 mmol/L Final     CO2   Date Value Ref Range Status   06/10/2023 26 22 - 31 mmol/L Final     Glucose   Date Value Ref Range Status   06/10/2023 116 (H) 70 - 110 mg/dL Final     Comment:     The ADA recommends the following guidelines for fasting glucose:    Normal:       less than 100 mg/dL    Prediabetes:  100 mg/dL to 125 mg/dL    Diabetes:     126 mg/dL or higher       BUN   Date Value Ref Range Status   06/10/2023 10 7 - 18 mg/dL Final     Creatinine   Date Value Ref Range Status   06/10/2023 0.95 0.50 - 1.40 mg/dL Final     Calcium   Date Value Ref Range Status   06/10/2023 8.7 8.4 - 10.2 mg/dL Final     Total Protein   Date Value Ref Range Status   06/10/2023 7.8 6.0 - 8.4 g/dL Final     Albumin   Date Value Ref Range Status   06/10/2023 4.0 3.5 - 5.2 g/dL Final     Total Bilirubin   Date Value Ref Range Status   06/10/2023 0.8 0.2 - 1.3 mg/dL Final     Alkaline Phosphatase   Date Value Ref Range Status   06/10/2023 230 (H) 38 - 145 U/L Final     AST   Date Value Ref Range Status   06/10/2023 148 (H) 14 - 36 U/L Final     ALT   Date Value Ref Range Status   06/10/2023 96 (H) 0 - 35 U/L Final     Anion Gap   Date Value Ref Range Status   06/10/2023 7 mmol/L Final     Comment:     Anion Gap reference range revised on 4/28/2023     eGFR if    Date Value Ref Range Status   02/05/2021 >60.0 >60 mL/min/1.73 m^2 Final     eGFR if non    Date Value Ref Range Status   02/05/2021 >60.0 >60 mL/min/1.73 m^2 Final     Comment:     Calculation used to obtain the estimated glomerular filtration  rate (eGFR) is the CKD-EPI equation.        Lab Results   Component Value Date    TSH 3.25 11/30/2012     Reviewed prior medical records including radiology report of CT abdomen and pelvis 04/02/2019 & endoscopy history (see surgical history).    Objective:      Physical Exam  Vitals and nursing note reviewed.   Constitutional:       General: She is not in acute distress.     Appearance: Normal  appearance. She is obese. She is not ill-appearing.   HENT:      Mouth/Throat:      Lips: Pink. No lesions.   Cardiovascular:      Rate and Rhythm: Normal rate.      Heart sounds: Normal heart sounds.   Pulmonary:      Effort: Pulmonary effort is normal. No respiratory distress.      Breath sounds: Normal breath sounds.   Abdominal:      General: Abdomen is protuberant. Bowel sounds are normal. There is no distension or abdominal bruit. There are no signs of injury.      Palpations: Abdomen is soft. There is no shifting dullness, fluid wave, hepatomegaly, splenomegaly or mass.      Tenderness: There is abdominal tenderness in the right upper quadrant and right lower quadrant. There is no guarding or rebound. Negative signs include Kelly's sign, Rovsing's sign and McBurney's sign.   Skin:     General: Skin is warm and dry.      Coloration: Skin is not jaundiced or pale.   Neurological:      Mental Status: She is alert and oriented to person, place, and time.   Psychiatric:         Attention and Perception: Attention normal.         Mood and Affect: Mood normal.         Speech: Speech normal.         Behavior: Behavior normal.         Assessment:       1. Diarrhea, unspecified type    2. Change in bowel habits    3. History of pancreatic insufficiency    4. Nausea    5. Right sided abdominal pain    6. Heartburn    7. History of fatty infiltration of liver    8. S/P cholecystectomy    9. Family history of colon cancer        Plan:       Diarrhea, unspecified type, Change in bowel habits, & History of pancreatic insufficiency  - schedule Colonoscopy, discussed procedure with the patient, including risks and benefits, patient verbalized understanding  - Recommended increase fiber in diet, especially soluble fiber since this can help bulk up the stool consistency and may help to slow down how fast the stool goes through the colon and can prevent diarrhea  - avoid lactose, alcohol, & caffeine  - avoid known triggers  -      Pancreatic elastase, fecal; Future; Expected date: 08/13/2024  -     Giardia / Cryptosporidum, EIA; Future; Expected date: 08/13/2024  -     Stool Exam-Ova,Cysts,Parasites; Future; Expected date: 08/13/2024  -     Clostridium difficile EIA; Future; Expected date: 08/13/2024  -     Stool culture; Future; Expected date: 08/13/2024  - START: cholestyramine (QUESTRAN) 4 gram packet; Take 1 packet (4 g total) by mouth once daily.  Dispense: 90 packet; Refill: 0  -     CBC Without Differential; Future; Expected date: 08/13/2024  - continue Creon as prescribed for now    Nausea  - schedule EGD, discussed procedure with patient, including risks and benefits, patient verbalized understanding  - continue Zofran p.r.n. as prescribed for now  -     US Abdomen Complete; Future; Expected date: 08/13/2024    Right sided abdominal pain  - schedule EGD, discussed procedure with patient, including risks and benefits, patient verbalized understanding  - schedule Colonoscopy, discussed procedure with the patient, including risks and benefits, patient verbalized understanding  -     CBC Without Differential; Future; Expected date: 08/13/2024  -     US Abdomen Complete; Future; Expected date: 08/13/2024    Heartburn  - schedule EGD, discussed procedure with patient, including risks and benefits, patient verbalized understanding  -Avoid large meals, avoid eating within 2-3 hours of bedtime (avoid late night eating & lying down soon after eating), elevate head of bed if nocturnal symptoms are present, smoking cessation (if current smoker), & weight loss (if overweight).   -Avoid known foods which trigger reflux symptoms & to minimize/avoid high-fat foods, chocolate, caffeine, citrus, alcohol, & tomato products.  -Avoid/limit use of NSAID's, since they can cause GI upset, bleeding, and/or ulcers. If needed, take with food.  -INCREASE:omeprazole (PRILOSEC) 40 MG capsule; Take 1 capsule (40 mg total) by mouth every morning.  Dispense: 90  capsule; Refill: 0  - INCREASE: famotidine (PEPCID) 40 MG tablet; Take 1 tablet (40 mg total) by mouth nightly as needed for Heartburn.  Dispense: 90 tablet; Refill: 0    History of fatty infiltration of liver  For fatty liver recommend: low fat, low cholesterol diet, maintain good control of blood sugars and cholesterol levels, exercise, weight loss (if overweight), minimize/avoid alcohol and tylenol products, & follow-up with PCP for continued evaluation and management; if specialist is needed, recommend seeing hepatology.  -     Hepatic Function Panel; Future; Expected date: 08/13/2024    S/P cholecystectomy  -START:  cholestyramine (QUESTRAN) 4 gram packet; Take 1 packet (4 g total) by mouth once daily.  Dispense: 90 packet; Refill: 0    Family history of colon cancer  - schedule Colonoscopy, discussed procedure with the patient, including risks and benefits, patient verbalized understanding    Follow up in about 4 weeks (around 9/10/2024), or if symptoms worsen or fail to improve.      If no improvement in symptoms or symptoms worsen, call/follow-up at clinic or go to ER.        Total time spent on the encounter includes face to face time and non-face to face time preparing to see the patient (eg, review of tests), Obtaining and/or reviewing separately obtained history, Documenting clinical information in the electronic or other health record, Independently interpreting results (not separately reported) and communicating results to the patient/family/caregiver, or Care coordination (not separately reported).     A dictation software program was used for this note. Please expect some simple typographical  errors in this note.

## 2024-08-13 NOTE — PROCEDURES
ProceduresProcedures       PROCEDURE PERFORMED: Botulinum toxin injection (86813)    CLINICAL INDICATION: G43.719    A time out was conducted just before the start of the procedure to verify the correct patient and procedure, procedure location, and all relevant critical information.     Conventional methods of treatment such as multiple medications , both on and   off label have been tried including: anti-epileptics (topiramate, gabapentin, diamox), beta blockers (metoprolol),  and antidepressants (paxil - cannot trial additional ones due to being on Paxil). The patient has been unresponsive and refractory.The patient meets criteria for chronic headaches according to the ICHD-II, the patient has more than 15 headaches a month which last for more than 4 hours a day.    The Botox injections have achieved well over 50%  improvement in the patient's symptoms. Migraines have been reduced at least 7 days per month and the number of cumulative hours suffering with headaches has been reduced at least 100 total hours per month. Today she does have a headache indicating that the Botox has worn off. Frequency of treatment is every 3 months unless no response to the treatments, at which time we will discontinue the injections.      DESCRIPTION OF PROCEDURE: After obtaining informed consent and under   aseptic technique, a total of 135 units of botulinum toxin type A were   injected in the following muscles:     -- Procerus 5 units  --  5 units bilaterally  -- Frontalis 20 units  -- Temporalis 20 units bilaterally  -- Occipitalis 15 units bilaterally  -- Trapezius 15 units bilaterally.     -- Upper cervical paraspinals 10 units bilaterally spared due to prior surgery in this area     The patient tolerated the procedure well. There were no complications. The patient was given a prescription for repeat treatment in 12 weeks     Unavoidable waste - 65 units       Cee Campbell M.D  Medical Director, Headache and  Facial Pain  [facility]

## 2024-08-15 ENCOUNTER — TELEPHONE (OUTPATIENT)
Dept: NEUROSURGERY | Facility: CLINIC | Age: 44
End: 2024-08-15
Payer: MEDICARE

## 2024-08-15 NOTE — TELEPHONE ENCOUNTER
Called patient in regards to appointment scheduled on 9/9 with Fozia Avalos NP. Provider will be out of the clinic. No answer. Unable to LVM

## 2024-08-23 ENCOUNTER — TELEPHONE (OUTPATIENT)
Dept: GASTROENTEROLOGY | Facility: CLINIC | Age: 44
End: 2024-08-23
Payer: MEDICARE

## 2024-08-23 NOTE — TELEPHONE ENCOUNTER
----- Message from Eladio Browne sent at 8/23/2024  3:53 PM CDT -----  Type:  Patient Returning Call    Who Called:  pt   Who Left Message for Patient:  Lisa  Does the patient know what this is regarding?:  unsure  Best Call Back Number:  [phone]  Additional Information:  pt stated she is returning missed call please ensure to call back to advise asap thanks!

## 2024-09-24 ENCOUNTER — TELEPHONE (OUTPATIENT)
Dept: NEUROLOGY | Facility: CLINIC | Age: 44
End: 2024-09-24
Payer: MEDICARE

## 2024-09-24 NOTE — TELEPHONE ENCOUNTER
----- Message from Vanessa Gong sent at 9/24/2024 11:22 AM CDT -----  Regarding: Needs Medical Advice/RX  Contact: patient at [phone]  Type: Needs Medical Advice    Who Called:  patient at [phone]    Symptoms (please be specific):  constant headaches, night and day    How long has patient had these symptoms:  2 weeks  Pharmacy name and phone #:    [facility] - [place], [place] - [address]   [address] [place] [id number]  Phone: [phone] Fax: [phone]    Additional Information: she would like to get some medication to help. Please call and advise. Thank you

## 2024-10-21 NOTE — TELEPHONE ENCOUNTER
Discussed ideal body weight and encouraged regular physical activity and healthy diet. Recommended routine preventative measures such as always wearing seatbelt & home safety measures. Counseled the patient regarding the rationale for the recommended immunizations and screenings and they are current and/or updated.     Returned call and spoke with patient. Informed patient per Dr. Chamorro's response. Patient verbalized understanding.

## 2024-10-28 ENCOUNTER — OFFICE VISIT (OUTPATIENT)
Dept: PAIN MEDICINE | Facility: CLINIC | Age: 44
End: 2024-10-28
Payer: MEDICARE

## 2024-10-28 ENCOUNTER — PROCEDURE VISIT (OUTPATIENT)
Dept: NEUROLOGY | Facility: CLINIC | Age: 44
End: 2024-10-28
Payer: MEDICARE

## 2024-10-28 ENCOUNTER — HOSPITAL ENCOUNTER (OUTPATIENT)
Dept: RADIOLOGY | Facility: HOSPITAL | Age: 44
Discharge: HOME | End: 2024-10-28
Payer: MEDICARE

## 2024-10-28 VITALS
HEART RATE: 71 BPM | WEIGHT: 273.94 LBS | SYSTOLIC BLOOD PRESSURE: 128 MMHG | HEIGHT: 57 IN | BODY MASS INDEX: 59.1 KG/M2 | DIASTOLIC BLOOD PRESSURE: 85 MMHG

## 2024-10-28 VITALS
DIASTOLIC BLOOD PRESSURE: 85 MMHG | HEIGHT: 57 IN | WEIGHT: 274.5 LBS | HEART RATE: 69 BPM | BODY MASS INDEX: 59.22 KG/M2 | SYSTOLIC BLOOD PRESSURE: 131 MMHG

## 2024-10-28 DIAGNOSIS — G43.719 INTRACTABLE CHRONIC MIGRAINE WITHOUT AURA AND WITHOUT STATUS MIGRAINOSUS: Primary | ICD-10-CM

## 2024-10-28 DIAGNOSIS — M47.816 LUMBAR SPONDYLOSIS: Primary | ICD-10-CM

## 2024-10-28 DIAGNOSIS — R11.0 NAUSEA: ICD-10-CM

## 2024-10-28 DIAGNOSIS — R16.0 HEPATOMEGALY: ICD-10-CM

## 2024-10-28 DIAGNOSIS — K76.0 FATTY LIVER: Primary | ICD-10-CM

## 2024-10-28 DIAGNOSIS — E66.01 MORBID OBESITY WITH BMI OF 60.0-69.9, ADULT: ICD-10-CM

## 2024-10-28 DIAGNOSIS — M54.12 CERVICAL RADICULOPATHY: ICD-10-CM

## 2024-10-28 DIAGNOSIS — R10.9 RIGHT SIDED ABDOMINAL PAIN: ICD-10-CM

## 2024-10-28 PROCEDURE — 99215 OFFICE O/P EST HI 40 MIN: CPT | Mod: PBBFAC,PO,25 | Performed by: ANESTHESIOLOGY

## 2024-10-28 PROCEDURE — 76700 US EXAM ABDOM COMPLETE: CPT | Mod: 26,,, | Performed by: RADIOLOGY

## 2024-10-28 PROCEDURE — 64615 CHEMODENERV MUSC MIGRAINE: CPT | Mod: S$PBB,,, | Performed by: PSYCHIATRY & NEUROLOGY

## 2024-10-28 PROCEDURE — 99999 PR PBB SHADOW E&M-EST. PATIENT-LVL V: CPT | Mod: PBBFAC,,, | Performed by: ANESTHESIOLOGY

## 2024-10-28 PROCEDURE — 64615 CHEMODENERV MUSC MIGRAINE: CPT | Mod: PBBFAC,PO | Performed by: PSYCHIATRY & NEUROLOGY

## 2024-10-28 PROCEDURE — 76700 US EXAM ABDOM COMPLETE: CPT | Mod: TC,PO

## 2024-10-28 PROCEDURE — 99999PBSHW PR PBB SHADOW TECHNICAL ONLY FILED TO HB: Mod: PBBFAC,,,

## 2024-10-28 PROCEDURE — 99214 OFFICE O/P EST MOD 30 MIN: CPT | Mod: S$PBB,,, | Performed by: ANESTHESIOLOGY

## 2024-10-28 RX ORDER — RIMEGEPANT SULFATE 75 MG/75MG
TABLET, ORALLY DISINTEGRATING ORAL
Qty: 8 TABLET | Refills: 11 | Status: SHIPPED | OUTPATIENT
Start: 2024-10-28

## 2024-10-28 RX ORDER — HYDROCODONE BITARTRATE AND ACETAMINOPHEN 5; 325 MG/1; MG/1
1 TABLET ORAL
Qty: 30 TABLET | Refills: 0 | Status: SHIPPED | OUTPATIENT
Start: 2024-11-27 | End: 2024-12-27

## 2024-10-28 RX ORDER — HYDROCODONE BITARTRATE AND ACETAMINOPHEN 5; 325 MG/1; MG/1
1 TABLET ORAL
Qty: 30 TABLET | Refills: 0 | Status: SHIPPED | OUTPATIENT
Start: 2024-10-28 | End: 2024-11-27

## 2024-10-28 RX ORDER — HYDROCODONE BITARTRATE AND ACETAMINOPHEN 5; 325 MG/1; MG/1
1 TABLET ORAL
Qty: 30 TABLET | Refills: 0 | Status: SHIPPED | OUTPATIENT
Start: 2024-12-27 | End: 2025-01-26

## 2024-10-29 RX ORDER — SODIUM CHLORIDE 0.9 % (FLUSH) 0.9 %
10 SYRINGE (ML) INJECTION
OUTPATIENT
Start: 2024-10-29

## 2024-10-29 RX ORDER — SODIUM CHLORIDE, SODIUM LACTATE, POTASSIUM CHLORIDE, CALCIUM CHLORIDE 600; 310; 30; 20 MG/100ML; MG/100ML; MG/100ML; MG/100ML
INJECTION, SOLUTION INTRAVENOUS CONTINUOUS
OUTPATIENT
Start: 2024-10-29

## 2024-10-30 ENCOUNTER — TELEPHONE (OUTPATIENT)
Dept: SURGERY | Facility: HOSPITAL | Age: 44
End: 2024-10-30
Payer: MEDICARE

## 2024-10-30 ENCOUNTER — HOSPITAL ENCOUNTER (OUTPATIENT)
Facility: HOSPITAL | Age: 44
Discharge: HOME | End: 2024-10-30
Attending: INTERNAL MEDICINE | Admitting: INTERNAL MEDICINE
Payer: MEDICARE

## 2024-10-30 VITALS — HEIGHT: 57 IN | BODY MASS INDEX: 59.43 KG/M2 | WEIGHT: 275.5 LBS

## 2024-10-30 DIAGNOSIS — R19.7 DIARRHEA: ICD-10-CM

## 2024-10-30 NOTE — PROGRESS NOTES
Patient weighed in preop area. BMI 59.6kg. Anesthesia canceled procedure due to BMI. Josselyn from the clinic spoke with patient and is rescheduling her to [facility] at a later date.

## 2024-10-31 ENCOUNTER — TELEPHONE (OUTPATIENT)
Dept: GASTROENTEROLOGY | Facility: CLINIC | Age: 44
End: 2024-10-31
Payer: MEDICARE

## 2024-11-25 ENCOUNTER — TELEPHONE (OUTPATIENT)
Dept: GASTROENTEROLOGY | Facility: CLINIC | Age: 44
End: 2024-11-25
Payer: MEDICARE

## 2024-11-25 NOTE — TELEPHONE ENCOUNTER
Spoke with pt. Rescheduled procedure due to her dad being in the hospital. Pt verbalized understanding to new date.

## 2024-11-25 NOTE — TELEPHONE ENCOUNTER
----- Message from Wes sent at 11/25/2024  8:34 AM CST -----  Type: Needs Medical Advice    Who Called:  Pt  Best Call Back Number: [phone]    Additional Information: Pt states that her dad is in the hospital and won;t be able to make it to tomorrow's procedure, She would like to reschedule. Please call back to advise, Thanks!

## 2025-01-22 RX ORDER — FUROSEMIDE 80 MG/1
80 TABLET ORAL DAILY
Qty: 30 TABLET | Refills: 0 | Status: SHIPPED | OUTPATIENT
Start: 2025-01-22

## 2025-01-22 NOTE — TELEPHONE ENCOUNTER
----- Message from Bonny sent at 1/17/2025  9:46 AM CST -----  Type:  RX Refill Request    Who Called:  pt   Refill or New Rx:  refill  RX Name and Strength:  furosemide (LASIX) 80 MG tablet  How is the patient currently taking it? (ex. 1XDay):  as direct   Is this a 30 day or 90 day RX:  30  Preferred Pharmacy with phone number:    [facility] - [place] [address]   [address] [place] [address]  Phone: [phone] Fax: [phone]        Local or Mail Order:  local   Ordering Provider:  stephanie Royal Call Back Number:  [phone]    Additional Information:  please advise

## 2025-01-27 ENCOUNTER — TELEPHONE (OUTPATIENT)
Dept: PAIN MEDICINE | Facility: CLINIC | Age: 45
End: 2025-01-27
Payer: MEDICARE

## 2025-01-28 ENCOUNTER — PROCEDURE VISIT (OUTPATIENT)
Dept: NEUROLOGY | Facility: CLINIC | Age: 45
End: 2025-01-28
Payer: MEDICARE

## 2025-01-28 ENCOUNTER — PATIENT MESSAGE (OUTPATIENT)
Dept: NEUROLOGY | Facility: CLINIC | Age: 45
End: 2025-01-28

## 2025-01-28 ENCOUNTER — OFFICE VISIT (OUTPATIENT)
Dept: PAIN MEDICINE | Facility: CLINIC | Age: 45
End: 2025-01-28
Payer: MEDICARE

## 2025-01-28 VITALS
DIASTOLIC BLOOD PRESSURE: 78 MMHG | HEART RATE: 58 BPM | BODY MASS INDEX: 59.98 KG/M2 | WEIGHT: 278 LBS | SYSTOLIC BLOOD PRESSURE: 137 MMHG | HEIGHT: 57 IN

## 2025-01-28 VITALS
RESPIRATION RATE: 17 BRPM | HEART RATE: 74 BPM | DIASTOLIC BLOOD PRESSURE: 97 MMHG | SYSTOLIC BLOOD PRESSURE: 156 MMHG | BODY MASS INDEX: 58.73 KG/M2 | WEIGHT: 272.25 LBS | TEMPERATURE: 97 F | HEIGHT: 57 IN

## 2025-01-28 DIAGNOSIS — M54.12 CERVICAL RADICULOPATHY: ICD-10-CM

## 2025-01-28 DIAGNOSIS — M54.16 LUMBAR RADICULOPATHY: ICD-10-CM

## 2025-01-28 DIAGNOSIS — E66.01 MORBID OBESITY WITH BMI OF 60.0-69.9, ADULT: ICD-10-CM

## 2025-01-28 DIAGNOSIS — M48.02 CERVICAL STENOSIS OF SPINAL CANAL: ICD-10-CM

## 2025-01-28 DIAGNOSIS — M47.816 LUMBAR SPONDYLOSIS: ICD-10-CM

## 2025-01-28 DIAGNOSIS — G89.4 CHRONIC PAIN DISORDER: ICD-10-CM

## 2025-01-28 DIAGNOSIS — G89.4 CHRONIC PAIN DISORDER: Primary | ICD-10-CM

## 2025-01-28 DIAGNOSIS — Z02.89 PAIN MANAGEMENT CONTRACT SIGNED: Primary | ICD-10-CM

## 2025-01-28 DIAGNOSIS — G43.719 INTRACTABLE CHRONIC MIGRAINE WITHOUT AURA AND WITHOUT STATUS MIGRAINOSUS: Primary | ICD-10-CM

## 2025-01-28 PROCEDURE — 64615 CHEMODENERV MUSC MIGRAINE: CPT | Mod: S$PBB,,, | Performed by: PSYCHIATRY & NEUROLOGY

## 2025-01-28 PROCEDURE — 64615 CHEMODENERV MUSC MIGRAINE: CPT | Mod: PBBFAC,PO | Performed by: PSYCHIATRY & NEUROLOGY

## 2025-01-28 PROCEDURE — 99214 OFFICE O/P EST MOD 30 MIN: CPT | Mod: S$PBB,,,

## 2025-01-28 PROCEDURE — 99214 OFFICE O/P EST MOD 30 MIN: CPT | Mod: PBBFAC,PO,25

## 2025-01-28 PROCEDURE — 99999PBSHW PR PBB SHADOW TECHNICAL ONLY FILED TO HB: Mod: JW,PBBFAC,,

## 2025-01-28 PROCEDURE — 99999 PR PBB SHADOW E&M-EST. PATIENT-LVL IV: CPT | Mod: PBBFAC,,,

## 2025-01-28 RX ORDER — HYDROCODONE BITARTRATE AND ACETAMINOPHEN 5; 325 MG/1; MG/1
1 TABLET ORAL
Qty: 30 TABLET | Refills: 0 | Status: SHIPPED | OUTPATIENT
Start: 2025-03-31 | End: 2025-04-30

## 2025-01-28 RX ORDER — HYDROCODONE BITARTRATE AND ACETAMINOPHEN 5; 325 MG/1; MG/1
1 TABLET ORAL
Qty: 30 TABLET | Refills: 0 | Status: SHIPPED | OUTPATIENT
Start: 2025-01-30 | End: 2025-03-01

## 2025-01-28 RX ORDER — FUROSEMIDE 80 MG/1
80 TABLET ORAL 2 TIMES DAILY
Qty: 60 TABLET | Refills: 11 | Status: SHIPPED | OUTPATIENT
Start: 2025-01-28 | End: 2026-01-28

## 2025-01-28 RX ORDER — HYDROCODONE BITARTRATE AND ACETAMINOPHEN 5; 325 MG/1; MG/1
1 TABLET ORAL
Qty: 30 TABLET | Refills: 0 | Status: SHIPPED | OUTPATIENT
Start: 2025-03-01 | End: 2025-03-31

## 2025-01-28 RX ADMIN — ONABOTULINUMTOXINA 200 UNITS: 100 INJECTION, POWDER, LYOPHILIZED, FOR SOLUTION INTRADERMAL; INTRAMUSCULAR at 10:01

## 2025-01-28 NOTE — PROGRESS NOTES
This note was completed with dictation software and grammatical errors may exist.    CC: Back pain    HPI: The patient is a 44-year-old woman with a history of Chiari malformation, Hadley syndrome, pseudotumor cerebri with shunt, morbid obesity who presents in referral from Dr. Velasco for neck pain.  She returns for follow-up with continued multiple pain complaints.  Some continued neck pain into left arm, midback pain and lower back pain.  Ultimately her pain is at baseline with no new or significantly worsening symptoms.  No new numbness, weakness or any new changes to her bowel or bladder function.  She is working on weight loss in changing her diet.  She was diagnosed with fatty liver disease in his now eating baked foods and no longer fried foods.  She is taking Mounjaro.  She continues to take hydrocodone 5/325 mg once daily with added benefit and no ill side effects or adverse events.      Pain intervention history: She was seeing Dr. Gerson Renteria, pain management and until recently had been taking hydrocodone 5/325 once a day in addition to Flexeril 3 times a day and gabapentin 600 mg 3 times a day.  She apparently tested positive for Valium and so was dismissed from his practice. She is status post C7-T1 cervical interlaminar epidural steroid injection on 10/4/17 with 100% relief lasting 2 weeks.  She is status post a second cervical TABITHA on 11/14/17 with almost complete relief again but only lasting 2-3 weeks. She is status post bilateral L2, 3 and 4 medial branch radiofrequency ablation on 09/21/2018 with 75% relief.   She is status post C7-T1 interlaminar epidural steroid injection on 10/01/2020 with 100% relief of her neck pain.   She is status post cervical C3-C7 posterior approach laminectomy with Dr. Shari MD on 6/08/23    Spine surgeries:She is status post cervical C3-C7 posterior approach laminectomy with Dr. Shari MD on 6/08/23.     Antineuropathics:  NSAIDs:  Physical  therapy:  Antidepressants:  Muscle relaxers:  Flexeril 10 mg up to 3 times daily  Opioids:  Hydrocodone 5/325 once daily  Antiplatelets/Anticoagulants:            ROS: She reports weight loss, headaches, easy bruising and back pain.  Balance of review of systems is negative.    Past Medical History:   Diagnosis Date    Anticoagulant long-term use     Asthma     Bronchitis     Chiari malformation type I     Chronic headache     Diabetes mellitus     GERD (gastroesophageal reflux disease)     Graves disease     Graves disease     Hyperlipidemia     Hypertension     Murmur, heart     NPH (normal pressure hydrocephalus)     Obesity     MANUEL on CPAP     Pseudotumor cerebri     Thyroid disease     Hadley syndrome        Past Surgical History:   Procedure Laterality Date    CARPAL TUNNEL RELEASE      CERVICAL SPINE SURGERY      CHOLECYSTECTOMY      COLONOSCOPY  2022    2 years ago; Dr. Boyle - normal    COLONOSCOPY N/A 11/4/2024    Procedure: COLONOSCOPY;  Surgeon: Wei Thapa MD;  Location: [facility];  Service: Gastroenterology;  Laterality: N/A;    EAR TUBE REMOVAL Bilateral     EPIDURAL STEROID INJECTION INTO CERVICAL SPINE N/A 10/01/2020    Procedure: Injection-steroid-epidural-cervical C7- T1 interlaminar;  Surgeon: Santana Pak MD;  Location: [facility];  Service: Pain Management;  Laterality: N/A;    ESOPHAGOGASTRODUODENOSCOPY N/A 1/13/2025    Procedure: EGD (ESOPHAGOGASTRODUODENOSCOPY);  Surgeon: Wei Thapa MD;  Location: [facility];  Service: Gastroenterology;  Laterality: N/A;    EYE SURGERY      strabismus    INJECTION OF FACET JOINT Bilateral 05/23/2018    Procedure: INJECTION-FACET L3/4 and L4/5;  Surgeon: Santana Pak MD;  Location: [facility] OR;  Service: Pain Management;  Laterality: Bilateral;  sacralization of L5 and a rudimentary disc space    MYRINGOTOMY W/ TUBES      POSTERIOR CERVICAL LAMINECTOMY N/A 06/08/2023    Procedure: LAMINECTOMY, SPINE, CERVICAL, POSTERIOR APPROACH     "C3-C7 LAMINOPLASTY;  Surgeon: Sunita Mccarthy MD;  Location: [facility] OR;  Service: Neurosurgery;  Laterality: N/A;    RADIOFREQUENCY ABLATION OF LUMBAR MEDIAL BRANCH NERVE AT SINGLE LEVEL Bilateral 09/21/2018    Procedure: RADIOFREQUENCY ABLATION, NERVE, SPINAL, LUMBAR, MEDIAL BRANCH, L2, L3, L4;  Surgeon: Santana Pak MD;  Location: [facility] OR;  Service: Pain Management;  Laterality: Bilateral;    TONSILLECTOMY      UPPER GASTROINTESTINAL ENDOSCOPY  2021    normal    VENTRICULOPERITONEAL SHUNT         Social History     Socioeconomic History    Marital status: Single   Tobacco Use    Smoking status: Never     Passive exposure: Current    Smokeless tobacco: Never   Substance and Sexual Activity    Alcohol use: No    Drug use: Yes     Types: Hydrocodone         Medications/Allergies: See med card    Vitals:    01/28/25 1125   BP: 137/78   Pulse: (!) 58   Weight: 126.1 kg (278 lb)   Height: 4' 9" (1.448 m)   PainSc:   8   PainLoc: Neck         Body mass index is 60.16 kg/m².        1/28/2025    11:24 AM 10/28/2024     9:28 AM 7/25/2024     9:21 AM   Last 3 PDI Scores   Pain Disability Index (PDI) 42 27 27         Physical exam:    Gen: A and O x3, pleasant, well-groomed  Musculoskeletal:  No antalgic gait.     Neuro:    Iliopsoas Quadriceps Knee  Flexion Tibialis  anterior Gastro- cnemius EHL   Lower: R 5/5 5/5 5/5 5/5 5/5 5/5    L 5/5 5/5 5/5 5/5 5/5 5/5      Left  Right    Patellar DTR 2+ 2+   Achilles DTR 2+ 2+   Cardenas Absent  Absent   Clonus Absent Absent   Babinski Absent Absent     Intact and symmetrical to light touch and pinprick in L1-S1 dermatomes bilaterally.    Lumbar spine:  Lumbar spine: ROM is mildly reduced with flexion and extension with increased pain on both flexion and extension.    Derrick's test causes no increased pain on either side.    Supine straight leg raise is negative bilaterally.    Internal and external rotation of the hip causes no increased pain on either side.  Myofascial " exam: No tenderness to palpation across lumbar paraspinous muscles.        Imaging:  MRI from 2/9/17 cervical spine demonstrates congenitally narrowed canal with disc bulging most prominently at C4/5 to the right side causing anterior cord compression but no signal changes.  There is narrowing of the canal at C3/4 to a lesser degree.    03/08/2023 MRI cervical spine  C2-C3: Developmental fusion.  The spinal canal and foramina are patent..  C3-C4: Shallow broad-based disc osteophyte complex and mild facet arthrosis. No substantial degenerative spinal canal or foraminal narrowing.  C4-C5: Shallow broad-based disc osteophyte complex and mild bilateral facet arthrosis more pronounced on the left but no substantial degenerative spinal canal or foraminal narrowing..  C5-C6: Redemonstrated mild degenerative disc height loss and broad-based disc osteophyte complex lateralizing to the right contributing to moderate right central spinal canal narrowing. Similar mild flattening of the ventral cord surface. Uncovertebral spurring and facet arthrosis contribute to mild bilateral foraminal narrowing..  C6-C7: Shallow broad-based disc osteophyte complex.  No substantial degenerative spinal canal or foraminal narrowing..  C7-T1: Within normal limits.  The spinal canal and foramina are patent..     Soft tissues: The visualized paraspinal soft tissues are within normal limits.  Note is made of medialized internal carotid arteries at the C2 level to C5.    3/26/18 MRI L-spine:  T11-T12: Unremarkable.  There is no spinal canal or significant foraminal stenosis.   T12-L1: Unremarkable.  There is no spinal canal or significant foraminal stenosis.   L1-2: There is mild facet joint arthropathy.  There is no spinal canal or significant foraminal stenosis.   L2-3: There is mild facet joint arthropathy.  There is no spinal canal or significant foraminal stenosis.   L3-4: There is minimal bulging of the annulus.  There is mild-to-moderate  facet joint arthropathy with ligamentum flavum thickening and trace left facet joint effusion.  There is mildly prominent dorsal epidural fat.  There is no spinal canal or significant foraminal stenosis.   L4-5: There is mild disc space narrowing and disc desiccation.  There is moderate-to-marked right and mild-to-moderate left facet joint arthropathy with ligamentum flavum thickening.  There is a small left facet joint effusion.  There is minimal bulging of the annulus.  There is no spinal canal stenosis.  There is mild right superior foraminal recess stenosis.   L5-S1: This is a rudimentary disc space due to sacralization of L5.  There is no spinal canal or significant foraminal stenosis.    Assessment:   The patient is a 44-year-old woman with a history of Chiari malformation, pseudotumor cerebri with shunt, morbid obesity who presents in referral from Dr. Velasco for neck pain.    1. Pain management contract signed        2. Lumbar spondylosis        3. Morbid obesity with BMI of 60.0-69.9, adult        4. Cervical radiculopathy        5. Lumbar radiculopathy        6. Chronic pain disorder        7. Cervical stenosis of spinal canal                    Plan:  She is at baseline for pain.  She understands that majority of her pain is related to her weight and will improve with weight loss.  We will continue to maximize conservative management with continued formal physical therapy, weight loss diet and exercise.  Refills for hydrocodone 5/325 mg 30 tablets to take up to once daily sent to Dr. Pak today for approval. I have reviewed the [place] Board of Pharmacy website and there are no abberancies.    Follow up in 3 months or sooner if needed.

## 2025-01-28 NOTE — PROCEDURES
ProceduresProcedures       PROCEDURE PERFORMED: Botulinum toxin injection (76838)    CLINICAL INDICATION: G43.719    A time out was conducted just before the start of the procedure to verify the correct patient and procedure, procedure location, and all relevant critical information.     Conventional methods of treatment such as multiple medications , both on and   off label have been tried including: anti-epileptics (topiramate, gabapentin, diamox), beta blockers (metoprolol),  and antidepressants (paxil - cannot trial additional ones due to being on Paxil). The patient has been unresponsive and refractory.The patient meets criteria for chronic headaches according to the ICHD-II, the patient has more than 15 headaches a month which last for more than 4 hours a day.    The Botox injections have achieved well over 50%  improvement in the patient's symptoms. Migraines have been reduced at least 7 days per month and the number of cumulative hours suffering with headaches has been reduced at least 100 total hours per month. Today she does have a headache indicating that the Botox has worn off. Frequency of treatment is every 3 months unless no response to the treatments, at which time we will discontinue the injections.      DESCRIPTION OF PROCEDURE: After obtaining informed consent and under   aseptic technique, a total of 135 units of botulinum toxin type A were   injected in the following muscles:     -- Procerus 5 units  --  5 units bilaterally  -- Frontalis 20 units  -- Temporalis 20 units bilaterally  -- Occipitalis 15 units bilaterally  -- Trapezius 15 units bilaterally.     -- Upper cervical paraspinals 10 units bilaterally spared due to prior surgery in this area     The patient tolerated the procedure well. There were no complications. The patient was given a prescription for repeat treatment in 12 weeks     Unavoidable waste - 65 units       Cee Campbell M.D  Medical Director, Headache and  Facial Pain  [facility]

## 2025-01-30 DIAGNOSIS — M54.16 LUMBAR RADICULOPATHY: ICD-10-CM

## 2025-01-30 DIAGNOSIS — M54.12 CERVICAL RADICULOPATHY: ICD-10-CM

## 2025-01-30 RX ORDER — CYCLOBENZAPRINE HCL 10 MG
TABLET ORAL
Qty: 90 TABLET | Refills: 2 | Status: SHIPPED | OUTPATIENT
Start: 2025-01-30

## 2025-02-24 DIAGNOSIS — G43.719 INTRACTABLE CHRONIC MIGRAINE WITHOUT AURA AND WITHOUT STATUS MIGRAINOSUS: ICD-10-CM

## 2025-02-24 RX ORDER — AMANTADINE HYDROCHLORIDE 100 MG/1
CAPSULE, GELATIN COATED ORAL EVERY MORNING
Qty: 28 CAPSULE | Refills: 11 | Status: SHIPPED | OUTPATIENT
Start: 2025-02-24

## 2025-02-24 RX ORDER — TOPIRAMATE 100 MG/1
100 TABLET, FILM COATED ORAL 2 TIMES DAILY
Qty: 60 TABLET | Refills: 11 | Status: SHIPPED | OUTPATIENT
Start: 2025-02-24

## 2025-03-04 NOTE — TELEPHONE ENCOUNTER
----- Message from Bonny Graham sent at 9/10/2018  9:46 AM CDT -----  Contact: Patient  Shanna, patient [phone] calling because she needs a refill on HYDROcodone-acetaminophen (NORCO) 5-325 mg per tablet. Patient is completely out. Please advise. Thanks.    [facility]  [address] [place] [address]  Phone: [phone] Fax: [phone]     Detail Level: Detailed Detail Level: Zone

## 2025-03-17 DIAGNOSIS — Z90.49 S/P CHOLECYSTECTOMY: ICD-10-CM

## 2025-03-17 DIAGNOSIS — R19.7 DIARRHEA, UNSPECIFIED TYPE: ICD-10-CM

## 2025-03-17 RX ORDER — CHOLESTYRAMINE 4 G/9G
POWDER, FOR SUSPENSION ORAL
Qty: 90 PACKET | Refills: 0 | Status: SHIPPED | OUTPATIENT
Start: 2025-03-17

## 2025-03-17 NOTE — TELEPHONE ENCOUNTER
----- Message from Lupe sent at 3/17/2025  8:49 AM CDT -----  Contact: self  Type:  RX Refill RequestWho Called:  pt Refill or New Rx:  refillRX Name and Strength:  cholestyramine (QUESTRAN) 4 gram packet, and lipase-protease-amylase (CREON) 36,000-114,000- 180,000 unit CpDRHow is the patient currently taking it? (ex. 1XDay):  as directed Is this a 30 day or 90 day RX:  30Preferred Pharmacy with phone number:  [facility] - [place], [place] - [address] [facility] [address]Qjfuk: [phone] Fax: [phone] or Mail Order:  local Ordering Provider:  Thelma Shine Call Back Number:  [phone] Information:  thanks

## 2025-03-31 ENCOUNTER — TELEPHONE (OUTPATIENT)
Dept: NEUROLOGY | Facility: CLINIC | Age: 45
End: 2025-03-31
Payer: MEDICARE

## 2025-03-31 RX ORDER — BUTALBITAL, ACETAMINOPHEN AND CAFFEINE 50; 325; 40 MG/1; MG/1; MG/1
TABLET ORAL
Qty: 14 TABLET | Refills: 0 | Status: SHIPPED | OUTPATIENT
Start: 2025-03-31

## 2025-03-31 NOTE — TELEPHONE ENCOUNTER
"Spoke to pt--states she has had a migraine for 2 months now. She reports last Emgality injection was 3/1/25. I advised pt that since today is 3/31/25, and this is every 28 days, she can administer the injection today. Pt states she is still taking Nurtec as needed and Topamax twice a day. Pt reports pain is 10/10 and is nauseous. Pt declines going to the hospital because she was "trying to treat it her self". Wanted to move Botox sooner but she is not due until 4/22/25 and scheduled 4/24/25. Pt requesting something else she can take for this. Please advise.   "

## 2025-03-31 NOTE — TELEPHONE ENCOUNTER
Spoke to pt--advised that Fioricet taper was called in to [facility]. Instructed pt to take medication exactly as prescribed and until complete. Pt v/u.

## 2025-03-31 NOTE — TELEPHONE ENCOUNTER
Left a message on [facility] voicemail clarifying the order for Fioricet -40mg tabs, disp 14, 0 refills, take 1 tab PO TID x3 days, then 1 tab PO BID x2 days then 1 per day x1 day then stop per Dr. Campbell's orders

## 2025-03-31 NOTE — TELEPHONE ENCOUNTER
----- Message from Yara sent at 3/31/2025  2:22 PM CDT -----  Regarding: Sooner Botox Appointment Request  Contact: Patient at [phone]  Type:  Sooner Botox Appointment RequestCaller is requesting a sooner appointment.  Name of Caller:  Patient at [phone]:  bad headaches that won't go awayAdditional Information:  Patient would like sooner appointment. Please call and advise. Thank you.

## 2025-04-17 ENCOUNTER — TELEPHONE (OUTPATIENT)
Dept: RHEUMATOLOGY | Facility: CLINIC | Age: 45
End: 2025-04-17
Payer: MEDICARE

## 2025-04-17 NOTE — TELEPHONE ENCOUNTER
----- Message from Oanh sent at 4/17/2025  9:32 AM CDT -----  Regarding: Scheduling Request  Contact: pt @ [phone] (home)  Scheduling Request  Appt Type:  NP Date/Time Preference: first available Treating Provider: bruno Caller Name: [facility] Contact Preference: [phone] (home)  Comments/notes: office is calling to get appt for pt for L Mystitis of breast. Asking for a call back. Referral is in Media

## 2025-04-21 DIAGNOSIS — G43.719 INTRACTABLE CHRONIC MIGRAINE WITHOUT AURA AND WITHOUT STATUS MIGRAINOSUS: ICD-10-CM

## 2025-04-21 RX ORDER — GALCANEZUMAB 120 MG/ML
INJECTION, SOLUTION SUBCUTANEOUS
Qty: 1 ML | Refills: 11 | Status: SHIPPED | OUTPATIENT
Start: 2025-04-21

## 2025-04-24 ENCOUNTER — HOSPITAL ENCOUNTER (OUTPATIENT)
Dept: RADIOLOGY | Facility: HOSPITAL | Age: 45
Discharge: HOME | End: 2025-04-24
Payer: MEDICARE

## 2025-04-24 ENCOUNTER — PROCEDURE VISIT (OUTPATIENT)
Dept: NEUROLOGY | Facility: CLINIC | Age: 45
End: 2025-04-24
Payer: MEDICARE

## 2025-04-24 ENCOUNTER — OFFICE VISIT (OUTPATIENT)
Dept: PAIN MEDICINE | Facility: CLINIC | Age: 45
End: 2025-04-24
Payer: MEDICARE

## 2025-04-24 VITALS — DIASTOLIC BLOOD PRESSURE: 83 MMHG | RESPIRATION RATE: 17 BRPM | SYSTOLIC BLOOD PRESSURE: 117 MMHG | HEART RATE: 65 BPM

## 2025-04-24 VITALS
DIASTOLIC BLOOD PRESSURE: 65 MMHG | HEART RATE: 61 BPM | BODY MASS INDEX: 57.28 KG/M2 | SYSTOLIC BLOOD PRESSURE: 108 MMHG | WEIGHT: 264.69 LBS

## 2025-04-24 DIAGNOSIS — M47.816 LUMBAR SPONDYLOSIS: ICD-10-CM

## 2025-04-24 DIAGNOSIS — E66.01 MORBID OBESITY WITH BMI OF 60.0-69.9, ADULT: ICD-10-CM

## 2025-04-24 DIAGNOSIS — G43.719 INTRACTABLE CHRONIC MIGRAINE WITHOUT AURA AND WITHOUT STATUS MIGRAINOSUS: Primary | ICD-10-CM

## 2025-04-24 DIAGNOSIS — G89.4 CHRONIC PAIN DISORDER: ICD-10-CM

## 2025-04-24 DIAGNOSIS — M54.16 LUMBAR RADICULOPATHY: ICD-10-CM

## 2025-04-24 DIAGNOSIS — M54.2 CERVICALGIA: ICD-10-CM

## 2025-04-24 DIAGNOSIS — M54.12 CERVICAL RADICULOPATHY: Primary | ICD-10-CM

## 2025-04-24 DIAGNOSIS — Z02.89 PAIN MANAGEMENT CONTRACT SIGNED: ICD-10-CM

## 2025-04-24 DIAGNOSIS — M48.02 CERVICAL STENOSIS OF SPINAL CANAL: ICD-10-CM

## 2025-04-24 DIAGNOSIS — M54.12 CERVICAL RADICULOPATHY: ICD-10-CM

## 2025-04-24 PROCEDURE — 72050 X-RAY EXAM NECK SPINE 4/5VWS: CPT | Mod: TC,PO

## 2025-04-24 PROCEDURE — 72050 X-RAY EXAM NECK SPINE 4/5VWS: CPT | Mod: 26,,, | Performed by: RADIOLOGY

## 2025-04-24 PROCEDURE — 99214 OFFICE O/P EST MOD 30 MIN: CPT | Mod: PBBFAC,PO

## 2025-04-24 PROCEDURE — 64615 CHEMODENERV MUSC MIGRAINE: CPT | Mod: S$PBB,,, | Performed by: PSYCHIATRY & NEUROLOGY

## 2025-04-24 PROCEDURE — 99999 PR PBB SHADOW E&M-EST. PATIENT-LVL IV: CPT | Mod: PBBFAC,,,

## 2025-04-24 PROCEDURE — 64615 CHEMODENERV MUSC MIGRAINE: CPT | Mod: PBBFAC,PO | Performed by: PSYCHIATRY & NEUROLOGY

## 2025-04-24 PROCEDURE — 99999PBSHW PR PBB SHADOW TECHNICAL ONLY FILED TO HB: Mod: PBBFAC,,,

## 2025-04-24 RX ORDER — FUROSEMIDE 80 MG/1
80 TABLET ORAL DAILY
Qty: 30 TABLET | Refills: 11 | Status: SHIPPED | OUTPATIENT
Start: 2025-04-24

## 2025-04-24 RX ORDER — CYCLOBENZAPRINE HCL 10 MG
TABLET ORAL
Qty: 90 TABLET | Refills: 2 | Status: SHIPPED | OUTPATIENT
Start: 2025-04-24

## 2025-04-24 RX ORDER — TOPIRAMATE 100 MG/1
100 TABLET, FILM COATED ORAL 2 TIMES DAILY
Qty: 60 TABLET | Refills: 11 | Status: SHIPPED | OUTPATIENT
Start: 2025-04-24

## 2025-04-24 RX ORDER — HYDROCODONE BITARTRATE AND ACETAMINOPHEN 5; 325 MG/1; MG/1
1 TABLET ORAL
Qty: 30 TABLET | Refills: 0 | Status: SHIPPED | OUTPATIENT
Start: 2025-05-30 | End: 2025-06-29

## 2025-04-24 RX ORDER — HYDROCODONE BITARTRATE AND ACETAMINOPHEN 5; 325 MG/1; MG/1
1 TABLET ORAL
Qty: 30 TABLET | Refills: 0 | Status: SHIPPED | OUTPATIENT
Start: 2025-04-30 | End: 2025-05-30

## 2025-04-24 RX ORDER — HYDROCODONE BITARTRATE AND ACETAMINOPHEN 5; 325 MG/1; MG/1
1 TABLET ORAL
Qty: 30 TABLET | Refills: 0 | Status: SHIPPED | OUTPATIENT
Start: 2025-06-29 | End: 2025-07-29

## 2025-04-24 NOTE — PROCEDURES
ProceduresProcedures       PROCEDURE PERFORMED: Botulinum toxin injection (88059)    CLINICAL INDICATION: G43.719    A time out was conducted just before the start of the procedure to verify the correct patient and procedure, procedure location, and all relevant critical information.     Conventional methods of treatment such as multiple medications , both on and   off label have been tried including: anti-epileptics (topiramate, gabapentin, diamox), beta blockers (metoprolol),  and antidepressants (paxil - cannot trial additional ones due to being on Paxil). The patient has been unresponsive and refractory.The patient meets criteria for chronic headaches according to the ICHD-II, the patient has more than 15 headaches a month which last for more than 4 hours a day.    The Botox injections have achieved well over 50%  improvement in the patient's symptoms. Migraines have been reduced at least 7 days per month and the number of cumulative hours suffering with headaches has been reduced at least 100 total hours per month. Today she does have a headache indicating that the Botox has worn off. Frequency of treatment is every 3 months unless no response to the treatments, at which time we will discontinue the injections.      DESCRIPTION OF PROCEDURE: After obtaining informed consent and under   aseptic technique, a total of 135 units of botulinum toxin type A were   injected in the following muscles:     -- Procerus 5 units  --  5 units bilaterally  -- Frontalis 20 units  -- Temporalis 20 units bilaterally  -- Occipitalis 15 units bilaterally  -- Trapezius 15 units bilaterally.     -- Upper cervical paraspinals 10 units bilaterally spared due to prior surgery in this area     The patient tolerated the procedure well. There were no complications. The patient was given a prescription for repeat treatment in 12 weeks     Unavoidable waste - 65 units       Cee Campbell M.D  Medical Director, Headache and  Facial Pain  [facility]

## 2025-04-24 NOTE — PROGRESS NOTES
This note was completed with dictation software and grammatical errors may exist.    CC: Back pain    HPI: The patient is a 44-year-old woman with a history of Chiari malformation, Hadley syndrome, pseudotumor cerebri with shunt, morbid obesity who presents in referral from Dr. Velasco for neck pain.  She returns for follow-up with worsening neck pain with radiation down right arm, 8/10, constant.  She denies any associated numbness or tingling in arms or any cesar weakness in her arms.  She also continues to have lower back pain but overall lower back pain is manageable at this time and neck pain is most problematic for her.  She continues to take Flexeril and hydrocodone 5/325 mg once daily for her pain with no ill side effects or adverse events.  She has lost 10 lb by eating better and exercising more.        Pain intervention history: She was seeing Dr. Gerson Renteria, pain management and until recently had been taking hydrocodone 5/325 once a day in addition to Flexeril 3 times a day and gabapentin 600 mg 3 times a day.  She apparently tested positive for Valium and so was dismissed from his practice. She is status post C7-T1 cervical interlaminar epidural steroid injection on 10/4/17 with 100% relief lasting 2 weeks.  She is status post a second cervical TABITHA on 11/14/17 with almost complete relief again but only lasting 2-3 weeks. She is status post bilateral L2, 3 and 4 medial branch radiofrequency ablation on 09/21/2018 with 75% relief.   She is status post C7-T1 interlaminar epidural steroid injection on 10/01/2020 with 100% relief of her neck pain.   She is status post cervical C3-C7 posterior approach laminectomy with Dr. Shari MD on 6/08/23    Spine surgeries:She is status post cervical C3-C7 posterior approach laminectomy with Dr. Shari MD on 6/08/23.     Antineuropathics:  NSAIDs:  Physical therapy:  Antidepressants:  Muscle relaxers:  Flexeril 10 mg up to 3 times daily  Opioids:   Hydrocodone 5/325 once daily  Antiplatelets/Anticoagulants:            ROS: She reports weight loss, headaches, easy bruising and back pain.  Balance of review of systems is negative.    Past Medical History:   Diagnosis Date    Anticoagulant long-term use     Asthma     Bronchitis     Chiari malformation type I     Chronic headache     Diabetes mellitus     GERD (gastroesophageal reflux disease)     Graves disease     Graves disease     Hyperlipidemia     Hypertension     Murmur, heart     NPH (normal pressure hydrocephalus)     Obesity     MANUEL on CPAP     Pseudotumor cerebri     Thyroid disease     Hadley syndrome        Past Surgical History:   Procedure Laterality Date    CARPAL TUNNEL RELEASE      CERVICAL SPINE SURGERY      CHOLECYSTECTOMY      COLONOSCOPY  2022    2 years ago; Dr. Boyle - normal    COLONOSCOPY N/A 11/4/2024    Procedure: COLONOSCOPY;  Surgeon: Wei Thapa MD;  Location: [facility];  Service: Gastroenterology;  Laterality: N/A;    COLONOSCOPY N/A 2/6/2025    Procedure: COLONOSCOPY;  Surgeon: Wei Thapa MD;  Location: [facility];  Service: Gastroenterology;  Laterality: N/A;    EAR TUBE REMOVAL Bilateral     EPIDURAL STEROID INJECTION INTO CERVICAL SPINE N/A 10/01/2020    Procedure: Injection-steroid-epidural-cervical C7- T1 interlaminar;  Surgeon: Santana Pak MD;  Location: [facility] OR;  Service: Pain Management;  Laterality: N/A;    ESOPHAGOGASTRODUODENOSCOPY N/A 1/13/2025    Procedure: EGD (ESOPHAGOGASTRODUODENOSCOPY);  Surgeon: Wei Thapa MD;  Location: [facility];  Service: Gastroenterology;  Laterality: N/A;    EYE SURGERY      strabismus    INJECTION OF FACET JOINT Bilateral 05/23/2018    Procedure: INJECTION-FACET L3/4 and L4/5;  Surgeon: Santana Pak MD;  Location: [facility] OR;  Service: Pain Management;  Laterality: Bilateral;  sacralization of L5 and a rudimentary disc space    MYRINGOTOMY W/ TUBES      POSTERIOR CERVICAL LAMINECTOMY N/A 06/08/2023     Procedure: LAMINECTOMY, SPINE, CERVICAL, POSTERIOR APPROACH    C3-C7 LAMINOPLASTY;  Surgeon: Sunita Mccarthy MD;  Location: [facility] OR;  Service: Neurosurgery;  Laterality: N/A;    RADIOFREQUENCY ABLATION OF LUMBAR MEDIAL BRANCH NERVE AT SINGLE LEVEL Bilateral 09/21/2018    Procedure: RADIOFREQUENCY ABLATION, NERVE, SPINAL, LUMBAR, MEDIAL BRANCH, L2, L3, L4;  Surgeon: Santana Pak MD;  Location: [facility] OR;  Service: Pain Management;  Laterality: Bilateral;    TONSILLECTOMY      UPPER GASTROINTESTINAL ENDOSCOPY  2021    normal    VENTRICULOPERITONEAL SHUNT         Social History     Socioeconomic History    Marital status: Single   Tobacco Use    Smoking status: Never     Passive exposure: Current    Smokeless tobacco: Never   Substance and Sexual Activity    Alcohol use: No    Drug use: Yes     Types: Hydrocodone         Medications/Allergies: See med card    Vitals:    04/24/25 1031   BP: 108/65   Pulse: 61   Weight: 120.1 kg (264 lb 11 oz)   PainSc:   8   PainLoc: Back         Body mass index is 57.28 kg/m².        4/24/2025    10:30 AM 1/28/2025    11:24 AM 10/28/2024     9:28 AM   Last 3 PDI Scores   Pain Disability Index (PDI) 24 42 27         Physical exam:    Gen: A and O x3, pleasant, well-groomed  Musculoskeletal:  antalgic gait, waddling gait    Neuro:    Iliopsoas Quadriceps Knee  Flexion Tibialis  anterior Gastro- cnemius EHL   Lower: R 5/5 5/5 5/5 5/5 5/5 5/5    L 5/5 5/5 5/5 5/5 5/5 5/5       Intact and symmetrical to light touch and pinprick in L1-S1 dermatomes bilaterally.    Lumbar spine:  Lumbar spine: ROM is mildly reduced with flexion and extension with increased pain on both flexion and extension.    Derrick's test causes no increased pain on either side.    Supine straight leg raise is negative bilaterally.    Internal and external rotation of the hip causes no increased pain on either side.  Myofascial exam: No tenderness to palpation across lumbar paraspinous  muscles.      Neuro:  Motor:    Right Left   C4 Shoulder Abduction  5  5   C5 Elbow Flexion    5  5   C6 Wrist Extension  5  5   C7 Elbow Extension   5  5   C8/T1 Hand Intrinsics   5  5   C8 First Dorsal Interosseus  5  5   C8 Abductor Pollicus Brevis  5  5       Sensory: Intact and symmetrical to light touch and pinprick in C2-T1 dermatomes bilaterally.  Cervical spine: ROM is reduced with flexion, extension and lateral rotation without increased pain.  Myofascial exam: No Tenderness to palpation across cervical paraspinous region bilaterally.        Imaging:  MRI from 2/9/17 cervical spine demonstrates congenitally narrowed canal with disc bulging most prominently at C4/5 to the right side causing anterior cord compression but no signal changes.  There is narrowing of the canal at C3/4 to a lesser degree.    03/08/2023 MRI cervical spine  C2-C3: Developmental fusion.  The spinal canal and foramina are patent..  C3-C4: Shallow broad-based disc osteophyte complex and mild facet arthrosis. No substantial degenerative spinal canal or foraminal narrowing.  C4-C5: Shallow broad-based disc osteophyte complex and mild bilateral facet arthrosis more pronounced on the left but no substantial degenerative spinal canal or foraminal narrowing..  C5-C6: Redemonstrated mild degenerative disc height loss and broad-based disc osteophyte complex lateralizing to the right contributing to moderate right central spinal canal narrowing. Similar mild flattening of the ventral cord surface. Uncovertebral spurring and facet arthrosis contribute to mild bilateral foraminal narrowing..  C6-C7: Shallow broad-based disc osteophyte complex.  No substantial degenerative spinal canal or foraminal narrowing..  C7-T1: Within normal limits.  The spinal canal and foramina are patent..     Soft tissues: The visualized paraspinal soft tissues are within normal limits.  Note is made of medialized internal carotid arteries at the C2 level to  C5.    3/26/18 MRI L-spine:  T11-T12: Unremarkable.  There is no spinal canal or significant foraminal stenosis.   T12-L1: Unremarkable.  There is no spinal canal or significant foraminal stenosis.   L1-2: There is mild facet joint arthropathy.  There is no spinal canal or significant foraminal stenosis.   L2-3: There is mild facet joint arthropathy.  There is no spinal canal or significant foraminal stenosis.   L3-4: There is minimal bulging of the annulus.  There is mild-to-moderate facet joint arthropathy with ligamentum flavum thickening and trace left facet joint effusion.  There is mildly prominent dorsal epidural fat.  There is no spinal canal or significant foraminal stenosis.   L4-5: There is mild disc space narrowing and disc desiccation.  There is moderate-to-marked right and mild-to-moderate left facet joint arthropathy with ligamentum flavum thickening.  There is a small left facet joint effusion.  There is minimal bulging of the annulus.  There is no spinal canal stenosis.  There is mild right superior foraminal recess stenosis.   L5-S1: This is a rudimentary disc space due to sacralization of L5.  There is no spinal canal or significant foraminal stenosis.    Assessment:   The patient is a 44-year-old woman with a history of Chiari malformation, pseudotumor cerebri with shunt, morbid obesity who presents in referral from Dr. Velasco for neck pain.    1. Cervical radiculopathy  X-Ray Cervical Spine Complete 5 view    cyclobenzaprine (FLEXERIL) 10 MG tablet      2. Cervicalgia  MRI Cervical Spine Without Contrast      3. Lumbar radiculopathy  cyclobenzaprine (FLEXERIL) 10 MG tablet      4. Chronic pain disorder        5. Morbid obesity with BMI of 60.0-69.9, adult        6. Pain management contract signed        7. Lumbar spondylosis        8. Cervical stenosis of spinal canal                      Plan:    Lower back pain is manageable at this time.  Neck pain is becoming more severe.  She feels like this  pain is similar to the way it was prior to her cervical posterior fusion.  I am concerned for new worsening narrowing causing her cervical radicular pain.  At this time I would like to order a new x-ray of cervical spine and MRI of cervical spine.  Refill for Flexeril provided today.  Refills for hydrocodone 5/325 mg 30 tablets to take up to once daily sent to Dr. Pak today for approval. I have reviewed the [place] Board of Pharmacy website and there are no abberancies.    I will call her with results of imaging and future treatment plan.  Can consider cervical TABITHA if indicated.

## 2025-06-12 ENCOUNTER — PATIENT MESSAGE (OUTPATIENT)
Dept: RADIOLOGY | Facility: HOSPITAL | Age: 45
End: 2025-06-12
Payer: MEDICARE

## 2025-06-13 ENCOUNTER — TELEPHONE (OUTPATIENT)
Dept: PAIN MEDICINE | Facility: CLINIC | Age: 45
End: 2025-06-13
Payer: MEDICARE

## 2025-06-13 NOTE — TELEPHONE ENCOUNTER
Copied from CRM #[id number]. Topic: Appointments - Appointment Access  >> Jun 13, 2025  9:19 AM Reed wrote:  Type:  Needs Medical Advice / Req orders    Who Called: Pt  Would the patient rather a call back or a response via [facility]? Call  Best Call Back Number: [phone]   Additional Information: Pt missed today's appts for MRI and shunt setting and would like to reschedule. Unable to reschedule from finalized requests. Pls call back and adv. Thank you.

## 2025-06-18 RX ORDER — PANCRELIPASE 36000; 180000; 114000 [USP'U]/1; [USP'U]/1; [USP'U]/1
2 CAPSULE, DELAYED RELEASE PELLETS ORAL
Qty: 180 CAPSULE | Refills: 1 | Status: SHIPPED | OUTPATIENT
Start: 2025-06-18 | End: 2025-08-17

## 2025-06-18 NOTE — TELEPHONE ENCOUNTER
Copied from CRM #[id number]. Topic: Medications - Pharmacy  >> Jun 18, 2025  8:15 AM Kasie wrote:  Type:  Pharmacy Calling to Clarify an RX    Name of Caller:  Patient    Pharmacy Name:       [facility] - [place], [place] - [address]  [address] [facility] [address]  Phone: [phone] Fax: [phone]    Prescription Name:  lipase-protease-amylase (CREON) 36,000-114,000- 180,000 unit CpDR    What do they need to clarify?:  authorization    Best Call Back Number:  [phone] - Pharmacy    Additional Information:   States the pharmacist told her they need authorization to fill this prescription - please call - thank you

## 2025-06-30 ENCOUNTER — HOSPITAL ENCOUNTER (OUTPATIENT)
Dept: RADIOLOGY | Facility: HOSPITAL | Age: 45
Discharge: HOME | End: 2025-06-30
Payer: MEDICARE

## 2025-06-30 DIAGNOSIS — Z98.2 S/P VP SHUNT: ICD-10-CM

## 2025-06-30 DIAGNOSIS — M54.2 CERVICALGIA: ICD-10-CM

## 2025-06-30 PROCEDURE — 76000 FLUOROSCOPY <1 HR PHYS/QHP: CPT | Mod: 26,,, | Performed by: RADIOLOGY

## 2025-06-30 PROCEDURE — 72141 MRI NECK SPINE W/O DYE: CPT | Mod: 26,,, | Performed by: RADIOLOGY

## 2025-06-30 PROCEDURE — 76000 FLUOROSCOPY <1 HR PHYS/QHP: CPT | Mod: TC,PO

## 2025-06-30 PROCEDURE — 72141 MRI NECK SPINE W/O DYE: CPT | Mod: TC,PO

## 2025-07-24 ENCOUNTER — TELEPHONE (OUTPATIENT)
Dept: NEUROLOGY | Facility: CLINIC | Age: 45
End: 2025-07-24
Payer: MEDICARE

## 2025-07-24 NOTE — TELEPHONE ENCOUNTER
Copied from CRM #[id number]. Topic: General Inquiry - Return Call  >> Jul 24, 2025 11:30 AM Jeferson wrote:  Type:  Patient Returning Call    Who Called:patient  Who Left Message for Patient:nurse  Does the patient know what this is regarding?:please call/ no other information given  Would the patient rather a call back or a response via [facility]?   Best Call Back Number:[phone]  Additional Information:

## 2025-07-28 ENCOUNTER — TELEPHONE (OUTPATIENT)
Dept: NEUROLOGY | Facility: CLINIC | Age: 45
End: 2025-07-28
Payer: MEDICARE

## 2025-07-28 NOTE — TELEPHONE ENCOUNTER
Copied from CRM #[id number]. Topic: Appointments - Appointment Access  >> Jul 28, 2025  8:13 AM Mekhi wrote:  Type:   Apoointment Request    Caller is requesting a appointment.  Caller declined first available appointment listed below.  Caller will not accept being placed on the waitlist and is requesting a message be sent to doctor.  Name of Caller:Pt  When is the first available appointment?Dept Book  Symptoms:Botox  Would the patient rather a call back or a response via [facility]? call  Best Call Back Number:[phone]  Additional Information: PT would like to schedule a Botox appt. Please advise

## 2025-07-31 ENCOUNTER — OFFICE VISIT (OUTPATIENT)
Dept: PAIN MEDICINE | Facility: CLINIC | Age: 45
End: 2025-07-31
Payer: MEDICARE

## 2025-07-31 VITALS
HEART RATE: 69 BPM | DIASTOLIC BLOOD PRESSURE: 72 MMHG | WEIGHT: 265.88 LBS | SYSTOLIC BLOOD PRESSURE: 124 MMHG | BODY MASS INDEX: 57.53 KG/M2

## 2025-07-31 DIAGNOSIS — M47.816 LUMBAR SPONDYLOSIS: ICD-10-CM

## 2025-07-31 DIAGNOSIS — G89.4 CHRONIC PAIN DISORDER: ICD-10-CM

## 2025-07-31 DIAGNOSIS — Z02.89 PAIN MANAGEMENT CONTRACT SIGNED: ICD-10-CM

## 2025-07-31 DIAGNOSIS — M54.16 LUMBAR RADICULOPATHY: ICD-10-CM

## 2025-07-31 DIAGNOSIS — G89.4 CHRONIC PAIN DISORDER: Primary | ICD-10-CM

## 2025-07-31 DIAGNOSIS — M54.2 CERVICALGIA: ICD-10-CM

## 2025-07-31 DIAGNOSIS — E66.01 MORBID OBESITY WITH BMI OF 60.0-69.9, ADULT: ICD-10-CM

## 2025-07-31 DIAGNOSIS — M54.12 CERVICAL RADICULOPATHY: Primary | ICD-10-CM

## 2025-07-31 PROCEDURE — 99214 OFFICE O/P EST MOD 30 MIN: CPT | Mod: PBBFAC,PO

## 2025-07-31 PROCEDURE — 99999 PR PBB SHADOW E&M-EST. PATIENT-LVL IV: CPT | Mod: PBBFAC,,,

## 2025-07-31 RX ORDER — HYDROCODONE BITARTRATE AND ACETAMINOPHEN 5; 325 MG/1; MG/1
1 TABLET ORAL
Qty: 30 TABLET | Refills: 0 | Status: SHIPPED | OUTPATIENT
Start: 2025-07-31 | End: 2025-08-30

## 2025-07-31 RX ORDER — CYCLOBENZAPRINE HCL 10 MG
TABLET ORAL
Qty: 90 TABLET | Refills: 2 | Status: SHIPPED | OUTPATIENT
Start: 2025-07-31

## 2025-07-31 RX ORDER — HYDROCODONE BITARTRATE AND ACETAMINOPHEN 5; 325 MG/1; MG/1
1 TABLET ORAL
Qty: 30 TABLET | Refills: 0 | Status: SHIPPED | OUTPATIENT
Start: 2025-09-29 | End: 2025-10-29

## 2025-07-31 RX ORDER — HYDROCODONE BITARTRATE AND ACETAMINOPHEN 5; 325 MG/1; MG/1
1 TABLET ORAL
Qty: 30 TABLET | Refills: 0 | Status: SHIPPED | OUTPATIENT
Start: 2025-08-30 | End: 2025-09-29

## 2025-07-31 RX ORDER — METHYLPREDNISOLONE 4 MG/1
TABLET ORAL
Qty: 21 EACH | Refills: 0 | Status: SHIPPED | OUTPATIENT
Start: 2025-07-31 | End: 2025-08-21

## 2025-07-31 NOTE — PROGRESS NOTES
This note was completed with dictation software and grammatical errors may exist.    CC: Back pain    HPI: The patient is a 44-year-old woman with a history of Chiari malformation, Hadley syndrome, pseudotumor cerebri with shunt, morbid obesity who presents in referral from Dr. Velasco for neck pain.  She returns for follow-up with some worsening neck pain with radiation down right arm.  Denies any new numbness, weakness or any new changes to her bowel bladder function.  She also reports some continued lower back pain but at baseline.  No new numbness or weakness in her lower extremities.  She continues to take Flexeril and hydrocodone 5/325 mg once daily with no ill side effects or adverse events.  She continues to work on losing weight.        Pain intervention history: She was seeing Dr. Gerson Renteria, pain management and until recently had been taking hydrocodone 5/325 once a day in addition to Flexeril 3 times a day and gabapentin 600 mg 3 times a day.  She apparently tested positive for Valium and so was dismissed from his practice. She is status post C7-T1 cervical interlaminar epidural steroid injection on 10/4/17 with 100% relief lasting 2 weeks.  She is status post a second cervical TABITHA on 11/14/17 with almost complete relief again but only lasting 2-3 weeks. She is status post bilateral L2, 3 and 4 medial branch radiofrequency ablation on 09/21/2018 with 75% relief.   She is status post C7-T1 interlaminar epidural steroid injection on 10/01/2020 with 100% relief of her neck pain.   She is status post cervical C3-C7 posterior approach laminectomy with Dr. Shari MD on 6/08/23    Spine surgeries:She is status post cervical C3-C7 posterior approach laminectomy with Dr. Shari MD on 6/08/23.     Antineuropathics:  NSAIDs:  Physical therapy:  Antidepressants:  Muscle relaxers:  Flexeril 10 mg up to 3 times daily  Opioids:  Hydrocodone 5/325 once  daily  Antiplatelets/Anticoagulants:            ROS: She reports weight loss, headaches, easy bruising and back pain.  Balance of review of systems is negative.    Past Medical History:   Diagnosis Date    Anticoagulant long-term use     Asthma     Bronchitis     Chiari malformation type I     Chronic headache     Diabetes mellitus     GERD (gastroesophageal reflux disease)     Graves disease     Graves disease     Hyperlipidemia     Hypertension     Murmur, heart     NPH (normal pressure hydrocephalus)     Obesity     MANUEL on CPAP     Pseudotumor cerebri     Thyroid disease     Hadley syndrome        Past Surgical History:   Procedure Laterality Date    CARPAL TUNNEL RELEASE      CERVICAL SPINE SURGERY      CHOLECYSTECTOMY      COLONOSCOPY  2022    2 years ago; Dr. Boyle - normal    COLONOSCOPY N/A 11/4/2024    Procedure: COLONOSCOPY;  Surgeon: Wei Thapa MD;  Location: [facility];  Service: Gastroenterology;  Laterality: N/A;    COLONOSCOPY N/A 2/6/2025    Procedure: COLONOSCOPY;  Surgeon: Wei Thapa MD;  Location: [facility];  Service: Gastroenterology;  Laterality: N/A;    EAR TUBE REMOVAL Bilateral     EPIDURAL STEROID INJECTION INTO CERVICAL SPINE N/A 10/01/2020    Procedure: Injection-steroid-epidural-cervical C7- T1 interlaminar;  Surgeon: Santana Pak MD;  Location: [facility];  Service: Pain Management;  Laterality: N/A;    ESOPHAGOGASTRODUODENOSCOPY N/A 1/13/2025    Procedure: EGD (ESOPHAGOGASTRODUODENOSCOPY);  Surgeon: Wei Thapa MD;  Location: [facility];  Service: Gastroenterology;  Laterality: N/A;    EYE SURGERY      strabismus    INJECTION OF FACET JOINT Bilateral 05/23/2018    Procedure: INJECTION-FACET L3/4 and L4/5;  Surgeon: Santana Pak MD;  Location: [facility] OR;  Service: Pain Management;  Laterality: Bilateral;  sacralization of L5 and a rudimentary disc space    MYRINGOTOMY W/ TUBES      POSTERIOR CERVICAL LAMINECTOMY N/A 06/08/2023    Procedure:  LAMINECTOMY, SPINE, CERVICAL, POSTERIOR APPROACH    C3-C7 LAMINOPLASTY;  Surgeon: Sunita Mccarthy MD;  Location: [facility] OR;  Service: Neurosurgery;  Laterality: N/A;    RADIOFREQUENCY ABLATION OF LUMBAR MEDIAL BRANCH NERVE AT SINGLE LEVEL Bilateral 09/21/2018    Procedure: RADIOFREQUENCY ABLATION, NERVE, SPINAL, LUMBAR, MEDIAL BRANCH, L2, L3, L4;  Surgeon: Santana Pak MD;  Location: [facility] OR;  Service: Pain Management;  Laterality: Bilateral;    TONSILLECTOMY      UPPER GASTROINTESTINAL ENDOSCOPY  2021    normal    VENTRICULOPERITONEAL SHUNT         Social History     Socioeconomic History    Marital status: Single   Tobacco Use    Smoking status: Never     Passive exposure: Current    Smokeless tobacco: Never   Substance and Sexual Activity    Alcohol use: No    Drug use: Yes     Types: Hydrocodone         Medications/Allergies: See med card    Vitals:    07/31/25 0837   BP: 124/72   Pulse: 69   Weight: 120.6 kg (265 lb 14 oz)   PainSc: 10-Worst pain ever   PainLoc: Back         Body mass index is 57.53 kg/m².        7/31/2025     8:36 AM 4/24/2025    10:30 AM 1/28/2025    11:24 AM   Last 3 PDI Scores   Pain Disability Index (PDI) 38 24 42         Physical exam:    Gen: A and O x3, pleasant, well-groomed  Musculoskeletal:  antalgic gait, waddling gait    Neuro:    Iliopsoas Quadriceps Knee  Flexion Tibialis  anterior Gastro- cnemius EHL   Lower: R 5/5 5/5 5/5 5/5 5/5 5/5    L 5/5 5/5 5/5 5/5 5/5 5/5       Intact and symmetrical to light touch and pinprick in L1-S1 dermatomes bilaterally.    Lumbar spine:  Lumbar spine: ROM is mildly reduced with flexion and extension with increased pain on both flexion and extension.    Derrick's test causes no increased pain on either side.    Supine straight leg raise is negative bilaterally.    Internal and external rotation of the hip causes no increased pain on either side.  Myofascial exam: No tenderness to palpation across lumbar paraspinous  muscles.      Neuro:  Motor:    Right Left   C4 Shoulder Abduction  5  5   C5 Elbow Flexion    5  5   C6 Wrist Extension  5  5   C7 Elbow Extension   5  5   C8/T1 Hand Intrinsics   5  5   C8 First Dorsal Interosseus  5  5   C8 Abductor Pollicus Brevis  5  5       Sensory: Intact and symmetrical to light touch and pinprick in C2-T1 dermatomes bilaterally.  Cervical spine: ROM is reduced with flexion, extension and lateral rotation without increased pain.  Myofascial exam: No Tenderness to palpation across cervical paraspinous region bilaterally.        Imaging:  MRI from 2/9/17 cervical spine demonstrates congenitally narrowed canal with disc bulging most prominently at C4/5 to the right side causing anterior cord compression but no signal changes.  There is narrowing of the canal at C3/4 to a lesser degree.    03/08/2023 MRI cervical spine  C2-C3: Developmental fusion.  The spinal canal and foramina are patent..  C3-C4: Shallow broad-based disc osteophyte complex and mild facet arthrosis. No substantial degenerative spinal canal or foraminal narrowing.  C4-C5: Shallow broad-based disc osteophyte complex and mild bilateral facet arthrosis more pronounced on the left but no substantial degenerative spinal canal or foraminal narrowing..  C5-C6: Redemonstrated mild degenerative disc height loss and broad-based disc osteophyte complex lateralizing to the right contributing to moderate right central spinal canal narrowing. Similar mild flattening of the ventral cord surface. Uncovertebral spurring and facet arthrosis contribute to mild bilateral foraminal narrowing..  C6-C7: Shallow broad-based disc osteophyte complex.  No substantial degenerative spinal canal or foraminal narrowing..  C7-T1: Within normal limits.  The spinal canal and foramina are patent..     Soft tissues: The visualized paraspinal soft tissues are within normal limits.  Note is made of medialized internal carotid arteries at the C2 level to  C5.    3/26/18 MRI L-spine:  T11-T12: Unremarkable.  There is no spinal canal or significant foraminal stenosis.   T12-L1: Unremarkable.  There is no spinal canal or significant foraminal stenosis.   L1-2: There is mild facet joint arthropathy.  There is no spinal canal or significant foraminal stenosis.   L2-3: There is mild facet joint arthropathy.  There is no spinal canal or significant foraminal stenosis.   L3-4: There is minimal bulging of the annulus.  There is mild-to-moderate facet joint arthropathy with ligamentum flavum thickening and trace left facet joint effusion.  There is mildly prominent dorsal epidural fat.  There is no spinal canal or significant foraminal stenosis.   L4-5: There is mild disc space narrowing and disc desiccation.  There is moderate-to-marked right and mild-to-moderate left facet joint arthropathy with ligamentum flavum thickening.  There is a small left facet joint effusion.  There is minimal bulging of the annulus.  There is no spinal canal stenosis.  There is mild right superior foraminal recess stenosis.   L5-S1: This is a rudimentary disc space due to sacralization of L5.  There is no spinal canal or significant foraminal stenosis.    06/30/2025 MRI C-spine  C2-C3: Developmental fusion.  Osseous hypertrophy contributes to mild left foraminal narrowing.  The spinal canal and right foramen are patent..  C3-C4: Right-sided laminoplasty changes.  The spinal canal and foramina are patent noting mild bilateral facet arthrosis..  C4-C5: Right-sided laminoplasty changes.  Moderate left and mild right-sided facet arthrosis without substantial spinal canal or foraminal narrowing..  C5-C6: Right-sided laminoplasty changes.  Broad-based disc osteophyte complex/protrusion lateralizing to the right mildly narrows the spinal canal flattening the ventral thecal sac.  Facet arthrosis and uncovertebral spurring produce suspected moderate bilateral foraminal narrowing.  C6-C7: Mild facet  arthrosis and uncovertebral spurring producing suspected mild bilateral foraminal narrowing.  The spinal canal is patent..  C7-T1: Ligamentum flavum thickening and shallow broad-based disc osteophyte complex producing no more than mild narrowing of the spinal canal.  Mild facet arthrosis.  The foramina are patent..    Assessment:   The patient is a 44-year-old woman with a history of Chiari malformation, pseudotumor cerebri with shunt, morbid obesity who presents in referral from Dr. Velasco for neck pain.    1. Cervical radiculopathy  methylPREDNISolone (MEDROL DOSEPACK) 4 mg tablet    cyclobenzaprine (FLEXERIL) 10 MG tablet      2. Lumbar radiculopathy  cyclobenzaprine (FLEXERIL) 10 MG tablet      3. Chronic pain disorder        4. Cervicalgia        5. Morbid obesity with BMI of 60.0-69.9, adult        6. Pain management contract signed        7. Lumbar spondylosis                    Plan:    We reviewed her updated cervical MRI together in office today and she does have some multilevel mild-to-moderate foraminal narrowing but no severe narrowing.  Discussed oral Medrol Dosepak versus cervical epidural, at this time we will maximize conservative management with oral Medrol Dosepak.  Refill for Flexeril provided today.  Refills for hydrocodone 5/325 mg 30 tablets to take up to once daily sent to Dr. Pak today for approval. I have reviewed the [place] Board of Pharmacy website and there are no abberancies.    Follow up in 3 months or sooner if needed

## 2025-08-22 ENCOUNTER — PROCEDURE VISIT (OUTPATIENT)
Dept: NEUROLOGY | Facility: CLINIC | Age: 45
End: 2025-08-22
Payer: MEDICARE

## 2025-08-22 VITALS
RESPIRATION RATE: 17 BRPM | WEIGHT: 265.88 LBS | BODY MASS INDEX: 57.36 KG/M2 | DIASTOLIC BLOOD PRESSURE: 72 MMHG | SYSTOLIC BLOOD PRESSURE: 107 MMHG | HEIGHT: 57 IN | TEMPERATURE: 98 F | HEART RATE: 69 BPM

## 2025-08-22 DIAGNOSIS — G43.719 INTRACTABLE CHRONIC MIGRAINE WITHOUT AURA AND WITHOUT STATUS MIGRAINOSUS: Primary | ICD-10-CM

## 2025-09-04 ENCOUNTER — TELEPHONE (OUTPATIENT)
Dept: NEUROLOGY | Facility: CLINIC | Age: 45
End: 2025-09-04
Payer: MEDICARE

## 2025-09-04 RX ORDER — RIMEGEPANT SULFATE 75 MG/75MG
TABLET, ORALLY DISINTEGRATING ORAL
Qty: 8 TABLET | Refills: 11 | Status: SHIPPED | OUTPATIENT
Start: 2025-09-04

## (undated) DEVICE — TRAY NERVE BLOCK

## (undated) DEVICE — SPONGE DERMACEA 4X4IN 12PLY

## (undated) DEVICE — APPLICATOR CHLORAPREP CLR 10.5

## (undated) DEVICE — GLOVE SURGICAL LATEX SZ 7

## (undated) DEVICE — SEE MEDLINE ITEM 152622

## (undated) DEVICE — PAD ELECTROSURGICAL PAT PLATE

## (undated) DEVICE — CANNULA CVD 100MM X 20G

## (undated) DEVICE — SYR GLASS 5CC LUER LOK

## (undated) DEVICE — MARKER SKIN STND TIP BLUE BARR

## (undated) DEVICE — NDL 18GA X1 1/2 REG BEVEL

## (undated) DEVICE — NDL TUOHY EPIDURAL 20G X 3.5

## (undated) DEVICE — NDL SPINAL SPINOCAN 22GX3.5